# Patient Record
Sex: FEMALE | Race: WHITE | NOT HISPANIC OR LATINO | Employment: OTHER | URBAN - METROPOLITAN AREA
[De-identification: names, ages, dates, MRNs, and addresses within clinical notes are randomized per-mention and may not be internally consistent; named-entity substitution may affect disease eponyms.]

---

## 2023-04-21 ENCOUNTER — TELEPHONE (OUTPATIENT)
Dept: PRIMARY CARE | Facility: CLINIC | Age: 78
End: 2023-04-21

## 2023-04-21 DIAGNOSIS — J44.9 CHRONIC OBSTRUCTIVE PULMONARY DISEASE, UNSPECIFIED COPD TYPE (MULTI): Primary | ICD-10-CM

## 2023-04-21 DIAGNOSIS — R09.02 HYPOXIA: ICD-10-CM

## 2023-04-21 NOTE — TELEPHONE ENCOUNTER
Patient called stating she is requesting her oxygen liter flow up to go up. Yareli from [employer] states she needs an order faxed to [phone]. Please order and I can fax. Thanks!

## 2023-09-12 ENCOUNTER — TELEPHONE (OUTPATIENT)
Dept: PRIMARY CARE | Facility: CLINIC | Age: 78
End: 2023-09-12

## 2023-09-12 DIAGNOSIS — J44.1 COPD EXACERBATION (MULTI): Primary | ICD-10-CM

## 2023-09-12 RX ORDER — AMOXICILLIN AND CLAVULANATE POTASSIUM 875; 125 MG/1; MG/1
875 TABLET, FILM COATED ORAL 2 TIMES DAILY
Qty: 14 TABLET | Refills: 0 | Status: SHIPPED | OUTPATIENT
Start: 2023-09-12 | End: 2023-09-19

## 2023-09-12 RX ORDER — AMOXICILLIN AND CLAVULANATE POTASSIUM 875; 125 MG/1; MG/1
TABLET, FILM COATED ORAL
COMMUNITY
Start: 2023-02-02 | End: 2023-09-12 | Stop reason: SDUPTHER

## 2023-09-12 NOTE — TELEPHONE ENCOUNTER
Is not feeling well can you prescribe and antibiotic, lungs are full of phlegm and she is having a lot of trouble just moving. No sulfur. 126/82 was her BP, [facility].

## 2023-11-20 ENCOUNTER — APPOINTMENT (OUTPATIENT)
Dept: RADIOLOGY | Facility: HOSPITAL | Age: 78
DRG: 193 | End: 2023-11-20
Payer: COMMERCIAL

## 2023-11-20 ENCOUNTER — HOSPITAL ENCOUNTER (INPATIENT)
Facility: HOSPITAL | Age: 78
LOS: 6 days | Discharge: HOME HEALTH | DRG: 193 | End: 2023-11-27
Attending: EMERGENCY MEDICINE | Admitting: INTERNAL MEDICINE
Payer: COMMERCIAL

## 2023-11-20 DIAGNOSIS — J44.1 COPD WITH ACUTE EXACERBATION (MULTI): Primary | ICD-10-CM

## 2023-11-20 DIAGNOSIS — R06.09 DYSPNEA ON EXERTION: ICD-10-CM

## 2023-11-20 DIAGNOSIS — J18.9 PNEUMONIA OF RIGHT LOWER LOBE DUE TO INFECTIOUS ORGANISM: ICD-10-CM

## 2023-11-20 PROBLEM — R53.1 GENERALIZED WEAKNESS: Status: ACTIVE | Noted: 2023-11-20

## 2023-11-20 PROBLEM — N13.30 HYDRONEPHROSIS OF LEFT KIDNEY: Status: ACTIVE | Noted: 2023-11-20

## 2023-11-20 PROBLEM — N20.1 LEFT URETERAL CALCULUS: Status: ACTIVE | Noted: 2023-11-20

## 2023-11-20 PROBLEM — I35.0 MODERATE AORTIC STENOSIS: Chronic | Status: ACTIVE | Noted: 2023-11-20

## 2023-11-20 PROBLEM — J96.21 ACUTE ON CHRONIC HYPOXIC RESPIRATORY FAILURE (MULTI): Status: ACTIVE | Noted: 2023-11-20

## 2023-11-20 LAB
ALBUMIN SERPL BCP-MCNC: 4.2 G/DL (ref 3.4–5)
ALP SERPL-CCNC: 64 U/L (ref 33–136)
ALT SERPL W P-5'-P-CCNC: 15 U/L (ref 7–45)
ANION GAP SERPL CALC-SCNC: 11 MMOL/L (ref 10–20)
APPEARANCE UR: ABNORMAL
AST SERPL W P-5'-P-CCNC: 19 U/L (ref 9–39)
BASOPHILS # BLD AUTO: 0.05 X10*3/UL (ref 0–0.1)
BASOPHILS NFR BLD AUTO: 0.4 %
BILIRUB SERPL-MCNC: 0.6 MG/DL (ref 0–1.2)
BILIRUB UR STRIP.AUTO-MCNC: NEGATIVE MG/DL
BNP SERPL-MCNC: 180 PG/ML (ref 0–99)
BUN SERPL-MCNC: 16 MG/DL (ref 6–23)
CALCIUM SERPL-MCNC: 9.7 MG/DL (ref 8.6–10.3)
CAOX CRY #/AREA UR COMP ASSIST: ABNORMAL /HPF
CARDIAC TROPONIN I PNL SERPL HS: 21 NG/L (ref 0–13)
CHLORIDE SERPL-SCNC: 95 MMOL/L (ref 98–107)
CO2 SERPL-SCNC: 38 MMOL/L (ref 21–32)
COLOR UR: YELLOW
CREAT SERPL-MCNC: 0.68 MG/DL (ref 0.5–1.05)
EOSINOPHIL # BLD AUTO: 0.04 X10*3/UL (ref 0–0.4)
EOSINOPHIL NFR BLD AUTO: 0.3 %
ERYTHROCYTE [DISTWIDTH] IN BLOOD BY AUTOMATED COUNT: 13.1 % (ref 11.5–14.5)
FLUAV RNA RESP QL NAA+PROBE: NOT DETECTED
FLUBV RNA RESP QL NAA+PROBE: NOT DETECTED
GFR SERPL CREATININE-BSD FRML MDRD: 88 ML/MIN/1.73M*2
GLUCOSE SERPL-MCNC: 90 MG/DL (ref 74–99)
GLUCOSE UR STRIP.AUTO-MCNC: NEGATIVE MG/DL
HCT VFR BLD AUTO: 39.7 % (ref 36–46)
HGB BLD-MCNC: 11.9 G/DL (ref 12–16)
HOLD SPECIMEN: NORMAL
HYALINE CASTS #/AREA URNS AUTO: ABNORMAL /LPF
IMM GRANULOCYTES # BLD AUTO: 0.07 X10*3/UL (ref 0–0.5)
IMM GRANULOCYTES NFR BLD AUTO: 0.6 % (ref 0–0.9)
KETONES UR STRIP.AUTO-MCNC: ABNORMAL MG/DL
LACTATE SERPL-SCNC: 1.6 MMOL/L (ref 0.4–2)
LEUKOCYTE ESTERASE UR QL STRIP.AUTO: ABNORMAL
LYMPHOCYTES # BLD AUTO: 0.62 X10*3/UL (ref 0.8–3)
LYMPHOCYTES NFR BLD AUTO: 5.1 %
MCH RBC QN AUTO: 29.1 PG (ref 26–34)
MCHC RBC AUTO-ENTMCNC: 30 G/DL (ref 32–36)
MCV RBC AUTO: 97 FL (ref 80–100)
MONOCYTES # BLD AUTO: 0.96 X10*3/UL (ref 0.05–0.8)
MONOCYTES NFR BLD AUTO: 7.9 %
NEUTROPHILS # BLD AUTO: 10.39 X10*3/UL (ref 1.6–5.5)
NEUTROPHILS NFR BLD AUTO: 85.7 %
NITRITE UR QL STRIP.AUTO: NEGATIVE
NRBC BLD-RTO: 0 /100 WBCS (ref 0–0)
PH UR STRIP.AUTO: 5 [PH]
PLATELET # BLD AUTO: 249 X10*3/UL (ref 150–450)
POTASSIUM SERPL-SCNC: 3.5 MMOL/L (ref 3.5–5.3)
PROT SERPL-MCNC: 7 G/DL (ref 6.4–8.2)
PROT UR STRIP.AUTO-MCNC: ABNORMAL MG/DL
RBC # BLD AUTO: 4.09 X10*6/UL (ref 4–5.2)
RBC # UR STRIP.AUTO: NEGATIVE /UL
RBC #/AREA URNS AUTO: >20 /HPF
SARS-COV-2 RNA RESP QL NAA+PROBE: NOT DETECTED
SODIUM SERPL-SCNC: 140 MMOL/L (ref 136–145)
SP GR UR STRIP.AUTO: 1.02
UROBILINOGEN UR STRIP.AUTO-MCNC: <2 MG/DL
WBC # BLD AUTO: 12.1 X10*3/UL (ref 4.4–11.3)
WBC #/AREA URNS AUTO: ABNORMAL /HPF

## 2023-11-20 PROCEDURE — 99285 EMERGENCY DEPT VISIT HI MDM: CPT | Mod: 25 | Performed by: EMERGENCY MEDICINE

## 2023-11-20 PROCEDURE — 99223 1ST HOSP IP/OBS HIGH 75: CPT | Performed by: STUDENT IN AN ORGANIZED HEALTH CARE EDUCATION/TRAINING PROGRAM

## 2023-11-20 PROCEDURE — 2500000001 HC RX 250 WO HCPCS SELF ADMINISTERED DRUGS (ALT 637 FOR MEDICARE OP): Performed by: STUDENT IN AN ORGANIZED HEALTH CARE EDUCATION/TRAINING PROGRAM

## 2023-11-20 PROCEDURE — 96372 THER/PROPH/DIAG INJ SC/IM: CPT | Mod: 25

## 2023-11-20 PROCEDURE — 73590 X-RAY EXAM OF LOWER LEG: CPT | Mod: RIGHT SIDE | Performed by: RADIOLOGY

## 2023-11-20 PROCEDURE — 71046 X-RAY EXAM CHEST 2 VIEWS: CPT | Performed by: RADIOLOGY

## 2023-11-20 PROCEDURE — 36415 COLL VENOUS BLD VENIPUNCTURE: CPT | Performed by: EMERGENCY MEDICINE

## 2023-11-20 PROCEDURE — 94640 AIRWAY INHALATION TREATMENT: CPT

## 2023-11-20 PROCEDURE — 71046 X-RAY EXAM CHEST 2 VIEWS: CPT | Mod: FY

## 2023-11-20 PROCEDURE — 83880 ASSAY OF NATRIURETIC PEPTIDE: CPT | Performed by: EMERGENCY MEDICINE

## 2023-11-20 PROCEDURE — 84484 ASSAY OF TROPONIN QUANT: CPT | Performed by: EMERGENCY MEDICINE

## 2023-11-20 PROCEDURE — 2500000002 HC RX 250 W HCPCS SELF ADMINISTERED DRUGS (ALT 637 FOR MEDICARE OP, ALT 636 FOR OP/ED): Performed by: EMERGENCY MEDICINE

## 2023-11-20 PROCEDURE — 80053 COMPREHEN METABOLIC PANEL: CPT | Performed by: EMERGENCY MEDICINE

## 2023-11-20 PROCEDURE — 85025 COMPLETE CBC W/AUTO DIFF WBC: CPT | Performed by: EMERGENCY MEDICINE

## 2023-11-20 PROCEDURE — 96374 THER/PROPH/DIAG INJ IV PUSH: CPT | Mod: 59

## 2023-11-20 PROCEDURE — 87086 URINE CULTURE/COLONY COUNT: CPT | Mod: PORLAB | Performed by: EMERGENCY MEDICINE

## 2023-11-20 PROCEDURE — 87449 NOS EACH ORGANISM AG IA: CPT | Mod: PORLAB | Performed by: EMERGENCY MEDICINE

## 2023-11-20 PROCEDURE — 81001 URINALYSIS AUTO W/SCOPE: CPT | Performed by: EMERGENCY MEDICINE

## 2023-11-20 PROCEDURE — 73564 X-RAY EXAM KNEE 4 OR MORE: CPT | Mod: RIGHT SIDE | Performed by: RADIOLOGY

## 2023-11-20 PROCEDURE — 96365 THER/PROPH/DIAG IV INF INIT: CPT

## 2023-11-20 PROCEDURE — 2500000004 HC RX 250 GENERAL PHARMACY W/ HCPCS (ALT 636 FOR OP/ED): Performed by: STUDENT IN AN ORGANIZED HEALTH CARE EDUCATION/TRAINING PROGRAM

## 2023-11-20 PROCEDURE — 73070 X-RAY EXAM OF ELBOW: CPT | Mod: RIGHT SIDE | Performed by: RADIOLOGY

## 2023-11-20 PROCEDURE — 87040 BLOOD CULTURE FOR BACTERIA: CPT | Mod: PORLAB | Performed by: EMERGENCY MEDICINE

## 2023-11-20 PROCEDURE — 2500000002 HC RX 250 W HCPCS SELF ADMINISTERED DRUGS (ALT 637 FOR MEDICARE OP, ALT 636 FOR OP/ED): Performed by: STUDENT IN AN ORGANIZED HEALTH CARE EDUCATION/TRAINING PROGRAM

## 2023-11-20 PROCEDURE — 71260 CT THORAX DX C+: CPT

## 2023-11-20 PROCEDURE — 71260 CT THORAX DX C+: CPT | Performed by: RADIOLOGY

## 2023-11-20 PROCEDURE — 74177 CT ABD & PELVIS W/CONTRAST: CPT | Performed by: RADIOLOGY

## 2023-11-20 PROCEDURE — 73070 X-RAY EXAM OF ELBOW: CPT | Mod: RT,FY

## 2023-11-20 PROCEDURE — 2500000004 HC RX 250 GENERAL PHARMACY W/ HCPCS (ALT 636 FOR OP/ED): Performed by: EMERGENCY MEDICINE

## 2023-11-20 PROCEDURE — 96376 TX/PRO/DX INJ SAME DRUG ADON: CPT

## 2023-11-20 PROCEDURE — 87899 AGENT NOS ASSAY W/OPTIC: CPT | Mod: PORLAB | Performed by: EMERGENCY MEDICINE

## 2023-11-20 PROCEDURE — 96375 TX/PRO/DX INJ NEW DRUG ADDON: CPT

## 2023-11-20 PROCEDURE — 73590 X-RAY EXAM OF LOWER LEG: CPT | Mod: RT,FY

## 2023-11-20 PROCEDURE — 2550000001 HC RX 255 CONTRASTS: Performed by: EMERGENCY MEDICINE

## 2023-11-20 PROCEDURE — 87636 SARSCOV2 & INF A&B AMP PRB: CPT | Performed by: EMERGENCY MEDICINE

## 2023-11-20 PROCEDURE — 83605 ASSAY OF LACTIC ACID: CPT | Performed by: EMERGENCY MEDICINE

## 2023-11-20 PROCEDURE — 96367 TX/PROPH/DG ADDL SEQ IV INF: CPT

## 2023-11-20 PROCEDURE — 73564 X-RAY EXAM KNEE 4 OR MORE: CPT | Mod: RT,FY

## 2023-11-20 RX ORDER — ACETAMINOPHEN 650 MG/1
650 SUPPOSITORY RECTAL EVERY 4 HOURS PRN
Status: DISCONTINUED | OUTPATIENT
Start: 2023-11-20 | End: 2023-11-20

## 2023-11-20 RX ORDER — TALC
3 POWDER (GRAM) TOPICAL DAILY
Status: DISCONTINUED | OUTPATIENT
Start: 2023-11-20 | End: 2023-11-27 | Stop reason: HOSPADM

## 2023-11-20 RX ORDER — ACETAMINOPHEN 325 MG/1
650 TABLET ORAL EVERY 4 HOURS PRN
Status: DISCONTINUED | OUTPATIENT
Start: 2023-11-20 | End: 2023-11-20

## 2023-11-20 RX ORDER — GUAIFENESIN 600 MG/1
600 TABLET, EXTENDED RELEASE ORAL EVERY 12 HOURS PRN
Status: DISCONTINUED | OUTPATIENT
Start: 2023-11-20 | End: 2023-11-27 | Stop reason: HOSPADM

## 2023-11-20 RX ORDER — DOXYCYCLINE 100 MG/1
100 CAPSULE ORAL EVERY 12 HOURS SCHEDULED
Status: DISCONTINUED | OUTPATIENT
Start: 2023-11-20 | End: 2023-11-26

## 2023-11-20 RX ORDER — PANTOPRAZOLE SODIUM 40 MG/1
40 TABLET, DELAYED RELEASE ORAL
Status: DISCONTINUED | OUTPATIENT
Start: 2023-11-21 | End: 2023-11-27 | Stop reason: HOSPADM

## 2023-11-20 RX ORDER — IPRATROPIUM BROMIDE AND ALBUTEROL SULFATE 2.5; .5 MG/3ML; MG/3ML
3 SOLUTION RESPIRATORY (INHALATION)
Status: DISCONTINUED | OUTPATIENT
Start: 2023-11-20 | End: 2023-11-21

## 2023-11-20 RX ORDER — ONDANSETRON HYDROCHLORIDE 2 MG/ML
4 INJECTION, SOLUTION INTRAVENOUS EVERY 8 HOURS PRN
Status: DISCONTINUED | OUTPATIENT
Start: 2023-11-20 | End: 2023-11-27 | Stop reason: HOSPADM

## 2023-11-20 RX ORDER — PREDNISONE 5 MG/1
5 TABLET ORAL EVERY MORNING
Status: ON HOLD | COMMUNITY
End: 2023-11-27 | Stop reason: SDUPTHER

## 2023-11-20 RX ORDER — CEFTRIAXONE 2 G/50ML
2 INJECTION, SOLUTION INTRAVENOUS ONCE
Status: COMPLETED | OUTPATIENT
Start: 2023-11-20 | End: 2023-11-20

## 2023-11-20 RX ORDER — ACETAMINOPHEN 160 MG/5ML
650 SOLUTION ORAL EVERY 4 HOURS PRN
Status: DISCONTINUED | OUTPATIENT
Start: 2023-11-20 | End: 2023-11-20

## 2023-11-20 RX ORDER — SODIUM CHLORIDE, SODIUM LACTATE, POTASSIUM CHLORIDE, CALCIUM CHLORIDE 600; 310; 30; 20 MG/100ML; MG/100ML; MG/100ML; MG/100ML
75 INJECTION, SOLUTION INTRAVENOUS CONTINUOUS
Status: ACTIVE | OUTPATIENT
Start: 2023-11-20 | End: 2023-11-21

## 2023-11-20 RX ORDER — CEFTRIAXONE 2 G/50ML
2 INJECTION, SOLUTION INTRAVENOUS EVERY 24 HOURS
Status: DISCONTINUED | OUTPATIENT
Start: 2023-11-21 | End: 2023-11-26

## 2023-11-20 RX ORDER — METOPROLOL TARTRATE 25 MG/1
37.5 TABLET, FILM COATED ORAL 2 TIMES DAILY
Status: DISCONTINUED | OUTPATIENT
Start: 2023-11-20 | End: 2023-11-27 | Stop reason: HOSPADM

## 2023-11-20 RX ORDER — ONDANSETRON 4 MG/1
4 TABLET, FILM COATED ORAL EVERY 8 HOURS PRN
Status: DISCONTINUED | OUTPATIENT
Start: 2023-11-20 | End: 2023-11-27 | Stop reason: HOSPADM

## 2023-11-20 RX ORDER — PANTOPRAZOLE SODIUM 40 MG/10ML
40 INJECTION, POWDER, LYOPHILIZED, FOR SOLUTION INTRAVENOUS
Status: DISCONTINUED | OUTPATIENT
Start: 2023-11-21 | End: 2023-11-27 | Stop reason: HOSPADM

## 2023-11-20 RX ORDER — BUDESONIDE AND FORMOTEROL FUMARATE DIHYDRATE 160; 4.5 UG/1; UG/1
2 AEROSOL RESPIRATORY (INHALATION)
COMMUNITY
End: 2024-02-27

## 2023-11-20 RX ORDER — BISACODYL 5 MG
10 TABLET, DELAYED RELEASE (ENTERIC COATED) ORAL DAILY PRN
Status: DISCONTINUED | OUTPATIENT
Start: 2023-11-20 | End: 2023-11-27 | Stop reason: HOSPADM

## 2023-11-20 RX ORDER — VANCOMYCIN HYDROCHLORIDE 1 G/200ML
1000 INJECTION, SOLUTION INTRAVENOUS ONCE
Status: COMPLETED | OUTPATIENT
Start: 2023-11-20 | End: 2023-11-20

## 2023-11-20 RX ORDER — ACETAMINOPHEN 325 MG/1
650 TABLET ORAL EVERY 4 HOURS PRN
Status: DISCONTINUED | OUTPATIENT
Start: 2023-11-20 | End: 2023-11-27 | Stop reason: HOSPADM

## 2023-11-20 RX ORDER — VANCOMYCIN HYDROCHLORIDE 750 MG/150ML
750 INJECTION, SOLUTION INTRAVENOUS EVERY 12 HOURS
Status: DISCONTINUED | OUTPATIENT
Start: 2023-11-21 | End: 2023-11-21 | Stop reason: DRUGHIGH

## 2023-11-20 RX ORDER — POLYETHYLENE GLYCOL 3350 17 G/17G
17 POWDER, FOR SOLUTION ORAL DAILY
Status: DISCONTINUED | OUTPATIENT
Start: 2023-11-20 | End: 2023-11-27 | Stop reason: HOSPADM

## 2023-11-20 RX ORDER — HEPARIN SODIUM 5000 [USP'U]/ML
5000 INJECTION, SOLUTION INTRAVENOUS; SUBCUTANEOUS EVERY 8 HOURS
Status: DISCONTINUED | OUTPATIENT
Start: 2023-11-20 | End: 2023-11-27 | Stop reason: HOSPADM

## 2023-11-20 RX ORDER — IPRATROPIUM BROMIDE AND ALBUTEROL SULFATE 2.5; .5 MG/3ML; MG/3ML
3 SOLUTION RESPIRATORY (INHALATION) EVERY 20 MIN
Status: COMPLETED | OUTPATIENT
Start: 2023-11-20 | End: 2023-11-20

## 2023-11-20 RX ORDER — FENTANYL CITRATE 50 UG/ML
25 INJECTION, SOLUTION INTRAMUSCULAR; INTRAVENOUS ONCE
Status: COMPLETED | OUTPATIENT
Start: 2023-11-20 | End: 2023-11-20

## 2023-11-20 RX ORDER — BISACODYL 10 MG/1
10 SUPPOSITORY RECTAL DAILY PRN
Status: DISCONTINUED | OUTPATIENT
Start: 2023-11-20 | End: 2023-11-27 | Stop reason: HOSPADM

## 2023-11-20 RX ADMIN — ACETAMINOPHEN 650 MG: 325 TABLET ORAL at 17:30

## 2023-11-20 RX ADMIN — IPRATROPIUM BROMIDE AND ALBUTEROL SULFATE 3 ML: 2.5; .5 SOLUTION RESPIRATORY (INHALATION) at 09:53

## 2023-11-20 RX ADMIN — IPRATROPIUM BROMIDE AND ALBUTEROL SULFATE 3 ML: 2.5; .5 SOLUTION RESPIRATORY (INHALATION) at 10:00

## 2023-11-20 RX ADMIN — HEPARIN SODIUM 5000 UNITS: 5000 INJECTION INTRAVENOUS; SUBCUTANEOUS at 17:24

## 2023-11-20 RX ADMIN — VANCOMYCIN HYDROCHLORIDE 1000 MG: 1 INJECTION, SOLUTION INTRAVENOUS at 19:30

## 2023-11-20 RX ADMIN — IPRATROPIUM BROMIDE AND ALBUTEROL SULFATE 3 ML: 2.5; .5 SOLUTION RESPIRATORY (INHALATION) at 09:30

## 2023-11-20 RX ADMIN — FENTANYL CITRATE 25 MCG: 50 INJECTION INTRAMUSCULAR; INTRAVENOUS at 09:31

## 2023-11-20 RX ADMIN — CEFTRIAXONE SODIUM 2 G: 2 INJECTION, SOLUTION INTRAVENOUS at 12:55

## 2023-11-20 RX ADMIN — GUAIFENESIN 600 MG: 600 TABLET ORAL at 21:21

## 2023-11-20 RX ADMIN — Medication 3 MG: at 19:31

## 2023-11-20 RX ADMIN — METHYLPREDNISOLONE SODIUM SUCCINATE 125 MG: 125 INJECTION, POWDER, FOR SOLUTION INTRAMUSCULAR; INTRAVENOUS at 09:31

## 2023-11-20 RX ADMIN — DOXYCYCLINE 100 MG: 100 INJECTION, POWDER, LYOPHILIZED, FOR SOLUTION INTRAVENOUS at 13:54

## 2023-11-20 RX ADMIN — DOXYCYCLINE 100 MG: 100 CAPSULE ORAL at 21:21

## 2023-11-20 RX ADMIN — METOPROLOL TARTRATE 37.5 MG: 25 TABLET, FILM COATED ORAL at 21:21

## 2023-11-20 RX ADMIN — SODIUM CHLORIDE, POTASSIUM CHLORIDE, SODIUM LACTATE AND CALCIUM CHLORIDE 75 ML/HR: 600; 310; 30; 20 INJECTION, SOLUTION INTRAVENOUS at 17:24

## 2023-11-20 RX ADMIN — IOHEXOL 75 ML: 350 INJECTION, SOLUTION INTRAVENOUS at 12:18

## 2023-11-20 RX ADMIN — IPRATROPIUM BROMIDE AND ALBUTEROL SULFATE 3 ML: 2.5; .5 SOLUTION RESPIRATORY (INHALATION) at 19:31

## 2023-11-20 RX ADMIN — METHYLPREDNISOLONE SODIUM SUCCINATE 40 MG: 40 INJECTION, POWDER, LYOPHILIZED, FOR SOLUTION INTRAMUSCULAR; INTRAVENOUS at 17:23

## 2023-11-20 RX ADMIN — METHYLPREDNISOLONE SODIUM SUCCINATE 40 MG: 40 INJECTION, POWDER, LYOPHILIZED, FOR SOLUTION INTRAMUSCULAR; INTRAVENOUS at 21:21

## 2023-11-20 SDOH — SOCIAL STABILITY: SOCIAL INSECURITY: ABUSE: ADULT

## 2023-11-20 SDOH — SOCIAL STABILITY: SOCIAL INSECURITY: WERE YOU ABLE TO COMPLETE ALL THE BEHAVIORAL HEALTH SCREENINGS?: YES

## 2023-11-20 SDOH — SOCIAL STABILITY: SOCIAL INSECURITY: HAVE YOU HAD THOUGHTS OF HARMING ANYONE ELSE?: NO

## 2023-11-20 ASSESSMENT — PAIN DESCRIPTION - LOCATION: LOCATION: RIB CAGE

## 2023-11-20 ASSESSMENT — ENCOUNTER SYMPTOMS
DYSURIA: 0
CHILLS: 0
WEAKNESS: 1
WHEEZING: 1
BLOOD IN STOOL: 0
COLOR CHANGE: 0
NAUSEA: 0
DIZZINESS: 0
COUGH: 1
HEMATURIA: 0
SLEEP DISTURBANCE: 0
ABDOMINAL PAIN: 0
PHOTOPHOBIA: 0
VOMITING: 0
SHORTNESS OF BREATH: 1
LIGHT-HEADEDNESS: 0
PALPITATIONS: 0
FEVER: 0
FATIGUE: 1
POLYDIPSIA: 0
TREMORS: 0
CONFUSION: 0

## 2023-11-20 ASSESSMENT — PAIN SCALES - GENERAL
PAINLEVEL_OUTOF10: 2
PAINLEVEL_OUTOF10: 2

## 2023-11-20 ASSESSMENT — ACTIVITIES OF DAILY LIVING (ADL)
TOILETING: INDEPENDENT
BATHING: NEEDS ASSISTANCE
ADEQUATE_TO_COMPLETE_ADL: YES
FEEDING YOURSELF: INDEPENDENT
HEARING - RIGHT EAR: FUNCTIONAL
WALKS IN HOME: INDEPENDENT
PATIENT'S MEMORY ADEQUATE TO SAFELY COMPLETE DAILY ACTIVITIES?: YES
HEARING - LEFT EAR: FUNCTIONAL
JUDGMENT_ADEQUATE_SAFELY_COMPLETE_DAILY_ACTIVITIES: YES
GROOMING: INDEPENDENT
DRESSING YOURSELF: INDEPENDENT

## 2023-11-20 ASSESSMENT — LIFESTYLE VARIABLES
AUDIT-C TOTAL SCORE: 0
HAVE PEOPLE ANNOYED YOU BY CRITICIZING YOUR DRINKING: NO
HOW MANY STANDARD DRINKS CONTAINING ALCOHOL DO YOU HAVE ON A TYPICAL DAY: PATIENT DOES NOT DRINK
HAVE YOU EVER FELT YOU SHOULD CUT DOWN ON YOUR DRINKING: NO
AUDIT-C TOTAL SCORE: 0
HOW OFTEN DO YOU HAVE A DRINK CONTAINING ALCOHOL: NEVER
HOW OFTEN DO YOU HAVE 6 OR MORE DRINKS ON ONE OCCASION: NEVER
EVER HAD A DRINK FIRST THING IN THE MORNING TO STEADY YOUR NERVES TO GET RID OF A HANGOVER: NO
REASON UNABLE TO ASSESS: NO
SKIP TO QUESTIONS 9-10: 1
EVER FELT BAD OR GUILTY ABOUT YOUR DRINKING: NO

## 2023-11-20 ASSESSMENT — COLUMBIA-SUICIDE SEVERITY RATING SCALE - C-SSRS
6. HAVE YOU EVER DONE ANYTHING, STARTED TO DO ANYTHING, OR PREPARED TO DO ANYTHING TO END YOUR LIFE?: NO
1. IN THE PAST MONTH, HAVE YOU WISHED YOU WERE DEAD OR WISHED YOU COULD GO TO SLEEP AND NOT WAKE UP?: NO
2. HAVE YOU ACTUALLY HAD ANY THOUGHTS OF KILLING YOURSELF?: NO

## 2023-11-20 ASSESSMENT — COGNITIVE AND FUNCTIONAL STATUS - GENERAL
DAILY ACTIVITIY SCORE: 23
MOBILITY SCORE: 24
PATIENT BASELINE BEDBOUND: NO
HELP NEEDED FOR BATHING: A LITTLE

## 2023-11-20 ASSESSMENT — PATIENT HEALTH QUESTIONNAIRE - PHQ9
2. FEELING DOWN, DEPRESSED OR HOPELESS: NOT AT ALL
SUM OF ALL RESPONSES TO PHQ9 QUESTIONS 1 & 2: 0
1. LITTLE INTEREST OR PLEASURE IN DOING THINGS: NOT AT ALL

## 2023-11-20 ASSESSMENT — PAIN DESCRIPTION - ORIENTATION: ORIENTATION: RIGHT

## 2023-11-20 ASSESSMENT — PAIN - FUNCTIONAL ASSESSMENT: PAIN_FUNCTIONAL_ASSESSMENT: 0-10

## 2023-11-20 NOTE — PROGRESS NOTES
"Vancomycin Dosing by Pharmacy- INITIAL    Irish Davis is a 78 y.o. year old female who Pharmacy has been consulted for vancomycin dosing for other sepsis . Based on the patient's indication and renal status this patient will be dosed based on a goal AUC of 400-600.     Renal function is currently stable.    Visit Vitals  /87 (BP Location: Left arm, Patient Position: Lying)   Pulse (!) 124   Temp 36.6 °C (97.9 °F) (Temporal)   Resp (!) 28        Lab Results   Component Value Date    CREATININE 0.68 11/20/2023    CREATININE 0.68 09/12/2023    CREATININE 0.76 11/02/2021    CREATININE 0.58 07/20/2020        Patient weight is No results found for: \"PTWEIGHT\"    No results found for: \"CULTURE\"     No intake/output data recorded.  [unfilled]    Lab Results   Component Value Date    PATIENTTEMP 37.0 09/13/2023          Assessment/Plan     Patient will be given a loading dose of 1000 mg.  Will initiate vancomycin maintenance,  750 mg every 12 hours.    This dosing regimen is predicted by [facility] to result in the following pharmacokinetic parameters:  Loading dose: 1000 mg at 18:00 11/20/2023.  Regimen: 750 mg IV every 12 hours.  Start time: 17:16 on 11/20/2023  Exposure target: AUC24 (range)400-600 mg/L.hr   AUC24,ss: 591 mg/L.hr  Probability of AUC24 > 400: 87 %  Ctrough,ss: 19.4 mg/L  Probability of Ctrough,ss > 20: 47 %  Probability of nephrotoxicity (Lodise MINOO 2009): 16 %      Follow-up level will be ordered on 11/21 at 5:00 unless clinically indicated sooner.  Will continue to monitor renal function daily while on vancomycin and order serum creatinine at least every 48 hours if not already ordered.  Follow for continued vancomycin needs, clinical response, and signs/symptoms of toxicity.       Marco Antonio Mora, PharmD       "

## 2023-11-20 NOTE — H&P
History Of Present Illness:  Irish Davis is a 78 y.o. female with PMHx s/f HTN, HLD, moderate aortic stenosis, chronic bilateral lower extremity edema (on as needed Lasix), COPD with chronic hypoxic respiratory failure (baseline 3-4 L nasal cannula), presenting with worsening shortness of breath, cough, wheezing, congestion, weakness following a fall approximately 1 week ago.  Patient reports that in general, she has been worsening with her weakness over the last few months, but otherwise in her typical state of health.  Unfortunately, this changed after she got up to ambulate about a week ago and on return from the bathroom, caught her toe against the floor tile and fell onto her right side (no head strike, no loss of consciousness, no thinners).  She was able to get up and ambulate with the help of her  following this, but has some fairly persistent right knee, right elbow, right lateral chest discomfort since the fall.  She also describes that over the last 5 days in particular, she has noticed that her breathing has gotten much worse from her baseline.  She has had a productive cough, worsening shortness of breath at rest, and has felt generally unwell.  Her home inhalers have not helped with her symptoms.  She does admit that multiple family members she has seen recently have had upper respiratory illnesses with productive coughs as well.  She denies any overt chest pain or pressure, describes chronic lower extremity edema for which she did take Lasix yesterday with improvement, no dizziness or lightheadedness, nausea, vomiting, abdominal pain, focal or lateralizing sensorimotor deficits, flank pain, changes in urine output, dysuria, diarrhea, hematuria.    ED Course (Summary):   Vitals on presentation: T99.5, /88, HR 91, RR 20, SPO2 96% 4 L nasal cannula  CBC with WBC 12.1, Hb 11.9, platelets 249  Chemistries with glucose 90, sodium 140, testing 3.5, BUN 16, serum creatinine 0.68  Lactate  1.6  High-sensitivity troponin 21; ECG without acute ischemic changes  Viral panel negative  UA with trace leukocyte esterase, 11-20 WBCs  Imaging: No acute traumatic injuries.  CXR with a left lower lobe pneumonia.  CT chest abdomen pelvis with the same, but also notable for a large obstructing left ureteral calculus with associated severe hydronephrosis  Urine culture and blood culture sent from the ED.  Urology consulted from the ED.  Patient admitted to medicine.    ED Course:  ED Course as of 11/20/23 2128 Mon Nov 20, 2023   1602 Patient CT scan showing no acute traumatic injury, does show consolidation of the right lower lobe consistent with pneumonia, less likely contusion.  Given patient's cough, wheezing, subjective fevers, leukocytosis seems more consistent with an infectious etiology.  Patient started on ceftriaxone, doxycycline (given erythromycin allergy.)  Did discuss transfer with the patient, she would prefer to remain at [facility] for the duration of her care. [BR]      ED Course User Index  [BR] Panfilo Orr MD         Diagnoses as of 11/20/23 2128   COPD with acute exacerbation (CMS/[facility])   Pneumonia of right lower lobe due to infectious organism     Relevant Results  Results for orders placed or performed during the hospital encounter of 11/20/23 (from the past 24 hour(s))   CBC and Auto Differential   Result Value Ref Range    WBC 12.1 (H) 4.4 - 11.3 x10*3/uL    nRBC 0.0 0.0 - 0.0 /100 WBCs    RBC 4.09 4.00 - 5.20 x10*6/uL    Hemoglobin 11.9 (L) 12.0 - 16.0 g/dL    Hematocrit 39.7 36.0 - 46.0 %    MCV 97 80 - 100 fL    MCH 29.1 26.0 - 34.0 pg    MCHC 30.0 (L) 32.0 - 36.0 g/dL    RDW 13.1 11.5 - 14.5 %    Platelets 249 150 - 450 x10*3/uL    Neutrophils % 85.7 40.0 - 80.0 %    Immature Granulocytes %, Automated 0.6 0.0 - 0.9 %    Lymphocytes % 5.1 13.0 - 44.0 %    Monocytes % 7.9 2.0 - 10.0 %    Eosinophils % 0.3 0.0 - 6.0 %    Basophils % 0.4 0.0 - 2.0 %    Neutrophils Absolute  10.39 (H) 1.60 - 5.50 x10*3/uL    Immature Granulocytes Absolute, Automated 0.07 0.00 - 0.50 x10*3/uL    Lymphocytes Absolute 0.62 (L) 0.80 - 3.00 x10*3/uL    Monocytes Absolute 0.96 (H) 0.05 - 0.80 x10*3/uL    Eosinophils Absolute 0.04 0.00 - 0.40 x10*3/uL    Basophils Absolute 0.05 0.00 - 0.10 x10*3/uL   Comprehensive metabolic panel   Result Value Ref Range    Glucose 90 74 - 99 mg/dL    Sodium 140 136 - 145 mmol/L    Potassium 3.5 3.5 - 5.3 mmol/L    Chloride 95 (L) 98 - 107 mmol/L    Bicarbonate 38 (H) 21 - 32 mmol/L    Anion Gap 11 10 - 20 mmol/L    Urea Nitrogen 16 6 - 23 mg/dL    Creatinine 0.68 0.50 - 1.05 mg/dL    eGFR 88 >60 mL/min/1.73m*2    Calcium 9.7 8.6 - 10.3 mg/dL    Albumin 4.2 3.4 - 5.0 g/dL    Alkaline Phosphatase 64 33 - 136 U/L    Total Protein 7.0 6.4 - 8.2 g/dL    AST 19 9 - 39 U/L    Bilirubin, Total 0.6 0.0 - 1.2 mg/dL    ALT 15 7 - 45 U/L   B-Type Natriuretic Peptide   Result Value Ref Range     (H) 0 - 99 pg/mL   Troponin I, High Sensitivity   Result Value Ref Range    Troponin I, High Sensitivity 21 (H) 0 - 13 ng/L   Influenza A, and B PCR   Result Value Ref Range    Flu A Result Not Detected Not Detected    Flu B Result Not Detected Not Detected   Sars-CoV-2 PCR, Symptomatic   Result Value Ref Range    Coronavirus 2019, PCR Not Detected Not Detected   Lactate   Result Value Ref Range    Lactate 1.6 0.4 - 2.0 mmol/L   Urinalysis with Reflex Microscopic and Culture   Result Value Ref Range    Color, Urine Yellow Straw, Yellow    Appearance, Urine Hazy (N) Clear    Specific Gravity, Urine 1.023 1.005 - 1.035    pH, Urine 5.0 5.0, 5.5, 6.0, 6.5, 7.0, 7.5, 8.0    Protein, Urine 100 (2+) (N) NEGATIVE mg/dL    Glucose, Urine NEGATIVE NEGATIVE mg/dL    Blood, Urine NEGATIVE NEGATIVE    Ketones, Urine 80 (2+) (A) NEGATIVE mg/dL    Bilirubin, Urine NEGATIVE NEGATIVE    Urobilinogen, Urine <2.0 <2.0 mg/dL    Nitrite, Urine NEGATIVE NEGATIVE    Leukocyte Esterase, Urine TRACE (A) NEGATIVE    Extra Urine Gray Tube   Result Value Ref Range    Extra Tube Hold for add-ons.    Microscopic Only, Urine   Result Value Ref Range    WBC, Urine 11-20 (A) 1-5, NONE /HPF    RBC, Urine >20 (A) NONE, 1-2, 3-5 /HPF    Hyaline Casts, Urine 1+ (A) NONE /LPF    Calcium Oxalate Crystals, Urine 2+ (A) NONE, 1+ /HPF      XR elbow right 1-2 views    Result Date: 11/20/2023  Interpreted By:  Titi Trevino, STUDY: XR ELBOW RIGHT 1-2 VIEWS; ;  11/20/2023 1:18 pm   INDICATION: Signs/Symptoms:Fall, right elbow pain.   COMPARISON: None.   ACCESSION NUMBER(S): [id number]   ORDERING CLINICIAN: TRAVIS SKY   FINDINGS: Two views of the right elbow. Soft tissue swelling. No obvious displaced fracture or obvious effusion. Tiny calcifications adjacent to the radial head, favored to be chronic in nature. Chronic appearing irregularity adjacent to the medial humeral epicondyle.       Soft tissue swelling without acute osseous abnormality detected of the elbow.     MACRO: None   Signed by: Titi Trevino 11/20/2023 1:35 PM Dictation workstation:   [id number]    CT chest abdomen pelvis w IV contrast    Result Date: 11/20/2023  Interpreted By:  Carlton Ferrell, STUDY: CT CHEST ABDOMEN PELVIS W IV CONTRAST;  11/20/2023 12:42 pm   INDICATION: Signs/Symptoms:Fall, right sided chest/ right upper quadrant pain   COMPARISON: November 12, 2021 CT abdomen and pelvis. November 20, 2023 chest radiograph   ACCESSION NUMBER(S): [id number]   ORDERING CLINICIAN: TRAVIS SKY   TECHNIQUE: CT of the chest, abdomen, and pelvis was performed. Contiguous axial images were obtained at  5 mm slice thickness through the chest, and at  3 mm through the abdomen and pelvis. Coronal and sagittal reconstructions at  3 mm slice thickness were performed. 75 ml of contrast material Omnipaque 350 were administered intravenously without immediate complication.   FINDINGS: CHEST:   LUNG/PLEURA/LARGE AIRWAYS: Emphysema with superimposed prominent airspace abnormality at  the left base most compatible with pneumonia versus less likely contusion given history of trauma. 4 mm nodule superior segment right lower lobe series 2, image 47 most likely incidental. Unchanged coarse calcification right base series 2, image 84.   VESSELS: No traumatic aortic injury is appreciated within the limitations of this non-EKG gated study.  The thoracic aorta is of normal course and caliber.  Scattered atherosclerosis thoracic aorta and branch vessels. Moderate coronary artery calcifications are seen. The study is not optimized for evaluation of coronary arteries.   HEART: The heart is normal in size.   There is no pericardial effusion.   MEDIASTINUM AND WILBER: No pneumomediastinum, abnormal mediastinal fluid collection or mediastinal hematoma are appreciated.  Postinflammatory mediastinal calcifications. No adenopathy. The esophagus is normal in course and caliber.   CHEST WALL AND LOWER NECK: No acute fracture or dislocation of the included osseous structures are appreciated.  No suspicious osseous lesions are identified.  The thoracic wall soft tissues are within normal limits.   ABDOMEN:   LIVER: No focal perfusion abnormality of the liver is appreciated to suggest contusion or laceration. There is no subcapsular hematoma, no perihepatic fluid collection.   GALLBLADDER: No calcified stones or wall thickening.   BILE DUCTS: The intahepatic and extrahepatic bile ducts are not dilated.   PANCREAS: The pancreas appears unremarkable.   SPLEEN: No parenchymal perfusion deficit of the spleen is appreciated to suggest contusion or laceration. There is no subcapsular hematoma, no perisplenic fluid collection. Scattered benign postinflammatory calcifications.   ADRENAL GLANDS: The bilateral adrenal glands are unremarkable in appearance.   KIDNEYS AND URETERS: No parenchymal perfusion deficit is appreciated in bilateral kidneys to suggest contusion or laceration. There is no subcapsular hematoma, no perinephric  fluid collection.  There is severe left-sided hydronephrosis related to large obstructing distal left ureteral calculus which measures 19 mm length series 6, image 64. There is a smaller calculus along the superior margin of the obstructing calculus. There is postobstructive hypoenhancement left kidney. There are scattered bilateral probably benign renal cortical hypodensities most compatible with cysts although some are too small to characterize including 1 cm fluid density nodule anterior midportion left kidney series 2, image 111.   PELVIS:   BLADDER: The urinary bladder is distended without mass, calculus or surrounding inflammatory change.   REPRODUCTIVE ORGANS: No pelvic mass.   BOWEL: The stomach and bowel are normal caliber without evident hematoma.   VESSELS: Scattered atherosclerosis without aneurysm.   PERITONEUM/RETROPERITONEUM/LYMPH NODES: There is no evidence of intra- or retroperitoneal hematoma.  There is no free or loculated fluid collection, no free intraperitoneal air. No abdominopelvic lymphadenopathy is present.   BONES AND ABDOMINAL WALL: No evidence of acute fracture or dislocation of the included osseous structures.  No suspicious osseous lesions are identified.  No detected abdominal wall hematoma. Unchanged rectus diastasis and ventral abdominal eventration. Unchanged tiny periumbilical fat containing hernia.       Probable pneumonia versus less likely contusion left base.   Large obstructing calculus left ureter with severe left-sided hydronephrosis. Smaller calculus upstream to the large obstructing calculus left kidney.   Signed by: Carlton Ferrell 11/20/2023 1:07 PM Dictation workstation:   [id number]    XR chest 2 views    Result Date: 11/20/2023  Interpreted By:  Jeremy Macdonald, STUDY: XR CHEST 2 VIEWS;  11/20/2023 12:17 pm   INDICATION: Signs/Symptoms:Fall, right sided chest pain, cough.   COMPARISON: Chest radiograph dated 09/12/2023.   ACCESSION NUMBER(S): [id number]   ORDERING  CLINICIAN: TRAVIS SKY   FINDINGS: PA and lateral radiographs of the chest were provided.   CARDIOMEDIASTINAL SILHOUETTE: Cardiomediastinal silhouette is normal in size and configuration. Aortic atherosclerotic calcification.   LUNGS: There is irregular consolidative parenchymal opacity within the posterior base of the left lower lobe. Trace right basilar atelectasis/scarring, similar to comparison exam. No pneumothorax or pleural effusion.   ABDOMEN: No remarkable upper abdominal findings.   BONES: No acute osseous changes.       New, irregular left lower lobe airspace opacity that could reflect an element of pneumonia, aspiration, or atelectasis.   MACRO: None   Signed by: Jeremy Macdonald 11/20/2023 12:31 PM Dictation workstation:   [id number]    XR knee right 4+ views    Result Date: 11/20/2023  Interpreted By:  Jeremy Macdonald, STUDY: XR KNEE RIGHT 4+ VIEWS; XR TIBIA FIBULA RIGHT 2 VIEWS; ;  11/20/2023 12:17 pm   INDICATION: Signs/Symptoms:Fall, knee pain; Signs/Symptoms:Fall, right lower extremity pain.   COMPARISON: None.   ACCESSION NUMBER(S): [id number]; [id number]   ORDERING CLINICIAN: TRAVIS SKY   FINDINGS: Four view right knee: No acute fracture. Normal osseous alignment. Chondrocalcinosis is evident. Trace patellofemoral osteophytic spurring. Joint spaces are otherwise relatively well maintained. No large joint effusion. Enthesopathy along the superior pole of the patella. Atherosclerotic vascular calcification.   Two-view of the right tib-fib: No acute fracture. Trace smooth periosteal reaction along the distal diaphysis of the fibula versus vascular calcification. Normal osseous alignment. Knee joint as described above. Ankle mortise appears congruent. There is edema of the right lower extremity.       No acute osseous abnormality of the right knee. 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   No acute osseous abnormality of the right tib-fib.  Edema of the lower extremity.   MACRO: None   Signed by: Jeremy Macdonald 11/20/2023 12:28 PM Dictation workstation:   [id number]    XR tibia fibula right 2 views    Result Date: 11/20/2023  Interpreted By:  Jeremy Macdonald, STUDY: XR KNEE RIGHT 4+ VIEWS; XR TIBIA FIBULA RIGHT 2 VIEWS; ;  11/20/2023 12:17 pm   INDICATION: Signs/Symptoms:Fall, knee pain; Signs/Symptoms:Fall, right lower extremity pain.   COMPARISON: None.   ACCESSION NUMBER(S): [id number]; [id number]   ORDERING CLINICIAN: TRAVIS SKY   FINDINGS: Four view right knee: No acute fracture. Normal osseous alignment. Chondrocalcinosis is evident. Trace patellofemoral osteophytic spurring. Joint spaces are otherwise relatively well maintained. No large joint effusion. Enthesopathy along the superior pole of the patella. Atherosclerotic vascular calcification.   Two-view of the right tib-fib: No acute fracture. Trace smooth periosteal reaction along the distal diaphysis of the fibula versus vascular calcification. Normal osseous alignment. Knee joint as described above. Ankle mortise appears congruent. There is edema of the right lower extremity.       No acute osseous abnormality of the right knee. 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   No acute osseous abnormality of the right tib-fib. Edema of the lower extremity.   MACRO: None   Signed by: Jeremy Macdonald 11/20/2023 12:28 PM Dictation workstation:   [id number]     Scheduled medications:  [START ON 11/21/2023] cefTRIAXone, 2 g, intravenous, q24h  doxycycline, 100 mg, oral, q12h DORIS  heparin (porcine), 5,000 Units, subcutaneous, q8h  ipratropium-albuteroL, 3 mL, nebulization, q6h  melatonin, 3 mg, oral, Daily  methylPREDNISolone sodium succinate (PF), 40 mg, intravenous, q8h DORIS  metoprolol tartrate, 37.5 mg, oral, BID  [START ON 11/21/2023] pantoprazole, 40 mg, oral, Daily before breakfast   Or  [START ON 11/21/2023] pantoprazole, 40 mg,  intravenous, Daily before breakfast  polyethylene glycol, 17 g, oral, Daily  tiotropium, 2 Inhalation, inhalation, Daily  [START ON 11/21/2023] vancomycin, 750 mg, intravenous, q12h      Continuous medications:  lactated Ringer's, 75 mL/hr, Last Rate: 75 mL/hr (11/20/23 1724)      PRN medications:  PRN medications: acetaminophen, bisacodyl, bisacodyl, guaiFENesin, ondansetron **OR** ondansetron, oxygen      Past Medical History  She has a past medical history of COPD (chronic obstructive pulmonary disease) (CMS/[facility]), Disorder of the skin and subcutaneous tissue, unspecified (07/22/2016), Personal history of other diseases of the respiratory system (02/06/2018), and Personal history of other specified conditions (07/22/2016).    Surgical History  She has a past surgical history that includes Other surgical history (09/25/2019).     Social History  She reports that she has quit smoking. Her smoking use included cigarettes. She does not have any smokeless tobacco history on file. She reports that she does not drink alcohol and does not use drugs.    Family History  No family history on file.     Allergies  Erythromycin    Code Status  Full Code     Review of Systems   Constitutional:  Positive for fatigue. Negative for chills and fever.   Eyes:  Negative for photophobia and visual disturbance.   Respiratory:  Positive for cough, shortness of breath and wheezing.    Cardiovascular:  Negative for chest pain, palpitations and leg swelling.   Gastrointestinal:  Negative for abdominal pain, blood in stool, nausea and vomiting.   Endocrine: Negative for polydipsia and polyuria.   Genitourinary:  Negative for decreased urine volume, dysuria, hematuria and urgency.   Skin:  Negative for color change and rash.   Neurological:  Positive for weakness (Generalized). Negative for dizziness, tremors, syncope and light-headedness.   Psychiatric/Behavioral:  Negative for confusion and sleep disturbance.    All other systems reviewed  and are negative.    Last Recorded Vitals  /71   Pulse 108   Temp 36.6 °C (97.9 °F) (Temporal)   Resp 20   Wt 54.4 kg (120 lb)   SpO2 99%      Physical Exam  Vitals and nursing note reviewed.   Constitutional:       General: She is awake. She is not in acute distress.     Appearance: Normal appearance. She is well-developed. She is not ill-appearing or toxic-appearing.   HENT:      Head: Normocephalic and atraumatic.      Right Ear: External ear normal.      Left Ear: External ear normal.      Nose: Nose normal.      Mouth/Throat:      Pharynx: No oropharyngeal exudate or posterior oropharyngeal erythema.   Eyes:      General: No scleral icterus.     Extraocular Movements: Extraocular movements intact.      Pupils: Pupils are equal, round, and reactive to light.   Cardiovascular:      Rate and Rhythm: Regular rhythm. Tachycardia present.      Pulses: Normal pulses.      Heart sounds: Murmur heard.      No friction rub. No gallop.   Pulmonary:      Effort: Pulmonary effort is normal. No respiratory distress.      Breath sounds: Wheezing present. No rhonchi or rales.      Comments: Crackles at bases  Diminished bilaterally     Abdominal:      General: Bowel sounds are normal. There is no distension.      Palpations: Abdomen is soft. There is no hepatomegaly, splenomegaly or mass.      Tenderness: There is no abdominal tenderness.   Musculoskeletal:         General: No deformity or signs of injury. Normal range of motion.      Cervical back: Normal range of motion and neck supple. No rigidity or tenderness.      Right lower leg: Edema present.      Left lower leg: Edema present.   Skin:     General: Skin is warm and dry.      Capillary Refill: Capillary refill takes less than 2 seconds.      Coloration: Skin is not pale.      Findings: No erythema, lesion or rash.      Comments: Chronic venous stasis dermatitis to BLE   RLE/shin with some fluid blisters   Neurological:      General: No focal deficit present.       Mental Status: She is alert and oriented to person, place, and time.      Cranial Nerves: No cranial nerve deficit.   Psychiatric:         Mood and Affect: Mood normal.         Behavior: Behavior normal. Behavior is cooperative.       Assessment/Plan   Principal Problem:    COPD with acute exacerbation (CMS/HCC)  Active Problems:    Pneumonia of left lower lobe due to infectious organism    Acute on chronic hypoxic respiratory failure (CMS/HCC)    Hydronephrosis of left kidney    Left ureteral calculus    Moderate aortic stenosis    Generalized weakness    78 y.o. female with PMHx s/f HTN, HLD, moderate aortic stenosis, chronic bilateral lower extremity edema (on as needed Lasix), COPD with chronic hypoxic respiratory failure (baseline 3-4 L nasal cannula), presenting with worsening shortness of breath, cough, wheezing, congestion, weakness following a fall approximately 1 week ago.    1.) Acute on chronic hypoxic respiratory failure 2/2 LLL PNA, AECOPD  -Patient's baseline oxygen requirement is typically 3-4 L nasal cannula, while in the ED her oxygen requirement did uptrend to 6 L.  This is acute on chronic by evidence of increase >2 L from baseline.  -Send urine antigens  -Send sputum cx if able   -Continue antibiotic coverage with ceftriaxone/doxycycline   -DuoNebs  -IV steroids  -Pulmonary hygiene   -RT c/s appreciated  -Follow fever curve, WBCs     2.) Large left ureteral calculus with associated severe hydronephrosis  -Patient denies any current symptoms with this and has not had any urinary tract infection symptoms that she can name.  She is not having flank pain.  She has not noticed any changes in her urine output.  -We will continue very gentle IVF overnight  -UA is without obvious signs of infection at this time.  Her renal function is appropriate without any significant elevation.  -Patient is already on antibiotics as above  -Urology consultation appreciated    3.) Worsening dyspnea on exertion,  bilateral lower extremity edema; hx moderate aortic stenosis   -We will update echocardiogram    4.) Generalized weakness  -Likely in setting of above  -PT/OT    CODE STATUS: Full code; had discussion at the bedside with patient and until she is able to talk to her  and family she wishes for everything to be done.  DVT prophylaxis: SCDs, [facility]         Fabiana Shin PA-C    Dragon dictation software was used to dictate this note and thus there may be minor errors in translation/transcription including garbled speech or misspellings. Please contact for clarification if needed.

## 2023-11-20 NOTE — ED PROVIDER NOTES
HPI   Chief Complaint   Patient presents with    Fall     Fell on rt side, rib pain, elbow pain       The patient is an 78-year-old female past medical history of COPD on 4 L supplemental oxygen at baseline presenting with right-sided chest pain, right-sided abdominal pain, cough, dyspnea worsening after a fall 1 week ago.  Patient states that approximately 7 days ago she was ambulating across her room when she caught her foot on the floor precipitating her to fall onto her right side.  She denies striking her head or losing consciousness, states she was able to be helped up immediately by her  and ambulate at baseline after the fall.  Notes some mild discomfort of her right elbow, right lateral chest and right knee at that time.  Notes she feels slightly sore over the first 1-2 days but then noted pain worsening over the next 5 days.  Localizes pain mostly to her right lateral chest.  States that she also began to develop a cough at this time, productive of yellowish-greenish sputum that significantly worsened pain.  States that she has been in contact with family members with similar respiratory illnesses.  Has been feeling progressively more dyspneic over the last 3 days, has been using home bronchodilators without much improvement.  States that she has been using 4 L supplemental oxygen at home, but thinks that she needs to increase this (has not gone up on her supplemental oxygen).  At time of evaluation patient is also localizing pain over her right elbow, right knee, right shin.  Denies any neck pain, back pain.  Denies any nausea or vomiting.  Denies any other recent illness or injury.                          [place] Coma Scale Score: 15                  Patient History   Past Medical History:   Diagnosis Date    COPD (chronic obstructive pulmonary disease) (CMS/[facility])     Disorder of the skin and subcutaneous tissue, unspecified 07/22/2016    Leg skin lesion, left    Personal history of other diseases  of the respiratory system 02/06/2018    History of allergic rhinitis    Personal history of other specified conditions 07/22/2016    History of chest pain     Past Surgical History:   Procedure Laterality Date    OTHER SURGICAL HISTORY  09/25/2019    Cataract surgery     No family history on file.  Social History     Tobacco Use    Smoking status: Former     Types: Cigarettes    Smokeless tobacco: Not on file   Substance Use Topics    Alcohol use: Never    Drug use: Never       Physical Exam   ED Triage Vitals [11/20/23 0831]   Temp Heart Rate Resp BP   37.5 °C (99.5 °F) 91 13 146/88      SpO2 Temp src Heart Rate Source Patient Position   95 % -- Monitor Sitting      BP Location FiO2 (%)     Left arm --       Physical Exam  Vitals and nursing note reviewed.   Constitutional:       General: She is not in acute distress.     Appearance: Normal appearance. She is not ill-appearing or toxic-appearing.   HENT:      Head: Normocephalic and atraumatic. No raccoon eyes, Pardo's sign, abrasion, contusion, right periorbital erythema, left periorbital erythema or laceration.      Nose: No congestion or rhinorrhea.      Mouth/Throat:      Mouth: Mucous membranes are moist.      Pharynx: Oropharynx is clear. No oropharyngeal exudate or posterior oropharyngeal erythema.   Eyes:      Extraocular Movements: Extraocular movements intact.      Right eye: Normal extraocular motion.      Left eye: Normal extraocular motion.      Conjunctiva/sclera: Conjunctivae normal.      Pupils: Pupils are equal, round, and reactive to light.   Cardiovascular:      Rate and Rhythm: Normal rate and regular rhythm.      Pulses: Normal pulses.           Radial pulses are 2+ on the right side and 2+ on the left side.        Dorsalis pedis pulses are 2+ on the right side and 2+ on the left side.      Heart sounds: Normal heart sounds, S1 normal and S2 normal. No murmur heard.     No friction rub. No gallop.   Pulmonary:      Effort: Pulmonary effort is  normal. Prolonged expiration present. No tachypnea, bradypnea, accessory muscle usage or respiratory distress.      Breath sounds: No stridor. Examination of the right-upper field reveals wheezing. Examination of the left-upper field reveals wheezing. Examination of the right-middle field reveals wheezing. Examination of the left-middle field reveals wheezing. Examination of the right-lower field reveals wheezing and rhonchi. Examination of the left-lower field reveals wheezing and rhonchi. Wheezing and rhonchi present. No decreased breath sounds or rales.   Chest:      Chest wall: Tenderness (Over right lateral chest wall) present. No deformity or crepitus.   Abdominal:      General: Abdomen is flat. Bowel sounds are normal. There is no distension.      Palpations: Abdomen is soft.      Tenderness: There is abdominal tenderness in the right upper quadrant. There is no right CVA tenderness, left CVA tenderness, guarding or rebound.      Comments: No ecchymosis, no flank tenderness   Musculoskeletal:      Right shoulder: Normal.      Left shoulder: Normal.      Left upper arm: Normal.      Right elbow: Tenderness present in lateral epicondyle.      Left elbow: Normal.      Right forearm: Normal.      Left forearm: Normal.      Right wrist: Normal.      Left wrist: Normal.      Cervical back: Normal, full passive range of motion without pain, normal range of motion and neck supple. No rigidity. No spinous process tenderness or muscular tenderness. Normal range of motion.      Thoracic back: Normal.      Lumbar back: Normal.      Right hip: Normal.      Left hip: Normal.      Right upper leg: Normal.      Left upper leg: Normal.      Right knee: No swelling. Tenderness present over the medial joint line and lateral joint line.      Left knee: No swelling. Normal range of motion.      Right lower leg: Tenderness present. No deformity. 1+ Edema present.      Left lower leg: No deformity. 1+ Edema present.      Right  ankle: Normal.      Left ankle: Normal.      Right foot: Normal.      Left foot: Normal.   Skin:     General: Skin is warm and dry.      Capillary Refill: Capillary refill takes less than 2 seconds.      Comments: Ecchymosis around right knee, right lateral shin.   Neurological:      General: No focal deficit present.      Mental Status: She is alert and oriented to person, place, and time.      GCS: GCS eye subscore is 4. GCS verbal subscore is 5. GCS motor subscore is 6.      Cranial Nerves: No cranial nerve deficit.      Sensory: No sensory deficit.      Motor: No weakness, tremor or abnormal muscle tone.   Psychiatric:         Mood and Affect: Mood normal.         ED Course & MDM   ED Course as of 11/20/23 2140   Mon Nov 20, 2023   1602 Patient CT scan showing no acute traumatic injury, does show consolidation of the right lower lobe consistent with pneumonia, less likely contusion.  Given patient's cough, wheezing, subjective fevers, leukocytosis seems more consistent with an infectious etiology.  Patient started on ceftriaxone, doxycycline (given erythromycin allergy.)  Did discuss transfer with the patient, she would prefer to remain at [facility] for the duration of her care. [BR]      ED Course User Index  [BR] Panfilo Orr MD         Diagnoses as of 11/20/23 2140   COPD with acute exacerbation (CMS/HCC)   Pneumonia of right lower lobe due to infectious organism       Medical Decision Making  Patient presenting with lateral chest wall pain, cough, myalgias, dyspnea after a fall last week, has had an increasing O2 requirement, does have wheezing/ rhonchi on exam concerning for COPD exacerbation/ pneumonia. Will assess with CXR, will also obtain plain film imaging of lower leg/ elbow to assess for trauma given tenderness over these areas. Will obtain CT of the chest abdomen and pelvis as well given tenderness to right lateral chest wall/ upper abdomen. Will obtain troponin/ ECG as well.  Disposition pending labs/ imaging results/ reassessment.        Procedure  Procedures     Panfilo Orr MD  11/20/23 1667

## 2023-11-20 NOTE — PROGRESS NOTES
Irish Davis is a 78 y.o. female admitted for COPD with acute exacerbation (CMS/[facility]). Pharmacy reviewed the patient's [id number] medications and allergies for accuracy.    The list below reflectives the updated PTA list. Please review each medication in order reconciliation for additional clarification and justification.    Medications added:  Symbicort 160/4.5mcg 2p bid  Prednisone 5mg every day am     Medications modified:    Medications to be removed:    Medications of concern:      Prior to Admission Medications   Prescriptions Last Dose Informant Patient Reported? Taking?   Atrovent HFA 17 mcg/actuation inhaler   No No   Sig: USE 2 INHALATIONS FOUR TIMES A DAY   metoprolol tartrate (Lopressor) 25 mg tablet   No No   Sig: TAKE ONE AND ONE-HALF TABLETS TWICE A DAY      Facility-Administered Medications: None       Madalyn Aguero CPhT

## 2023-11-21 ENCOUNTER — APPOINTMENT (OUTPATIENT)
Dept: CARDIOLOGY | Facility: HOSPITAL | Age: 78
DRG: 193 | End: 2023-11-21
Payer: COMMERCIAL

## 2023-11-21 LAB
AORTIC VALVE MEAN GRADIENT: 11
AORTIC VALVE PEAK VELOCITY: 2.25
AV PEAK GRADIENT: 20.3
AVA (PEAK VEL): 1.21
AVA (VTI): 1
BACTERIA UR CULT: NORMAL
EJECTION FRACTION APICAL 4 CHAMBER: 61.2
EJECTION FRACTION: 61
LEFT ATRIUM VOLUME AREA LENGTH INDEX BSA: 28.4
LEFT VENTRICLE INTERNAL DIMENSION DIASTOLE: 4.1 (ref 3.5–6)
LEFT VENTRICULAR OUTFLOW TRACT DIAMETER: 1.8
LEGIONELLA AG UR QL: NEGATIVE
MITRAL VALVE E/A RATIO: 0.77
MITRAL VALVE E/E' RATIO: 10.01
RIGHT VENTRICLE FREE WALL PEAK S': 15.1
RIGHT VENTRICLE PEAK SYSTOLIC PRESSURE: 42.7
S PNEUM AG UR QL: NEGATIVE
TRICUSPID ANNULAR PLANE SYSTOLIC EXCURSION: 2.6
VANCOMYCIN SERPL-MCNC: 10.8 UG/ML (ref 5–20)

## 2023-11-21 PROCEDURE — 2500000002 HC RX 250 W HCPCS SELF ADMINISTERED DRUGS (ALT 637 FOR MEDICARE OP, ALT 636 FOR OP/ED): Performed by: PHYSICIAN ASSISTANT

## 2023-11-21 PROCEDURE — 2500000002 HC RX 250 W HCPCS SELF ADMINISTERED DRUGS (ALT 637 FOR MEDICARE OP, ALT 636 FOR OP/ED): Performed by: STUDENT IN AN ORGANIZED HEALTH CARE EDUCATION/TRAINING PROGRAM

## 2023-11-21 PROCEDURE — 2500000001 HC RX 250 WO HCPCS SELF ADMINISTERED DRUGS (ALT 637 FOR MEDICARE OP): Performed by: STUDENT IN AN ORGANIZED HEALTH CARE EDUCATION/TRAINING PROGRAM

## 2023-11-21 PROCEDURE — 93306 TTE W/DOPPLER COMPLETE: CPT | Performed by: INTERNAL MEDICINE

## 2023-11-21 PROCEDURE — 97161 PT EVAL LOW COMPLEX 20 MIN: CPT | Mod: GP | Performed by: PHYSICAL THERAPIST

## 2023-11-21 PROCEDURE — 97166 OT EVAL MOD COMPLEX 45 MIN: CPT | Mod: GO | Performed by: OCCUPATIONAL THERAPIST

## 2023-11-21 PROCEDURE — 87070 CULTURE OTHR SPECIMN AEROBIC: CPT | Mod: PORLAB | Performed by: INTERNAL MEDICINE

## 2023-11-21 PROCEDURE — 96372 THER/PROPH/DIAG INJ SC/IM: CPT | Performed by: STUDENT IN AN ORGANIZED HEALTH CARE EDUCATION/TRAINING PROGRAM

## 2023-11-21 PROCEDURE — 1200000002 HC GENERAL ROOM WITH TELEMETRY DAILY

## 2023-11-21 PROCEDURE — 99233 SBSQ HOSP IP/OBS HIGH 50: CPT | Performed by: PHYSICIAN ASSISTANT

## 2023-11-21 PROCEDURE — 80202 ASSAY OF VANCOMYCIN: CPT | Performed by: STUDENT IN AN ORGANIZED HEALTH CARE EDUCATION/TRAINING PROGRAM

## 2023-11-21 PROCEDURE — 99221 1ST HOSP IP/OBS SF/LOW 40: CPT | Performed by: UROLOGY

## 2023-11-21 PROCEDURE — 93306 TTE W/DOPPLER COMPLETE: CPT

## 2023-11-21 PROCEDURE — 2500000004 HC RX 250 GENERAL PHARMACY W/ HCPCS (ALT 636 FOR OP/ED): Performed by: STUDENT IN AN ORGANIZED HEALTH CARE EDUCATION/TRAINING PROGRAM

## 2023-11-21 PROCEDURE — 36415 COLL VENOUS BLD VENIPUNCTURE: CPT | Performed by: STUDENT IN AN ORGANIZED HEALTH CARE EDUCATION/TRAINING PROGRAM

## 2023-11-21 RX ORDER — IPRATROPIUM BROMIDE AND ALBUTEROL SULFATE 2.5; .5 MG/3ML; MG/3ML
3 SOLUTION RESPIRATORY (INHALATION) 4 TIMES DAILY
Status: DISCONTINUED | OUTPATIENT
Start: 2023-11-21 | End: 2023-11-21

## 2023-11-21 RX ORDER — PETROLATUM 420 MG/G
OINTMENT TOPICAL DAILY
Status: DISCONTINUED | OUTPATIENT
Start: 2023-11-21 | End: 2023-11-27 | Stop reason: HOSPADM

## 2023-11-21 RX ORDER — IPRATROPIUM BROMIDE AND ALBUTEROL SULFATE 2.5; .5 MG/3ML; MG/3ML
3 SOLUTION RESPIRATORY (INHALATION) EVERY 2 HOUR PRN
Status: DISCONTINUED | OUTPATIENT
Start: 2023-11-21 | End: 2023-11-27 | Stop reason: HOSPADM

## 2023-11-21 RX ORDER — BUDESONIDE AND FORMOTEROL FUMARATE DIHYDRATE 160; 4.5 UG/1; UG/1
2 AEROSOL RESPIRATORY (INHALATION)
Status: DISCONTINUED | OUTPATIENT
Start: 2023-11-21 | End: 2023-11-27 | Stop reason: HOSPADM

## 2023-11-21 RX ADMIN — IPRATROPIUM BROMIDE 2 PUFF: 17 AEROSOL, METERED RESPIRATORY (INHALATION) at 13:45

## 2023-11-21 RX ADMIN — VANCOMYCIN HYDROCHLORIDE 750 MG: 750 INJECTION, SOLUTION INTRAVENOUS at 06:03

## 2023-11-21 RX ADMIN — METOPROLOL TARTRATE 37.5 MG: 25 TABLET, FILM COATED ORAL at 10:32

## 2023-11-21 RX ADMIN — METHYLPREDNISOLONE SODIUM SUCCINATE 40 MG: 40 INJECTION, POWDER, LYOPHILIZED, FOR SOLUTION INTRAMUSCULAR; INTRAVENOUS at 21:21

## 2023-11-21 RX ADMIN — IPRATROPIUM BROMIDE 2 PUFF: 17 AEROSOL, METERED RESPIRATORY (INHALATION) at 20:00

## 2023-11-21 RX ADMIN — METHYLPREDNISOLONE SODIUM SUCCINATE 40 MG: 40 INJECTION, POWDER, LYOPHILIZED, FOR SOLUTION INTRAMUSCULAR; INTRAVENOUS at 06:03

## 2023-11-21 RX ADMIN — DOXYCYCLINE 100 MG: 100 CAPSULE ORAL at 10:33

## 2023-11-21 RX ADMIN — HEPARIN SODIUM 5000 UNITS: 5000 INJECTION INTRAVENOUS; SUBCUTANEOUS at 01:16

## 2023-11-21 RX ADMIN — BUDESONIDE AND FORMOTEROL FUMARATE DIHYDRATE 2 PUFF: 160; 4.5 AEROSOL RESPIRATORY (INHALATION) at 21:00

## 2023-11-21 RX ADMIN — HEPARIN SODIUM 5000 UNITS: 5000 INJECTION INTRAVENOUS; SUBCUTANEOUS at 10:34

## 2023-11-21 RX ADMIN — CEFTRIAXONE SODIUM 2 G: 2 INJECTION, SOLUTION INTRAVENOUS at 14:38

## 2023-11-21 RX ADMIN — METOPROLOL TARTRATE 37.5 MG: 25 TABLET, FILM COATED ORAL at 20:00

## 2023-11-21 RX ADMIN — ACETAMINOPHEN 650 MG: 325 TABLET ORAL at 21:24

## 2023-11-21 RX ADMIN — Medication 3 MG: at 20:00

## 2023-11-21 RX ADMIN — METHYLPREDNISOLONE SODIUM SUCCINATE 40 MG: 40 INJECTION, POWDER, LYOPHILIZED, FOR SOLUTION INTRAMUSCULAR; INTRAVENOUS at 14:38

## 2023-11-21 RX ADMIN — DOXYCYCLINE 100 MG: 100 CAPSULE ORAL at 20:00

## 2023-11-21 RX ADMIN — IPRATROPIUM BROMIDE 2 PUFF: 17 AEROSOL, METERED RESPIRATORY (INHALATION) at 16:00

## 2023-11-21 RX ADMIN — ACETAMINOPHEN 650 MG: 325 TABLET ORAL at 15:19

## 2023-11-21 ASSESSMENT — COGNITIVE AND FUNCTIONAL STATUS - GENERAL
HELP NEEDED FOR BATHING: A LOT
HELP NEEDED FOR BATHING: A LOT
TURNING FROM BACK TO SIDE WHILE IN FLAT BAD: A LOT
MOBILITY SCORE: 11
PERSONAL GROOMING: A LITTLE
TOILETING: A LOT
STANDING UP FROM CHAIR USING ARMS: A LOT
MOVING FROM LYING ON BACK TO SITTING ON SIDE OF FLAT BED WITH BEDRAILS: A LOT
PERSONAL GROOMING: A LITTLE
MOVING TO AND FROM BED TO CHAIR: A LOT
WALKING IN HOSPITAL ROOM: A LOT
TURNING FROM BACK TO SIDE WHILE IN FLAT BAD: A LOT
DRESSING REGULAR UPPER BODY CLOTHING: A LOT
MOBILITY SCORE: 11
DRESSING REGULAR UPPER BODY CLOTHING: A LOT
CLIMB 3 TO 5 STEPS WITH RAILING: TOTAL
TOILETING: A LOT
STANDING UP FROM CHAIR USING ARMS: A LOT
DRESSING REGULAR LOWER BODY CLOTHING: A LOT
DAILY ACTIVITIY SCORE: 14
DAILY ACTIVITIY SCORE: 14
EATING MEALS: A LITTLE
DRESSING REGULAR LOWER BODY CLOTHING: A LOT
MOVING TO AND FROM BED TO CHAIR: A LOT
WALKING IN HOSPITAL ROOM: A LOT
EATING MEALS: A LITTLE
CLIMB 3 TO 5 STEPS WITH RAILING: TOTAL
MOVING FROM LYING ON BACK TO SITTING ON SIDE OF FLAT BED WITH BEDRAILS: A LOT

## 2023-11-21 ASSESSMENT — ENCOUNTER SYMPTOMS
HALLUCINATIONS: 0
VOMITING: 0
DIARRHEA: 0
FACIAL SWELLING: 0
NAUSEA: 0
CHEST TIGHTNESS: 0
TROUBLE SWALLOWING: 0
WEAKNESS: 1
APPETITE CHANGE: 0
NUMBNESS: 0
BLOOD IN STOOL: 0
FREQUENCY: 0
COUGH: 0
DYSURIA: 0
SORE THROAT: 0
DIAPHORESIS: 0
JOINT SWELLING: 0
SHORTNESS OF BREATH: 1
BACK PAIN: 0
HEADACHES: 0
CONSTIPATION: 0
EYE PAIN: 0
PALPITATIONS: 0
HEMATURIA: 0
FEVER: 0
WOUND: 0
WHEEZING: 1
BRUISES/BLEEDS EASILY: 0
LIGHT-HEADEDNESS: 0
ABDOMINAL PAIN: 0
CHILLS: 0
DIZZINESS: 0
FLANK PAIN: 0
FATIGUE: 1

## 2023-11-21 ASSESSMENT — PAIN SCALES - GENERAL
PAINLEVEL_OUTOF10: 3
PAINLEVEL_OUTOF10: 0 - NO PAIN
PAINLEVEL_OUTOF10: 0 - NO PAIN
PAINLEVEL_OUTOF10: 3
PAINLEVEL_OUTOF10: 0 - NO PAIN

## 2023-11-21 ASSESSMENT — PAIN - FUNCTIONAL ASSESSMENT
PAIN_FUNCTIONAL_ASSESSMENT: 0-10

## 2023-11-21 ASSESSMENT — ACTIVITIES OF DAILY LIVING (ADL): LACK_OF_TRANSPORTATION: YES

## 2023-11-21 NOTE — PROGRESS NOTES
Occupational Therapy    Evaluation    Patient Name: Irihs Davis  MRN: [id number]  Today's Date: 11/21/2023  Time Calculation  Start Time: 0914  Stop Time: 0935  Time Calculation (min): 21 min        Assessment:  OT Assessment: Pt. would benefit from continued therapy toimprove activity ileana. and gen. strength to prior level  Prognosis: Good  End of Session Communication: Bedside nurse  End of Session Patient Position: Bed, 3 rail up, Alarm on  Prognosis: Good  Plan:  Treatment Interventions: Endurance training, UE strengthening/ROM, Functional transfer training, ADL retraining  OT Frequency: 4 times per week  OT Discharge Recommendations: Moderate intensity level of continued care  OT - OK to Discharge: Yes  Treatment Interventions: Endurance training, UE strengthening/ROM, Functional transfer training, ADL retraining    Subjective   Current Problem:  1. COPD with acute exacerbation (CMS/HCC)        2. Pneumonia of right lower lobe due to infectious organism        3. Dyspnea on exertion  Transthoracic Echo (TTE) Complete    Transthoracic Echo (TTE) Complete        General:  General  Reason for Referral: S.O.B., COPD exac., weakness  Referred By: Aleida  Co-Treatment: PT  Co-Treatment Reason: assist of 2 for safe mobility  Patient Position Received: Bed, 3 rail up, Alarm on  General Comment:  in prior to me stating she should be NPO for possible kidney stone procedure, pt. reluctant to getting up OOB 2/2 feeling weak and tired but agreeable, 5 liters O2 , petite build  Precautions:  Medical Precautions: Fall precautions, Oxygen therapy device and L/min  Vital Signs: pulsox 90 when checked during eval.      Pain:  Pain Assessment  Pain Assessment: 0-10  Pain Score: 0 - No pain Pt. states has a sore under R thigh     Objective   Cognition:  Overall Cognitive Status: Within Functional Limits           Home Living:  Type of Home: House  Lives With: Spouse  Home Adaptive Equipment: None  Home Layout: Two level  "(stays on 1st floor , sleeps in recliner)  Home Living Comments: Pt. states that she cannot ileana. showering in shower and hygiene \"has gone to hell\"  Prior Function:  Level of [place]: Independent with ADLs and functional transfers, Independent with homemaking with ambulation  Receives Help From:  ()  Homemaking Assistance: Needs assistance  Meal Prep:  (by )  Laundry: Other (Comment)  Vacuuming:  (by )  Driving/Transportation: Other (Comment)  Shopping:  (by , pt. has macular degen.)  IADL History:  does most now      ADL:  LE Dressing Assistance: Moderate  Toileting Assistance with Device: Moderate (to[place] [facility]  ADL Comments: pt. cyndi and niles, needs frequent rest breaks with min. activity  Activity Tolerance:  Endurance: Tolerates less than 10 min exercise, no significant change in vital signs (extended time for rest breaks for pt. to restore breathing during min. activity)  Bed Mobility/Transfers: Bed Mobility  Bed Mobility:  (Min.A X 2 to/from bed)    Transfers  Transfer:  (Min.A X 2 sit-to-stand 2/2 cyndi cage, S.O.B.)      Ambulation/Gait Training:  Ambulation/Gait Training  Ambulation/Gait Training Performed:  (Min.A X 2 for 2-3 steps at bedside)  Standing Balance:  Static Standing Balance  Static Standing-Level of Assistance:  (Min.A X 2 bedside)      Vision:Vision - Basic Assessment  Current Vision:  (decreased with macular degen.)     Strength:  Strength Comments: fair UEs, full AROM     Coordination:  Movements are Fluid and Coordinated: No (Noy cage)       Outcome Measures:[facility] Daily Activity  Putting on and taking off regular lower body clothing: A lot  Bathing (including washing, rinsing, drying): A lot  Putting on and taking off regular upper body clothing: A lot  Toileting, which includes using toilet, bedpan or urinal: A lot  Taking care of personal grooming such as brushing teeth: A little  Eating Meals: A little  Daily Activity - Total Score: " 14        Education Documentation  ADL Training, taught by Lady Vinson OT at 11/21/2023 10:46 AM.  Learner: Patient  Readiness: Acceptance  Method: Demonstration  Response: Needs Reinforcement  Comment: pursed lip breathing    Education Comments  No comments found.             Goals:  Encounter Problems       Encounter Problems (Active)       ADLs       Marta. UB and LB bathing with set-up while seated EOB with min. S.O.B. and O2  (Progressing)       Start:  11/21/23    Expected End:  11/28/23               EXERCISE/STRENGTHENING       Marta. bilat. UE AROM 12 reps./plane with min. S.O.B. and O2 and breathing tech.  (Progressing)       Start:  11/21/23    Expected End:  11/28/23               TRANSFERS       Demo. CGA func. trfrs. with device PRN.   (Progressing)       Start:  11/21/23    Expected End:  11/28/23

## 2023-11-21 NOTE — CARE PLAN
Problem: Fall/Injury  Goal: Not fall by end of shift  Outcome: Not Progressing  Goal: Be free from injury by end of the shift  Outcome: Not Progressing  Goal: Verbalize understanding of personal risk factors for fall in the hospital  Outcome: Not Progressing  Goal: Verbalize understanding of risk factor reduction measures to prevent injury from fall in the home  Outcome: Not Progressing  Goal: Use assistive devices by end of the shift  Outcome: Not Progressing  Goal: Pace activities to prevent fatigue by end of the shift  Outcome: Not Progressing     Problem: Skin  Goal: Decreased wound size/increased tissue granulation at next dressing change  Outcome: Not Progressing  Goal: Participates in plan/prevention/treatment measures  Outcome: Not Progressing  Goal: Prevent/manage excess moisture  Outcome: Not Progressing  Goal: Prevent/minimize sheer/friction injuries  Outcome: Not Progressing  Goal: Promote/optimize nutrition  Outcome: Not Progressing  Goal: Promote skin healing  Outcome: Not Progressing   The patient's goals for the shift include      The clinical goals for the shift include no falls    Over the shift, the patient did not make progress toward the following goals. Barriers to progression include none.

## 2023-11-21 NOTE — ASSESSMENT & PLAN NOTE
-Patient's baseline oxygen requirement is typically 3-4 L nasal cannula, while in the ED her oxygen requirement did uptrend to 6 L.  This is acute on chronic by evidence of increase >2 L from baseline.  -Urine antigens negative  -Send sputum cx if able   -Continue antibiotic coverage with ceftriaxone/doxycycline   -DuoNebs- she is refusing, will continue PRN  -IV steroids  -Pulmonary hygiene   -RT c/s appreciated  -Follow fever curve, WBCs

## 2023-11-21 NOTE — CARE PLAN
The patient's goals for the shift include      The clinical goals for the shift include no falls    Over the shift, the patient did not make progress toward the following goals. Barriers to progression include ***. Recommendations to address these barriers include ***.

## 2023-11-21 NOTE — ASSESSMENT & PLAN NOTE
-Patient denies any current symptoms with this and has not had any urinary tract infection symptoms that she can name.  She is not having flank pain.  She has not noticed any changes in her urine output.  -UA is without obvious signs of infection at this time.  Her renal function is appropriate without any significant elevation.  -Patient is already on antibiotics as above  -Urology consultation appreciated- planning outpatient cystoscopy/treatment

## 2023-11-21 NOTE — PROGRESS NOTES
Vancomycin Dosing by Pharmacy- Cessation of Therapy    Consult to pharmacy for vancomycin dosing has been discontinued by the prescriber, pharmacy will sign off at this time.    Please call pharmacy if there are further questions or re-enter a consult if vancomycin is resumed.     Ned Worthington [facility]

## 2023-11-21 NOTE — PROGRESS NOTES
Irish Davis is a 78 y.o. female on day 0 of admission presenting with COPD with acute exacerbation (CMS/[facility]).      Subjective   Irish Davis is a 78 y.o. female with PMHx s/f HTN, HLD, moderate aortic stenosis, chronic bilateral lower extremity edema (on as needed Lasix), COPD with chronic hypoxic respiratory failure (baseline 3-4 L nasal cannula), presented 11/20/23 with worsening shortness of breath, cough, wheezing, congestion, weakness following a fall approximately 1 week ago. She reports on return from the bathroom, caught her toe against the floor tile and fell onto her right side (no head strike, no loss of consciousness, no thinners), has some fairly persistent right knee, right elbow, right lateral chest discomfort since the fall.She also describes that over the last 5 days in particular, she has noticed that her breathing has gotten much worse from her baseline.  She has had a productive cough, worsening shortness of breath at rest, and has felt generally unwell.  Her home inhalers have not helped with her symptoms.  She does admit that multiple family members she has seen recently have had upper respiratory illnesses with productive coughs as well. She describes chronic lower extremity edema for which she did take Lasix yesterday with improvement. Lactate 1.6. High-sensitivity troponin 21; ECG without acute ischemic changes. Viral panel negative. UA with trace leukocyte esterase, 11-20 WBCs. Imaging: No acute traumatic injuries.  CXR with a left lower lobe pneumonia.  CT chest abdomen pelvis with the same, but also notable for a large obstructing left ureteral calculus with associated severe hydronephrosis. Urinary antigens negative    11/21/23: No acute events overnight. Vitals stable, 94% on 5L. No AM labs. She feels improved from a respiratory standpoint, but still weak.          Review of Systems   Constitutional:  Positive for fatigue. Negative for appetite change, chills, diaphoresis  and fever.   HENT:  Negative for congestion, ear pain, facial swelling, hearing loss, nosebleeds, sore throat, tinnitus and trouble swallowing.    Eyes:  Negative for pain.   Respiratory:  Positive for shortness of breath and wheezing. Negative for cough and chest tightness.    Cardiovascular:  Negative for chest pain, palpitations and leg swelling.   Gastrointestinal:  Negative for abdominal pain, blood in stool, constipation, diarrhea, nausea and vomiting.   Genitourinary:  Negative for dysuria, flank pain, frequency, hematuria and urgency.   Musculoskeletal:  Negative for back pain and joint swelling.   Skin:  Negative for rash and wound.   Neurological:  Positive for weakness. Negative for dizziness, syncope, light-headedness, numbness and headaches.   Hematological:  Does not bruise/bleed easily.   Psychiatric/Behavioral:  Negative for behavioral problems, hallucinations and suicidal ideas.           Objective     Last Recorded Vitals  /72 (Patient Position: Lying)   Pulse 81   Temp 37.1 °C (98.8 °F) (Temporal)   Resp 19   Wt 54.4 kg (120 lb)   SpO2 96%     Image Results  XR elbow right 1-2 views    Result Date: 11/20/2023  Interpreted By:  Titi Trevino, STUDY: XR ELBOW RIGHT 1-2 VIEWS; ;  11/20/2023 1:18 pm   INDICATION: Signs/Symptoms:Fall, right elbow pain.   COMPARISON: None.   ACCESSION NUMBER(S): [id number]   ORDERING CLINICIAN: TRAVIS SKY   FINDINGS: Two views of the right elbow. Soft tissue swelling. No obvious displaced fracture or obvious effusion. Tiny calcifications adjacent to the radial head, favored to be chronic in nature. Chronic appearing irregularity adjacent to the medial humeral epicondyle.       Soft tissue swelling without acute osseous abnormality detected of the elbow.     MACRO: None   Signed by: Titi Trevino 11/20/2023 1:35 PM Dictation workstation:   [id number]    CT chest abdomen pelvis w IV contrast    Result Date: 11/20/2023  Interpreted By:  Carlton Ferrell,  STUDY: CT CHEST ABDOMEN PELVIS W IV CONTRAST;  11/20/2023 12:42 pm   INDICATION: Signs/Symptoms:Fall, right sided chest/ right upper quadrant pain   COMPARISON: November 12, 2021 CT abdomen and pelvis. November 20, 2023 chest radiograph   ACCESSION NUMBER(S): [id number]   ORDERING CLINICIAN: TRAVIS SKY   TECHNIQUE: CT of the chest, abdomen, and pelvis was performed. Contiguous axial images were obtained at  5 mm slice thickness through the chest, and at  3 mm through the abdomen and pelvis. Coronal and sagittal reconstructions at  3 mm slice thickness were performed. 75 ml of contrast material Omnipaque 350 were administered intravenously without immediate complication.   FINDINGS: CHEST:   LUNG/PLEURA/LARGE AIRWAYS: Emphysema with superimposed prominent airspace abnormality at the left base most compatible with pneumonia versus less likely contusion given history of trauma. 4 mm nodule superior segment right lower lobe series 2, image 47 most likely incidental. Unchanged coarse calcification right base series 2, image 84.   VESSELS: No traumatic aortic injury is appreciated within the limitations of this non-EKG gated study.  The thoracic aorta is of normal course and caliber.  Scattered atherosclerosis thoracic aorta and branch vessels. Moderate coronary artery calcifications are seen. The study is not optimized for evaluation of coronary arteries.   HEART: The heart is normal in size.   There is no pericardial effusion.   MEDIASTINUM AND WILBER: No pneumomediastinum, abnormal mediastinal fluid collection or mediastinal hematoma are appreciated.  Postinflammatory mediastinal calcifications. No adenopathy. The esophagus is normal in course and caliber.   CHEST WALL AND LOWER NECK: No acute fracture or dislocation of the included osseous structures are appreciated.  No suspicious osseous lesions are identified.  The thoracic wall soft tissues are within normal limits.   ABDOMEN:   LIVER: No focal perfusion  abnormality of the liver is appreciated to suggest contusion or laceration. There is no subcapsular hematoma, no perihepatic fluid collection.   GALLBLADDER: No calcified stones or wall thickening.   BILE DUCTS: The intahepatic and extrahepatic bile ducts are not dilated.   PANCREAS: The pancreas appears unremarkable.   SPLEEN: No parenchymal perfusion deficit of the spleen is appreciated to suggest contusion or laceration. There is no subcapsular hematoma, no perisplenic fluid collection. Scattered benign postinflammatory calcifications.   ADRENAL GLANDS: The bilateral adrenal glands are unremarkable in appearance.   KIDNEYS AND URETERS: No parenchymal perfusion deficit is appreciated in bilateral kidneys to suggest contusion or laceration. There is no subcapsular hematoma, no perinephric fluid collection.  There is severe left-sided hydronephrosis related to large obstructing distal left ureteral calculus which measures 19 mm length series 6, image 64. There is a smaller calculus along the superior margin of the obstructing calculus. There is postobstructive hypoenhancement left kidney. There are scattered bilateral probably benign renal cortical hypodensities most compatible with cysts although some are too small to characterize including 1 cm fluid density nodule anterior midportion left kidney series 2, image 111.   PELVIS:   BLADDER: The urinary bladder is distended without mass, calculus or surrounding inflammatory change.   REPRODUCTIVE ORGANS: No pelvic mass.   BOWEL: The stomach and bowel are normal caliber without evident hematoma.   VESSELS: Scattered atherosclerosis without aneurysm.   PERITONEUM/RETROPERITONEUM/LYMPH NODES: There is no evidence of intra- or retroperitoneal hematoma.  There is no free or loculated fluid collection, no free intraperitoneal air. No abdominopelvic lymphadenopathy is present.   BONES AND ABDOMINAL WALL: No evidence of acute fracture or dislocation of the included osseous  structures.  No suspicious osseous lesions are identified.  No detected abdominal wall hematoma. Unchanged rectus diastasis and ventral abdominal eventration. Unchanged tiny periumbilical fat containing hernia.       Probable pneumonia versus less likely contusion left base.   Large obstructing calculus left ureter with severe left-sided hydronephrosis. Smaller calculus upstream to the large obstructing calculus left kidney.   Signed by: Carlton Ferrell 11/20/2023 1:07 PM Dictation workstation:   [id number]    XR chest 2 views    Result Date: 11/20/2023  Interpreted By:  Jeremy Macdonald, STUDY: XR CHEST 2 VIEWS;  11/20/2023 12:17 pm   INDICATION: Signs/Symptoms:Fall, right sided chest pain, cough.   COMPARISON: Chest radiograph dated 09/12/2023.   ACCESSION NUMBER(S): [id number]   ORDERING CLINICIAN: TRAVIS SKY   FINDINGS: PA and lateral radiographs of the chest were provided.   CARDIOMEDIASTINAL SILHOUETTE: Cardiomediastinal silhouette is normal in size and configuration. Aortic atherosclerotic calcification.   LUNGS: There is irregular consolidative parenchymal opacity within the posterior base of the left lower lobe. Trace right basilar atelectasis/scarring, similar to comparison exam. No pneumothorax or pleural effusion.   ABDOMEN: No remarkable upper abdominal findings.   BONES: No acute osseous changes.       New, irregular left lower lobe airspace opacity that could reflect an element of pneumonia, aspiration, or atelectasis.   MACRO: None   Signed by: Jeremy Macdonald 11/20/2023 12:31 PM Dictation workstation:   [id number]    XR knee right 4+ views    Result Date: 11/20/2023  Interpreted By:  Jeremy Macdonald, STUDY: XR KNEE RIGHT 4+ VIEWS; XR TIBIA FIBULA RIGHT 2 VIEWS; ;  11/20/2023 12:17 pm   INDICATION: Signs/Symptoms:Fall, knee pain; Signs/Symptoms:Fall, right lower extremity pain.   COMPARISON: None.   ACCESSION NUMBER(S): [id number]; [id number]   ORDERING CLINICIAN: TRAVIS SKY   FINDINGS: Four  view right knee: No acute fracture. Normal osseous alignment. Chondrocalcinosis is evident. Trace patellofemoral osteophytic spurring. Joint spaces are otherwise relatively well maintained. No large joint effusion. Enthesopathy along the superior pole of the patella. Atherosclerotic vascular calcification.   Two-view of the right tib-fib: No acute fracture. Trace smooth periosteal reaction along the distal diaphysis of the fibula versus vascular calcification. Normal osseous alignment. Knee joint as described above. Ankle mortise appears congruent. There is edema of the right lower extremity.       No acute osseous abnormality of the right knee. 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   No acute osseous abnormality of the right tib-fib. Edema of the lower extremity.   MACRO: None   Signed by: Jeremy Macdonald 11/20/2023 12:28 PM Dictation workstation:   [id number]    XR tibia fibula right 2 views    Result Date: 11/20/2023  Interpreted By:  Jeremy Macdonald, STUDY: XR KNEE RIGHT 4+ VIEWS; XR TIBIA FIBULA RIGHT 2 VIEWS; ;  11/20/2023 12:17 pm   INDICATION: Signs/Symptoms:Fall, knee pain; Signs/Symptoms:Fall, right lower extremity pain.   COMPARISON: None.   ACCESSION NUMBER(S): [id number]; [id number]   ORDERING CLINICIAN: TRAVIS SKY   FINDINGS: Four view right knee: No acute fracture. Normal osseous alignment. Chondrocalcinosis is evident. Trace patellofemoral osteophytic spurring. Joint spaces are otherwise relatively well maintained. No large joint effusion. Enthesopathy along the superior pole of the patella. Atherosclerotic vascular calcification.   Two-view of the right tib-fib: No acute fracture. Trace smooth periosteal reaction along the distal diaphysis of the fibula versus vascular calcification. Normal osseous alignment. Knee joint as described above. Ankle mortise appears congruent. There is edema of the right lower extremity.       No acute  osseous abnormality of the right knee. 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   No acute osseous abnormality of the right tib-fib. Edema of the lower extremity.   MACRO: None   Signed by: Jeremy Macdonald 11/20/2023 12:28 PM Dictation workstation:   [id number]       Lab Results  Results for orders placed or performed during the hospital encounter of 11/20/23 (from the past 24 hour(s))   Vancomycin   Result Value Ref Range    Vancomycin 10.8 5.0 - 20.0 ug/mL        Medications  Scheduled medications:  budesonide-formoteroL, 2 puff, inhalation, BID  cefTRIAXone, 2 g, intravenous, q24h  doxycycline, 100 mg, oral, q12h DORIS  heparin (porcine), 5,000 Units, subcutaneous, q8h  ipratropium, 2 puff, inhalation, 4x daily  melatonin, 3 mg, oral, Daily  methylPREDNISolone sodium succinate (PF), 40 mg, intravenous, q8h DORIS  metoprolol tartrate, 37.5 mg, oral, BID  pantoprazole, 40 mg, oral, Daily before breakfast   Or  pantoprazole, 40 mg, intravenous, Daily before breakfast  polyethylene glycol, 17 g, oral, Daily  tiotropium, 2 Inhalation, inhalation, Daily      Continuous medications:     PRN medications:  PRN medications: acetaminophen, bisacodyl, bisacodyl, guaiFENesin, ipratropium-albuteroL, ondansetron **OR** ondansetron, oxygen     Physical Exam  Constitutional:       General: She is not in acute distress.     Appearance: Normal appearance.      Comments: Thin, frail   HENT:      Head: Normocephalic and atraumatic.      Right Ear: External ear normal.      Left Ear: External ear normal.      Nose: Nose normal.      Mouth/Throat:      Mouth: Mucous membranes are moist.      Pharynx: Oropharynx is clear.   Eyes:      Extraocular Movements: Extraocular movements intact.      Conjunctiva/sclera: Conjunctivae normal.      Pupils: Pupils are equal, round, and reactive to light.   Cardiovascular:      Rate and Rhythm: Normal rate and regular rhythm.      Pulses: Normal  pulses.      Heart sounds: Normal heart sounds.   Pulmonary:      Effort: Pulmonary effort is normal. No respiratory distress.      Breath sounds: Wheezing and rales present. No rhonchi.      Comments: NC in place  Abdominal:      General: Abdomen is flat. Bowel sounds are normal.      Palpations: Abdomen is soft.      Tenderness: There is no abdominal tenderness. There is no right CVA tenderness, left CVA tenderness, guarding or rebound.   Musculoskeletal:         General: No swelling. Normal range of motion.      Cervical back: Normal range of motion and neck supple.   Skin:     General: Skin is warm and dry.      Capillary Refill: Capillary refill takes less than 2 seconds.      Findings: No lesion or rash.   Neurological:      General: No focal deficit present.      Mental Status: She is alert and oriented to person, place, and time. Mental status is at baseline.   Psychiatric:         Mood and Affect: Mood normal.         Behavior: Behavior normal.                  Code Status  Full Code     Assessment/Plan      Acute on chronic hypoxic respiratory failure (CMS/HCC)  Assessment & Plan  -Patient's baseline oxygen requirement is typically 3-4 L nasal cannula, while in the ED her oxygen requirement did uptrend to 6 L.  This is acute on chronic by evidence of increase >2 L from baseline.  -Urine antigens negative  -Send sputum cx if able   -Continue antibiotic coverage with ceftriaxone/doxycycline   -DuoNebs- she is refusing, will continue PRN  -IV steroids  -Pulmonary hygiene   -RT c/s appreciated  -Follow fever curve, WBCs     * COPD with acute exacerbation (CMS/HCC)  Assessment & Plan  -Steroids/doxy as above    Generalized weakness  Assessment & Plan  -Likely in setting of above  -PT/OT    Moderate aortic stenosis  Assessment & Plan  -We will update echocardiogram      Left ureteral calculus  Assessment & Plan  -Patient denies any current symptoms with this and has not had any urinary tract infection symptoms  that she can name.  She is not having flank pain.  She has not noticed any changes in her urine output.  -UA is without obvious signs of infection at this time.  Her renal function is appropriate without any significant elevation.  -Patient is already on antibiotics as above  -Urology consultation appreciated- planning outpatient cystoscopy/treatment     Hydronephrosis of left kidney  Assessment & Plan  -2/2 stone  -outpatient treatment per urology    Pneumonia of left lower lobe due to infectious organism  Assessment & Plan  -IV antibiotics- rocephin/doxy  -Urine antigens negative          DVT ppx: subcutaneous heparin      Please see orders for more complete plan    Sadie Maher PA-C

## 2023-11-21 NOTE — PROGRESS NOTES
Physical Therapy    Physical Therapy Evaluation    Patient Name: Irish Davis  MRN: [id number]  Today's Date: 11/21/2023   Time Calculation  Start Time: 0913  Stop Time: 0931  Time Calculation (min): 18 min    Assessment/Plan   PT Assessment  PT Assessment Results: Decreased strength, Decreased endurance, Impaired balance, Decreased mobility  Rehab Prognosis: Good  Evaluation/Treatment Tolerance: Patient limited by fatigue (SOB)  End of Session Communication: Bedside nurse  Assessment Comment:  (Patient requires 2 assist and extended rest breaks for minimal activity, would benefit from continued therapy at discharge.)  End of Session Patient Position: Bed, 3 rail up, Alarm on  IP OR SWING BED PT PLAN  Inpatient or Swing Bed: Inpatient  PT Plan  Treatment/Interventions: Bed mobility, Transfer training, Gait training, Balance training, Strengthening, Endurance training, Therapeutic exercise, Therapeutic activity  PT Plan: Skilled PT  PT Frequency: 3 times per week  PT Discharge Recommendations: Moderate intensity level of continued care  PT - OK to Discharge: Yes    Subjective     General Visit Information:  General  Reason for Referral: Dx: R sided CP, abd pain, PNA.  Referred By: Aleida  Past Medical History Relevant to Rehab: COPD on O2 4-4.5 L/min at home, aortic stenosis, recent fall, macular degeneration, HTN, LE edema,  Co-Treatment:  (with OT for safety and energy conservation)  Prior to Session Communication: Bedside nurse  Patient Position Received: Bed, 3 rail up, Alarm on  General Comment:  (Patient seen awake and alert in room 3328, O2 on 5L/min, tele)    Home Living:  Home Living  Type of Home:  (Patient lives with  at home, stays on first level, few steps to enter. Patient amb without AD, was fairly independent until recently, does admit to needing rest breaks after miimal activity due to SOB.)    Prior Level of Function:       Precautions:  Precautions  Precautions Comment:  (falls,  O2)    Vital Signs:  Vital Signs  SpO2: 90 %  Objective     Pain:  Pain Assessment  Pain Assessment:  (mild pain posterior R thigh due to wound, did not rate)    Cognition:  Cognition  Overall Cognitive Status: Within Functional Limits    General Assessments:  General Observation  General Observation:  (Patient needed 4-5 min rest breaks between activities due to significant SOB)   Activity Tolerance  Endurance: Tolerates less than 10 min exercise, no significant change in vital signs (needs extended rest breaks during activity due to SOB)     Strength  Strength Comments:  (overall generalized weakness; SUNITA LE quads grossly 4-/5)        Postural Control  Postural Control:  (sitting balance fair/fair+ - uses UE's for support/to assist with breathing; standing balance fair-/poor+ with 2 assist)          Functional Assessments:     Bed Mobility  Bed Mobility:  (supine to/from sit with Min assist x 2)  Transfers  Transfer:  (sit to stand with Min assist x 2; cues for sequencing, safety, balance, posture)  Ambulation/Gait Training  Ambulation/Gait Training Performed:  (Amb 2-3 small side steps with Min assist x 2 and hand held assist; cues for sequencing, safety, balance, posture, energy conservation)          Extremity/Trunk Assessments:                Outcome Measures:  [facility] Basic Mobility  Turning from your back to your side while in a flat bed without using bedrails: A lot  Moving from lying on your back to sitting on the side of a flat bed without using bedrails: A lot  Moving to and from bed to chair (including a wheelchair): A lot  Standing up from a chair using your arms (e.g. wheelchair or bedside chair): A lot  To walk in hospital room: A lot  Climbing 3-5 steps with railing: Total  Basic Mobility - Total Score: 11                            Goals:  Encounter Problems       Encounter Problems (Active)       PT Problem       transfers       Start:  11/21/23    Expected End:  12/05/23       Patient will perform  all transfers with CGA x1          gait       Start:  11/21/23    Expected End:  12/05/23       Patient will amb 25+ feet with Min assist x 1; A.D prn          strength       Start:  11/21/23    Expected End:  12/05/23       Patient will perform 20+ reps of AROM/RROM for SUNITA LE's to improve safety and functional independence               Education Documentation  Precautions, taught by Nora Sr PT at 11/21/2023 10:30 AM.  Learner: Patient  Readiness: Eager  Method: Explanation  Response: Needs Reinforcement    Mobility Training, taught by Nora Sr, PT at 11/21/2023 10:30 AM.  Learner: Patient  Readiness: Eager  Method: Explanation  Response: Needs Reinforcement    Education Comments  No comments found.

## 2023-11-21 NOTE — NURSING NOTE
Patient complaining of SOB. O2 sat 81% on 5 L. Nurse paged respiratory to come and look at pt. Pt o2 turned up to 6L and pt instructed to do pursed lip breathing and try to relax. Pt sitting at 90% angle and did refuse a breathing treatment. Pt o2 sat returned to 90%. With patient copd history, respiratory suggested we keep pt at 6L as long as she stays above 88%.

## 2023-11-21 NOTE — CONSULTS
Wound Care Consult     Visit Date: 11/21/2023      Patient Name: Irish Davis         MRN: [id number]           YOB: 1945     Pertinent Labs:   Albumin   Date Value Ref Range Status   11/20/2023 4.2 3.4 - 5.0 g/dL Final     Wound Assessment:  Wound 11/21/23 Other (comment) Pretibial Right (Active)   Wound Image   11/21/23 1611   Site Assessment [place];Other (Comment) 11/21/23 1611   Leticia-Wound Assessment Edematous;Dry;Ecchymotic 11/21/23 1611   Shape irregular 11/21/23 1611   Wound Length (cm) 5.5 cm 11/21/23 1611   Wound Width (cm) 2.5 cm 11/21/23 1611   Wound Surface Area (cm^2) 13.75 cm^2 11/21/23 1611   Drainage Description None 11/21/23 1611   Drainage Amount None 11/21/23 1611   Dressing Petroleum gauze;Foam 11/21/23 1611   Dressing Changed New 11/21/23 1611   Dressing Status Clean;Dry 11/21/23 1611       Wound 11/21/23 Other (comment) Leg Dorsal;Right;Upper (Active)   Wound Image   11/21/23 1612   Site Assessment Red;Yellow;Painful 11/21/23 1612   Leticia-Wound Assessment Pale;Pink 11/21/23 1612   Non-staged Wound Description Full thickness 11/21/23 1612   Shape round 11/21/23 1612   Wound Length (cm) 1 cm 11/21/23 1612   Wound Width (cm) 1 cm 11/21/23 1612   Wound Surface Area (cm^2) 1 cm^2 11/21/23 1612   Wound Depth (cm) 0.2 cm 11/21/23 1612   Wound Volume (cm^3) 0.2 cm^3 11/21/23 1612   State of Healing Non-healing 11/21/23 1612   Wound Bed Granulation (%) 30 % 11/21/23 1612   Wound Bed Epithelium (%) 20 % 11/21/23 1612   Wound Bed Slough (%) 50 % 11/21/23 1612   Drainage Description Yellow 11/21/23 1612   Drainage Amount Small 11/21/23 1612   Dressing Other (Comment) 11/21/23 1612   Dressing Changed Changed 11/21/23 1612   Dressing Status Clean;Dry 11/21/23 1612     Patient seen for report of “chronic right posterior leg wound, unable to see wound care when she was at home and blistering on right leg” (present on admission) complicated by PMH: HTN, HLD, moderate aortic stenosis,  chronic bilateral lower extremity edema (on as needed Lasix), COPD with chronic hypoxic respiratory failure. Exam conducted with patient  at bedside. Patient states she has had wound to right posterior thigh for years, unsure of what caused it. She states “my  is my doctor at home and fixed it up when it was infected”. Patient reports she has not seen an actual doctor for her wound. Foul smell and yellow drainage coming from right posterior full thickness ulceration, wound culture obtained per order. Right lower leg swelling noted with intact blister. BLE dry/flaky, aquaphor ordered. Patient states due to her COPD she is unable to wash her legs/feet as bending down causes her worsening shortness of breath. Patient had recent fall with generalized bruising noted. Skin hygiene and dressing care provided. See detailed assessment above from flowsheet. Recommendations below, reviewed with Dr. Levi, verbal orders received.      Wound location: Right posterior thigh   Treatment protocol recommended:  Cleanse with normal saline and pat dry.  Apply aquacel ag cut to size, cover with mepilex foam border every other day/prn.   Continue to off load, turning at least every 2 hours. Offload heels.    Wound location: Right lower leg   Treatment protocol recommended:  Cleanse with normal saline and pat dry.  Apply adaptic and mepilex ag every 3 days/prn.   ACE wrap RLE from toes to below knee. May remove for hygiene.   Continue to off load, turning at least every 2 hours. Offload heels.      Therapeutic surface: Patient on [employer] standard pressure relieving mattress during exam. Staff to continue to assist patient with turning and reposition atleast every 2 hours. Elevate legs, offload heels.     Nursing updated, continue pressure injury preventions, nursing to follow provider orders and re-consult wound RN if needed.    See above recommendations for treatment. I would recommend follow up in [facility] upon discharge.     Please contact me with questions or changes in patient condition.  Diamond Basurto RN  [facility]   [phone]         Diamond Basurto RN  11/21/2023  5:08 PM

## 2023-11-21 NOTE — PROGRESS NOTES
"Vancomycin Dosing by Pharmacy- FOLLOW UP    Irish Davis is a 78 y.o. year old female who Pharmacy has been consulted for vancomycin dosing for other sepsis . Based on the patient's indication and renal status this patient is being dosed based on a goal AUC of 500-600.     Renal function is currently stable.    Current vancomycin dose: 750 mg given every 12 hours    Estimated vancomycin AUC on current dose: 625 mg/L.hr     Visit Vitals  /67 (BP Location: Right arm, Patient Position: Lying)   Pulse 79   Temp 36.6 °C (97.9 °F) (Temporal)   Resp 16        Lab Results   Component Value Date    CREATININE 0.68 11/20/2023    CREATININE 0.68 09/12/2023    CREATININE 0.76 11/02/2021    CREATININE 0.58 07/20/2020        Patient weight is No results found for: \"PTWEIGHT\"    No results found for: \"CULTURE\"     No intake/output data recorded.  [unfilled]    Lab Results   Component Value Date    PATIENTTEMP 37.0 09/13/2023        Assessment/Plan    Above goal AUC. Orders placed for new vancomcyin regimen of 1250 every 24 hours to begin at 0700.    This dosing regimen is predicted by [employer] to result in the following pharmacokinetic parameters:  Loading dose: N/A  Regimen: 1250 mg IV every 24 hours.  Start time: 18:03 on 11/21/2023  Exposure target: AUC24 (range)400-600 mg/L.hr   AUC24,ss: 529 mg/L.hr  Probability of AUC24 > 400: 89 %  Ctrough,ss: 14.3 mg/L  Probability of Ctrough,ss > 20: 15 %  Probability of nephrotoxicity (Lodise MINOO 2009): 9 %      The next level will be obtained on 11/25 at 0500. May be obtained sooner if clinically indicated.   Will continue to monitor renal function daily while on vancomycin and order serum creatinine at least every 48 hours if not already ordered.  Follow for continued vancomycin needs, clinical response, and signs/symptoms of toxicity.       Darion Melendez [facility]           "

## 2023-11-21 NOTE — CONSULTS
Reason For Consult  Incidental finding left ureteral stone, hydronephrosis    History Of Present Illness  Irish Davis is a 78 y.o. female presenting with short of breath, pneumonia.  He has a incidental finding of left hydronephrosis and dilated ureter and 2 cm left distal ureteral stone.  Patient asymptomatic.  Patient denied by  history.    Tmax 97.9, vital signs stable    White count 12.2, hemoglobin 11.9, creatinine 0.68    Urine culture pending    CT scan abdomen pelvis: Significant dilated left proximal ureter and hydronephrosis, 3 cm left distal ureteral stone     Past Medical History  She has a past medical history of COPD (chronic obstructive pulmonary disease) (CMS/[facility]), Disorder of the skin and subcutaneous tissue, unspecified (07/22/2016), Personal history of other diseases of the respiratory system (02/06/2018), and Personal history of other specified conditions (07/22/2016).    Surgical History  She has a past surgical history that includes Other surgical history (09/25/2019).     Social History  She reports that she has quit smoking. Her smoking use included cigarettes. She does not have any smokeless tobacco history on file. She reports that she does not drink alcohol and does not use drugs.    Family History  No family history on file.     Allergies  Erythromycin    Review of Systems  Unremarkable     Physical Exam  Abdomen soft     Last Recorded Vitals  Blood pressure 118/67, pulse 79, temperature 36.6 °C (97.9 °F), temperature source Temporal, resp. rate 16, height 1.524 m (5'), weight 54.4 kg (120 lb), SpO2 94 %.    Relevant Results      CT abdomen and pelvis: Left ureteral stone     Assessment/Plan   Impression  Left ureteral stone-asymptomatic  Left hydronephrosis    Plan  Medical management for pneumonia  Elective cystoscopy ureteroscopy holmium laser lithotripsy and left stent as outpatient (will schedule)  Discussed with medical team and patient herself    I spent  minutes in the  professional and overall care of this patient.      Sebastián Ríos MD

## 2023-11-22 ENCOUNTER — APPOINTMENT (OUTPATIENT)
Dept: RADIOLOGY | Facility: HOSPITAL | Age: 78
DRG: 193 | End: 2023-11-22
Payer: COMMERCIAL

## 2023-11-22 LAB
ANION GAP SERPL CALC-SCNC: 13 MMOL/L (ref 10–20)
BUN SERPL-MCNC: 28 MG/DL (ref 6–23)
CALCIUM SERPL-MCNC: 9.5 MG/DL (ref 8.6–10.3)
CHLORIDE SERPL-SCNC: 98 MMOL/L (ref 98–107)
CO2 SERPL-SCNC: 33 MMOL/L (ref 21–32)
CREAT SERPL-MCNC: 0.69 MG/DL (ref 0.5–1.05)
ERYTHROCYTE [DISTWIDTH] IN BLOOD BY AUTOMATED COUNT: 13.2 % (ref 11.5–14.5)
GFR SERPL CREATININE-BSD FRML MDRD: 88 ML/MIN/1.73M*2
GLUCOSE SERPL-MCNC: 102 MG/DL (ref 74–99)
HCT VFR BLD AUTO: 37 % (ref 36–46)
HGB BLD-MCNC: 11.1 G/DL (ref 12–16)
MAGNESIUM SERPL-MCNC: 1.95 MG/DL (ref 1.6–2.4)
MCH RBC QN AUTO: 29 PG (ref 26–34)
MCHC RBC AUTO-ENTMCNC: 30 G/DL (ref 32–36)
MCV RBC AUTO: 97 FL (ref 80–100)
NRBC BLD-RTO: 0 /100 WBCS (ref 0–0)
PLATELET # BLD AUTO: 273 X10*3/UL (ref 150–450)
POTASSIUM SERPL-SCNC: 3.7 MMOL/L (ref 3.5–5.3)
RBC # BLD AUTO: 3.83 X10*6/UL (ref 4–5.2)
SODIUM SERPL-SCNC: 140 MMOL/L (ref 136–145)
WBC # BLD AUTO: 14 X10*3/UL (ref 4.4–11.3)

## 2023-11-22 PROCEDURE — 71275 CT ANGIOGRAPHY CHEST: CPT

## 2023-11-22 PROCEDURE — 36415 COLL VENOUS BLD VENIPUNCTURE: CPT | Performed by: PHYSICIAN ASSISTANT

## 2023-11-22 PROCEDURE — 71275 CT ANGIOGRAPHY CHEST: CPT | Performed by: RADIOLOGY

## 2023-11-22 PROCEDURE — 2550000001 HC RX 255 CONTRASTS: Performed by: INTERNAL MEDICINE

## 2023-11-22 PROCEDURE — 1200000002 HC GENERAL ROOM WITH TELEMETRY DAILY

## 2023-11-22 PROCEDURE — 2500000005 HC RX 250 GENERAL PHARMACY W/O HCPCS: Performed by: STUDENT IN AN ORGANIZED HEALTH CARE EDUCATION/TRAINING PROGRAM

## 2023-11-22 PROCEDURE — 97530 THERAPEUTIC ACTIVITIES: CPT | Mod: GP,CQ

## 2023-11-22 PROCEDURE — 2500000004 HC RX 250 GENERAL PHARMACY W/ HCPCS (ALT 636 FOR OP/ED): Performed by: PHYSICIAN ASSISTANT

## 2023-11-22 PROCEDURE — 96372 THER/PROPH/DIAG INJ SC/IM: CPT | Performed by: STUDENT IN AN ORGANIZED HEALTH CARE EDUCATION/TRAINING PROGRAM

## 2023-11-22 PROCEDURE — 2500000001 HC RX 250 WO HCPCS SELF ADMINISTERED DRUGS (ALT 637 FOR MEDICARE OP): Performed by: STUDENT IN AN ORGANIZED HEALTH CARE EDUCATION/TRAINING PROGRAM

## 2023-11-22 PROCEDURE — 2500000001 HC RX 250 WO HCPCS SELF ADMINISTERED DRUGS (ALT 637 FOR MEDICARE OP): Performed by: PHYSICIAN ASSISTANT

## 2023-11-22 PROCEDURE — 2500000004 HC RX 250 GENERAL PHARMACY W/ HCPCS (ALT 636 FOR OP/ED): Performed by: STUDENT IN AN ORGANIZED HEALTH CARE EDUCATION/TRAINING PROGRAM

## 2023-11-22 PROCEDURE — 2500000002 HC RX 250 W HCPCS SELF ADMINISTERED DRUGS (ALT 637 FOR MEDICARE OP, ALT 636 FOR OP/ED): Performed by: PHYSICIAN ASSISTANT

## 2023-11-22 PROCEDURE — 83735 ASSAY OF MAGNESIUM: CPT | Performed by: PHYSICIAN ASSISTANT

## 2023-11-22 PROCEDURE — 99233 SBSQ HOSP IP/OBS HIGH 50: CPT | Performed by: PHYSICIAN ASSISTANT

## 2023-11-22 PROCEDURE — 82374 ASSAY BLOOD CARBON DIOXIDE: CPT | Performed by: PHYSICIAN ASSISTANT

## 2023-11-22 PROCEDURE — 85027 COMPLETE CBC AUTOMATED: CPT | Performed by: PHYSICIAN ASSISTANT

## 2023-11-22 RX ORDER — LORAZEPAM 0.5 MG/1
0.5 TABLET ORAL ONCE
Status: COMPLETED | OUTPATIENT
Start: 2023-11-22 | End: 2023-11-22

## 2023-11-22 RX ORDER — FUROSEMIDE 10 MG/ML
20 INJECTION INTRAMUSCULAR; INTRAVENOUS ONCE
Status: COMPLETED | OUTPATIENT
Start: 2023-11-22 | End: 2023-11-22

## 2023-11-22 RX ADMIN — BUDESONIDE AND FORMOTEROL FUMARATE DIHYDRATE 2 PUFF: 160; 4.5 AEROSOL RESPIRATORY (INHALATION) at 20:00

## 2023-11-22 RX ADMIN — HEPARIN SODIUM 5000 UNITS: 5000 INJECTION INTRAVENOUS; SUBCUTANEOUS at 23:24

## 2023-11-22 RX ADMIN — IPRATROPIUM BROMIDE 2 PUFF: 17 AEROSOL, METERED RESPIRATORY (INHALATION) at 20:00

## 2023-11-22 RX ADMIN — METOPROLOL TARTRATE 37.5 MG: 25 TABLET, FILM COATED ORAL at 08:10

## 2023-11-22 RX ADMIN — DOXYCYCLINE 100 MG: 100 CAPSULE ORAL at 20:27

## 2023-11-22 RX ADMIN — CEFTRIAXONE SODIUM 2 G: 2 INJECTION, SOLUTION INTRAVENOUS at 13:14

## 2023-11-22 RX ADMIN — BUDESONIDE AND FORMOTEROL FUMARATE DIHYDRATE 2 PUFF: 160; 4.5 AEROSOL RESPIRATORY (INHALATION) at 08:10

## 2023-11-22 RX ADMIN — METOPROLOL TARTRATE 37.5 MG: 25 TABLET, FILM COATED ORAL at 20:27

## 2023-11-22 RX ADMIN — IPRATROPIUM BROMIDE 2 PUFF: 17 AEROSOL, METERED RESPIRATORY (INHALATION) at 16:37

## 2023-11-22 RX ADMIN — ACETAMINOPHEN 650 MG: 325 TABLET ORAL at 16:42

## 2023-11-22 RX ADMIN — METHYLPREDNISOLONE SODIUM SUCCINATE 40 MG: 40 INJECTION, POWDER, LYOPHILIZED, FOR SOLUTION INTRAMUSCULAR; INTRAVENOUS at 05:04

## 2023-11-22 RX ADMIN — FUROSEMIDE 20 MG: 10 INJECTION, SOLUTION INTRAMUSCULAR; INTRAVENOUS at 11:08

## 2023-11-22 RX ADMIN — METHYLPREDNISOLONE SODIUM SUCCINATE 40 MG: 40 INJECTION, POWDER, LYOPHILIZED, FOR SOLUTION INTRAMUSCULAR; INTRAVENOUS at 14:17

## 2023-11-22 RX ADMIN — Medication 5 L/MIN: at 14:17

## 2023-11-22 RX ADMIN — DOXYCYCLINE 100 MG: 100 CAPSULE ORAL at 08:10

## 2023-11-22 RX ADMIN — Medication 3 MG: at 20:27

## 2023-11-22 RX ADMIN — HEPARIN SODIUM 5000 UNITS: 5000 INJECTION INTRAVENOUS; SUBCUTANEOUS at 08:10

## 2023-11-22 RX ADMIN — IOHEXOL 75 ML: 350 INJECTION, SOLUTION INTRAVENOUS at 11:34

## 2023-11-22 RX ADMIN — IPRATROPIUM BROMIDE 2 PUFF: 17 AEROSOL, METERED RESPIRATORY (INHALATION) at 08:10

## 2023-11-22 RX ADMIN — METHYLPREDNISOLONE SODIUM SUCCINATE 40 MG: 40 INJECTION, POWDER, LYOPHILIZED, FOR SOLUTION INTRAMUSCULAR; INTRAVENOUS at 22:39

## 2023-11-22 RX ADMIN — HEPARIN SODIUM 5000 UNITS: 5000 INJECTION INTRAVENOUS; SUBCUTANEOUS at 16:38

## 2023-11-22 RX ADMIN — LORAZEPAM 0.5 MG: 0.5 TABLET ORAL at 11:09

## 2023-11-22 RX ADMIN — IPRATROPIUM BROMIDE 2 PUFF: 17 AEROSOL, METERED RESPIRATORY (INHALATION) at 13:15

## 2023-11-22 ASSESSMENT — ENCOUNTER SYMPTOMS
DIZZINESS: 0
WOUND: 0
DYSURIA: 0
LIGHT-HEADEDNESS: 0
APPETITE CHANGE: 0
CHILLS: 0
PALPITATIONS: 0
WEAKNESS: 1
BLOOD IN STOOL: 0
EYE PAIN: 0
BRUISES/BLEEDS EASILY: 0
COUGH: 0
FREQUENCY: 0
NAUSEA: 0
ABDOMINAL PAIN: 0
BACK PAIN: 0
DIAPHORESIS: 0
WHEEZING: 1
TROUBLE SWALLOWING: 0
DIARRHEA: 0
SORE THROAT: 0
FATIGUE: 1
CHEST TIGHTNESS: 0
HALLUCINATIONS: 0
FACIAL SWELLING: 0
HEADACHES: 0
JOINT SWELLING: 0
CONSTIPATION: 0
SHORTNESS OF BREATH: 1
FEVER: 0
FLANK PAIN: 0
NUMBNESS: 0
VOMITING: 0
HEMATURIA: 0

## 2023-11-22 ASSESSMENT — COGNITIVE AND FUNCTIONAL STATUS - GENERAL
WALKING IN HOSPITAL ROOM: A LOT
CLIMB 3 TO 5 STEPS WITH RAILING: A LOT
MOBILITY SCORE: 13
MOVING FROM LYING ON BACK TO SITTING ON SIDE OF FLAT BED WITH BEDRAILS: A LITTLE
TURNING FROM BACK TO SIDE WHILE IN FLAT BAD: A LOT
STANDING UP FROM CHAIR USING ARMS: A LOT
MOVING TO AND FROM BED TO CHAIR: A LOT

## 2023-11-22 ASSESSMENT — PAIN - FUNCTIONAL ASSESSMENT
PAIN_FUNCTIONAL_ASSESSMENT: 0-10
PAIN_FUNCTIONAL_ASSESSMENT: 0-10

## 2023-11-22 ASSESSMENT — PAIN SCALES - GENERAL
PAINLEVEL_OUTOF10: 3
PAINLEVEL_OUTOF10: 0 - NO PAIN
PAINLEVEL_OUTOF10: 0 - NO PAIN

## 2023-11-22 ASSESSMENT — PAIN DESCRIPTION - LOCATION: LOCATION: NECK

## 2023-11-22 NOTE — PROGRESS NOTES
11/22/23 0940   Discharge Planning   Living Arrangements Spouse/significant other   Support Systems Spouse/significant other   Assistance Needed none   Type of Residence Private residence   Home or Post Acute Services Post acute facilities (Rehab/SNF/etc)   Type of Post Acute Facility Services Rehab     I met with pt/ who said that pt is independent at home.  She denies use of mobility aides but does use O2 (4l NC) through [facility].  She has port concentrator and tanks.  I discussed PT rec for mod therapy. Pt did admit feeling weak but willing to look over SNF lists and discuss with .  Will follow up on pts decision at later time.

## 2023-11-22 NOTE — PROGRESS NOTES
Occupational Therapy                 Therapy Communication Note    Patient Name: Irish Davis  MRN: [id number]  Today's Date: 11/22/2023     Discipline: Occupational Therapy    Missed Visit Reason: Missed Visit Reason: Patient refused    Missed Time: Attempt    Comment: Pt claims she is too short of breath to participate in therapy

## 2023-11-22 NOTE — PROGRESS NOTES
Physical Therapy    Physical Therapy Treatment    Patient Name: Irish Davis  MRN: [id number]  Today's Date: 11/22/2023  Time Calculation  Start Time: 1343  Stop Time: 1402  Time Calculation (min): 19 min       Assessment/Plan   PT Assessment  End of Session Communication: Bedside nurse  Assessment Comment: patient's Spo2 86 perent, nurisng adjusted  02 and PT got paatient ot chiar for ease of breathiing, Spo2 increased to90 percent but patient toofatigued todo more  End of Session Patient Position: Up in chair, Alarm off, not on at start of session  PT Plan  Inpatient/Swing Bed or Outpatient: Inpatient  PT Plan  Treatment/Interventions: Bed mobility, Transfer training, Strengthening  PT Plan: Skilled PT  PT Frequency: 3 times per week  PT Discharge Recommendations: Moderate intensity level of continued care  PT - OK to Discharge: Yes      General Visit Information:   PT  Visit  PT Received On: 11/22/23  General  Prior to Session Communication: Bedside nurse  Patient Position Received: Bed, 3 rail up, Alarm off, not on at start of session  General Comment: patient half-way don in thebed breathinghard    Subjective   Precautions:     Vital Signs:       Objective   Pain:  Pain Assessment  Pain Assessment: 0-10  Pain Score: 0 - No pain (no complaints of pain)  Cognition:  Cognition  Overall Cognitive Status: Within Functional Limits (anxious)  Postural Control:     Extremity/Trunk Assessments:    Activity Tolerance:     Treatments:  Therapeutic Exercise  Therapeutic Exercise Performed: Yes  Therapeutic Exercise Activity 1: ankle pumps 10 reps each         Bed Mobility  Bed Mobility: Yes  Bed Mobility 1  Bed Mobility 1: Supine to sitting  Level of Assistance 1: Minimum assistance    Ambulation/Gait Training  Ambulation/Gait Training Performed: Yes  Ambulation/Gait Training 1  Surface 1: Level tile  Comments/Distance (ft) 1: gait rianing 3 feet to chair wiht  hand held assit  Transfers  Transfer: Yes  Transfer  1  Transfer From 1: Sit to  Transfer to 1: Stand  Transfer Level of Assistance 1: Minimum assistance    Outcome Measures:  [facility] Basic Mobility  Turning from your back to your side while in a flat bed without using bedrails: A little  Moving from lying on your back to sitting on the side of a flat bed without using bedrails: A lot  Moving to and from bed to chair (including a wheelchair): A lot  Standing up from a chair using your arms (e.g. wheelchair or bedside chair): A lot  To walk in hospital room: A lot  Climbing 3-5 steps with railing: A lot  Basic Mobility - Total Score: 13    Education Documentation  Precautions, taught by Rama Rockwell PTA at 11/22/2023  2:30 PM.  Learner: Patient  Readiness: Acceptance  Method: Explanation  Response: Verbalizes Understanding    Mobility Training, taught by Rama Rockwell PTA at 11/22/2023  2:30 PM.  Learner: Patient  Readiness: Acceptance  Method: Explanation  Response: Verbalizes Understanding    Education Comments  No comments found.        OP EDUCATION:       Encounter Problems       Encounter Problems (Active)       PT Problem       transfers (Progressing)       Start:  11/21/23    Expected End:  12/05/23       Patient will perform all transfers with CGA x1          gait (Progressing)       Start:  11/21/23    Expected End:  12/05/23       Patient will amb 25+ feet with Min assist x 1; A.D prn          strength (Progressing)       Start:  11/21/23    Expected End:  12/05/23       Patient will perform 20+ reps of AROM/RROM for SUNITA LE's to improve safety and functional independence

## 2023-11-22 NOTE — PROGRESS NOTES
"Irish Davis is a 78 y.o. female on day 1 of admission presenting with COPD with acute exacerbation (CMS/[facility]).      Subjective   Irish Davis is a 78 y.o. female with PMHx s/f HTN, HLD, moderate aortic stenosis, chronic bilateral lower extremity edema (on as needed Lasix), COPD with chronic hypoxic respiratory failure (baseline 3-4 L nasal cannula), presented 11/20/23 with worsening shortness of breath, cough, wheezing, congestion, weakness following a fall approximately 1 week ago. She reports on return from the bathroom, caught her toe against the floor tile and fell onto her right side (no head strike, no loss of consciousness, no thinners), has some fairly persistent right knee, right elbow, right lateral chest discomfort since the fall.She also describes that over the last 5 days in particular, she has noticed that her breathing has gotten much worse from her baseline.  She has had a productive cough, worsening shortness of breath at rest, and has felt generally unwell.  Her home inhalers have not helped with her symptoms.  She does admit that multiple family members she has seen recently have had upper respiratory illnesses with productive coughs as well. She describes chronic lower extremity edema for which she did take Lasix yesterday with improvement. Lactate 1.6. High-sensitivity troponin 21; ECG without acute ischemic changes. Viral panel negative. UA with trace leukocyte esterase, 11-20 WBCs. Imaging: No acute traumatic injuries.  CXR with a left lower lobe pneumonia.  CT chest abdomen pelvis with the same, but also notable for a large obstructing left ureteral calculus with associated severe hydronephrosis. Urinary antigens negative. Urine culture no growth      Blood cultures negative x1 day  Echo: EF \"normal\" but no percentage recorded, +DD, Mild AS      11/22/23: No acute events overnight. 81% on 5 L- increased to 6L with improvement to 90%, BP elevated 166/85. BMP unremarkable. Slight " leukocytosis, likely from steroids. She feels her breathing has worsened today, will give 1x dose IV lasix as she sounds like she has more crackles. CTA chest negative for PE but does show continued LLL pneumonia.          Review of Systems   Constitutional:  Positive for fatigue. Negative for appetite change, chills, diaphoresis and fever.   HENT:  Negative for congestion, ear pain, facial swelling, hearing loss, nosebleeds, sore throat, tinnitus and trouble swallowing.    Eyes:  Negative for pain.   Respiratory:  Positive for shortness of breath and wheezing. Negative for cough and chest tightness.    Cardiovascular:  Negative for chest pain, palpitations and leg swelling.   Gastrointestinal:  Negative for abdominal pain, blood in stool, constipation, diarrhea, nausea and vomiting.   Genitourinary:  Negative for dysuria, flank pain, frequency, hematuria and urgency.   Musculoskeletal:  Negative for back pain and joint swelling.   Skin:  Negative for rash and wound.   Neurological:  Positive for weakness. Negative for dizziness, syncope, light-headedness, numbness and headaches.   Hematological:  Does not bruise/bleed easily.   Psychiatric/Behavioral:  Negative for behavioral problems, hallucinations and suicidal ideas.           Objective     Last Recorded Vitals  /77 (BP Location: Right arm, Patient Position: Lying)   Pulse 81   Temp 36.6 °C (97.9 °F) (Temporal)   Resp 20   Wt 54.4 kg (120 lb)   SpO2 94%     Image Results  XR elbow right 1-2 views    Result Date: 11/20/2023  Interpreted By:  Titi Trevino, STUDY: XR ELBOW RIGHT 1-2 VIEWS; ;  11/20/2023 1:18 pm   INDICATION: Signs/Symptoms:Fall, right elbow pain.   COMPARISON: None.   ACCESSION NUMBER(S): [id number]   ORDERING CLINICIAN: TRAVIS SKY   FINDINGS: Two views of the right elbow. Soft tissue swelling. No obvious displaced fracture or obvious effusion. Tiny calcifications adjacent to the radial head, favored to be chronic in nature. Chronic  appearing irregularity adjacent to the medial humeral epicondyle.       Soft tissue swelling without acute osseous abnormality detected of the elbow.     MACRO: None   Signed by: Titi Trevino 11/20/2023 1:35 PM Dictation workstation:   [id number]    CT chest abdomen pelvis w IV contrast    Result Date: 11/20/2023  Interpreted By:  Carlton Ferrell, STUDY: CT CHEST ABDOMEN PELVIS W IV CONTRAST;  11/20/2023 12:42 pm   INDICATION: Signs/Symptoms:Fall, right sided chest/ right upper quadrant pain   COMPARISON: November 12, 2021 CT abdomen and pelvis. November 20, 2023 chest radiograph   ACCESSION NUMBER(S): [id number]   ORDERING CLINICIAN: TRAVIS SKY   TECHNIQUE: CT of the chest, abdomen, and pelvis was performed. Contiguous axial images were obtained at  5 mm slice thickness through the chest, and at  3 mm through the abdomen and pelvis. Coronal and sagittal reconstructions at  3 mm slice thickness were performed. 75 ml of contrast material Omnipaque 350 were administered intravenously without immediate complication.   FINDINGS: CHEST:   LUNG/PLEURA/LARGE AIRWAYS: Emphysema with superimposed prominent airspace abnormality at the left base most compatible with pneumonia versus less likely contusion given history of trauma. 4 mm nodule superior segment right lower lobe series 2, image 47 most likely incidental. Unchanged coarse calcification right base series 2, image 84.   VESSELS: No traumatic aortic injury is appreciated within the limitations of this non-EKG gated study.  The thoracic aorta is of normal course and caliber.  Scattered atherosclerosis thoracic aorta and branch vessels. Moderate coronary artery calcifications are seen. The study is not optimized for evaluation of coronary arteries.   HEART: The heart is normal in size.   There is no pericardial effusion.   MEDIASTINUM AND WILBER: No pneumomediastinum, abnormal mediastinal fluid collection or mediastinal hematoma are appreciated.  Postinflammatory  mediastinal calcifications. No adenopathy. The esophagus is normal in course and caliber.   CHEST WALL AND LOWER NECK: No acute fracture or dislocation of the included osseous structures are appreciated.  No suspicious osseous lesions are identified.  The thoracic wall soft tissues are within normal limits.   ABDOMEN:   LIVER: No focal perfusion abnormality of the liver is appreciated to suggest contusion or laceration. There is no subcapsular hematoma, no perihepatic fluid collection.   GALLBLADDER: No calcified stones or wall thickening.   BILE DUCTS: The intahepatic and extrahepatic bile ducts are not dilated.   PANCREAS: The pancreas appears unremarkable.   SPLEEN: No parenchymal perfusion deficit of the spleen is appreciated to suggest contusion or laceration. There is no subcapsular hematoma, no perisplenic fluid collection. Scattered benign postinflammatory calcifications.   ADRENAL GLANDS: The bilateral adrenal glands are unremarkable in appearance.   KIDNEYS AND URETERS: No parenchymal perfusion deficit is appreciated in bilateral kidneys to suggest contusion or laceration. There is no subcapsular hematoma, no perinephric fluid collection.  There is severe left-sided hydronephrosis related to large obstructing distal left ureteral calculus which measures 19 mm length series 6, image 64. There is a smaller calculus along the superior margin of the obstructing calculus. There is postobstructive hypoenhancement left kidney. There are scattered bilateral probably benign renal cortical hypodensities most compatible with cysts although some are too small to characterize including 1 cm fluid density nodule anterior midportion left kidney series 2, image 111.   PELVIS:   BLADDER: The urinary bladder is distended without mass, calculus or surrounding inflammatory change.   REPRODUCTIVE ORGANS: No pelvic mass.   BOWEL: The stomach and bowel are normal caliber without evident hematoma.   VESSELS: Scattered  atherosclerosis without aneurysm.   PERITONEUM/RETROPERITONEUM/LYMPH NODES: There is no evidence of intra- or retroperitoneal hematoma.  There is no free or loculated fluid collection, no free intraperitoneal air. No abdominopelvic lymphadenopathy is present.   BONES AND ABDOMINAL WALL: No evidence of acute fracture or dislocation of the included osseous structures.  No suspicious osseous lesions are identified.  No detected abdominal wall hematoma. Unchanged rectus diastasis and ventral abdominal eventration. Unchanged tiny periumbilical fat containing hernia.       Probable pneumonia versus less likely contusion left base.   Large obstructing calculus left ureter with severe left-sided hydronephrosis. Smaller calculus upstream to the large obstructing calculus left kidney.   Signed by: Carlton Ferrell 11/20/2023 1:07 PM Dictation workstation:   [id number]    XR chest 2 views    Result Date: 11/20/2023  Interpreted By:  Jeremy Macdonald, STUDY: XR CHEST 2 VIEWS;  11/20/2023 12:17 pm   INDICATION: Signs/Symptoms:Fall, right sided chest pain, cough.   COMPARISON: Chest radiograph dated 09/12/2023.   ACCESSION NUMBER(S): [id number]   ORDERING CLINICIAN: TRAVIS SKY   FINDINGS: PA and lateral radiographs of the chest were provided.   CARDIOMEDIASTINAL SILHOUETTE: Cardiomediastinal silhouette is normal in size and configuration. Aortic atherosclerotic calcification.   LUNGS: There is irregular consolidative parenchymal opacity within the posterior base of the left lower lobe. Trace right basilar atelectasis/scarring, similar to comparison exam. No pneumothorax or pleural effusion.   ABDOMEN: No remarkable upper abdominal findings.   BONES: No acute osseous changes.       New, irregular left lower lobe airspace opacity that could reflect an element of pneumonia, aspiration, or atelectasis.   MACRO: None   Signed by: Jeremy Macdonald 11/20/2023 12:31 PM Dictation workstation:   [id number]    XR knee right 4+  views    Result Date: 11/20/2023  Interpreted By:  Jeremy Macdonald, STUDY: XR KNEE RIGHT 4+ VIEWS; XR TIBIA FIBULA RIGHT 2 VIEWS; ;  11/20/2023 12:17 pm   INDICATION: Signs/Symptoms:Fall, knee pain; Signs/Symptoms:Fall, right lower extremity pain.   COMPARISON: None.   ACCESSION NUMBER(S): [id number]; [id number]   ORDERING CLINICIAN: TRAVIS SKY   FINDINGS: Four view right knee: No acute fracture. Normal osseous alignment. Chondrocalcinosis is evident. Trace patellofemoral osteophytic spurring. Joint spaces are otherwise relatively well maintained. No large joint effusion. Enthesopathy along the superior pole of the patella. Atherosclerotic vascular calcification.   Two-view of the right tib-fib: No acute fracture. Trace smooth periosteal reaction along the distal diaphysis of the fibula versus vascular calcification. Normal osseous alignment. Knee joint as described above. Ankle mortise appears congruent. There is edema of the right lower extremity.       No acute osseous abnormality of the right knee. 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   No acute osseous abnormality of the right tib-fib. Edema of the lower extremity.   MACRO: None   Signed by: Jeremy Macdonald 11/20/2023 12:28 PM Dictation workstation:   [id number]    XR tibia fibula right 2 views    Result Date: 11/20/2023  Interpreted By:  Jeremy Macdonald, STUDY: XR KNEE RIGHT 4+ VIEWS; XR TIBIA FIBULA RIGHT 2 VIEWS; ;  11/20/2023 12:17 pm   INDICATION: Signs/Symptoms:Fall, knee pain; Signs/Symptoms:Fall, right lower extremity pain.   COMPARISON: None.   ACCESSION NUMBER(S): [id number]; [id number]   ORDERING CLINICIAN: TRAVIS SKY   FINDINGS: Four view right knee: No acute fracture. Normal osseous alignment. Chondrocalcinosis is evident. Trace patellofemoral osteophytic spurring. Joint spaces are otherwise relatively well maintained. No large joint effusion. Enthesopathy along the superior pole  of the patella. Atherosclerotic vascular calcification.   Two-view of the right tib-fib: No acute fracture. Trace smooth periosteal reaction along the distal diaphysis of the fibula versus vascular calcification. Normal osseous alignment. Knee joint as described above. Ankle mortise appears congruent. There is edema of the right lower extremity.       No acute osseous abnormality of the right knee. 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   No acute osseous abnormality of the right tib-fib. Edema of the lower extremity.   MACRO: None   Signed by: Jeremy Macdonald 11/20/2023 12:28 PM Dictation workstation:   [id number]       Lab Results  Results for orders placed or performed during the hospital encounter of 11/20/23 (from the past 24 hour(s))   Transthoracic Echo (TTE) Complete   Result Value Ref Range    LVOT diam 1.80     LV biplane EF 61     MV E/A ratio 0.77     MV avg E/e' ratio 10.01     Tricuspid annular plane systolic excursion 2.6     AV mn grad 11.0     LA vol index A/L 28.4     AV pk lucrecia 2.25     RV free wall pk S' 15.10     RVSP 42.7     LVIDd 4.10     Aortic Valve Area by Continuity of VTI 1.00     Aortic Valve Area by Continuity of Peak Velocity 1.21     AV pk grad 20.3     LV A4C EF 61.2    CBC   Result Value Ref Range    WBC 14.0 (H) 4.4 - 11.3 x10*3/uL    nRBC 0.0 0.0 - 0.0 /100 WBCs    RBC 3.83 (L) 4.00 - 5.20 x10*6/uL    Hemoglobin 11.1 (L) 12.0 - 16.0 g/dL    Hematocrit 37.0 36.0 - 46.0 %    MCV 97 80 - 100 fL    MCH 29.0 26.0 - 34.0 pg    MCHC 30.0 (L) 32.0 - 36.0 g/dL    RDW 13.2 11.5 - 14.5 %    Platelets 273 150 - 450 x10*3/uL   Basic Metabolic Panel   Result Value Ref Range    Glucose 102 (H) 74 - 99 mg/dL    Sodium 140 136 - 145 mmol/L    Potassium 3.7 3.5 - 5.3 mmol/L    Chloride 98 98 - 107 mmol/L    Bicarbonate 33 (H) 21 - 32 mmol/L    Anion Gap 13 10 - 20 mmol/L    Urea Nitrogen 28 (H) 6 - 23 mg/dL    Creatinine 0.69 0.50 - 1.05  mg/dL    eGFR 88 >60 mL/min/1.73m*2    Calcium 9.5 8.6 - 10.3 mg/dL   Magnesium   Result Value Ref Range    Magnesium 1.95 1.60 - 2.40 mg/dL        Medications  Scheduled medications:  budesonide-formoteroL, 2 puff, inhalation, BID  cefTRIAXone, 2 g, intravenous, q24h  doxycycline, 100 mg, oral, q12h DORIS  heparin (porcine), 5,000 Units, subcutaneous, q8h  ipratropium, 2 puff, inhalation, 4x daily  melatonin, 3 mg, oral, Daily  methylPREDNISolone sodium succinate (PF), 40 mg, intravenous, q8h DORIS  metoprolol tartrate, 37.5 mg, oral, BID  pantoprazole, 40 mg, oral, Daily before breakfast   Or  pantoprazole, 40 mg, intravenous, Daily before breakfast  polyethylene glycol, 17 g, oral, Daily  white petrolatum, , Topical, Daily      Continuous medications:     PRN medications:  PRN medications: acetaminophen, bisacodyl, bisacodyl, guaiFENesin, ipratropium-albuteroL, ondansetron **OR** ondansetron, oxygen     Physical Exam  Constitutional:       General: She is not in acute distress.     Appearance: Normal appearance.      Comments: Thin, frail   HENT:      Head: Normocephalic and atraumatic.      Right Ear: External ear normal.      Left Ear: External ear normal.      Nose: Nose normal.      Mouth/Throat:      Mouth: Mucous membranes are moist.      Pharynx: Oropharynx is clear.   Eyes:      Extraocular Movements: Extraocular movements intact.      Conjunctiva/sclera: Conjunctivae normal.      Pupils: Pupils are equal, round, and reactive to light.   Cardiovascular:      Rate and Rhythm: Normal rate and regular rhythm.      Pulses: Normal pulses.      Heart sounds: Normal heart sounds.   Pulmonary:      Effort: Pulmonary effort is normal. No respiratory distress.      Breath sounds: Wheezing and rales present. No rhonchi.      Comments: NC in place  Slightly worse crackles bilateral bases  Abdominal:      General: Abdomen is flat. Bowel sounds are normal.      Palpations: Abdomen is soft.      Tenderness: There is no  abdominal tenderness. There is no right CVA tenderness, left CVA tenderness, guarding or rebound.   Musculoskeletal:         General: No swelling. Normal range of motion.      Cervical back: Normal range of motion and neck supple.   Skin:     General: Skin is warm and dry.      Capillary Refill: Capillary refill takes less than 2 seconds.      Findings: No lesion or rash.   Neurological:      General: No focal deficit present.      Mental Status: She is alert and oriented to person, place, and time. Mental status is at baseline.   Psychiatric:         Mood and Affect: Mood normal.         Behavior: Behavior normal.                  Code Status  Full Code     Assessment/Plan      Acute on chronic hypoxic respiratory failure (CMS/HCC)  Assessment & Plan  -Patient's baseline oxygen requirement is typically 3-4 L nasal cannula, while in the ED her oxygen requirement did uptrend to 6 L.  This is acute on chronic by evidence of increase >2 L from baseline.  -Urine antigens negative  -Send sputum cx if able   -Continue antibiotic coverage with ceftriaxone/doxycycline   -DuoNebs- she is refusing, will continue PRN  -IV steroids  -Pulmonary hygiene   -RT c/s appreciated  -Follow fever curve, WBCs     * COPD with acute exacerbation (CMS/HCC)  Assessment & Plan  -Steroids/doxy as above    Generalized weakness  Assessment & Plan  -Likely in setting of above  -PT/OT    Moderate aortic stenosis  Assessment & Plan  -We will update echocardiogram      Left ureteral calculus  Assessment & Plan  -Patient denies any current symptoms with this and has not had any urinary tract infection symptoms that she can name.  She is not having flank pain.  She has not noticed any changes in her urine output.  -UA is without obvious signs of infection at this time.  Her renal function is appropriate without any significant elevation.  -Patient is already on antibiotics as above  -Urology consultation appreciated- planning outpatient  cystoscopy/treatment     Hydronephrosis of left kidney  Assessment & Plan  -2/2 stone  -outpatient treatment per urology    Pneumonia of left lower lobe due to infectious organism  Assessment & Plan  -IV antibiotics- rocephin/doxy  -Urine antigens negative          DVT ppx: subcutaneous heparin      Please see orders for more complete plan    Sadie Maher PA-C

## 2023-11-22 NOTE — PROGRESS NOTES
"Nutrition Assessment Note  Nutrition Assessment    Reason for Assessment  Reason for Assessment: Dietitian discretion (MST 3)    History:   78 y.o. female with PMHx s/f HTN, HLD, moderate aortic stenosis, chronic bilateral lower extremity edema (on as needed Lasix), COPD with chronic hypoxic respiratory failure (baseline 3-4 L nasal cannula), presenting with worsening shortness of breath, cough, wheezing, congestion, weakness following a fall approximately 1 week ago.         11/22:  Pt reports having a poor appetite for the past 3 days.  Pt only consuming 3 bites of oatmeal today, but agreeable to Ensure supplements.           Current Diet: Adult diet Cardiac; 70 gm fat; 2 - 3 grams Sodium  Average meal Intake during admission: <25%   Average supplement intake during admission: none ordered at this time     Nutrition Related Findings: Pt reports anorexia.  .  Peripheral Vascular (WDL): Within Defined Limits.  .   Wound Type:  ulceration  (nursing/wound notes provide further details)           Labs:  Results from last 7 days   Lab Units 11/22/23  0503 11/20/23  0911   GLUCOSE mg/dL 102* 90   SODIUM mmol/L 140 140   POTASSIUM mmol/L 3.7 3.5   CHLORIDE mmol/L 98 95*   CO2 mmol/L 33* 38*   BUN mg/dL 28* 16   CREATININE mg/dL 0.69 0.68   EGFR mL/min/1.73m*2 88 88   CALCIUM mg/dL 9.5 9.7   MAGNESIUM mg/dL 1.95  --      No results found for: \"HGBA1C\"        Wt Readings from Last 10 Encounters:   11/20/23 54.4 kg (120 lb)   09/07/22 59 kg (130 lb)   08/10/22 59 kg (130 lb)   05/13/22 58.5 kg (129 lb)   06/14/21 54.4 kg (120 lb)   07/20/20 54.4 kg (120 lb)   03/10/20 56.2 kg (124 lb)                 Anthropometrics:            Weight Change  Weight History / % Weight Change: Pt reports stable weight, and UBW of 126#         IBW/kg (Dietitian Calculated): 45.45 kg        Energy Needs:  Calculated Energy Needs Using Equations  Temp: 36.8 °C (98.3 °F)    Estimated Energy Needs  Total Energy Estimated Needs (kCal): 1632 " kCal  Total Estimated Energy Need per Day (kCal/kg): 30 kCal/kg    Estimated Protein Needs  Total Protein Estimated Needs (g): 65 g  Total Protein Estimated Needs (g/kg): 1.2 g/kg    Estimated Fluid Needs  Method for Estimating Needs: 1ml/kcal         Nutrition Focused Physical Findings:  Subcutaneous Fat Loss  Orbital Fat Pads: Well nourshed (slightly bulging fat pads)  Buccal Fat Pads: Well nourished (full, rounded cheeks)    Muscle Wasting  Temporalis: Well nourished (well-defined muscle)  Pectoralis (Clavicular Region): Well nourished (clavicle not visible)            Nutrition Diagnosis        Patient has Nutrition Diagnosis: Yes  Nutrition Diagnosis 1: Increased energy expenditure  Diagnosis Status (1): New  Related to (1): hx of COPD  As Evidenced by (1): increased energy needs to promote weight gain             Nutrition Interventions/Recommendations   Nutrition Prescription  Individualized Nutrition Prescription Provided for : Cardiac diet    Food and/or Nutrient Delivery Interventions  Interventions: Meals and snacks, Medical food supplement    Meals and Snacks: General healthful diet          Medical Food Supplement: Commercial beverage  Goal: Trial Margy Ensure plus daily            Nutrition Counseling  Counseling Theoretical Approach: Nutrition counseling based on health belief model  Counseling Strategies: Nutrition counseling based on goal setting strategy    Coordination of Nutrition Care by a Nutrition Professional  Collaboration and Referral of Nutrition Care: Collaboration by nutrition professional with other providers    Education Documentation  No documentation found.    Reviewed supplements        Nutrition Monitoring and Evaluation   Food and Nutrient Related History  Energy Intake: Estimated energy intake         Amount of Food: Estimated amout of food  Criteria: PO >75%          Anthropometrics: Body Composition/Growth/Weight History  Weight: Measured weight  Criteria: stable weight             Biochemical Data, Medical Tests and Procedures  Electrolyte and Renal Panel: Sodium       Nutrition Focused Physical Findings            Skin: Other (Comment)  Criteria: Intact

## 2023-11-23 LAB
ANION GAP SERPL CALC-SCNC: 11 MMOL/L (ref 10–20)
BUN SERPL-MCNC: 35 MG/DL (ref 6–23)
CALCIUM SERPL-MCNC: 9.4 MG/DL (ref 8.6–10.3)
CHLORIDE SERPL-SCNC: 93 MMOL/L (ref 98–107)
CO2 SERPL-SCNC: 43 MMOL/L (ref 21–32)
CREAT SERPL-MCNC: 0.75 MG/DL (ref 0.5–1.05)
ERYTHROCYTE [DISTWIDTH] IN BLOOD BY AUTOMATED COUNT: 12.9 % (ref 11.5–14.5)
GFR SERPL CREATININE-BSD FRML MDRD: 81 ML/MIN/1.73M*2
GLUCOSE SERPL-MCNC: 135 MG/DL (ref 74–99)
HCT VFR BLD AUTO: 36.6 % (ref 36–46)
HGB BLD-MCNC: 11.1 G/DL (ref 12–16)
MAGNESIUM SERPL-MCNC: 2.03 MG/DL (ref 1.6–2.4)
MCH RBC QN AUTO: 29.2 PG (ref 26–34)
MCHC RBC AUTO-ENTMCNC: 30.3 G/DL (ref 32–36)
MCV RBC AUTO: 96 FL (ref 80–100)
NRBC BLD-RTO: 0 /100 WBCS (ref 0–0)
PLATELET # BLD AUTO: 285 X10*3/UL (ref 150–450)
POTASSIUM SERPL-SCNC: 3.6 MMOL/L (ref 3.5–5.3)
RBC # BLD AUTO: 3.8 X10*6/UL (ref 4–5.2)
SODIUM SERPL-SCNC: 143 MMOL/L (ref 136–145)
WBC # BLD AUTO: 10.3 X10*3/UL (ref 4.4–11.3)

## 2023-11-23 PROCEDURE — 2500000001 HC RX 250 WO HCPCS SELF ADMINISTERED DRUGS (ALT 637 FOR MEDICARE OP): Performed by: STUDENT IN AN ORGANIZED HEALTH CARE EDUCATION/TRAINING PROGRAM

## 2023-11-23 PROCEDURE — 36415 COLL VENOUS BLD VENIPUNCTURE: CPT | Performed by: PHYSICIAN ASSISTANT

## 2023-11-23 PROCEDURE — 2500000002 HC RX 250 W HCPCS SELF ADMINISTERED DRUGS (ALT 637 FOR MEDICARE OP, ALT 636 FOR OP/ED): Performed by: PHYSICIAN ASSISTANT

## 2023-11-23 PROCEDURE — 82374 ASSAY BLOOD CARBON DIOXIDE: CPT | Performed by: PHYSICIAN ASSISTANT

## 2023-11-23 PROCEDURE — 1210000001 HC SEMI-PRIVATE ROOM DAILY

## 2023-11-23 PROCEDURE — 99233 SBSQ HOSP IP/OBS HIGH 50: CPT | Performed by: INTERNAL MEDICINE

## 2023-11-23 PROCEDURE — 2500000004 HC RX 250 GENERAL PHARMACY W/ HCPCS (ALT 636 FOR OP/ED): Performed by: STUDENT IN AN ORGANIZED HEALTH CARE EDUCATION/TRAINING PROGRAM

## 2023-11-23 PROCEDURE — 96372 THER/PROPH/DIAG INJ SC/IM: CPT | Performed by: STUDENT IN AN ORGANIZED HEALTH CARE EDUCATION/TRAINING PROGRAM

## 2023-11-23 PROCEDURE — 83735 ASSAY OF MAGNESIUM: CPT | Performed by: PHYSICIAN ASSISTANT

## 2023-11-23 PROCEDURE — 85027 COMPLETE CBC AUTOMATED: CPT | Performed by: PHYSICIAN ASSISTANT

## 2023-11-23 PROCEDURE — 2500000001 HC RX 250 WO HCPCS SELF ADMINISTERED DRUGS (ALT 637 FOR MEDICARE OP): Performed by: INTERNAL MEDICINE

## 2023-11-23 PROCEDURE — 2500000005 HC RX 250 GENERAL PHARMACY W/O HCPCS: Performed by: STUDENT IN AN ORGANIZED HEALTH CARE EDUCATION/TRAINING PROGRAM

## 2023-11-23 RX ORDER — LORAZEPAM 0.5 MG/1
0.5 TABLET ORAL EVERY 8 HOURS PRN
Status: DISCONTINUED | OUTPATIENT
Start: 2023-11-23 | End: 2023-11-27 | Stop reason: HOSPADM

## 2023-11-23 RX ADMIN — METHYLPREDNISOLONE SODIUM SUCCINATE 40 MG: 40 INJECTION, POWDER, LYOPHILIZED, FOR SOLUTION INTRAMUSCULAR; INTRAVENOUS at 05:40

## 2023-11-23 RX ADMIN — METHYLPREDNISOLONE SODIUM SUCCINATE 40 MG: 40 INJECTION, POWDER, LYOPHILIZED, FOR SOLUTION INTRAMUSCULAR; INTRAVENOUS at 13:29

## 2023-11-23 RX ADMIN — DOXYCYCLINE 100 MG: 100 CAPSULE ORAL at 21:15

## 2023-11-23 RX ADMIN — ACETAMINOPHEN 650 MG: 325 TABLET ORAL at 16:33

## 2023-11-23 RX ADMIN — HEPARIN SODIUM 5000 UNITS: 5000 INJECTION INTRAVENOUS; SUBCUTANEOUS at 16:31

## 2023-11-23 RX ADMIN — METOPROLOL TARTRATE 37.5 MG: 25 TABLET, FILM COATED ORAL at 21:14

## 2023-11-23 RX ADMIN — METOPROLOL TARTRATE 37.5 MG: 25 TABLET, FILM COATED ORAL at 09:32

## 2023-11-23 RX ADMIN — LORAZEPAM 0.5 MG: 0.5 TABLET ORAL at 21:15

## 2023-11-23 RX ADMIN — LORAZEPAM 0.5 MG: 0.5 TABLET ORAL at 10:27

## 2023-11-23 RX ADMIN — IPRATROPIUM BROMIDE 2 PUFF: 17 AEROSOL, METERED RESPIRATORY (INHALATION) at 09:31

## 2023-11-23 RX ADMIN — HEPARIN SODIUM 5000 UNITS: 5000 INJECTION INTRAVENOUS; SUBCUTANEOUS at 23:13

## 2023-11-23 RX ADMIN — BUDESONIDE AND FORMOTEROL FUMARATE DIHYDRATE 2 PUFF: 160; 4.5 AEROSOL RESPIRATORY (INHALATION) at 21:15

## 2023-11-23 RX ADMIN — IPRATROPIUM BROMIDE 2 PUFF: 17 AEROSOL, METERED RESPIRATORY (INHALATION) at 13:29

## 2023-11-23 RX ADMIN — BUDESONIDE AND FORMOTEROL FUMARATE DIHYDRATE 2 PUFF: 160; 4.5 AEROSOL RESPIRATORY (INHALATION) at 09:31

## 2023-11-23 RX ADMIN — Medication 3 MG: at 21:14

## 2023-11-23 RX ADMIN — IPRATROPIUM BROMIDE 2 PUFF: 17 AEROSOL, METERED RESPIRATORY (INHALATION) at 21:15

## 2023-11-23 RX ADMIN — IPRATROPIUM BROMIDE 2 PUFF: 17 AEROSOL, METERED RESPIRATORY (INHALATION) at 16:31

## 2023-11-23 RX ADMIN — Medication 5 L/MIN: at 21:21

## 2023-11-23 RX ADMIN — METHYLPREDNISOLONE SODIUM SUCCINATE 40 MG: 40 INJECTION, POWDER, LYOPHILIZED, FOR SOLUTION INTRAMUSCULAR; INTRAVENOUS at 21:15

## 2023-11-23 RX ADMIN — DOXYCYCLINE 100 MG: 100 CAPSULE ORAL at 09:32

## 2023-11-23 RX ADMIN — CEFTRIAXONE SODIUM 2 G: 2 INJECTION, SOLUTION INTRAVENOUS at 13:29

## 2023-11-23 RX ADMIN — ACETAMINOPHEN 650 MG: 325 TABLET ORAL at 21:14

## 2023-11-23 RX ADMIN — HEPARIN SODIUM 5000 UNITS: 5000 INJECTION INTRAVENOUS; SUBCUTANEOUS at 09:32

## 2023-11-23 ASSESSMENT — ENCOUNTER SYMPTOMS
SHORTNESS OF BREATH: 1
BACK PAIN: 0
DIAPHORESIS: 0
CHILLS: 0
JOINT SWELLING: 0
ABDOMINAL PAIN: 0
BRUISES/BLEEDS EASILY: 0
DYSURIA: 0
NAUSEA: 0
WEAKNESS: 1
WHEEZING: 1
NUMBNESS: 0
SORE THROAT: 0
FREQUENCY: 0
HEMATURIA: 0
DIARRHEA: 0
FATIGUE: 1
HALLUCINATIONS: 0
BLOOD IN STOOL: 0
WOUND: 0
COUGH: 0
VOMITING: 0
EYE PAIN: 0
FEVER: 0
TROUBLE SWALLOWING: 0
PALPITATIONS: 0
HEADACHES: 0
FLANK PAIN: 0
FACIAL SWELLING: 0
CONSTIPATION: 0
CHEST TIGHTNESS: 0
APPETITE CHANGE: 0
LIGHT-HEADEDNESS: 0
DIZZINESS: 0

## 2023-11-23 ASSESSMENT — COGNITIVE AND FUNCTIONAL STATUS - GENERAL
WALKING IN HOSPITAL ROOM: A LOT
HELP NEEDED FOR BATHING: A LOT
TOILETING: A LOT
DRESSING REGULAR UPPER BODY CLOTHING: A LOT
MOVING TO AND FROM BED TO CHAIR: A LOT
TURNING FROM BACK TO SIDE WHILE IN FLAT BAD: A LOT
DRESSING REGULAR LOWER BODY CLOTHING: A LOT
STANDING UP FROM CHAIR USING ARMS: A LOT
WALKING IN HOSPITAL ROOM: A LOT
MOBILITY SCORE: 13
DRESSING REGULAR LOWER BODY CLOTHING: A LOT
MOVING FROM LYING ON BACK TO SITTING ON SIDE OF FLAT BED WITH BEDRAILS: A LITTLE
HELP NEEDED FOR BATHING: A LOT
TURNING FROM BACK TO SIDE WHILE IN FLAT BAD: A LOT
MOBILITY SCORE: 13
PERSONAL GROOMING: A LITTLE
MOVING FROM LYING ON BACK TO SITTING ON SIDE OF FLAT BED WITH BEDRAILS: A LITTLE
EATING MEALS: A LITTLE
DAILY ACTIVITIY SCORE: 14
CLIMB 3 TO 5 STEPS WITH RAILING: A LOT
STANDING UP FROM CHAIR USING ARMS: A LOT
MOVING TO AND FROM BED TO CHAIR: A LOT
DRESSING REGULAR UPPER BODY CLOTHING: A LOT
DAILY ACTIVITIY SCORE: 14
TOILETING: A LOT
PERSONAL GROOMING: A LITTLE
EATING MEALS: A LITTLE
CLIMB 3 TO 5 STEPS WITH RAILING: A LOT

## 2023-11-23 ASSESSMENT — PAIN - FUNCTIONAL ASSESSMENT
PAIN_FUNCTIONAL_ASSESSMENT: 0-10
PAIN_FUNCTIONAL_ASSESSMENT: 0-10

## 2023-11-23 ASSESSMENT — PAIN SCALES - GENERAL
PAINLEVEL_OUTOF10: 0 - NO PAIN
PAINLEVEL_OUTOF10: 0 - NO PAIN
PAINLEVEL_OUTOF10: 3

## 2023-11-23 ASSESSMENT — PAIN DESCRIPTION - LOCATION: LOCATION: OTHER (COMMENT)

## 2023-11-23 NOTE — PROGRESS NOTES
"Irish Davis is a 78 y.o. female on day 2 of admission presenting with COPD with acute exacerbation (CMS/[facility]).      Subjective   Irish Davis is a 78 y.o. female with PMHx s/f HTN, HLD, moderate aortic stenosis, chronic bilateral lower extremity edema (on as needed Lasix), COPD with chronic hypoxic respiratory failure (baseline 3-4 L nasal cannula), presented 11/20/23 with worsening shortness of breath, cough, wheezing, congestion, weakness following a fall approximately 1 week ago. She reports on return from the bathroom, caught her toe against the floor tile and fell onto her right side (no head strike, no loss of consciousness, no thinners), has some fairly persistent right knee, right elbow, right lateral chest discomfort since the fall.She also describes that over the last 5 days in particular, she has noticed that her breathing has gotten much worse from her baseline.  She has had a productive cough, worsening shortness of breath at rest, and has felt generally unwell.  Her home inhalers have not helped with her symptoms.  She does admit that multiple family members she has seen recently have had upper respiratory illnesses with productive coughs as well. She describes chronic lower extremity edema for which she did take Lasix yesterday with improvement. Lactate 1.6. High-sensitivity troponin 21; ECG without acute ischemic changes. Viral panel negative. UA with trace leukocyte esterase, 11-20 WBCs. Imaging: No acute traumatic injuries.  CXR with a left lower lobe pneumonia.  CT chest abdomen pelvis with the same, but also notable for a large obstructing left ureteral calculus with associated severe hydronephrosis. Urinary antigens negative. Urine culture no growth      Blood cultures negative x1 day  Echo: EF \"normal\" but no percentage recorded, +DD, Mild AS      11/22/23: No acute events overnight. 81% on 5 L- increased to 6L with improvement to 90%, BP elevated 166/85. BMP unremarkable. Slight " leukocytosis, likely from steroids. She feels her breathing has worsened today, will give 1x dose IV lasix as she sounds like she has more crackles. CTA chest negative for PE but does show continued LLL pneumonia.     11/23: SOB improving. She had an episode of SVT this am that broke spontaneously before intervention could be implemented.          Review of Systems   Constitutional:  Positive for fatigue. Negative for appetite change, chills, diaphoresis and fever.   HENT:  Negative for congestion, ear pain, facial swelling, hearing loss, nosebleeds, sore throat, tinnitus and trouble swallowing.    Eyes:  Negative for pain.   Respiratory:  Positive for shortness of breath and wheezing. Negative for cough and chest tightness.    Cardiovascular:  Negative for chest pain, palpitations and leg swelling.   Gastrointestinal:  Negative for abdominal pain, blood in stool, constipation, diarrhea, nausea and vomiting.   Genitourinary:  Negative for dysuria, flank pain, frequency, hematuria and urgency.   Musculoskeletal:  Negative for back pain and joint swelling.   Skin:  Negative for rash and wound.   Neurological:  Positive for weakness. Negative for dizziness, syncope, light-headedness, numbness and headaches.   Hematological:  Does not bruise/bleed easily.   Psychiatric/Behavioral:  Negative for behavioral problems, hallucinations and suicidal ideas.           Objective     Last Recorded Vitals  /73 (BP Location: Left arm, Patient Position: Lying)   Pulse 89   Temp 36.7 °C (98 °F) (Temporal)   Resp 20   Wt 54.4 kg (120 lb)   SpO2 (!) 88%     Image Results  XR elbow right 1-2 views    Result Date: 11/20/2023  Interpreted By:  Titi Trevino, STUDY: XR ELBOW RIGHT 1-2 VIEWS; ;  11/20/2023 1:18 pm   INDICATION: Signs/Symptoms:Fall, right elbow pain.   COMPARISON: None.   ACCESSION NUMBER(S): [id number]   ORDERING CLINICIAN: TRAVIS SKY   FINDINGS: Two views of the right elbow. Soft tissue swelling. No obvious  displaced fracture or obvious effusion. Tiny calcifications adjacent to the radial head, favored to be chronic in nature. Chronic appearing irregularity adjacent to the medial humeral epicondyle.       Soft tissue swelling without acute osseous abnormality detected of the elbow.     MACRO: None   Signed by: Titi Trevino 11/20/2023 1:35 PM Dictation workstation:   [id number]    CT chest abdomen pelvis w IV contrast    Result Date: 11/20/2023  Interpreted By:  Carlton Ferrell, STUDY: CT CHEST ABDOMEN PELVIS W IV CONTRAST;  11/20/2023 12:42 pm   INDICATION: Signs/Symptoms:Fall, right sided chest/ right upper quadrant pain   COMPARISON: November 12, 2021 CT abdomen and pelvis. November 20, 2023 chest radiograph   ACCESSION NUMBER(S): [id number]   ORDERING CLINICIAN: TRAVIS SKY   TECHNIQUE: CT of the chest, abdomen, and pelvis was performed. Contiguous axial images were obtained at  5 mm slice thickness through the chest, and at  3 mm through the abdomen and pelvis. Coronal and sagittal reconstructions at  3 mm slice thickness were performed. 75 ml of contrast material Omnipaque 350 were administered intravenously without immediate complication.   FINDINGS: CHEST:   LUNG/PLEURA/LARGE AIRWAYS: Emphysema with superimposed prominent airspace abnormality at the left base most compatible with pneumonia versus less likely contusion given history of trauma. 4 mm nodule superior segment right lower lobe series 2, image 47 most likely incidental. Unchanged coarse calcification right base series 2, image 84.   VESSELS: No traumatic aortic injury is appreciated within the limitations of this non-EKG gated study.  The thoracic aorta is of normal course and caliber.  Scattered atherosclerosis thoracic aorta and branch vessels. Moderate coronary artery calcifications are seen. The study is not optimized for evaluation of coronary arteries.   HEART: The heart is normal in size.   There is no pericardial effusion.   MEDIASTINUM  AND WILBER: No pneumomediastinum, abnormal mediastinal fluid collection or mediastinal hematoma are appreciated.  Postinflammatory mediastinal calcifications. No adenopathy. The esophagus is normal in course and caliber.   CHEST WALL AND LOWER NECK: No acute fracture or dislocation of the included osseous structures are appreciated.  No suspicious osseous lesions are identified.  The thoracic wall soft tissues are within normal limits.   ABDOMEN:   LIVER: No focal perfusion abnormality of the liver is appreciated to suggest contusion or laceration. There is no subcapsular hematoma, no perihepatic fluid collection.   GALLBLADDER: No calcified stones or wall thickening.   BILE DUCTS: The intahepatic and extrahepatic bile ducts are not dilated.   PANCREAS: The pancreas appears unremarkable.   SPLEEN: No parenchymal perfusion deficit of the spleen is appreciated to suggest contusion or laceration. There is no subcapsular hematoma, no perisplenic fluid collection. Scattered benign postinflammatory calcifications.   ADRENAL GLANDS: The bilateral adrenal glands are unremarkable in appearance.   KIDNEYS AND URETERS: No parenchymal perfusion deficit is appreciated in bilateral kidneys to suggest contusion or laceration. There is no subcapsular hematoma, no perinephric fluid collection.  There is severe left-sided hydronephrosis related to large obstructing distal left ureteral calculus which measures 19 mm length series 6, image 64. There is a smaller calculus along the superior margin of the obstructing calculus. There is postobstructive hypoenhancement left kidney. There are scattered bilateral probably benign renal cortical hypodensities most compatible with cysts although some are too small to characterize including 1 cm fluid density nodule anterior midportion left kidney series 2, image 111.   PELVIS:   BLADDER: The urinary bladder is distended without mass, calculus or surrounding inflammatory change.   REPRODUCTIVE  ORGANS: No pelvic mass.   BOWEL: The stomach and bowel are normal caliber without evident hematoma.   VESSELS: Scattered atherosclerosis without aneurysm.   PERITONEUM/RETROPERITONEUM/LYMPH NODES: There is no evidence of intra- or retroperitoneal hematoma.  There is no free or loculated fluid collection, no free intraperitoneal air. No abdominopelvic lymphadenopathy is present.   BONES AND ABDOMINAL WALL: No evidence of acute fracture or dislocation of the included osseous structures.  No suspicious osseous lesions are identified.  No detected abdominal wall hematoma. Unchanged rectus diastasis and ventral abdominal eventration. Unchanged tiny periumbilical fat containing hernia.       Probable pneumonia versus less likely contusion left base.   Large obstructing calculus left ureter with severe left-sided hydronephrosis. Smaller calculus upstream to the large obstructing calculus left kidney.   Signed by: Carlton Ferrell 11/20/2023 1:07 PM Dictation workstation:   [id number]    XR chest 2 views    Result Date: 11/20/2023  Interpreted By:  Jeremy Macdonald, STUDY: XR CHEST 2 VIEWS;  11/20/2023 12:17 pm   INDICATION: Signs/Symptoms:Fall, right sided chest pain, cough.   COMPARISON: Chest radiograph dated 09/12/2023.   ACCESSION NUMBER(S): [id number]   ORDERING CLINICIAN: TRAVIS SKY   FINDINGS: PA and lateral radiographs of the chest were provided.   CARDIOMEDIASTINAL SILHOUETTE: Cardiomediastinal silhouette is normal in size and configuration. Aortic atherosclerotic calcification.   LUNGS: There is irregular consolidative parenchymal opacity within the posterior base of the left lower lobe. Trace right basilar atelectasis/scarring, similar to comparison exam. No pneumothorax or pleural effusion.   ABDOMEN: No remarkable upper abdominal findings.   BONES: No acute osseous changes.       New, irregular left lower lobe airspace opacity that could reflect an element of pneumonia, aspiration, or atelectasis.   MACRO:  None   Signed by: Jeremy Macdonald 11/20/2023 12:31 PM Dictation workstation:   [id number]    XR knee right 4+ views    Result Date: 11/20/2023  Interpreted By:  Jeremy Macdonald, STUDY: XR KNEE RIGHT 4+ VIEWS; XR TIBIA FIBULA RIGHT 2 VIEWS; ;  11/20/2023 12:17 pm   INDICATION: Signs/Symptoms:Fall, knee pain; Signs/Symptoms:Fall, right lower extremity pain.   COMPARISON: None.   ACCESSION NUMBER(S): [id number]; [id number]   ORDERING CLINICIAN: TRAVIS SKY   FINDINGS: Four view right knee: No acute fracture. Normal osseous alignment. Chondrocalcinosis is evident. Trace patellofemoral osteophytic spurring. Joint spaces are otherwise relatively well maintained. No large joint effusion. Enthesopathy along the superior pole of the patella. Atherosclerotic vascular calcification.   Two-view of the right tib-fib: No acute fracture. Trace smooth periosteal reaction along the distal diaphysis of the fibula versus vascular calcification. Normal osseous alignment. Knee joint as described above. Ankle mortise appears congruent. There is edema of the right lower extremity.       No acute osseous abnormality of the right knee. 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   No acute osseous abnormality of the right tib-fib. Edema of the lower extremity.   MACRO: None   Signed by: Jeremy Macdonald 11/20/2023 12:28 PM Dictation workstation:   [id number]    XR tibia fibula right 2 views    Result Date: 11/20/2023  Interpreted By:  Jeremy Macdonald, STUDY: XR KNEE RIGHT 4+ VIEWS; XR TIBIA FIBULA RIGHT 2 VIEWS; ;  11/20/2023 12:17 pm   INDICATION: Signs/Symptoms:Fall, knee pain; Signs/Symptoms:Fall, right lower extremity pain.   COMPARISON: None.   ACCESSION NUMBER(S): [id number]; [id number]   ORDERING CLINICIAN: TRAVIS SKY   FINDINGS: Four view right knee: No acute fracture. Normal osseous alignment. Chondrocalcinosis is evident. Trace patellofemoral osteophytic spurring. Joint  spaces are otherwise relatively well maintained. No large joint effusion. Enthesopathy along the superior pole of the patella. Atherosclerotic vascular calcification.   Two-view of the right tib-fib: No acute fracture. Trace smooth periosteal reaction along the distal diaphysis of the fibula versus vascular calcification. Normal osseous alignment. Knee joint as described above. Ankle mortise appears congruent. There is edema of the right lower extremity.       No acute osseous abnormality of the right knee. 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   No acute osseous abnormality of the right tib-fib. Edema of the lower extremity.   MACRO: None   Signed by: Jeremy Macdonald 11/20/2023 12:28 PM Dictation workstation:   [id number]       Lab Results  Results for orders placed or performed during the hospital encounter of 11/20/23 (from the past 24 hour(s))   CBC   Result Value Ref Range    WBC 10.3 4.4 - 11.3 x10*3/uL    nRBC 0.0 0.0 - 0.0 /100 WBCs    RBC 3.80 (L) 4.00 - 5.20 x10*6/uL    Hemoglobin 11.1 (L) 12.0 - 16.0 g/dL    Hematocrit 36.6 36.0 - 46.0 %    MCV 96 80 - 100 fL    MCH 29.2 26.0 - 34.0 pg    MCHC 30.3 (L) 32.0 - 36.0 g/dL    RDW 12.9 11.5 - 14.5 %    Platelets 285 150 - 450 x10*3/uL   Basic Metabolic Panel   Result Value Ref Range    Glucose 135 (H) 74 - 99 mg/dL    Sodium 143 136 - 145 mmol/L    Potassium 3.6 3.5 - 5.3 mmol/L    Chloride 93 (L) 98 - 107 mmol/L    Bicarbonate 43 (HH) 21 - 32 mmol/L    Anion Gap 11 10 - 20 mmol/L    Urea Nitrogen 35 (H) 6 - 23 mg/dL    Creatinine 0.75 0.50 - 1.05 mg/dL    eGFR 81 >60 mL/min/1.73m*2    Calcium 9.4 8.6 - 10.3 mg/dL   Magnesium   Result Value Ref Range    Magnesium 2.03 1.60 - 2.40 mg/dL        Medications  Scheduled medications:  budesonide-formoteroL, 2 puff, inhalation, BID  cefTRIAXone, 2 g, intravenous, q24h  doxycycline, 100 mg, oral, q12h DORIS  heparin (porcine), 5,000 Units, subcutaneous,  q8h  ipratropium, 2 puff, inhalation, 4x daily  melatonin, 3 mg, oral, Daily  methylPREDNISolone sodium succinate (PF), 40 mg, intravenous, q8h DORIS  metoprolol tartrate, 37.5 mg, oral, BID  pantoprazole, 40 mg, oral, Daily before breakfast   Or  pantoprazole, 40 mg, intravenous, Daily before breakfast  polyethylene glycol, 17 g, oral, Daily  white petrolatum, , Topical, Daily      Continuous medications:     PRN medications:  PRN medications: acetaminophen, bisacodyl, bisacodyl, guaiFENesin, ipratropium-albuteroL, LORazepam, ondansetron **OR** ondansetron, oxygen     Physical Exam  Constitutional:       General: She is not in acute distress.     Appearance: Normal appearance.      Comments: Thin, frail   HENT:      Head: Normocephalic and atraumatic.      Right Ear: External ear normal.      Left Ear: External ear normal.      Nose: Nose normal.      Mouth/Throat:      Mouth: Mucous membranes are moist.      Pharynx: Oropharynx is clear.   Eyes:      Extraocular Movements: Extraocular movements intact.      Conjunctiva/sclera: Conjunctivae normal.      Pupils: Pupils are equal, round, and reactive to light.   Cardiovascular:      Rate and Rhythm: Normal rate and regular rhythm.      Pulses: Normal pulses.      Heart sounds: Normal heart sounds.   Pulmonary:      Effort: Pulmonary effort is normal. No respiratory distress.      Breath sounds: Wheezing and rales present. No rhonchi.      Comments: NC in place  Slightly worse crackles bilateral bases  Abdominal:      General: Abdomen is flat. Bowel sounds are normal.      Palpations: Abdomen is soft.      Tenderness: There is no abdominal tenderness. There is no right CVA tenderness, left CVA tenderness, guarding or rebound.   Musculoskeletal:         General: No swelling. Normal range of motion.      Cervical back: Normal range of motion and neck supple.   Skin:     General: Skin is warm and dry.      Capillary Refill: Capillary refill takes less than 2 seconds.       Findings: No lesion or rash.   Neurological:      General: No focal deficit present.      Mental Status: She is alert and oriented to person, place, and time. Mental status is at baseline.   Psychiatric:         Mood and Affect: Mood normal.         Behavior: Behavior normal.                  Code Status  Full Code     Assessment/Plan      Generalized weakness  Assessment & Plan  -Likely in setting of above  -PT/OT    Left ureteral calculus  Assessment & Plan  -Patient denies any current symptoms with this and has not had any urinary tract infection symptoms that she can name.  She is not having flank pain.  She has not noticed any changes in her urine output.  -UA is without obvious signs of infection at this time.  Her renal function is appropriate without any significant elevation.  -Patient is already on antibiotics  -Urology consultation appreciated- planning outpatient cystoscopy/treatment     Hydronephrosis of left kidney  Assessment & Plan  -2/2 stone  -outpatient treatment per urology    Acute on chronic hypoxic respiratory failure (CMS/HCC)  Assessment & Plan  -Patient's baseline oxygen requirement is typically 3-4 L nasal cannula, while in the ED her oxygen requirement did uptrend to 6 L.  This is acute on chronic by evidence of increase >2 L from baseline.  -Urine antigens negative  -Send sputum cx if able   -Continue antibiotic coverage with ceftriaxone/doxycycline   -DuoNebs- she is refusing, will continue PRN  -IV steroids  -Pulmonary hygiene   -RT c/s appreciated  -Follow fever curve, WBCs     Pneumonia of left lower lobe due to infectious organism  Assessment & Plan  -IV antibiotics- rocephin/doxy D3  -Urine antigens negative    * COPD with acute exacerbation (CMS/HCC)  Assessment & Plan  -Steroids/doxy as above          DVT ppx: subcutaneous heparin      Please see orders for more complete plan    Donovan Mendosa MD PhD

## 2023-11-23 NOTE — PROGRESS NOTES
[facility]     Urology Progress Note   PCP: Khloe Mccray MD    LOS: 1     Subjective: Pt sleeping    Past Medical History:   Diagnosis Date    COPD (chronic obstructive pulmonary disease) (CMS/[facility])     Disorder of the skin and subcutaneous tissue, unspecified 07/22/2016    Leg skin lesion, left    Personal history of other diseases of the respiratory system 02/06/2018    History of allergic rhinitis    Personal history of other specified conditions 07/22/2016    History of chest pain     Past Surgical History:   Procedure Laterality Date    OTHER SURGICAL HISTORY  09/25/2019    Cataract surgery     Social History     Socioeconomic History    Marital status:      Spouse name: Not on file    Number of children: Not on file    Years of education: Not on file    Highest education level: Not on file   Occupational History    Not on file   Tobacco Use    Smoking status: Former     Types: Cigarettes    Smokeless tobacco: Not on file   Substance and Sexual Activity    Alcohol use: Never    Drug use: Never    Sexual activity: Not on file   Other Topics Concern    Not on file   Social History Narrative    Not on file     Social Determinants of Health     Financial Resource Strain: Low Risk  (11/21/2023)    Overall Financial Resource Strain (CARDIA)     Difficulty of Paying Living Expenses: Not hard at all   Food Insecurity: Not on file   Transportation Needs: Unmet Transportation Needs (11/21/2023)    PRAPARE - Transportation     Lack of Transportation (Medical): Yes     Lack of Transportation (Non-Medical): Yes   Physical Activity: Not on file   Stress: Not on file   Social Connections: Not on file   Intimate Partner Violence: Not on file   Housing Stability: Low Risk  (11/21/2023)    Housing Stability Vital Sign     Unable to Pay for Housing in the Last Year: No     Number of Places Lived in the Last Year: 1     Unstable Housing in the Last Year: No     Scheduled medications  budesonide-formoteroL, 2  puff, inhalation, BID  cefTRIAXone, 2 g, intravenous, q24h  doxycycline, 100 mg, oral, q12h DORIS  heparin (porcine), 5,000 Units, subcutaneous, q8h  ipratropium, 2 puff, inhalation, 4x daily  melatonin, 3 mg, oral, Daily  methylPREDNISolone sodium succinate (PF), 40 mg, intravenous, q8h DORIS  metoprolol tartrate, 37.5 mg, oral, BID  pantoprazole, 40 mg, oral, Daily before breakfast   Or  pantoprazole, 40 mg, intravenous, Daily before breakfast  polyethylene glycol, 17 g, oral, Daily  white petrolatum, , Topical, Daily      Continuous medications     PRN medications  PRN medications: acetaminophen, bisacodyl, bisacodyl, guaiFENesin, ipratropium-albuteroL, ondansetron **OR** ondansetron, oxygen  Allergies   Allergen Reactions    Erythromycin Itching          Physical Exam:  Resting comfortably  NAD      Results from last 7 days   Lab Units 11/22/23  0503 11/20/23  0911   SODIUM mmol/L 140 140   POTASSIUM mmol/L 3.7 3.5   CHLORIDE mmol/L 98 95*   CO2 mmol/L 33* 38*   BUN mg/dL 28* 16   CREATININE mg/dL 0.69 0.68   GLUCOSE mg/dL 102* 90   CALCIUM mg/dL 9.5 9.7     Results from last 7 days   Lab Units 11/22/23  0503 11/20/23  0911   WBC AUTO x10*3/uL 14.0* 12.1*   HEMOGLOBIN g/dL 11.1* 11.9*   HEMATOCRIT % 37.0 39.7   PLATELETS AUTO x10*3/uL 273 249     CT A/P 11/20/23  KIDNEYS AND URETERS:  No parenchymal perfusion deficit is appreciated in bilateral kidneys  to suggest contusion or laceration. There is no subcapsular hematoma,  no perinephric fluid collection.  There is severe left-sided  hydronephrosis related to large obstructing distal left ureteral  calculus which measures 19 mm length series 6, image 64. There is a  smaller calculus along the superior margin of the obstructing  calculus. There is postobstructive hypoenhancement left kidney. There  are scattered bilateral probably benign renal cortical hypodensities  most compatible with cysts although some are too small to  characterize including 1 cm fluid density  nodule anterior midportion  left kidney series 2, image 111.    Images and report above reviewed     No results found for the last 90 days.      Assessment:  Left distal ureteral calculus   Left hydronephrosis secondary to above    Plan:  Renal function WNL, pain controlled, urine cx 11/20 no growth  Plan for outpatient left ureteroscopy and laser lithotripsy with Dr. Ríos - to be scheduled in near future after resolution of PNA  Urology will sign off, please reconsult as needed

## 2023-11-23 NOTE — CARE PLAN
The patient's goals for the shift include      The clinical goals for the shift include no falls    Pt remained free of falls this shift. Pt medicated per MAR.   Problem: Fall/Injury  Goal: Not fall by end of shift  Outcome: Progressing  Goal: Be free from injury by end of the shift  Outcome: Progressing  Goal: Verbalize understanding of personal risk factors for fall in the hospital  Outcome: Progressing  Goal: Verbalize understanding of risk factor reduction measures to prevent injury from fall in the home  Outcome: Progressing     Problem: Skin  Goal: Decreased wound size/increased tissue granulation at next dressing change  Outcome: Progressing  Goal: Participates in plan/prevention/treatment measures  Outcome: Progressing  Goal: Prevent/manage excess moisture  Outcome: Progressing  Goal: Prevent/minimize sheer/friction injuries  Outcome: Progressing  Goal: Promote/optimize nutrition  Outcome: Progressing  Goal: Promote skin healing  Outcome: Progressing     Pt medicated per MAR this shift. Pt remained free of falls. Will continue to monitor pt this shift.

## 2023-11-23 NOTE — CARE PLAN
Problem: Fall/Injury  Goal: Not fall by end of shift  Outcome: Progressing  Goal: Be free from injury by end of the shift  Outcome: Progressing  Goal: Verbalize understanding of personal risk factors for fall in the hospital  Outcome: Progressing  Goal: Verbalize understanding of risk factor reduction measures to prevent injury from fall in the home  Outcome: Progressing     Problem: Skin  Goal: Decreased wound size/increased tissue granulation at next dressing change  11/23/2023 1522 by Nyla Guerrier RN  Outcome: Progressing  Flowsheets (Taken 11/23/2023 1522)  Decreased wound size/increased tissue granulation at next dressing change:   Promote sleep for wound healing   Protective dressings over bony prominences  11/23/2023 1516 by Nyla Guerrier RN  Outcome: Progressing  Goal: Participates in plan/prevention/treatment measures  11/23/2023 1522 by Nyla Guerrier RN  Outcome: Progressing  Flowsheets (Taken 11/23/2023 1522)  Participates in plan/prevention/treatment measures: Increase activity/out of bed for meals  11/23/2023 1516 by Nyla Guerrier RN  Outcome: Progressing  Goal: Prevent/manage excess moisture  11/23/2023 1522 by Nyla Guerrier RN  Outcome: Progressing  Flowsheets (Taken 11/23/2023 1522)  Prevent/manage excess moisture:   Moisturize dry skin   Monitor for/manage infection if present   Follow provider orders for dressing changes  11/23/2023 1516 by Nyla Guerrier RN  Outcome: Progressing  Goal: Prevent/minimize sheer/friction injuries  11/23/2023 1522 by Nyla Guerrier RN  Outcome: Progressing  Flowsheets (Taken 11/23/2023 1522)  Prevent/minimize sheer/friction injuries:   HOB 30 degrees or less   Increase activity/out of bed for meals   Turn/reposition every 2 hours/use positioning/transfer devices   Use pull sheet  11/23/2023 1516 by Nyla Guerrier RN  Outcome: Progressing  Goal: Promote/optimize nutrition  11/23/2023 1522 by Nyla Guerrier RN  Outcome: Progressing  Flowsheets  (Taken 11/23/2023 1522)  Promote/optimize nutrition:   Consume > 50% meals/supplements   Monitor/record intake including meals   Offer water/supplements/favorite foods  11/23/2023 1516 by Nyla Guerrier RN  Outcome: Progressing  Goal: Promote skin healing  11/23/2023 1522 by Nyla Guerrier RN  Outcome: Progressing  Flowsheets (Taken 11/23/2023 1522)  Promote skin healing:   Assess skin/pad under line(s)/device(s)   Protective dressings over bony prominences   Rotate device position/do not position patient on device   Turn/reposition every 2 hours/use positioning/transfer devices  11/23/2023 1516 by Nyla Guerrier RN  Outcome: Progressing   The patient's goals for the shift include      The clinical goals for the shift include pt will maintain baseline oxygen sats this shift on baseline home oxygen requirement.    Pt remained free of falls this shift. Pt medicated per MAR. Will continue to monitor pt.

## 2023-11-23 NOTE — NURSING NOTE
Patient educated on use of external catheter, due to increased urination it was decided it would be best for her breathing to use rather than having to lay flat for bed pan since she would get very short of breath and oxygen level would destat. Patient understood and has shown appropriate use of this, but may need re-educated on this as she forgets she can pee with this on and not make a mess of her bed

## 2023-11-23 NOTE — NURSING NOTE
"Secure chat sent to Dr. Pyle regarding patient's critical bicarb of 43. Received response from Dr. Pyle stating \"notify rounding doctors.\" This RN leaves at 0730 and will not be present to speak to the rounding doctor. Will notify dayshift RN who will take over care for patient.   "

## 2023-11-24 LAB
BACTERIA BLD CULT: NORMAL
BACTERIA BLD CULT: NORMAL

## 2023-11-24 PROCEDURE — 96372 THER/PROPH/DIAG INJ SC/IM: CPT | Performed by: STUDENT IN AN ORGANIZED HEALTH CARE EDUCATION/TRAINING PROGRAM

## 2023-11-24 PROCEDURE — 2500000001 HC RX 250 WO HCPCS SELF ADMINISTERED DRUGS (ALT 637 FOR MEDICARE OP): Performed by: INTERNAL MEDICINE

## 2023-11-24 PROCEDURE — 1210000001 HC SEMI-PRIVATE ROOM DAILY

## 2023-11-24 PROCEDURE — 2500000001 HC RX 250 WO HCPCS SELF ADMINISTERED DRUGS (ALT 637 FOR MEDICARE OP): Performed by: STUDENT IN AN ORGANIZED HEALTH CARE EDUCATION/TRAINING PROGRAM

## 2023-11-24 PROCEDURE — 2500000004 HC RX 250 GENERAL PHARMACY W/ HCPCS (ALT 636 FOR OP/ED): Performed by: STUDENT IN AN ORGANIZED HEALTH CARE EDUCATION/TRAINING PROGRAM

## 2023-11-24 PROCEDURE — 97530 THERAPEUTIC ACTIVITIES: CPT | Mod: GO,CO

## 2023-11-24 PROCEDURE — 99233 SBSQ HOSP IP/OBS HIGH 50: CPT | Performed by: INTERNAL MEDICINE

## 2023-11-24 PROCEDURE — 2500000002 HC RX 250 W HCPCS SELF ADMINISTERED DRUGS (ALT 637 FOR MEDICARE OP, ALT 636 FOR OP/ED): Performed by: PHYSICIAN ASSISTANT

## 2023-11-24 RX ADMIN — IPRATROPIUM BROMIDE 2 PUFF: 17 AEROSOL, METERED RESPIRATORY (INHALATION) at 12:10

## 2023-11-24 RX ADMIN — Medication 3 MG: at 20:18

## 2023-11-24 RX ADMIN — METHYLPREDNISOLONE SODIUM SUCCINATE 40 MG: 40 INJECTION, POWDER, LYOPHILIZED, FOR SOLUTION INTRAMUSCULAR; INTRAVENOUS at 22:00

## 2023-11-24 RX ADMIN — METOPROLOL TARTRATE 37.5 MG: 25 TABLET, FILM COATED ORAL at 08:02

## 2023-11-24 RX ADMIN — IPRATROPIUM BROMIDE 2 PUFF: 17 AEROSOL, METERED RESPIRATORY (INHALATION) at 16:02

## 2023-11-24 RX ADMIN — METHYLPREDNISOLONE SODIUM SUCCINATE 40 MG: 40 INJECTION, POWDER, LYOPHILIZED, FOR SOLUTION INTRAMUSCULAR; INTRAVENOUS at 05:00

## 2023-11-24 RX ADMIN — CEFTRIAXONE SODIUM 2 G: 2 INJECTION, SOLUTION INTRAVENOUS at 11:11

## 2023-11-24 RX ADMIN — BUDESONIDE AND FORMOTEROL FUMARATE DIHYDRATE 2 PUFF: 160; 4.5 AEROSOL RESPIRATORY (INHALATION) at 20:16

## 2023-11-24 RX ADMIN — IPRATROPIUM BROMIDE 2 PUFF: 17 AEROSOL, METERED RESPIRATORY (INHALATION) at 20:16

## 2023-11-24 RX ADMIN — DOXYCYCLINE 100 MG: 100 CAPSULE ORAL at 20:16

## 2023-11-24 RX ADMIN — METOPROLOL TARTRATE 37.5 MG: 25 TABLET, FILM COATED ORAL at 20:16

## 2023-11-24 RX ADMIN — LORAZEPAM 0.5 MG: 0.5 TABLET ORAL at 10:58

## 2023-11-24 RX ADMIN — BUDESONIDE AND FORMOTEROL FUMARATE DIHYDRATE 2 PUFF: 160; 4.5 AEROSOL RESPIRATORY (INHALATION) at 08:03

## 2023-11-24 RX ADMIN — HYDROPHOR: 42 OINTMENT TOPICAL at 09:00

## 2023-11-24 RX ADMIN — IPRATROPIUM BROMIDE 2 PUFF: 17 AEROSOL, METERED RESPIRATORY (INHALATION) at 08:03

## 2023-11-24 RX ADMIN — LORAZEPAM 0.5 MG: 0.5 TABLET ORAL at 20:18

## 2023-11-24 RX ADMIN — HEPARIN SODIUM 5000 UNITS: 5000 INJECTION INTRAVENOUS; SUBCUTANEOUS at 08:02

## 2023-11-24 RX ADMIN — HEPARIN SODIUM 5000 UNITS: 5000 INJECTION INTRAVENOUS; SUBCUTANEOUS at 16:02

## 2023-11-24 RX ADMIN — METHYLPREDNISOLONE SODIUM SUCCINATE 40 MG: 40 INJECTION, POWDER, LYOPHILIZED, FOR SOLUTION INTRAMUSCULAR; INTRAVENOUS at 13:07

## 2023-11-24 RX ADMIN — DOXYCYCLINE 100 MG: 100 CAPSULE ORAL at 08:02

## 2023-11-24 RX ADMIN — PANTOPRAZOLE SODIUM 40 MG: 40 TABLET, DELAYED RELEASE ORAL at 05:00

## 2023-11-24 ASSESSMENT — COGNITIVE AND FUNCTIONAL STATUS - GENERAL
MOVING TO AND FROM BED TO CHAIR: A LOT
MOVING TO AND FROM BED TO CHAIR: A LOT
PERSONAL GROOMING: A LITTLE
WALKING IN HOSPITAL ROOM: A LOT
HELP NEEDED FOR BATHING: A LOT
DRESSING REGULAR UPPER BODY CLOTHING: A LOT
WALKING IN HOSPITAL ROOM: A LOT
MOVING FROM LYING ON BACK TO SITTING ON SIDE OF FLAT BED WITH BEDRAILS: A LITTLE
TOILETING: A LOT
STANDING UP FROM CHAIR USING ARMS: A LOT
CLIMB 3 TO 5 STEPS WITH RAILING: A LOT
MOVING FROM LYING ON BACK TO SITTING ON SIDE OF FLAT BED WITH BEDRAILS: A LITTLE
MOBILITY SCORE: 12
DAILY ACTIVITIY SCORE: 15
EATING MEALS: A LITTLE
DRESSING REGULAR LOWER BODY CLOTHING: A LOT
STANDING UP FROM CHAIR USING ARMS: A LOT
TURNING FROM BACK TO SIDE WHILE IN FLAT BAD: A LOT
EATING MEALS: A LITTLE
HELP NEEDED FOR BATHING: A LOT
MOBILITY SCORE: 13
DRESSING REGULAR UPPER BODY CLOTHING: A LITTLE
TURNING FROM BACK TO SIDE WHILE IN FLAT BAD: A LOT
DRESSING REGULAR LOWER BODY CLOTHING: A LOT
PERSONAL GROOMING: A LITTLE
CLIMB 3 TO 5 STEPS WITH RAILING: TOTAL
TOILETING: A LOT
DAILY ACTIVITIY SCORE: 14

## 2023-11-24 ASSESSMENT — ENCOUNTER SYMPTOMS
CHILLS: 0
FEVER: 0
WHEEZING: 1
EYE PAIN: 0
DIAPHORESIS: 0
NAUSEA: 0
CONSTIPATION: 0
BRUISES/BLEEDS EASILY: 0
TROUBLE SWALLOWING: 0
CHEST TIGHTNESS: 0
FACIAL SWELLING: 0
HALLUCINATIONS: 0
BACK PAIN: 0
PALPITATIONS: 0
DIZZINESS: 0
NUMBNESS: 0
VOMITING: 0
COUGH: 0
DYSURIA: 0
FREQUENCY: 0
DIARRHEA: 0
LIGHT-HEADEDNESS: 0
JOINT SWELLING: 0
HEMATURIA: 0
APPETITE CHANGE: 0
HEADACHES: 0
SORE THROAT: 0
WOUND: 0
SHORTNESS OF BREATH: 1
WEAKNESS: 1
BLOOD IN STOOL: 0
FATIGUE: 1
ABDOMINAL PAIN: 0
FLANK PAIN: 0

## 2023-11-24 ASSESSMENT — PAIN SCALES - GENERAL
PAINLEVEL_OUTOF10: 0 - NO PAIN

## 2023-11-24 ASSESSMENT — PAIN - FUNCTIONAL ASSESSMENT
PAIN_FUNCTIONAL_ASSESSMENT: 0-10
PAIN_FUNCTIONAL_ASSESSMENT: 0-10

## 2023-11-24 NOTE — PROGRESS NOTES
Notified by Khalida Bernstein RN [facility]  that pt preference for Snf is [place], referral to be sent.    Dr Mendosa sent message that pt preference now [place], referral to be sent.       11.24.23 1139  [facility] accepted, they are [facility]< I asked them to start [employer] precert

## 2023-11-24 NOTE — CARE PLAN
Problem: Skin  Goal: Decreased wound size/increased tissue granulation at next dressing change  11/24/2023 1206 by Nyla Guerrier RN  Outcome: Progressing  Flowsheets (Taken 11/24/2023 1206)  Decreased wound size/increased tissue granulation at next dressing change:   Promote sleep for wound healing   Utilize specialty bed per algorithm   Protective dressings over bony prominences  11/24/2023 1201 by Nyla Guerrier RN  Outcome: Progressing  Goal: Participates in plan/prevention/treatment measures  11/24/2023 1206 by Nyla Guerrier RN  Outcome: Progressing  Flowsheets (Taken 11/24/2023 1206)  Participates in plan/prevention/treatment measures:   Discuss with provider PT/OT consult   Increase activity/out of bed for meals   Elevate heels  11/24/2023 1201 by Nyla Guerrier RN  Outcome: Progressing  Goal: Prevent/manage excess moisture  11/24/2023 1206 by Nyla Guerrier RN  Outcome: Progressing  Flowsheets (Taken 11/24/2023 1206)  Prevent/manage excess moisture:   Cleanse incontinence/protect with barrier cream   Moisturize dry skin   Use wicking fabric (obtain order)   Follow provider orders for dressing changes   Monitor for/manage infection if present  11/24/2023 1201 by Nyla Guerrier RN  Outcome: Progressing  Goal: Prevent/minimize sheer/friction injuries  11/24/2023 1206 by Nyla Guerrier RN  Outcome: Progressing  Flowsheets (Taken 11/24/2023 1206)  Prevent/minimize sheer/friction injuries:   HOB 30 degrees or less   Increase activity/out of bed for meals   Turn/reposition every 2 hours/use positioning/transfer devices   Complete micro-shifts as needed if patient unable. Adjust patient position to relieve pressure points, not a full turn   Use pull sheet   Utilize specialty bed per algorithm  11/24/2023 1201 by Nyla Guerrier RN  Outcome: Progressing  Goal: Promote/optimize nutrition  11/24/2023 1206 by Nyla Guerrier RN  Outcome: Progressing  Flowsheets (Taken 11/24/2023  1206)  Promote/optimize nutrition:   Discuss with provider if NPO > 2 days   Assist with feeding   Monitor/record intake including meals   Consume > 50% meals/supplements  11/24/2023 1201 by Nyla Guerrier RN  Outcome: Progressing  Goal: Promote skin healing  11/24/2023 1206 by Nyla Guerrier RN  Outcome: Progressing  Flowsheets (Taken 11/24/2023 1206)  Promote skin healing:   Assess skin/pad under line(s)/device(s)   Rotate device position/do not position patient on device   Turn/reposition every 2 hours/use positioning/transfer devices   Protective dressings over bony prominences   Ensure correct size (line/device) and apply per  instructions  11/24/2023 1201 by Nyla Guerrier RN  Outcome: Progressing   The patient's goals for the shift include      The clinical goals for the shift include pt will maintain base line o2 saturation on baseline o2 requirements this shift.

## 2023-11-24 NOTE — PROGRESS NOTES
Occupational Therapy    OT Treatment    Patient Name: Irish Davis  MRN: [id number]  Today's Date: 11/24/2023  Time Calculation  Start Time: 1019  Stop Time: 1035  Time Calculation (min): 16 min         Assessment:  Evaluation/Treatment Tolerance: Patient limited by fatigue  Medical Staff Made Aware: Yes  End of Session Communication: Bedside nurse  End of Session Patient Position: Up in chair, Alarm off, not on at start of session  OT Assessment Results: Decreased upper extremity strength, Decreased safe judgment during ADL, Decreased endurance, Decreased ADL status  Evaluation/Treatment Tolerance: Patient limited by fatigue  Medical Staff Made Aware: Yes  Strengths: Support of Caregivers    Plan:  Treatment Interventions: Functional transfer training, Endurance training, Patient/family training  Treatment Interventions: Functional transfer training, Endurance training, Patient/family training  Subjective     Current Problem:  Patient Active Problem List   Diagnosis    COPD with acute exacerbation (CMS/HCC)    Pneumonia of left lower lobe due to infectious organism    Acute on chronic hypoxic respiratory failure (CMS/HCC)    Hydronephrosis of left kidney    Left ureteral calculus    Moderate aortic stenosis    Generalized weakness       General:  OT Received On: 11/24/23  Missed Visit: Yes  Missed Visit Reason: Patient refused  Family/Caregiver Present: Yes  Caregiver Feedback: very supportive  Patient Position Received: Bed, 3 rail up  General Comment: Pt c/o SOB with activity       Pain:  Pain Assessment  Pain Assessment: 0-10  Pain Score: 0 - No pain  Objective        Functional Standing Tolerance:  Time: Static standing x 1 min  Activity: Static standing with hand held assist  Functional Standing Tolerance Comments: poor    Bed Mobility/Transfers: Bed Mobility  Bed Mobility: Yes  Bed Mobility 1  Level of Assistance 1: Minimum assistance  Transfers  Transfer: Yes  Transfer 1  Transfer From 1: Bed  to  Transfer to 1: Chair with arms  Technique 1: Stand pivot, Sit to stand, Stand to sit  Transfer Level of Assistance 1: Arm in arm assistance        Strength:  Strength Comments: Generalized weakness    Other Activity: static sitting and standing       Outcome Measures:[facility] Daily Activity  Putting on and taking off regular lower body clothing: A lot  Bathing (including washing, rinsing, drying): A lot  Putting on and taking off regular upper body clothing: A little  Toileting, which includes using toilet, bedpan or urinal: A lot  Taking care of personal grooming such as brushing teeth: A little  Eating Meals: A little  Daily Activity - Total Score: 15         Goals:  Encounter Problems       Encounter Problems (Active)       ADLs       Marta. UB and LB bathing with set-up while seated EOB with min. S.O.B. and O2  (Progressing)       Start:  11/21/23    Expected End:  11/28/23               EXERCISE/STRENGTHENING       Marta. bilat. UE AROM 12 reps./plane with min. S.O.B. and O2 and breathing tech.  (Not Progressing)       Start:  11/21/23    Expected End:  11/28/23               TRANSFERS       Demo. CGA func. trfrs. with device PRN.   (Progressing)       Start:  11/21/23    Expected End:  11/28/23

## 2023-11-24 NOTE — PROGRESS NOTES
I met with pt/ at bedside to obtain SNF choice.  Their choice was [facility].  HASEEB Hess, MEAGANN, RT given referral to send.

## 2023-11-24 NOTE — PROGRESS NOTES
"Irish Davis is a 78 y.o. female on day 3 of admission presenting with COPD with acute exacerbation (CMS/[facility]).      Subjective   Irish Davis is a 78 y.o. female with PMHx s/f HTN, HLD, moderate aortic stenosis, chronic bilateral lower extremity edema (on as needed Lasix), COPD with chronic hypoxic respiratory failure (baseline 3-4 L nasal cannula), presented 11/20/23 with worsening shortness of breath, cough, wheezing, congestion, weakness following a fall approximately 1 week ago. She reports on return from the bathroom, caught her toe against the floor tile and fell onto her right side (no head strike, no loss of consciousness, no thinners), has some fairly persistent right knee, right elbow, right lateral chest discomfort since the fall.She also describes that over the last 5 days in particular, she has noticed that her breathing has gotten much worse from her baseline.  She has had a productive cough, worsening shortness of breath at rest, and has felt generally unwell.  Her home inhalers have not helped with her symptoms.  She does admit that multiple family members she has seen recently have had upper respiratory illnesses with productive coughs as well. She describes chronic lower extremity edema for which she did take Lasix yesterday with improvement. Lactate 1.6. High-sensitivity troponin 21; ECG without acute ischemic changes. Viral panel negative. UA with trace leukocyte esterase, 11-20 WBCs. Imaging: No acute traumatic injuries.  CXR with a left lower lobe pneumonia.  CT chest abdomen pelvis with the same, but also notable for a large obstructing left ureteral calculus with associated severe hydronephrosis. Urinary antigens negative. Urine culture no growth      Blood cultures negative x1 day  Echo: EF \"normal\" but no percentage recorded, +DD, Mild AS      11/22/23: No acute events overnight. 81% on 5 L- increased to 6L with improvement to 90%, BP elevated 166/85. BMP unremarkable. Slight " leukocytosis, likely from steroids. She feels her breathing has worsened today, will give 1x dose IV lasix as she sounds like she has more crackles. CTA chest negative for PE but does show continued LLL pneumonia.     11/23: SOB improving. She had an episode of SVT this am that broke spontaneously before intervention could be implemented.     11/24: She is without complaint. [facility] has accepted patient. Auth pending.          Review of Systems   Constitutional:  Positive for fatigue. Negative for appetite change, chills, diaphoresis and fever.   HENT:  Negative for congestion, ear pain, facial swelling, hearing loss, nosebleeds, sore throat, tinnitus and trouble swallowing.    Eyes:  Negative for pain.   Respiratory:  Positive for shortness of breath and wheezing. Negative for cough and chest tightness.    Cardiovascular:  Negative for chest pain, palpitations and leg swelling.   Gastrointestinal:  Negative for abdominal pain, blood in stool, constipation, diarrhea, nausea and vomiting.   Genitourinary:  Negative for dysuria, flank pain, frequency, hematuria and urgency.   Musculoskeletal:  Negative for back pain and joint swelling.   Skin:  Negative for rash and wound.   Neurological:  Positive for weakness. Negative for dizziness, syncope, light-headedness, numbness and headaches.   Hematological:  Does not bruise/bleed easily.   Psychiatric/Behavioral:  Negative for behavioral problems, hallucinations and suicidal ideas.           Objective     Last Recorded Vitals  /79 (BP Location: Right arm, Patient Position: Sitting)   Pulse 89   Temp 36.4 °C (97.6 °F) (Temporal)   Resp 18   Wt 54.4 kg (120 lb)   SpO2 93%     Image Results  XR elbow right 1-2 views    Result Date: 11/20/2023  Interpreted By:  Titi Trevino, STUDY: XR ELBOW RIGHT 1-2 VIEWS; ;  11/20/2023 1:18 pm   INDICATION: Signs/Symptoms:Fall, right elbow pain.   COMPARISON: None.   ACCESSION NUMBER(S): [id number]   ORDERING CLINICIAN:  TRAVIS SKY   FINDINGS: Two views of the right elbow. Soft tissue swelling. No obvious displaced fracture or obvious effusion. Tiny calcifications adjacent to the radial head, favored to be chronic in nature. Chronic appearing irregularity adjacent to the medial humeral epicondyle.       Soft tissue swelling without acute osseous abnormality detected of the elbow.     MACRO: None   Signed by: Titi Trevino 11/20/2023 1:35 PM Dictation workstation:   [id number]    CT chest abdomen pelvis w IV contrast    Result Date: 11/20/2023  Interpreted By:  Carlton Ferrell, STUDY: CT CHEST ABDOMEN PELVIS W IV CONTRAST;  11/20/2023 12:42 pm   INDICATION: Signs/Symptoms:Fall, right sided chest/ right upper quadrant pain   COMPARISON: November 12, 2021 CT abdomen and pelvis. November 20, 2023 chest radiograph   ACCESSION NUMBER(S): [id number]   ORDERING CLINICIAN: TRAVIS SKY   TECHNIQUE: CT of the chest, abdomen, and pelvis was performed. Contiguous axial images were obtained at  5 mm slice thickness through the chest, and at  3 mm through the abdomen and pelvis. Coronal and sagittal reconstructions at  3 mm slice thickness were performed. 75 ml of contrast material Omnipaque 350 were administered intravenously without immediate complication.   FINDINGS: CHEST:   LUNG/PLEURA/LARGE AIRWAYS: Emphysema with superimposed prominent airspace abnormality at the left base most compatible with pneumonia versus less likely contusion given history of trauma. 4 mm nodule superior segment right lower lobe series 2, image 47 most likely incidental. Unchanged coarse calcification right base series 2, image 84.   VESSELS: No traumatic aortic injury is appreciated within the limitations of this non-EKG gated study.  The thoracic aorta is of normal course and caliber.  Scattered atherosclerosis thoracic aorta and branch vessels. Moderate coronary artery calcifications are seen. The study is not optimized for evaluation of coronary arteries.    HEART: The heart is normal in size.   There is no pericardial effusion.   MEDIASTINUM AND WILBER: No pneumomediastinum, abnormal mediastinal fluid collection or mediastinal hematoma are appreciated.  Postinflammatory mediastinal calcifications. No adenopathy. The esophagus is normal in course and caliber.   CHEST WALL AND LOWER NECK: No acute fracture or dislocation of the included osseous structures are appreciated.  No suspicious osseous lesions are identified.  The thoracic wall soft tissues are within normal limits.   ABDOMEN:   LIVER: No focal perfusion abnormality of the liver is appreciated to suggest contusion or laceration. There is no subcapsular hematoma, no perihepatic fluid collection.   GALLBLADDER: No calcified stones or wall thickening.   BILE DUCTS: The intahepatic and extrahepatic bile ducts are not dilated.   PANCREAS: The pancreas appears unremarkable.   SPLEEN: No parenchymal perfusion deficit of the spleen is appreciated to suggest contusion or laceration. There is no subcapsular hematoma, no perisplenic fluid collection. Scattered benign postinflammatory calcifications.   ADRENAL GLANDS: The bilateral adrenal glands are unremarkable in appearance.   KIDNEYS AND URETERS: No parenchymal perfusion deficit is appreciated in bilateral kidneys to suggest contusion or laceration. There is no subcapsular hematoma, no perinephric fluid collection.  There is severe left-sided hydronephrosis related to large obstructing distal left ureteral calculus which measures 19 mm length series 6, image 64. There is a smaller calculus along the superior margin of the obstructing calculus. There is postobstructive hypoenhancement left kidney. There are scattered bilateral probably benign renal cortical hypodensities most compatible with cysts although some are too small to characterize including 1 cm fluid density nodule anterior midportion left kidney series 2, image 111.   PELVIS:   BLADDER: The urinary bladder is  distended without mass, calculus or surrounding inflammatory change.   REPRODUCTIVE ORGANS: No pelvic mass.   BOWEL: The stomach and bowel are normal caliber without evident hematoma.   VESSELS: Scattered atherosclerosis without aneurysm.   PERITONEUM/RETROPERITONEUM/LYMPH NODES: There is no evidence of intra- or retroperitoneal hematoma.  There is no free or loculated fluid collection, no free intraperitoneal air. No abdominopelvic lymphadenopathy is present.   BONES AND ABDOMINAL WALL: No evidence of acute fracture or dislocation of the included osseous structures.  No suspicious osseous lesions are identified.  No detected abdominal wall hematoma. Unchanged rectus diastasis and ventral abdominal eventration. Unchanged tiny periumbilical fat containing hernia.       Probable pneumonia versus less likely contusion left base.   Large obstructing calculus left ureter with severe left-sided hydronephrosis. Smaller calculus upstream to the large obstructing calculus left kidney.   Signed by: Carlton Ferrell 11/20/2023 1:07 PM Dictation workstation:   [id number]    XR chest 2 views    Result Date: 11/20/2023  Interpreted By:  Jeremy Macdonald, STUDY: XR CHEST 2 VIEWS;  11/20/2023 12:17 pm   INDICATION: Signs/Symptoms:Fall, right sided chest pain, cough.   COMPARISON: Chest radiograph dated 09/12/2023.   ACCESSION NUMBER(S): [id number]   ORDERING CLINICIAN: TRAVIS SKY   FINDINGS: PA and lateral radiographs of the chest were provided.   CARDIOMEDIASTINAL SILHOUETTE: Cardiomediastinal silhouette is normal in size and configuration. Aortic atherosclerotic calcification.   LUNGS: There is irregular consolidative parenchymal opacity within the posterior base of the left lower lobe. Trace right basilar atelectasis/scarring, similar to comparison exam. No pneumothorax or pleural effusion.   ABDOMEN: No remarkable upper abdominal findings.   BONES: No acute osseous changes.       New, irregular left lower lobe airspace opacity  that could reflect an element of pneumonia, aspiration, or atelectasis.   MACRO: None   Signed by: Jeremy Macdonald 11/20/2023 12:31 PM Dictation workstation:   [id number]    XR knee right 4+ views    Result Date: 11/20/2023  Interpreted By:  Jeremy Macdonald, STUDY: XR KNEE RIGHT 4+ VIEWS; XR TIBIA FIBULA RIGHT 2 VIEWS; ;  11/20/2023 12:17 pm   INDICATION: Signs/Symptoms:Fall, knee pain; Signs/Symptoms:Fall, right lower extremity pain.   COMPARISON: None.   ACCESSION NUMBER(S): [id number]; [id number]   ORDERING CLINICIAN: TRAVIS SKY   FINDINGS: Four view right knee: No acute fracture. Normal osseous alignment. Chondrocalcinosis is evident. Trace patellofemoral osteophytic spurring. Joint spaces are otherwise relatively well maintained. No large joint effusion. Enthesopathy along the superior pole of the patella. Atherosclerotic vascular calcification.   Two-view of the right tib-fib: No acute fracture. Trace smooth periosteal reaction along the distal diaphysis of the fibula versus vascular calcification. Normal osseous alignment. Knee joint as described above. Ankle mortise appears congruent. There is edema of the right lower extremity.       No acute osseous abnormality of the right knee. 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   No acute osseous abnormality of the right tib-fib. Edema of the lower extremity.   MACRO: None   Signed by: Jeremy Macdonald 11/20/2023 12:28 PM Dictation workstation:   [id number]    XR tibia fibula right 2 views    Result Date: 11/20/2023  Interpreted By:  Jeremy Macdonald, STUDY: XR KNEE RIGHT 4+ VIEWS; XR TIBIA FIBULA RIGHT 2 VIEWS; ;  11/20/2023 12:17 pm   INDICATION: Signs/Symptoms:Fall, knee pain; Signs/Symptoms:Fall, right lower extremity pain.   COMPARISON: None.   ACCESSION NUMBER(S): [id number]; [id number]   ORDERING CLINICIAN: TRAVIS SKY   FINDINGS: Four view right knee: No acute fracture. Normal osseous alignment.  Chondrocalcinosis is evident. Trace patellofemoral osteophytic spurring. Joint spaces are otherwise relatively well maintained. No large joint effusion. Enthesopathy along the superior pole of the patella. Atherosclerotic vascular calcification.   Two-view of the right tib-fib: No acute fracture. Trace smooth periosteal reaction along the distal diaphysis of the fibula versus vascular calcification. Normal osseous alignment. Knee joint as described above. Ankle mortise appears congruent. There is edema of the right lower extremity.       No acute osseous abnormality of the right knee. 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   No acute osseous abnormality of the right tib-fib. Edema of the lower extremity.   MACRO: None   Signed by: Jeremy Macdonald 11/20/2023 12:28 PM Dictation workstation:   [id number]       Lab Results  No results found for this or any previous visit (from the past 24 hour(s)).       Medications  Scheduled medications:  budesonide-formoteroL, 2 puff, inhalation, BID  cefTRIAXone, 2 g, intravenous, q24h  doxycycline, 100 mg, oral, q12h DORIS  heparin (porcine), 5,000 Units, subcutaneous, q8h  ipratropium, 2 puff, inhalation, 4x daily  melatonin, 3 mg, oral, Daily  methylPREDNISolone sodium succinate (PF), 40 mg, intravenous, q8h DORIS  metoprolol tartrate, 37.5 mg, oral, BID  pantoprazole, 40 mg, oral, Daily before breakfast   Or  pantoprazole, 40 mg, intravenous, Daily before breakfast  polyethylene glycol, 17 g, oral, Daily  white petrolatum, , Topical, Daily      Continuous medications:     PRN medications:  PRN medications: acetaminophen, bisacodyl, bisacodyl, guaiFENesin, ipratropium-albuteroL, LORazepam, ondansetron **OR** ondansetron, oxygen     Physical Exam  Constitutional:       General: She is not in acute distress.     Appearance: Normal appearance.      Comments: Thin, frail   HENT:      Head: Normocephalic and atraumatic.       Right Ear: External ear normal.      Left Ear: External ear normal.      Nose: Nose normal.      Mouth/Throat:      Mouth: Mucous membranes are moist.      Pharynx: Oropharynx is clear.   Eyes:      Extraocular Movements: Extraocular movements intact.      Conjunctiva/sclera: Conjunctivae normal.      Pupils: Pupils are equal, round, and reactive to light.   Cardiovascular:      Rate and Rhythm: Normal rate and regular rhythm.      Pulses: Normal pulses.      Heart sounds: Normal heart sounds.   Pulmonary:      Effort: Pulmonary effort is normal. No respiratory distress.      Breath sounds: Wheezing and rales present. No rhonchi.      Comments: NC in place  Slightly worse crackles bilateral bases  Abdominal:      General: Abdomen is flat. Bowel sounds are normal.      Palpations: Abdomen is soft.      Tenderness: There is no abdominal tenderness. There is no right CVA tenderness, left CVA tenderness, guarding or rebound.   Musculoskeletal:         General: No swelling. Normal range of motion.      Cervical back: Normal range of motion and neck supple.   Skin:     General: Skin is warm and dry.      Capillary Refill: Capillary refill takes less than 2 seconds.      Findings: No lesion or rash.   Neurological:      General: No focal deficit present.      Mental Status: She is alert and oriented to person, place, and time. Mental status is at baseline.   Psychiatric:         Mood and Affect: Mood normal.         Behavior: Behavior normal.                  Code Status  Full Code     Assessment/Plan      Generalized weakness  Assessment & Plan  -Likely in setting of above  -PT/OT    Left ureteral calculus  Assessment & Plan  -Patient denies any current symptoms with this and has not had any urinary tract infection symptoms that she can name.  She is not having flank pain.  She has not noticed any changes in her urine output.  -UA is without obvious signs of infection at this time.  Her renal function is appropriate  without any significant elevation.  -Patient is already on antibiotics  -Urology consultation appreciated- planning outpatient cystoscopy/treatment     Hydronephrosis of left kidney  Assessment & Plan  -2/2 stone  -outpatient treatment per urology    Acute on chronic hypoxic respiratory failure (CMS/HCC)  Assessment & Plan  -Patient's baseline oxygen requirement is typically 3-4 L nasal cannula, while in the ED her oxygen requirement did uptrend to 6 L.  This is acute on chronic by evidence of increase >2 L from baseline.  -Urine antigens negative  -Continue antibiotic coverage with ceftriaxone/doxycycline D4  -DuoNebs- she is refusing, will continue PRN  -IV steroids  -Pulmonary hygiene   -RT c/s appreciated  -Follow fever curve, WBCs     Pneumonia of left lower lobe due to infectious organism  Assessment & Plan  -IV antibiotics- rocephin/doxy D4  -Urine antigens negative    * COPD with acute exacerbation (CMS/HCC)  Assessment & Plan  -Steroids/doxy as above          DVT ppx: subcutaneous heparin      Please see orders for more complete plan    Donovan Mendosa MD PhD

## 2023-11-24 NOTE — CARE PLAN
Problem: Skin  Goal: Decreased wound size/increased tissue granulation at next dressing change  Outcome: Progressing  Goal: Participates in plan/prevention/treatment measures  Outcome: Progressing  Goal: Prevent/manage excess moisture  Outcome: Progressing  Goal: Prevent/minimize sheer/friction injuries  Outcome: Progressing  Goal: Promote/optimize nutrition  Outcome: Progressing  Goal: Promote skin healing  Outcome: Progressing   The patient's goals for the shift include      The clinical goals for the shift include pt will maintain base line o2 saturation on baseline o2 requirements this shift.    Pt remained free of falls this shift. Pt medicated per MAR.

## 2023-11-25 PROCEDURE — 2500000004 HC RX 250 GENERAL PHARMACY W/ HCPCS (ALT 636 FOR OP/ED): Performed by: STUDENT IN AN ORGANIZED HEALTH CARE EDUCATION/TRAINING PROGRAM

## 2023-11-25 PROCEDURE — 97116 GAIT TRAINING THERAPY: CPT | Mod: CQ,GP

## 2023-11-25 PROCEDURE — 2500000001 HC RX 250 WO HCPCS SELF ADMINISTERED DRUGS (ALT 637 FOR MEDICARE OP): Performed by: STUDENT IN AN ORGANIZED HEALTH CARE EDUCATION/TRAINING PROGRAM

## 2023-11-25 PROCEDURE — 96372 THER/PROPH/DIAG INJ SC/IM: CPT | Performed by: STUDENT IN AN ORGANIZED HEALTH CARE EDUCATION/TRAINING PROGRAM

## 2023-11-25 PROCEDURE — 1210000001 HC SEMI-PRIVATE ROOM DAILY

## 2023-11-25 PROCEDURE — 2500000002 HC RX 250 W HCPCS SELF ADMINISTERED DRUGS (ALT 637 FOR MEDICARE OP, ALT 636 FOR OP/ED): Performed by: PHYSICIAN ASSISTANT

## 2023-11-25 PROCEDURE — 2500000001 HC RX 250 WO HCPCS SELF ADMINISTERED DRUGS (ALT 637 FOR MEDICARE OP): Performed by: INTERNAL MEDICINE

## 2023-11-25 PROCEDURE — 99233 SBSQ HOSP IP/OBS HIGH 50: CPT | Performed by: INTERNAL MEDICINE

## 2023-11-25 RX ADMIN — LORAZEPAM 0.5 MG: 0.5 TABLET ORAL at 11:19

## 2023-11-25 RX ADMIN — DOXYCYCLINE 100 MG: 100 CAPSULE ORAL at 21:21

## 2023-11-25 RX ADMIN — DOXYCYCLINE 100 MG: 100 CAPSULE ORAL at 11:17

## 2023-11-25 RX ADMIN — METHYLPREDNISOLONE SODIUM SUCCINATE 40 MG: 40 INJECTION, POWDER, LYOPHILIZED, FOR SOLUTION INTRAMUSCULAR; INTRAVENOUS at 21:21

## 2023-11-25 RX ADMIN — ACETAMINOPHEN 650 MG: 325 TABLET ORAL at 05:55

## 2023-11-25 RX ADMIN — METHYLPREDNISOLONE SODIUM SUCCINATE 40 MG: 40 INJECTION, POWDER, LYOPHILIZED, FOR SOLUTION INTRAMUSCULAR; INTRAVENOUS at 13:35

## 2023-11-25 RX ADMIN — GUAIFENESIN 600 MG: 600 TABLET ORAL at 11:17

## 2023-11-25 RX ADMIN — HEPARIN SODIUM 5000 UNITS: 5000 INJECTION INTRAVENOUS; SUBCUTANEOUS at 00:19

## 2023-11-25 RX ADMIN — PANTOPRAZOLE SODIUM 40 MG: 40 TABLET, DELAYED RELEASE ORAL at 06:01

## 2023-11-25 RX ADMIN — IPRATROPIUM BROMIDE 2 PUFF: 17 AEROSOL, METERED RESPIRATORY (INHALATION) at 11:30

## 2023-11-25 RX ADMIN — CEFTRIAXONE SODIUM 2 G: 2 INJECTION, SOLUTION INTRAVENOUS at 11:17

## 2023-11-25 RX ADMIN — HYDROPHOR: 42 OINTMENT TOPICAL at 09:00

## 2023-11-25 RX ADMIN — ACETAMINOPHEN 650 MG: 325 TABLET ORAL at 11:17

## 2023-11-25 RX ADMIN — METHYLPREDNISOLONE SODIUM SUCCINATE 40 MG: 40 INJECTION, POWDER, LYOPHILIZED, FOR SOLUTION INTRAMUSCULAR; INTRAVENOUS at 06:00

## 2023-11-25 RX ADMIN — IPRATROPIUM BROMIDE 2 PUFF: 17 AEROSOL, METERED RESPIRATORY (INHALATION) at 16:04

## 2023-11-25 RX ADMIN — LORAZEPAM 0.5 MG: 0.5 TABLET ORAL at 21:24

## 2023-11-25 RX ADMIN — IPRATROPIUM BROMIDE 2 PUFF: 17 AEROSOL, METERED RESPIRATORY (INHALATION) at 21:20

## 2023-11-25 RX ADMIN — METOPROLOL TARTRATE 37.5 MG: 25 TABLET, FILM COATED ORAL at 11:17

## 2023-11-25 RX ADMIN — Medication 3 MG: at 21:21

## 2023-11-25 RX ADMIN — BUDESONIDE AND FORMOTEROL FUMARATE DIHYDRATE 2 PUFF: 160; 4.5 AEROSOL RESPIRATORY (INHALATION) at 11:30

## 2023-11-25 RX ADMIN — BUDESONIDE AND FORMOTEROL FUMARATE DIHYDRATE 2 PUFF: 160; 4.5 AEROSOL RESPIRATORY (INHALATION) at 21:20

## 2023-11-25 RX ADMIN — HEPARIN SODIUM 5000 UNITS: 5000 INJECTION INTRAVENOUS; SUBCUTANEOUS at 11:18

## 2023-11-25 RX ADMIN — HEPARIN SODIUM 5000 UNITS: 5000 INJECTION INTRAVENOUS; SUBCUTANEOUS at 16:06

## 2023-11-25 ASSESSMENT — PAIN - FUNCTIONAL ASSESSMENT
PAIN_FUNCTIONAL_ASSESSMENT: 0-10

## 2023-11-25 ASSESSMENT — ENCOUNTER SYMPTOMS
WEAKNESS: 1
CHEST TIGHTNESS: 0
FREQUENCY: 0
HEMATURIA: 0
PALPITATIONS: 0
FATIGUE: 1
WHEEZING: 1
ABDOMINAL PAIN: 0
HEADACHES: 0
FACIAL SWELLING: 0
DIARRHEA: 0
COUGH: 0
TROUBLE SWALLOWING: 0
DIAPHORESIS: 0
EYE PAIN: 0
BRUISES/BLEEDS EASILY: 0
APPETITE CHANGE: 0
NUMBNESS: 0
CONSTIPATION: 0
CHILLS: 0
FLANK PAIN: 0
BACK PAIN: 0
LIGHT-HEADEDNESS: 0
DIZZINESS: 0
HALLUCINATIONS: 0
FEVER: 0
VOMITING: 0
SORE THROAT: 0
NAUSEA: 0
WOUND: 0
SHORTNESS OF BREATH: 1
DYSURIA: 0
JOINT SWELLING: 0
BLOOD IN STOOL: 0

## 2023-11-25 ASSESSMENT — PAIN DESCRIPTION - LOCATION: LOCATION: GENERALIZED

## 2023-11-25 ASSESSMENT — COGNITIVE AND FUNCTIONAL STATUS - GENERAL
MOVING FROM LYING ON BACK TO SITTING ON SIDE OF FLAT BED WITH BEDRAILS: A LITTLE
DRESSING REGULAR LOWER BODY CLOTHING: A LITTLE
HELP NEEDED FOR BATHING: A LITTLE
WALKING IN HOSPITAL ROOM: A LOT
CLIMB 3 TO 5 STEPS WITH RAILING: TOTAL
TOILETING: A LITTLE
DAILY ACTIVITIY SCORE: 19
MOVING TO AND FROM BED TO CHAIR: A LOT
MOVING TO AND FROM BED TO CHAIR: A LOT
MOBILITY SCORE: 14
HELP NEEDED FOR BATHING: A LITTLE
TURNING FROM BACK TO SIDE WHILE IN FLAT BAD: A LITTLE
TURNING FROM BACK TO SIDE WHILE IN FLAT BAD: A LITTLE
MOVING TO AND FROM BED TO CHAIR: A LOT
CLIMB 3 TO 5 STEPS WITH RAILING: TOTAL
MOBILITY SCORE: 14
MOVING FROM LYING ON BACK TO SITTING ON SIDE OF FLAT BED WITH BEDRAILS: A LITTLE
WALKING IN HOSPITAL ROOM: A LOT
PERSONAL GROOMING: A LITTLE
MOBILITY SCORE: 14
PERSONAL GROOMING: A LITTLE
TOILETING: A LITTLE
DAILY ACTIVITIY SCORE: 19
WALKING IN HOSPITAL ROOM: A LOT
DRESSING REGULAR LOWER BODY CLOTHING: A LITTLE
DRESSING REGULAR UPPER BODY CLOTHING: A LITTLE
MOVING FROM LYING ON BACK TO SITTING ON SIDE OF FLAT BED WITH BEDRAILS: A LITTLE
STANDING UP FROM CHAIR USING ARMS: A LITTLE
DRESSING REGULAR UPPER BODY CLOTHING: A LITTLE
STANDING UP FROM CHAIR USING ARMS: A LITTLE
STANDING UP FROM CHAIR USING ARMS: A LITTLE
CLIMB 3 TO 5 STEPS WITH RAILING: TOTAL
TURNING FROM BACK TO SIDE WHILE IN FLAT BAD: A LITTLE

## 2023-11-25 ASSESSMENT — PAIN SCALES - GENERAL
PAINLEVEL_OUTOF10: 0 - NO PAIN
PAINLEVEL_OUTOF10: 2
PAINLEVEL_OUTOF10: 0 - NO PAIN
PAINLEVEL_OUTOF10: 3

## 2023-11-25 ASSESSMENT — PAIN DESCRIPTION - DESCRIPTORS: DESCRIPTORS: ACHING

## 2023-11-25 NOTE — PROGRESS NOTES
"Irish Davis is a 78 y.o. female on day 4 of admission presenting with COPD with acute exacerbation (CMS/[facility]).      Subjective   Irish Davis is a 78 y.o. female with PMHx s/f HTN, HLD, moderate aortic stenosis, chronic bilateral lower extremity edema (on as needed Lasix), COPD with chronic hypoxic respiratory failure (baseline 3-4 L nasal cannula), presented 11/20/23 with worsening shortness of breath, cough, wheezing, congestion, weakness following a fall approximately 1 week ago. She reports on return from the bathroom, caught her toe against the floor tile and fell onto her right side (no head strike, no loss of consciousness, no thinners), has some fairly persistent right knee, right elbow, right lateral chest discomfort since the fall.She also describes that over the last 5 days in particular, she has noticed that her breathing has gotten much worse from her baseline.  She has had a productive cough, worsening shortness of breath at rest, and has felt generally unwell.  Her home inhalers have not helped with her symptoms.  She does admit that multiple family members she has seen recently have had upper respiratory illnesses with productive coughs as well. She describes chronic lower extremity edema for which she did take Lasix yesterday with improvement. Lactate 1.6. High-sensitivity troponin 21; ECG without acute ischemic changes. Viral panel negative. UA with trace leukocyte esterase, 11-20 WBCs. Imaging: No acute traumatic injuries.  CXR with a left lower lobe pneumonia.  CT chest abdomen pelvis with the same, but also notable for a large obstructing left ureteral calculus with associated severe hydronephrosis. Urinary antigens negative. Urine culture no growth      Blood cultures negative x1 day  Echo: EF \"normal\" but no percentage recorded, +DD, Mild AS      11/22/23: No acute events overnight. 81% on 5 L- increased to 6L with improvement to 90%, BP elevated 166/85. BMP unremarkable. Slight " leukocytosis, likely from steroids. She feels her breathing has worsened today, will give 1x dose IV lasix as she sounds like she has more crackles. CTA chest negative for PE but does show continued LLL pneumonia.     11/23: SOB improving. She had an episode of SVT this am that broke spontaneously before intervention could be implemented.     11/24: She is without complaint. [facility] has accepted patient. Auth pending.     11/25: Weakness is improving to the point that she's capable of a bit more ambulation. She's now requesting to go home with [facility] as opposed to SNF.            Review of Systems   Constitutional:  Positive for fatigue. Negative for appetite change, chills, diaphoresis and fever.   HENT:  Negative for congestion, ear pain, facial swelling, hearing loss, nosebleeds, sore throat, tinnitus and trouble swallowing.    Eyes:  Negative for pain.   Respiratory:  Positive for shortness of breath and wheezing. Negative for cough and chest tightness.    Cardiovascular:  Negative for chest pain, palpitations and leg swelling.   Gastrointestinal:  Negative for abdominal pain, blood in stool, constipation, diarrhea, nausea and vomiting.   Genitourinary:  Negative for dysuria, flank pain, frequency, hematuria and urgency.   Musculoskeletal:  Negative for back pain and joint swelling.   Skin:  Negative for rash and wound.   Neurological:  Positive for weakness. Negative for dizziness, syncope, light-headedness, numbness and headaches.   Hematological:  Does not bruise/bleed easily.   Psychiatric/Behavioral:  Negative for behavioral problems, hallucinations and suicidal ideas.           Objective     Last Recorded Vitals  /87 (BP Location: Left arm, Patient Position: Lying)   Pulse 66   Temp 35.9 °C (96.6 °F) (Temporal)   Resp 16   Wt 54.4 kg (120 lb)   SpO2 97%     Image Results  XR elbow right 1-2 views    Result Date: 11/20/2023  Interpreted By:  Titi Trevino, STUDY: XR ELBOW RIGHT 1-2 VIEWS; ;   11/20/2023 1:18 pm   INDICATION: Signs/Symptoms:Fall, right elbow pain.   COMPARISON: None.   ACCESSION NUMBER(S): [id number]   ORDERING CLINICIAN: TRAVIS SKY   FINDINGS: Two views of the right elbow. Soft tissue swelling. No obvious displaced fracture or obvious effusion. Tiny calcifications adjacent to the radial head, favored to be chronic in nature. Chronic appearing irregularity adjacent to the medial humeral epicondyle.       Soft tissue swelling without acute osseous abnormality detected of the elbow.     MACRO: None   Signed by: Titi Trevino 11/20/2023 1:35 PM Dictation workstation:   [id number]    CT chest abdomen pelvis w IV contrast    Result Date: 11/20/2023  Interpreted By:  Carlton Ferrell, STUDY: CT CHEST ABDOMEN PELVIS W IV CONTRAST;  11/20/2023 12:42 pm   INDICATION: Signs/Symptoms:Fall, right sided chest/ right upper quadrant pain   COMPARISON: November 12, 2021 CT abdomen and pelvis. November 20, 2023 chest radiograph   ACCESSION NUMBER(S): [id number]   ORDERING CLINICIAN: TRAVIS SKY   TECHNIQUE: CT of the chest, abdomen, and pelvis was performed. Contiguous axial images were obtained at  5 mm slice thickness through the chest, and at  3 mm through the abdomen and pelvis. Coronal and sagittal reconstructions at  3 mm slice thickness were performed. 75 ml of contrast material Omnipaque 350 were administered intravenously without immediate complication.   FINDINGS: CHEST:   LUNG/PLEURA/LARGE AIRWAYS: Emphysema with superimposed prominent airspace abnormality at the left base most compatible with pneumonia versus less likely contusion given history of trauma. 4 mm nodule superior segment right lower lobe series 2, image 47 most likely incidental. Unchanged coarse calcification right base series 2, image 84.   VESSELS: No traumatic aortic injury is appreciated within the limitations of this non-EKG gated study.  The thoracic aorta is of normal course and caliber.  Scattered atherosclerosis  thoracic aorta and branch vessels. Moderate coronary artery calcifications are seen. The study is not optimized for evaluation of coronary arteries.   HEART: The heart is normal in size.   There is no pericardial effusion.   MEDIASTINUM AND WILBER: No pneumomediastinum, abnormal mediastinal fluid collection or mediastinal hematoma are appreciated.  Postinflammatory mediastinal calcifications. No adenopathy. The esophagus is normal in course and caliber.   CHEST WALL AND LOWER NECK: No acute fracture or dislocation of the included osseous structures are appreciated.  No suspicious osseous lesions are identified.  The thoracic wall soft tissues are within normal limits.   ABDOMEN:   LIVER: No focal perfusion abnormality of the liver is appreciated to suggest contusion or laceration. There is no subcapsular hematoma, no perihepatic fluid collection.   GALLBLADDER: No calcified stones or wall thickening.   BILE DUCTS: The intahepatic and extrahepatic bile ducts are not dilated.   PANCREAS: The pancreas appears unremarkable.   SPLEEN: No parenchymal perfusion deficit of the spleen is appreciated to suggest contusion or laceration. There is no subcapsular hematoma, no perisplenic fluid collection. Scattered benign postinflammatory calcifications.   ADRENAL GLANDS: The bilateral adrenal glands are unremarkable in appearance.   KIDNEYS AND URETERS: No parenchymal perfusion deficit is appreciated in bilateral kidneys to suggest contusion or laceration. There is no subcapsular hematoma, no perinephric fluid collection.  There is severe left-sided hydronephrosis related to large obstructing distal left ureteral calculus which measures 19 mm length series 6, image 64. There is a smaller calculus along the superior margin of the obstructing calculus. There is postobstructive hypoenhancement left kidney. There are scattered bilateral probably benign renal cortical hypodensities most compatible with cysts although some are too small  to characterize including 1 cm fluid density nodule anterior midportion left kidney series 2, image 111.   PELVIS:   BLADDER: The urinary bladder is distended without mass, calculus or surrounding inflammatory change.   REPRODUCTIVE ORGANS: No pelvic mass.   BOWEL: The stomach and bowel are normal caliber without evident hematoma.   VESSELS: Scattered atherosclerosis without aneurysm.   PERITONEUM/RETROPERITONEUM/LYMPH NODES: There is no evidence of intra- or retroperitoneal hematoma.  There is no free or loculated fluid collection, no free intraperitoneal air. No abdominopelvic lymphadenopathy is present.   BONES AND ABDOMINAL WALL: No evidence of acute fracture or dislocation of the included osseous structures.  No suspicious osseous lesions are identified.  No detected abdominal wall hematoma. Unchanged rectus diastasis and ventral abdominal eventration. Unchanged tiny periumbilical fat containing hernia.       Probable pneumonia versus less likely contusion left base.   Large obstructing calculus left ureter with severe left-sided hydronephrosis. Smaller calculus upstream to the large obstructing calculus left kidney.   Signed by: Carlton Ferrell 11/20/2023 1:07 PM Dictation workstation:   [id number]    XR chest 2 views    Result Date: 11/20/2023  Interpreted By:  Jeremy Macdonald, STUDY: XR CHEST 2 VIEWS;  11/20/2023 12:17 pm   INDICATION: Signs/Symptoms:Fall, right sided chest pain, cough.   COMPARISON: Chest radiograph dated 09/12/2023.   ACCESSION NUMBER(S): [id number]   ORDERING CLINICIAN: TRAVIS SKY   FINDINGS: PA and lateral radiographs of the chest were provided.   CARDIOMEDIASTINAL SILHOUETTE: Cardiomediastinal silhouette is normal in size and configuration. Aortic atherosclerotic calcification.   LUNGS: There is irregular consolidative parenchymal opacity within the posterior base of the left lower lobe. Trace right basilar atelectasis/scarring, similar to comparison exam. No pneumothorax or pleural  effusion.   ABDOMEN: No remarkable upper abdominal findings.   BONES: No acute osseous changes.       New, irregular left lower lobe airspace opacity that could reflect an element of pneumonia, aspiration, or atelectasis.   MACRO: None   Signed by: Jeremy Macdonald 11/20/2023 12:31 PM Dictation workstation:   [id number]    XR knee right 4+ views    Result Date: 11/20/2023  Interpreted By:  Jeremy Macdonald, STUDY: XR KNEE RIGHT 4+ VIEWS; XR TIBIA FIBULA RIGHT 2 VIEWS; ;  11/20/2023 12:17 pm   INDICATION: Signs/Symptoms:Fall, knee pain; Signs/Symptoms:Fall, right lower extremity pain.   COMPARISON: None.   ACCESSION NUMBER(S): [id number]; [id number]   ORDERING CLINICIAN: TRAVIS SKY   FINDINGS: Four view right knee: No acute fracture. Normal osseous alignment. Chondrocalcinosis is evident. Trace patellofemoral osteophytic spurring. Joint spaces are otherwise relatively well maintained. No large joint effusion. Enthesopathy along the superior pole of the patella. Atherosclerotic vascular calcification.   Two-view of the right tib-fib: No acute fracture. Trace smooth periosteal reaction along the distal diaphysis of the fibula versus vascular calcification. Normal osseous alignment. Knee joint as described above. Ankle mortise appears congruent. There is edema of the right lower extremity.       No acute osseous abnormality of the right knee. 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   No acute osseous abnormality of the right tib-fib. Edema of the lower extremity.   MACRO: None   Signed by: Jeremy Macdonald 11/20/2023 12:28 PM Dictation workstation:   [id number]    XR tibia fibula right 2 views    Result Date: 11/20/2023  Interpreted By:  Jeremy Macdonald, STUDY: XR KNEE RIGHT 4+ VIEWS; XR TIBIA FIBULA RIGHT 2 VIEWS; ;  11/20/2023 12:17 pm   INDICATION: Signs/Symptoms:Fall, knee pain; Signs/Symptoms:Fall, right lower extremity pain.   COMPARISON: None.   ACCESSION  NUMBER(S): [id number]; [id number]   ORDERING CLINICIAN: TRAVIS SKY   FINDINGS: Four view right knee: No acute fracture. Normal osseous alignment. Chondrocalcinosis is evident. Trace patellofemoral osteophytic spurring. Joint spaces are otherwise relatively well maintained. No large joint effusion. Enthesopathy along the superior pole of the patella. Atherosclerotic vascular calcification.   Two-view of the right tib-fib: No acute fracture. Trace smooth periosteal reaction along the distal diaphysis of the fibula versus vascular calcification. Normal osseous alignment. Knee joint as described above. Ankle mortise appears congruent. There is edema of the right lower extremity.       No acute osseous abnormality of the right knee. 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   No acute osseous abnormality of the right tib-fib. Edema of the lower extremity.   MACRO: None   Signed by: Jeremy Macdonald 11/20/2023 12:28 PM Dictation workstation:   [id number]       Lab Results  No results found for this or any previous visit (from the past 24 hour(s)).       Medications  Scheduled medications:  budesonide-formoteroL, 2 puff, inhalation, BID  cefTRIAXone, 2 g, intravenous, q24h  doxycycline, 100 mg, oral, q12h DORIS  heparin (porcine), 5,000 Units, subcutaneous, q8h  ipratropium, 2 puff, inhalation, 4x daily  melatonin, 3 mg, oral, Daily  methylPREDNISolone sodium succinate (PF), 40 mg, intravenous, q8h [facility]  metoprolol tartrate, 37.5 mg, oral, BID  pantoprazole, 40 mg, oral, Daily before breakfast   Or  pantoprazole, 40 mg, intravenous, Daily before breakfast  polyethylene glycol, 17 g, oral, Daily  white petrolatum, , Topical, Daily      Continuous medications:     PRN medications:  PRN medications: acetaminophen, bisacodyl, bisacodyl, guaiFENesin, ipratropium-albuteroL, LORazepam, ondansetron **OR** ondansetron, oxygen     Physical Exam  Constitutional:       General:  She is not in acute distress.     Appearance: Normal appearance.      Comments: Thin, frail   HENT:      Head: Normocephalic and atraumatic.      Right Ear: External ear normal.      Left Ear: External ear normal.      Nose: Nose normal.      Mouth/Throat:      Mouth: Mucous membranes are moist.      Pharynx: Oropharynx is clear.   Eyes:      Extraocular Movements: Extraocular movements intact.      Conjunctiva/sclera: Conjunctivae normal.      Pupils: Pupils are equal, round, and reactive to light.   Cardiovascular:      Rate and Rhythm: Normal rate and regular rhythm.      Pulses: Normal pulses.      Heart sounds: Normal heart sounds.   Pulmonary:      Effort: Pulmonary effort is normal. No respiratory distress.      Breath sounds: Wheezing and rales present. No rhonchi.      Comments: NC in place  Slightly worse crackles bilateral bases  Abdominal:      General: Abdomen is flat. Bowel sounds are normal.      Palpations: Abdomen is soft.      Tenderness: There is no abdominal tenderness. There is no right CVA tenderness, left CVA tenderness, guarding or rebound.   Musculoskeletal:         General: No swelling. Normal range of motion.      Cervical back: Normal range of motion and neck supple.   Skin:     General: Skin is warm and dry.      Capillary Refill: Capillary refill takes less than 2 seconds.      Findings: No lesion or rash.   Neurological:      General: No focal deficit present.      Mental Status: She is alert and oriented to person, place, and time. Mental status is at baseline.   Psychiatric:         Mood and Affect: Mood normal.         Behavior: Behavior normal.                  Code Status  Full Code     Assessment/Plan      Generalized weakness  Assessment & Plan  -Likely in setting of above  -PT/OT    Left ureteral calculus  Assessment & Plan  -Patient denies any current symptoms with this and has not had any urinary tract infection symptoms that she can name.  She is not having flank pain.  She  has not noticed any changes in her urine output.  -UA is without obvious signs of infection at this time.  Her renal function is appropriate without any significant elevation.  -Patient is already on antibiotics  -Urology consultation appreciated- planning outpatient cystoscopy/treatment     Hydronephrosis of left kidney  Assessment & Plan  -2/2 stone  -outpatient treatment per urology    Acute on chronic hypoxic respiratory failure (CMS/HCC)  Assessment & Plan  -Patient's baseline oxygen requirement is typically 3-4 L nasal cannula, while in the ED her oxygen requirement did uptrend to 6 L.  This is acute on chronic by evidence of increase >2 L from baseline.  -Urine antigens negative  -Continue antibiotic coverage with ceftriaxone/doxycycline D5  -DuoNebs- she is refusing, will continue PRN  -IV steroids  -Pulmonary hygiene   -RT c/s appreciated  -Follow fever curve, WBCs     Pneumonia of left lower lobe due to infectious organism  Assessment & Plan  -IV antibiotics- rocephin/doxy D5  -Urine antigens negative    * COPD with acute exacerbation (CMS/HCC)  Assessment & Plan  -Steroids/doxy as above          DVT ppx: subcutaneous heparin      Please see orders for more complete plan    Donovan Mendosa MD PhD

## 2023-11-25 NOTE — PROGRESS NOTES
11/25/23 1318  Spoke with pt and pt . Both now wanting pt to go home with [facility] as pt PCP is Khloe cMcray. Requested MD to send referral to [facility].  Malena Banegas RN, BSN, Fan/ Eric CT Supervisor

## 2023-11-25 NOTE — CARE PLAN
The patient's goals for the shift include      Problem: Fall/Injury  Goal: Not fall by end of shift  Outcome: Progressing  Goal: Be free from injury by end of the shift  Outcome: Progressing  Goal: Verbalize understanding of personal risk factors for fall in the hospital  Outcome: Progressing  Goal: Verbalize understanding of risk factor reduction measures to prevent injury from fall in the home  Outcome: Progressing     Problem: Skin  Goal: Decreased wound size/increased tissue granulation at next dressing change  Outcome: Progressing  Flowsheets (Taken 11/25/2023 1613)  Decreased wound size/increased tissue granulation at next dressing change: Promote sleep for wound healing  Goal: Participates in plan/prevention/treatment measures  Outcome: Progressing  Goal: Prevent/manage excess moisture  Outcome: Progressing  Flowsheets (Taken 11/25/2023 1613)  Prevent/manage excess moisture: Cleanse incontinence/protect with barrier cream  Goal: Prevent/minimize sheer/friction injuries  Outcome: Progressing  Flowsheets (Taken 11/25/2023 1613)  Prevent/minimize sheer/friction injuries: Turn/reposition every 2 hours/use positioning/transfer devices  Goal: Promote/optimize nutrition  Outcome: Progressing  Flowsheets (Taken 11/25/2023 1613)  Promote/optimize nutrition: Consume > 50% meals/supplements  Goal: Promote skin healing  Outcome: Progressing       The clinical goals for the shift include pt will maintain O2>92% this shift    See assessment and mar. Remains on 5 liters.

## 2023-11-25 NOTE — CARE PLAN
Problem: Fall/Injury  Goal: Not fall by end of shift  Outcome: Progressing  Goal: Be free from injury by end of the shift  Outcome: Progressing  Goal: Verbalize understanding of personal risk factors for fall in the hospital  Outcome: Progressing  Goal: Verbalize understanding of risk factor reduction measures to prevent injury from fall in the home  Outcome: Progressing     Problem: Skin  Goal: Decreased wound size/increased tissue granulation at next dressing change  11/24/2023 2304 by Delgado Hudson RN  Flowsheets (Taken 11/24/2023 2304)  Decreased wound size/increased tissue granulation at next dressing change:   Promote sleep for wound healing   Protective dressings over bony prominences  11/24/2023 2252 by Delgado Hudson RN  Outcome: Progressing  Goal: Participates in plan/prevention/treatment measures  11/24/2023 2304 by Delgado Hudson RN  Flowsheets (Taken 11/24/2023 2304)  Participates in plan/prevention/treatment measures:   Increase activity/out of bed for meals   Elevate heels   Discuss with provider PT/OT consult  11/24/2023 2252 by Delgado Hudson RN  Outcome: Progressing  Goal: Prevent/manage excess moisture  11/24/2023 2304 by Delgado Hudson RN  Flowsheets (Taken 11/24/2023 2304)  Prevent/manage excess moisture:   Cleanse incontinence/protect with barrier cream   Moisturize dry skin   Follow provider orders for dressing changes   Monitor for/manage infection if present  11/24/2023 2252 by Delgado Hudson RN  Outcome: Progressing  Goal: Prevent/minimize sheer/friction injuries  11/24/2023 2304 by Delgado Hudson RN  Flowsheets (Taken 11/24/2023 2304)  Prevent/minimize sheer/friction injuries:   Turn/reposition every 2 hours/use positioning/transfer devices   Use pull sheet   Increase activity/out of bed for meals   HOB 30 degrees or less  11/24/2023 2252 by Delgado Hudson RN  Outcome: Progressing  Goal: Promote/optimize nutrition  11/24/2023 2304 by Delgado Hudson RN  Flowsheets (Taken 11/24/2023  2304)  Promote/optimize nutrition:   Discuss with provider if NPO > 2 days   Offer water/supplements/favorite foods   Consume > 50% meals/supplements   Monitor/record intake including meals  11/24/2023 2252 by Delgado Hudson RN  Outcome: Progressing  Goal: Promote skin healing  11/24/2023 2304 by Delgado Hudson RN  Flowsheets (Taken 11/24/2023 2304)  Promote skin healing:   Assess skin/pad under line(s)/device(s)   Protective dressings over bony prominences   Turn/reposition every 2 hours/use positioning/transfer devices  11/24/2023 2252 by Delgado Hudson RN  Outcome: Progressing   The patient's goals for the shift include      The clinical goals for the shift include pt will maintain O2>92% this shift    Over the shift, the patient did not make progress toward the following goals. Barriers to progression include n/a. Recommendations to address these barriers include q2 turn and reposition/Qshift skin assessments.

## 2023-11-25 NOTE — PROGRESS NOTES
Physical Therapy  Physical Therapy Treatment    Patient Name: Irish Davis  MRN: [id number]  Today's Date: 11/25/2023  Time Calculation  Start Time: 1026  Stop Time: 1041  Time Calculation (min): 15 min     Assessment/Plan   PT Plan  Inpatient/Swing Bed or Outpatient: Inpatient  PT Plan  Treatment/Interventions: Bed mobility, Transfer training, Strengthening  PT Plan: Skilled PT  PT Frequency: 3 times per week  PT Discharge Recommendations: Moderate intensity level of continued care  PT - OK to Discharge: Yes    General Visit Information:   PT  Visit  PT Received On: 11/25/23  Response to Previous Treatment: Patient with no complaints from previous session.  General  General Comment: Room: [facility]    Subjective   Precautions:  Precautions  Precautions Comment: falls       Objective   Pain:  Pain Assessment  Pain Assessment:  (pt reports 3/10 general aches and pains)    Cognition:  Cognition  Overall Cognitive Status: Within Functional Limits    Activity Tolerance:  Activity Tolerance  Endurance: Tolerates 10 - 20 min exercise with multiple rests    Treatments:  Bed Mobility  Supine to sitting: Amy  Scooting: SBA  Rolling: SBA    Ambulation/Gait Training  15 feet x1 rep with FWW, Amy  Multiple standing rest breaks d/t fatigue and SOB    Transfers  Sit to stand: Amy  Stand to sit: Amy  Transfer Device: Rolling Walker      Other Activity  Other Activity Performed:  (chair following tx)    Outcome Measures:  [facility] Basic Mobility  Turning from your back to your side while in a flat bed without using bedrails: A little  Moving from lying on your back to sitting on the side of a flat bed without using bedrails: A little  Moving to and from bed to chair (including a wheelchair): A lot  Standing up from a chair using your arms (e.g. wheelchair or bedside chair): A little  To walk in hospital room: A lot  Climbing 3-5 steps with railing: Total  Basic Mobility - Total Score: 14    Education Documentation  Mobility  Training, taught by Rober Montelongo PTA at 11/25/2023 11:18 AM.  Learner: Patient  Readiness: Acceptance  Method: Explanation, Demonstration  Response: Needs Reinforcement    Education Comments  No comments found.        OP EDUCATION:       Encounter Problems       Encounter Problems (Active)       PT Problem       transfers (Progressing)       Start:  11/21/23    Expected End:  12/05/23       Patient will perform all transfers with CGA x1          gait (Progressing)       Start:  11/21/23    Expected End:  12/05/23       Patient will amb 25+ feet with Min assist x 1; A.D prn          strength (Progressing)       Start:  11/21/23    Expected End:  12/05/23       Patient will perform 20+ reps of AROM/RROM for SUNITA LE's to improve safety and functional independence

## 2023-11-25 NOTE — CARE PLAN
The patient's goals for the shift include      The clinical goals for the shift include pt will maintain base line o2 saturation on baseline o2 requirements this shift.    Over the shift, the patient did not make progress toward the following goals. Barriers to progression include n/a. Recommendations to address these barriers include continue rissa. Inhalers, and assess respiratory status.

## 2023-11-26 PROCEDURE — 2500000004 HC RX 250 GENERAL PHARMACY W/ HCPCS (ALT 636 FOR OP/ED): Performed by: STUDENT IN AN ORGANIZED HEALTH CARE EDUCATION/TRAINING PROGRAM

## 2023-11-26 PROCEDURE — 2500000001 HC RX 250 WO HCPCS SELF ADMINISTERED DRUGS (ALT 637 FOR MEDICARE OP): Performed by: INTERNAL MEDICINE

## 2023-11-26 PROCEDURE — 2500000001 HC RX 250 WO HCPCS SELF ADMINISTERED DRUGS (ALT 637 FOR MEDICARE OP): Performed by: STUDENT IN AN ORGANIZED HEALTH CARE EDUCATION/TRAINING PROGRAM

## 2023-11-26 PROCEDURE — 1210000001 HC SEMI-PRIVATE ROOM DAILY

## 2023-11-26 PROCEDURE — 2500000002 HC RX 250 W HCPCS SELF ADMINISTERED DRUGS (ALT 637 FOR MEDICARE OP, ALT 636 FOR OP/ED): Performed by: PHYSICIAN ASSISTANT

## 2023-11-26 PROCEDURE — 96372 THER/PROPH/DIAG INJ SC/IM: CPT | Performed by: STUDENT IN AN ORGANIZED HEALTH CARE EDUCATION/TRAINING PROGRAM

## 2023-11-26 PROCEDURE — 93010 ELECTROCARDIOGRAM REPORT: CPT | Performed by: INTERNAL MEDICINE

## 2023-11-26 PROCEDURE — 99233 SBSQ HOSP IP/OBS HIGH 50: CPT | Performed by: INTERNAL MEDICINE

## 2023-11-26 RX ORDER — SODIUM CHLORIDE 0.9 % (FLUSH) 0.9 %
10 SYRINGE (ML) INJECTION AS NEEDED
Status: DISCONTINUED | OUTPATIENT
Start: 2023-11-26 | End: 2023-11-27 | Stop reason: HOSPADM

## 2023-11-26 RX ADMIN — METHYLPREDNISOLONE SODIUM SUCCINATE 40 MG: 40 INJECTION, POWDER, LYOPHILIZED, FOR SOLUTION INTRAMUSCULAR; INTRAVENOUS at 14:52

## 2023-11-26 RX ADMIN — LORAZEPAM 0.5 MG: 0.5 TABLET ORAL at 08:29

## 2023-11-26 RX ADMIN — IPRATROPIUM BROMIDE 2 PUFF: 17 AEROSOL, METERED RESPIRATORY (INHALATION) at 12:24

## 2023-11-26 RX ADMIN — Medication 3 MG: at 20:02

## 2023-11-26 RX ADMIN — ACETAMINOPHEN 650 MG: 325 TABLET ORAL at 03:36

## 2023-11-26 RX ADMIN — IPRATROPIUM BROMIDE 2 PUFF: 17 AEROSOL, METERED RESPIRATORY (INHALATION) at 08:29

## 2023-11-26 RX ADMIN — HEPARIN SODIUM 5000 UNITS: 5000 INJECTION INTRAVENOUS; SUBCUTANEOUS at 08:29

## 2023-11-26 RX ADMIN — IPRATROPIUM BROMIDE 2 PUFF: 17 AEROSOL, METERED RESPIRATORY (INHALATION) at 17:01

## 2023-11-26 RX ADMIN — DOXYCYCLINE 100 MG: 100 CAPSULE ORAL at 08:29

## 2023-11-26 RX ADMIN — METOPROLOL TARTRATE 37.5 MG: 25 TABLET, FILM COATED ORAL at 08:29

## 2023-11-26 RX ADMIN — Medication 10 ML: at 14:53

## 2023-11-26 RX ADMIN — IPRATROPIUM BROMIDE 2 PUFF: 17 AEROSOL, METERED RESPIRATORY (INHALATION) at 00:00

## 2023-11-26 RX ADMIN — BUDESONIDE AND FORMOTEROL FUMARATE DIHYDRATE 2 PUFF: 160; 4.5 AEROSOL RESPIRATORY (INHALATION) at 08:29

## 2023-11-26 RX ADMIN — BUDESONIDE AND FORMOTEROL FUMARATE DIHYDRATE 2 PUFF: 160; 4.5 AEROSOL RESPIRATORY (INHALATION) at 20:04

## 2023-11-26 RX ADMIN — METOPROLOL TARTRATE 37.5 MG: 25 TABLET, FILM COATED ORAL at 20:02

## 2023-11-26 RX ADMIN — IPRATROPIUM BROMIDE 2 PUFF: 17 AEROSOL, METERED RESPIRATORY (INHALATION) at 20:04

## 2023-11-26 RX ADMIN — PANTOPRAZOLE SODIUM 40 MG: 40 TABLET, DELAYED RELEASE ORAL at 06:01

## 2023-11-26 RX ADMIN — HEPARIN SODIUM 5000 UNITS: 5000 INJECTION INTRAVENOUS; SUBCUTANEOUS at 17:01

## 2023-11-26 RX ADMIN — METHYLPREDNISOLONE SODIUM SUCCINATE 40 MG: 40 INJECTION, POWDER, LYOPHILIZED, FOR SOLUTION INTRAMUSCULAR; INTRAVENOUS at 21:03

## 2023-11-26 RX ADMIN — METHYLPREDNISOLONE SODIUM SUCCINATE 40 MG: 40 INJECTION, POWDER, LYOPHILIZED, FOR SOLUTION INTRAMUSCULAR; INTRAVENOUS at 06:01

## 2023-11-26 RX ADMIN — LORAZEPAM 0.5 MG: 0.5 TABLET ORAL at 20:04

## 2023-11-26 ASSESSMENT — COGNITIVE AND FUNCTIONAL STATUS - GENERAL
CLIMB 3 TO 5 STEPS WITH RAILING: TOTAL
MOBILITY SCORE: 14
DAILY ACTIVITIY SCORE: 19
HELP NEEDED FOR BATHING: A LITTLE
TOILETING: A LITTLE
CLIMB 3 TO 5 STEPS WITH RAILING: TOTAL
MOVING TO AND FROM BED TO CHAIR: A LOT
TURNING FROM BACK TO SIDE WHILE IN FLAT BAD: A LITTLE
MOVING FROM LYING ON BACK TO SITTING ON SIDE OF FLAT BED WITH BEDRAILS: A LITTLE
MOVING TO AND FROM BED TO CHAIR: A LOT
TURNING FROM BACK TO SIDE WHILE IN FLAT BAD: A LITTLE
DRESSING REGULAR LOWER BODY CLOTHING: A LITTLE
DRESSING REGULAR UPPER BODY CLOTHING: A LITTLE
MOBILITY SCORE: 14
TOILETING: A LITTLE
HELP NEEDED FOR BATHING: A LITTLE
DAILY ACTIVITIY SCORE: 19
DRESSING REGULAR LOWER BODY CLOTHING: A LITTLE
STANDING UP FROM CHAIR USING ARMS: A LITTLE
DRESSING REGULAR UPPER BODY CLOTHING: A LITTLE
STANDING UP FROM CHAIR USING ARMS: A LITTLE
WALKING IN HOSPITAL ROOM: A LOT
WALKING IN HOSPITAL ROOM: A LOT
PERSONAL GROOMING: A LITTLE
MOVING FROM LYING ON BACK TO SITTING ON SIDE OF FLAT BED WITH BEDRAILS: A LITTLE
PERSONAL GROOMING: A LITTLE

## 2023-11-26 ASSESSMENT — ENCOUNTER SYMPTOMS
WOUND: 0
PALPITATIONS: 0
CHEST TIGHTNESS: 0
VOMITING: 0
CONSTIPATION: 0
FEVER: 0
WEAKNESS: 1
SHORTNESS OF BREATH: 1
COUGH: 0
FLANK PAIN: 0
FREQUENCY: 0
TROUBLE SWALLOWING: 0
HALLUCINATIONS: 0
WHEEZING: 1
HEMATURIA: 0
FATIGUE: 1
NUMBNESS: 0
DIARRHEA: 0
EYE PAIN: 0
APPETITE CHANGE: 0
DYSURIA: 0
BLOOD IN STOOL: 0
CHILLS: 0
ABDOMINAL PAIN: 0
JOINT SWELLING: 0
BRUISES/BLEEDS EASILY: 0
FACIAL SWELLING: 0
DIZZINESS: 0
SORE THROAT: 0
HEADACHES: 0
NAUSEA: 0
DIAPHORESIS: 0
BACK PAIN: 0
LIGHT-HEADEDNESS: 0

## 2023-11-26 ASSESSMENT — PAIN - FUNCTIONAL ASSESSMENT
PAIN_FUNCTIONAL_ASSESSMENT: 0-10
PAIN_FUNCTIONAL_ASSESSMENT: 0-10

## 2023-11-26 ASSESSMENT — PAIN SCALES - GENERAL
PAINLEVEL_OUTOF10: 0 - NO PAIN
PAINLEVEL_OUTOF10: 3

## 2023-11-26 ASSESSMENT — PAIN DESCRIPTION - ORIENTATION: ORIENTATION: RIGHT;LEFT

## 2023-11-26 ASSESSMENT — PAIN DESCRIPTION - LOCATION: LOCATION: LEG

## 2023-11-26 NOTE — PROGRESS NOTES
"Irish Davis is a 78 y.o. female on day 5 of admission presenting with COPD with acute exacerbation (CMS/[facility]).      Subjective   Irish Davis is a 78 y.o. female with PMHx s/f HTN, HLD, moderate aortic stenosis, chronic bilateral lower extremity edema (on as needed Lasix), COPD with chronic hypoxic respiratory failure (baseline 3-4 L nasal cannula), presented 11/20/23 with worsening shortness of breath, cough, wheezing, congestion, weakness following a fall approximately 1 week ago. She reports on return from the bathroom, caught her toe against the floor tile and fell onto her right side (no head strike, no loss of consciousness, no thinners), has some fairly persistent right knee, right elbow, right lateral chest discomfort since the fall.She also describes that over the last 5 days in particular, she has noticed that her breathing has gotten much worse from her baseline.  She has had a productive cough, worsening shortness of breath at rest, and has felt generally unwell.  Her home inhalers have not helped with her symptoms.  She does admit that multiple family members she has seen recently have had upper respiratory illnesses with productive coughs as well. She describes chronic lower extremity edema for which she did take Lasix yesterday with improvement. Lactate 1.6. High-sensitivity troponin 21; ECG without acute ischemic changes. Viral panel negative. UA with trace leukocyte esterase, 11-20 WBCs. Imaging: No acute traumatic injuries.  CXR with a left lower lobe pneumonia.  CT chest abdomen pelvis with the same, but also notable for a large obstructing left ureteral calculus with associated severe hydronephrosis. Urinary antigens negative. Urine culture no growth      Blood cultures negative x1 day  Echo: EF \"normal\" but no percentage recorded, +DD, Mild AS      11/22/23: No acute events overnight. 81% on 5 L- increased to 6L with improvement to 90%, BP elevated 166/85. BMP unremarkable. Slight " leukocytosis, likely from steroids. She feels her breathing has worsened today, will give 1x dose IV lasix as she sounds like she has more crackles. CTA chest negative for PE but does show continued LLL pneumonia.     11/23: SOB improving. She had an episode of SVT this am that broke spontaneously before intervention could be implemented.     11/24: She is without complaint. [facility]. Auth pending.     11/25: Weakness is improving to the point that she's capable of a bit more ambulation. She's now requesting to go home with [facility] as opposed to SNF.     11/26: She continues to improve. She wishes to go home with [facility] tomorrow.          Review of Systems   Constitutional:  Positive for fatigue. Negative for appetite change, chills, diaphoresis and fever.   HENT:  Negative for congestion, ear pain, facial swelling, hearing loss, nosebleeds, sore throat, tinnitus and trouble swallowing.    Eyes:  Negative for pain.   Respiratory:  Positive for shortness of breath and wheezing. Negative for cough and chest tightness.    Cardiovascular:  Negative for chest pain, palpitations and leg swelling.   Gastrointestinal:  Negative for abdominal pain, blood in stool, constipation, diarrhea, nausea and vomiting.   Genitourinary:  Negative for dysuria, flank pain, frequency, hematuria and urgency.   Musculoskeletal:  Negative for back pain and joint swelling.   Skin:  Negative for rash and wound.   Neurological:  Positive for weakness. Negative for dizziness, syncope, light-headedness, numbness and headaches.   Hematological:  Does not bruise/bleed easily.   Psychiatric/Behavioral:  Negative for behavioral problems, hallucinations and suicidal ideas.           Objective     Last Recorded Vitals  /81 (BP Location: Left arm, Patient Position: Lying)   Pulse 68   Temp 36.7 °C (98 °F) (Temporal)   Resp 16   Wt 54.4 kg (120 lb)   SpO2 96%     Image Results  XR elbow right 1-2 views    Result Date:  11/20/2023  Interpreted By:  Titi Trevino, STUDY: XR ELBOW RIGHT 1-2 VIEWS; ;  11/20/2023 1:18 pm   INDICATION: Signs/Symptoms:Fall, right elbow pain.   COMPARISON: None.   ACCESSION NUMBER(S): [id number]   ORDERING CLINICIAN: TRAVIS SKY   FINDINGS: Two views of the right elbow. Soft tissue swelling. No obvious displaced fracture or obvious effusion. Tiny calcifications adjacent to the radial head, favored to be chronic in nature. Chronic appearing irregularity adjacent to the medial humeral epicondyle.       Soft tissue swelling without acute osseous abnormality detected of the elbow.     MACRO: None   Signed by: Titi Trevino 11/20/2023 1:35 PM Dictation workstation:   [id number]    CT chest abdomen pelvis w IV contrast    Result Date: 11/20/2023  Interpreted By:  Carlton Ferrell, STUDY: CT CHEST ABDOMEN PELVIS W IV CONTRAST;  11/20/2023 12:42 pm   INDICATION: Signs/Symptoms:Fall, right sided chest/ right upper quadrant pain   COMPARISON: November 12, 2021 CT abdomen and pelvis. November 20, 2023 chest radiograph   ACCESSION NUMBER(S): [id number]   ORDERING CLINICIAN: TRAVIS SKY   TECHNIQUE: CT of the chest, abdomen, and pelvis was performed. Contiguous axial images were obtained at  5 mm slice thickness through the chest, and at  3 mm through the abdomen and pelvis. Coronal and sagittal reconstructions at  3 mm slice thickness were performed. 75 ml of contrast material Omnipaque 350 were administered intravenously without immediate complication.   FINDINGS: CHEST:   LUNG/PLEURA/LARGE AIRWAYS: Emphysema with superimposed prominent airspace abnormality at the left base most compatible with pneumonia versus less likely contusion given history of trauma. 4 mm nodule superior segment right lower lobe series 2, image 47 most likely incidental. Unchanged coarse calcification right base series 2, image 84.   VESSELS: No traumatic aortic injury is appreciated within the limitations of this non-EKG gated study.   The thoracic aorta is of normal course and caliber.  Scattered atherosclerosis thoracic aorta and branch vessels. Moderate coronary artery calcifications are seen. The study is not optimized for evaluation of coronary arteries.   HEART: The heart is normal in size.   There is no pericardial effusion.   MEDIASTINUM AND WILBER: No pneumomediastinum, abnormal mediastinal fluid collection or mediastinal hematoma are appreciated.  Postinflammatory mediastinal calcifications. No adenopathy. The esophagus is normal in course and caliber.   CHEST WALL AND LOWER NECK: No acute fracture or dislocation of the included osseous structures are appreciated.  No suspicious osseous lesions are identified.  The thoracic wall soft tissues are within normal limits.   ABDOMEN:   LIVER: No focal perfusion abnormality of the liver is appreciated to suggest contusion or laceration. There is no subcapsular hematoma, no perihepatic fluid collection.   GALLBLADDER: No calcified stones or wall thickening.   BILE DUCTS: The intahepatic and extrahepatic bile ducts are not dilated.   PANCREAS: The pancreas appears unremarkable.   SPLEEN: No parenchymal perfusion deficit of the spleen is appreciated to suggest contusion or laceration. There is no subcapsular hematoma, no perisplenic fluid collection. Scattered benign postinflammatory calcifications.   ADRENAL GLANDS: The bilateral adrenal glands are unremarkable in appearance.   KIDNEYS AND URETERS: No parenchymal perfusion deficit is appreciated in bilateral kidneys to suggest contusion or laceration. There is no subcapsular hematoma, no perinephric fluid collection.  There is severe left-sided hydronephrosis related to large obstructing distal left ureteral calculus which measures 19 mm length series 6, image 64. There is a smaller calculus along the superior margin of the obstructing calculus. There is postobstructive hypoenhancement left kidney. There are scattered bilateral probably benign  renal cortical hypodensities most compatible with cysts although some are too small to characterize including 1 cm fluid density nodule anterior midportion left kidney series 2, image 111.   PELVIS:   BLADDER: The urinary bladder is distended without mass, calculus or surrounding inflammatory change.   REPRODUCTIVE ORGANS: No pelvic mass.   BOWEL: The stomach and bowel are normal caliber without evident hematoma.   VESSELS: Scattered atherosclerosis without aneurysm.   PERITONEUM/RETROPERITONEUM/LYMPH NODES: There is no evidence of intra- or retroperitoneal hematoma.  There is no free or loculated fluid collection, no free intraperitoneal air. No abdominopelvic lymphadenopathy is present.   BONES AND ABDOMINAL WALL: No evidence of acute fracture or dislocation of the included osseous structures.  No suspicious osseous lesions are identified.  No detected abdominal wall hematoma. Unchanged rectus diastasis and ventral abdominal eventration. Unchanged tiny periumbilical fat containing hernia.       Probable pneumonia versus less likely contusion left base.   Large obstructing calculus left ureter with severe left-sided hydronephrosis. Smaller calculus upstream to the large obstructing calculus left kidney.   Signed by: Carlton Ferrell 11/20/2023 1:07 PM Dictation workstation:   [id number]    XR chest 2 views    Result Date: 11/20/2023  Interpreted By:  Jeremy Macdonald, STUDY: XR CHEST 2 VIEWS;  11/20/2023 12:17 pm   INDICATION: Signs/Symptoms:Fall, right sided chest pain, cough.   COMPARISON: Chest radiograph dated 09/12/2023.   ACCESSION NUMBER(S): [id number]   ORDERING CLINICIAN: TRAVIS SKY   FINDINGS: PA and lateral radiographs of the chest were provided.   CARDIOMEDIASTINAL SILHOUETTE: Cardiomediastinal silhouette is normal in size and configuration. Aortic atherosclerotic calcification.   LUNGS: There is irregular consolidative parenchymal opacity within the posterior base of the left lower lobe. Trace right  basilar atelectasis/scarring, similar to comparison exam. No pneumothorax or pleural effusion.   ABDOMEN: No remarkable upper abdominal findings.   BONES: No acute osseous changes.       New, irregular left lower lobe airspace opacity that could reflect an element of pneumonia, aspiration, or atelectasis.   MACRO: None   Signed by: Jeremy Macdonald 11/20/2023 12:31 PM Dictation workstation:   [id number]    XR knee right 4+ views    Result Date: 11/20/2023  Interpreted By:  Jeremy Macdonald, STUDY: XR KNEE RIGHT 4+ VIEWS; XR TIBIA FIBULA RIGHT 2 VIEWS; ;  11/20/2023 12:17 pm   INDICATION: Signs/Symptoms:Fall, knee pain; Signs/Symptoms:Fall, right lower extremity pain.   COMPARISON: None.   ACCESSION NUMBER(S): [id number]; [id number]   ORDERING CLINICIAN: TRAVIS SKY   FINDINGS: Four view right knee: No acute fracture. Normal osseous alignment. Chondrocalcinosis is evident. Trace patellofemoral osteophytic spurring. Joint spaces are otherwise relatively well maintained. No large joint effusion. Enthesopathy along the superior pole of the patella. Atherosclerotic vascular calcification.   Two-view of the right tib-fib: No acute fracture. Trace smooth periosteal reaction along the distal diaphysis of the fibula versus vascular calcification. Normal osseous alignment. Knee joint as described above. Ankle mortise appears congruent. There is edema of the right lower extremity.       No acute osseous abnormality of the right knee. 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   No acute osseous abnormality of the right tib-fib. Edema of the lower extremity.   MACRO: None   Signed by: Jeremy Macdonald 11/20/2023 12:28 PM Dictation workstation:   [id number]    XR tibia fibula right 2 views    Result Date: 11/20/2023  Interpreted By:  Jeremy Macdonald, STUDY: XR KNEE RIGHT 4+ VIEWS; XR TIBIA FIBULA RIGHT 2 VIEWS; ;  11/20/2023 12:17 pm   INDICATION: Signs/Symptoms:Fall, knee  pain; Signs/Symptoms:Fall, right lower extremity pain.   COMPARISON: None.   ACCESSION NUMBER(S): [id number]; [id number]   ORDERING CLINICIAN: TRAVIS SKY   FINDINGS: Four view right knee: No acute fracture. Normal osseous alignment. Chondrocalcinosis is evident. Trace patellofemoral osteophytic spurring. Joint spaces are otherwise relatively well maintained. No large joint effusion. Enthesopathy along the superior pole of the patella. Atherosclerotic vascular calcification.   Two-view of the right tib-fib: No acute fracture. Trace smooth periosteal reaction along the distal diaphysis of the fibula versus vascular calcification. Normal osseous alignment. Knee joint as described above. Ankle mortise appears congruent. There is edema of the right lower extremity.       No acute osseous abnormality of the right knee. 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   No acute osseous abnormality of the right tib-fib. Edema of the lower extremity.   MACRO: None   Signed by: Jeremy Macdonald 11/20/2023 12:28 PM Dictation workstation:   [id number]       Lab Results  No results found for this or any previous visit (from the past 24 hour(s)).       Medications  Scheduled medications:  budesonide-formoteroL, 2 puff, inhalation, BID  heparin (porcine), 5,000 Units, subcutaneous, q8h  ipratropium, 2 puff, inhalation, 4x daily  melatonin, 3 mg, oral, Daily  methylPREDNISolone sodium succinate (PF), 40 mg, intravenous, q8h DORIS  metoprolol tartrate, 37.5 mg, oral, BID  pantoprazole, 40 mg, oral, Daily before breakfast   Or  pantoprazole, 40 mg, intravenous, Daily before breakfast  polyethylene glycol, 17 g, oral, Daily  white petrolatum, , Topical, Daily      Continuous medications:     PRN medications:  PRN medications: acetaminophen, bisacodyl, bisacodyl, guaiFENesin, ipratropium-albuteroL, LORazepam, ondansetron **OR** ondansetron, oxygen     Physical Exam  Constitutional:        General: She is not in acute distress.     Appearance: Normal appearance.      Comments: Thin, frail   HENT:      Head: Normocephalic and atraumatic.      Right Ear: External ear normal.      Left Ear: External ear normal.      Nose: Nose normal.      Mouth/Throat:      Mouth: Mucous membranes are moist.      Pharynx: Oropharynx is clear.   Eyes:      Extraocular Movements: Extraocular movements intact.      Conjunctiva/sclera: Conjunctivae normal.      Pupils: Pupils are equal, round, and reactive to light.   Cardiovascular:      Rate and Rhythm: Normal rate and regular rhythm.      Pulses: Normal pulses.      Heart sounds: Normal heart sounds.   Pulmonary:      Effort: Pulmonary effort is normal. No respiratory distress.      Breath sounds: Wheezing and rales present. No rhonchi.      Comments: NC in place  Slightly worse crackles bilateral bases  Abdominal:      General: Abdomen is flat. Bowel sounds are normal.      Palpations: Abdomen is soft.      Tenderness: There is no abdominal tenderness. There is no right CVA tenderness, left CVA tenderness, guarding or rebound.   Musculoskeletal:         General: No swelling. Normal range of motion.      Cervical back: Normal range of motion and neck supple.   Skin:     General: Skin is warm and dry.      Capillary Refill: Capillary refill takes less than 2 seconds.      Findings: No lesion or rash.   Neurological:      General: No focal deficit present.      Mental Status: She is alert and oriented to person, place, and time. Mental status is at baseline.   Psychiatric:         Mood and Affect: Mood normal.         Behavior: Behavior normal.                  Code Status  Full Code     Assessment/Plan      Generalized weakness  Assessment & Plan  -Likely in setting of above  -PT/OT rec SNF, she wishes for [facility]     Left ureteral calculus  Assessment & Plan  -Patient denies any current symptoms with this and has not had any urinary tract infection symptoms that she can  name.  She is not having flank pain.  She has not noticed any changes in her urine output.  -UA is without obvious signs of infection at this time.  Her renal function is appropriate without any significant elevation.  -Patient is already on antibiotics  -Urology consultation appreciated- planning outpatient cystoscopy/treatment     Hydronephrosis of left kidney  Assessment & Plan  -2/2 stone  -outpatient treatment per urology    Acute on chronic hypoxic respiratory failure (CMS/HCC)  Assessment & Plan  -Patient's baseline oxygen requirement is typically 3-4 L nasal cannula, while in the ED her oxygen requirement did uptrend to 6 L.  This is acute on chronic by evidence of increase >2 L from baseline.  -Urine antigens negative  -ceftriaxone/doxycycline completed  -DuoNebs- she is refusing, will continue PRN  -IV steroids, PO taper on DC  -Pulmonary hygiene   -RT c/s appreciated  -Follow fever curve, WBCs     Pneumonia of left lower lobe due to infectious organism  Assessment & Plan  -IV antibiotics- rocephin/doxy complete  -Urine antigens negative    * COPD with acute exacerbation (CMS/HCC)  Assessment & Plan  -Steroids as above          DVT ppx: subcutaneous heparin      Please see orders for more complete plan    Donovan Mendosa MD PhD

## 2023-11-26 NOTE — CARE PLAN
The patient's goals for the shift include      Problem: Fall/Injury  Goal: Not fall by end of shift  Outcome: Progressing  Goal: Be free from injury by end of the shift  Outcome: Progressing  Goal: Verbalize understanding of personal risk factors for fall in the hospital  Outcome: Progressing  Goal: Verbalize understanding of risk factor reduction measures to prevent injury from fall in the home  Outcome: Progressing     Problem: Skin  Goal: Decreased wound size/increased tissue granulation at next dressing change  Outcome: Progressing  Goal: Participates in plan/prevention/treatment measures  Outcome: Progressing  Flowsheets (Taken 11/26/2023 1201)  Participates in plan/prevention/treatment measures: Increase activity/out of bed for meals  Goal: Prevent/manage excess moisture  Outcome: Progressing  Flowsheets (Taken 11/26/2023 1201)  Prevent/manage excess moisture: Follow provider orders for dressing changes  Goal: Prevent/minimize sheer/friction injuries  Outcome: Progressing  Flowsheets (Taken 11/26/2023 1201)  Prevent/minimize sheer/friction injuries:   Increase activity/out of bed for meals   Turn/reposition every 2 hours/use positioning/transfer devices  Goal: Promote/optimize nutrition  Outcome: Progressing  Flowsheets (Taken 11/26/2023 1201)  Promote/optimize nutrition: Consume > 50% meals/supplements  Goal: Promote skin healing  Outcome: Progressing  Flowsheets (Taken 11/26/2023 1201)  Promote skin healing: Turn/reposition every 2 hours/use positioning/transfer devices       The clinical goals for the shift include Pt will remain safe and not fall during shift    See assessment and mar. Remains on 5 liters NC. Wound care as ordered.

## 2023-11-26 NOTE — CARE PLAN
Problem: Fall/Injury  Goal: Not fall by end of shift  Outcome: Progressing  Goal: Be free from injury by end of the shift  Outcome: Progressing  Goal: Verbalize understanding of personal risk factors for fall in the hospital  Outcome: Progressing  Goal: Verbalize understanding of risk factor reduction measures to prevent injury from fall in the home  Outcome: Progressing     Problem: Skin  Goal: Decreased wound size/increased tissue granulation at next dressing change  Outcome: Progressing  Goal: Participates in plan/prevention/treatment measures  Outcome: Progressing  Goal: Prevent/manage excess moisture  Outcome: Progressing  Goal: Prevent/minimize sheer/friction injuries  Outcome: Progressing  Goal: Promote/optimize nutrition  Outcome: Progressing  Goal: Promote skin healing  Outcome: Progressing   The patient's goals for the shift include      The clinical goals for the shift include Pt will remain safe and not fall during shift    Over the shift, the patient did not make progress toward the following goals. Barriers to progression include . Recommendations to address these barriers include .

## 2023-11-27 ENCOUNTER — HOSPITAL ENCOUNTER (OUTPATIENT)
Dept: CARDIOLOGY | Facility: HOSPITAL | Age: 78
Discharge: HOME | End: 2023-11-27
Payer: COMMERCIAL

## 2023-11-27 ENCOUNTER — HOME HEALTH ADMISSION (OUTPATIENT)
Dept: HOME HEALTH SERVICES | Facility: HOME HEALTH | Age: 78
End: 2023-11-27
Payer: COMMERCIAL

## 2023-11-27 ENCOUNTER — PHARMACY VISIT (OUTPATIENT)
Dept: PHARMACY | Facility: CLINIC | Age: 78
End: 2023-11-27
Payer: COMMERCIAL

## 2023-11-27 VITALS
HEIGHT: 60 IN | SYSTOLIC BLOOD PRESSURE: 174 MMHG | TEMPERATURE: 97 F | WEIGHT: 120 LBS | BODY MASS INDEX: 23.56 KG/M2 | HEART RATE: 83 BPM | RESPIRATION RATE: 19 BRPM | OXYGEN SATURATION: 90 % | DIASTOLIC BLOOD PRESSURE: 73 MMHG

## 2023-11-27 PROBLEM — R53.1 GENERALIZED WEAKNESS: Status: RESOLVED | Noted: 2023-11-20 | Resolved: 2023-11-27

## 2023-11-27 PROBLEM — J96.21 ACUTE ON CHRONIC HYPOXIC RESPIRATORY FAILURE (MULTI): Status: RESOLVED | Noted: 2023-11-20 | Resolved: 2023-11-27

## 2023-11-27 LAB
ATRIAL RATE: 96 BPM
BACTERIA SPEC CULT: ABNORMAL
BACTERIA SPEC CULT: ABNORMAL
GRAM STN SPEC: ABNORMAL
GRAM STN SPEC: ABNORMAL
P AXIS: 78 DEGREES
PR INTERVAL: 111 MS
Q ONSET: 249 MS
QRS COUNT: 15 BEATS
QRS DURATION: 99 MS
QT INTERVAL: 459 MS
QTC CALCULATION(BAZETT): 596 MS
QTC FREDERICIA: 546 MS
R AXIS: 79 DEGREES
T AXIS: -57 DEGREES
T OFFSET: 479 MS
VENTRICULAR RATE: 101 BPM

## 2023-11-27 PROCEDURE — 2500000004 HC RX 250 GENERAL PHARMACY W/ HCPCS (ALT 636 FOR OP/ED): Performed by: STUDENT IN AN ORGANIZED HEALTH CARE EDUCATION/TRAINING PROGRAM

## 2023-11-27 PROCEDURE — RXMED WILLOW AMBULATORY MEDICATION CHARGE

## 2023-11-27 PROCEDURE — 93005 ELECTROCARDIOGRAM TRACING: CPT

## 2023-11-27 PROCEDURE — 2500000001 HC RX 250 WO HCPCS SELF ADMINISTERED DRUGS (ALT 637 FOR MEDICARE OP): Performed by: INTERNAL MEDICINE

## 2023-11-27 PROCEDURE — 2500000001 HC RX 250 WO HCPCS SELF ADMINISTERED DRUGS (ALT 637 FOR MEDICARE OP): Performed by: STUDENT IN AN ORGANIZED HEALTH CARE EDUCATION/TRAINING PROGRAM

## 2023-11-27 PROCEDURE — 96372 THER/PROPH/DIAG INJ SC/IM: CPT | Performed by: STUDENT IN AN ORGANIZED HEALTH CARE EDUCATION/TRAINING PROGRAM

## 2023-11-27 PROCEDURE — 99239 HOSP IP/OBS DSCHRG MGMT >30: CPT | Performed by: INTERNAL MEDICINE

## 2023-11-27 PROCEDURE — 2500000002 HC RX 250 W HCPCS SELF ADMINISTERED DRUGS (ALT 637 FOR MEDICARE OP, ALT 636 FOR OP/ED): Performed by: PHYSICIAN ASSISTANT

## 2023-11-27 RX ORDER — PREDNISONE 10 MG/1
TABLET ORAL
Qty: 30 TABLET | Refills: 0 | Status: SHIPPED | OUTPATIENT
Start: 2023-11-27 | End: 2023-12-06 | Stop reason: SDUPTHER

## 2023-11-27 RX ORDER — HYDROXYZINE HYDROCHLORIDE 25 MG/1
12.5 TABLET, FILM COATED ORAL 3 TIMES DAILY PRN
Qty: 60 TABLET | Refills: 0 | Status: SHIPPED | OUTPATIENT
Start: 2023-11-27 | End: 2023-12-13 | Stop reason: SDUPTHER

## 2023-11-27 RX ORDER — PREDNISONE 5 MG/1
5 TABLET ORAL EVERY MORNING
Start: 2023-11-27 | End: 2023-12-06 | Stop reason: ALTCHOICE

## 2023-11-27 RX ADMIN — IPRATROPIUM BROMIDE 2 PUFF: 17 AEROSOL, METERED RESPIRATORY (INHALATION) at 08:00

## 2023-11-27 RX ADMIN — ACETAMINOPHEN 650 MG: 325 TABLET ORAL at 09:43

## 2023-11-27 RX ADMIN — LORAZEPAM 0.5 MG: 0.5 TABLET ORAL at 09:42

## 2023-11-27 RX ADMIN — METHYLPREDNISOLONE SODIUM SUCCINATE 40 MG: 40 INJECTION, POWDER, LYOPHILIZED, FOR SOLUTION INTRAMUSCULAR; INTRAVENOUS at 05:49

## 2023-11-27 RX ADMIN — HEPARIN SODIUM 5000 UNITS: 5000 INJECTION INTRAVENOUS; SUBCUTANEOUS at 00:20

## 2023-11-27 RX ADMIN — METOPROLOL TARTRATE 37.5 MG: 25 TABLET, FILM COATED ORAL at 09:42

## 2023-11-27 RX ADMIN — BUDESONIDE AND FORMOTEROL FUMARATE DIHYDRATE 2 PUFF: 160; 4.5 AEROSOL RESPIRATORY (INHALATION) at 08:00

## 2023-11-27 RX ADMIN — HYDROPHOR: 42 OINTMENT TOPICAL at 09:43

## 2023-11-27 RX ADMIN — PANTOPRAZOLE SODIUM 40 MG: 40 TABLET, DELAYED RELEASE ORAL at 05:49

## 2023-11-27 RX ADMIN — HEPARIN SODIUM 5000 UNITS: 5000 INJECTION INTRAVENOUS; SUBCUTANEOUS at 09:46

## 2023-11-27 ASSESSMENT — COGNITIVE AND FUNCTIONAL STATUS - GENERAL
DRESSING REGULAR UPPER BODY CLOTHING: A LITTLE
MOVING FROM LYING ON BACK TO SITTING ON SIDE OF FLAT BED WITH BEDRAILS: A LITTLE
TURNING FROM BACK TO SIDE WHILE IN FLAT BAD: A LITTLE
TOILETING: A LITTLE
DAILY ACTIVITIY SCORE: 20
CLIMB 3 TO 5 STEPS WITH RAILING: A LITTLE
MOBILITY SCORE: 18
MOVING TO AND FROM BED TO CHAIR: A LITTLE
WALKING IN HOSPITAL ROOM: A LITTLE
DRESSING REGULAR LOWER BODY CLOTHING: A LITTLE
STANDING UP FROM CHAIR USING ARMS: A LITTLE
HELP NEEDED FOR BATHING: A LITTLE

## 2023-11-27 NOTE — NURSING NOTE
Patient given discharge instructions all questions and concerns answered at this  time. IV removed meds to beds

## 2023-11-27 NOTE — DISCHARGE SUMMARY
"DISCHARGE SUMMARY     Discharge Diagnosis  COPD with acute exacerbation (CMS/[facility])    Issues Requiring Follow-Up  Urology OP F/U     This discharge took greater than 35 minutes.    Test Results Pending At Discharge  Pending Labs       No current pending labs.            Hospital Course     Irish Davis is a 78 y.o. female with PMHx s/f HTN, HLD, moderate aortic stenosis, chronic bilateral lower extremity edema (on as needed Lasix), COPD with chronic hypoxic respiratory failure (baseline 3-4 L nasal cannula), presented 11/20/23 with worsening shortness of breath, cough, wheezing, congestion, weakness following a fall approximately 1 week ago. She reports on return from the bathroom, caught her toe against the floor tile and fell onto her right side (no head strike, no loss of consciousness, no thinners), has some fairly persistent right knee, right elbow, right lateral chest discomfort since the fall.She also describes that over the last 5 days in particular, she has noticed that her breathing has gotten much worse from her baseline.  She has had a productive cough, worsening shortness of breath at rest, and has felt generally unwell.  Her home inhalers have not helped with her symptoms.  She does admit that multiple family members she has seen recently have had upper respiratory illnesses with productive coughs as well. She describes chronic lower extremity edema for which she did take Lasix yesterday with improvement. Lactate 1.6. High-sensitivity troponin 21; ECG without acute ischemic changes. Viral panel negative. UA with trace leukocyte esterase, 11-20 WBCs. Imaging: No acute traumatic injuries.  CXR with a left lower lobe pneumonia.  CT chest abdomen pelvis with the same, but also notable for a large obstructing left ureteral calculus with associated severe hydronephrosis. Urinary antigens negative. Urine culture no growth        Blood cultures negative x1 day  Echo: EF \"normal\" but no percentage " recorded, +DD, Mild AS        11/22/23: No acute events overnight. 81% on 5 L- increased to 6L with improvement to 90%, BP elevated 166/85. BMP unremarkable. Slight leukocytosis, likely from steroids. She feels her breathing has worsened today, will give 1x dose IV lasix as she sounds like she has more crackles. CTA chest negative for PE but does show continued LLL pneumonia.      11/23: SOB improving. She had an episode of SVT this am that broke spontaneously before intervention could be implemented.      11/24: She is without complaint. [facility] has accepted patient. Auth pending.      11/25: Weakness is improving to the point that she's capable of a bit more ambulation. She's now requesting to go home with [facility] as opposed to SNF.      11/26: She continues to improve. She wishes to go home with [facility] tomorrow.     Acute on chronic hypoxic respiratory failure (CMS/HCC)  Assessment & Plan  -Patient's baseline oxygen requirement is typically 3-4 L nasal cannula, while in the ED her oxygen requirement did uptrend to 6 L.  This is acute on chronic by evidence of increase >2 L from baseline.  -Urine antigens negative  -ceftriaxone/doxycycline completed  -PO steroid taper on DC, then resume home prednisone dosing  -Home O2 eval pending      Pneumonia of left lower lobe due to infectious organism  Assessment & Plan  -rocephin/doxy complete  -Urine antigens negative     * COPD with acute exacerbation (CMS/HCC)  Assessment & Plan  -Steroid taper as above    Generalized weakness  Assessment & Plan  -Likely in setting of above  -PT/OT rec SNF, she wishes for [facility]      Left ureteral calculus  Assessment & Plan  -Patient denies any current symptoms with this and has not had any urinary tract infection symptoms that she can name.  She is not having flank pain.  She has not noticed any changes in her urine output.  -UA is without obvious signs of infection at this time.  Her renal function is appropriate without any significant  elevation.  -Urology consultation appreciated- planning outpatient cystoscopy/treatment      Hydronephrosis of left kidney  Assessment & Plan  -2/2 stone  -outpatient treatment per urology     Pertinent Physical Exam At Time of Discharge  Physical Exam  Vitals and nursing note reviewed.   Constitutional:       General: She is not in acute distress.     Appearance: She is not ill-appearing or toxic-appearing.   HENT:      Head: Normocephalic and atraumatic.   Cardiovascular:      Rate and Rhythm: Normal rate and regular rhythm.      Pulses: Normal pulses.      Heart sounds: Normal heart sounds. No murmur heard.     No friction rub. No gallop.   Pulmonary:      Effort: Pulmonary effort is normal. No respiratory distress.      Breath sounds: No wheezing, rhonchi or rales.      Comments: Poor air movement B/L.   Abdominal:      General: Abdomen is flat. Bowel sounds are normal. There is no distension.      Palpations: Abdomen is soft.      Tenderness: There is no abdominal tenderness.   Musculoskeletal:      Cervical back: Neck supple. No rigidity or tenderness.   Skin:     General: Skin is warm and dry.   Neurological:      General: No focal deficit present.   Psychiatric:         Mood and Affect: Mood normal.         Home Medications     Medication List      CHANGE how you take these medications     * predniSONE 10 mg tablet; Commonly known as: Deltasone; Take 4 tablets   (40 mg) by mouth once daily for 3 days, THEN 3 tablets (30 mg) once daily   for 3 days, THEN 2 tablets (20 mg) once daily for 3 days, THEN 1 tablet   (10 mg) once daily for 3 days.; Start taking on: November 27, 2023; What   changed: You were already taking a medication with the same name, and this   prescription was added. Make sure you understand how and when to take   each.   * predniSONE 5 mg tablet; Commonly known as: Deltasone; Take 1 tablet (5   mg) by mouth once daily in the morning. Hold this while taking the   prednisone taper prescribed on  11/27. Once the taper is complete resume   this medication.; What changed: additional instructions  * This list has 2 medication(s) that are the same as other medications   prescribed for you. Read the directions carefully, and ask your doctor or   other care provider to review them with you.     CONTINUE taking these medications     Atrovent HFA 17 mcg/actuation inhaler; Generic drug: ipratropium; USE 2   INHALATIONS FOUR TIMES A DAY   metoprolol tartrate 25 mg tablet; Commonly known as: Lopressor; TAKE ONE   AND ONE-HALF TABLETS TWICE A DAY   Symbicort 160-4.5 mcg/actuation inhaler; Generic drug:   budesonide-formoteroL       Outpatient Follow-Up  No follow-ups on file.     Donovan Mendosa MD PhD  11/27/2023  9:32 AM

## 2023-11-27 NOTE — PROGRESS NOTES
Spiritual Care Visit    Clinical Encounter Type  Visited With: Patient  Routine Visit: Introduction              Sacramental Encounters  Communion: Patient wants communion

## 2023-11-27 NOTE — PROGRESS NOTES
Physical Therapy                 Therapy Communication Note    Patient Name: Irish Davis  MRN: [id number]  Today's Date: 11/27/2023     Discipline: Physical Therapy    Respiratory present to do a home 02 eval

## 2023-11-27 NOTE — DISCHARGE INSTRUCTIONS
We are going to discharge you on a prednisone taper. While on this higher dose of prednisone do not take your normal dose of prednisone. Once you complete the prednisone taper, the next day resume your normal prednisone dosing of 5 mg once daily.

## 2023-11-28 ENCOUNTER — PATIENT OUTREACH (OUTPATIENT)
Dept: PRIMARY CARE | Facility: CLINIC | Age: 78
End: 2023-11-28

## 2023-11-28 ENCOUNTER — DOCUMENTATION (OUTPATIENT)
Dept: PRIMARY CARE | Facility: CLINIC | Age: 78
End: 2023-11-28

## 2023-11-28 NOTE — DOCUMENTATION CLARIFICATION NOTE
PATIENT:               ROMULO DOHERTY  ACCT #:                  [id number]  MRN:                       [id number]  :                       1945  ADMIT DATE:       2023 8:26 AM  DISCH DATE:        2023 12:30 PM  RESPONDING PROVIDER #:        [id number]          PROVIDER RESPONSE TEXT:    Sepsis was ruled out after workup    CDI QUERY TEXT:    [facility]_Sepsis Link Organ Dysfunction        Instruction:    Based on your assessment of the patient and the clinical information, please provide the requested documentation by clicking on the appropriate radio button and enter any additional information if prompted.    Question: Please further clarify if a relationship exists between the Sepsis and acute organ dysfunction    When answering this query, please exercise your independent professional judgment. The fact that a question is being asked, does not imply that any particular answer is desired or expected.    The patient's clinical indicators include:  Clinical Information: 79 yo female with COPD exac and Pneumonia.    Clinical Indicators:  Vital signs  T37.5  R28 Bp 127/76 93 percent on 4L   WBC 12.1 Lactate 1.6  Pao2/FIO2 ratio 188.89   CT chest Impression:  Probable pneumonia versus less likely contusion left base.      Treatment:  doxycycline 100 mg po Q12hrs -  Ceftriaxone 2gm IV Q24hrs -    Risk Factors:COPD , pneumonia, acute hypoxic resp failure, HTN, HLD  Options provided:  -- Sepsis with associated acute respiratory organ dysfunction acute on chronic hypoxic respiratory failure  -- Sepsis was ruled out after workup  -- Sepsis with other associated acute organ dysfunction, Please specify additional information below  -- Other - I will add my own diagnosis  -- Refer to Clinical Documentation Reviewer    Query created by: Helen Swift on 2023 7:42 AM      Electronically signed by:  KURT HUFF MD PhD 2023 9:06 AM

## 2023-11-28 NOTE — PROGRESS NOTES
Discharge Facility: [facility]   Discharge Diagnosis: COPD with acute exacerbation (CMS/[facility])   Admission Date: 11-20-23   Discharge Date: 11-27-23    PCP Appointment Date: Message routed to office   Specialist Appointment Date: 12-22-23 Urology   Hospital Encounter and Summary: Linked   See discharge assessment below for further details  Engagement  Call Start Time: 0845 (11/29/2023  8:48 AM)    Medications  Medications reviewed with patient/caregiver?: Yes (11/29/2023  8:48 AM)  Is the patient having any side effects they believe may be caused by any medication additions or changes?: No (11/29/2023  8:48 AM)  Care Management Interventions: Provided patient education (11/29/2023  8:48 AM)    Appointments  Does the patient have a primary care provider?: Yes (11/29/2023  8:48 AM)  Care Management Interventions: Advised patient to make appointment (11/29/2023  8:48 AM)    Self Management  Has home health visited the patient within 72 hours of discharge?: Call prior to 72 hours (11/29/2023  8:48 AM)  Has all Durable Medical Equipment (DME) been delivered?: Yes (11/29/2023  8:48 AM)  DME Interventions: Called Durable Medical Equipment (DME) agency (11/29/2023  8:48 AM)  Care Management Interventions: Contacted Acute Psychiatry Service (APS) (11/29/2023  8:48 AM)    Patient Teaching  Does the patient have access to their discharge instructions?: Yes (11/29/2023  8:48 AM)  Care Management Interventions: Reviewed instructions with patient (11/29/2023  8:48 AM)  What is the patient's perception of their health status since discharge?: Same (11/29/2023  8:48 AM)  Patient/Caregiver Education Comments: Pt.  is in the hosp at this time, and this is all pt. can focus on. Pt. states she does not want home health at this time, but is also stating she is very weak. Pt. daughter is in the house to help pt. with care. (11/29/2023  8:48 AM)      Guido Garza LPN

## 2023-11-28 NOTE — DOCUMENTATION CLARIFICATION NOTE
PATIENT:               ROMULO DOHERTY  ACCT #:                  [id number]  MRN:                       [id number]  :                       1945  ADMIT DATE:       2023 8:26 AM  DISCH DATE:        2023 12:30 PM  RESPONDING PROVIDER #:        [id number]          PROVIDER RESPONSE TEXT:    CHF ruled out after work up    CDI QUERY TEXT:    [facility]_CHF        Instruction:    Based on your assessment of the patient and the clinical information, please provide the requested documentation by clicking on the appropriate radio button and enter any additional information if prompted.    Question: Please further clarify the type and acuity of congestive heart failure      When answering this query, please exercise your independent professional judgment. The fact that a question is being asked, does not imply that any particular answer is desired or expected.    The patient's clinical indicators include:  Clinical Information:79 yo female with COPD exac and Pneumonia.    Clinical Indicators:  Vital signs  T37.5  R28 Bp 127/76 93 percent on 4L         Pao2/FIO2 ratio 188.89    echo:, Left Ventricle: Left ventricular systolic function is normal. There are no regional wall motion abnormalities. The left ventricular cavity size is normal. Spectral Doppler shows an impaired relaxation pattern of left ventricular diastolic filling. No EF noted,    ,  No acute events overnight. 81% on 5 L- increased to 6L with improvement to 90%, BP elevated 166/85. BMP unremarkable. Slight leukocytosis, likely from steroids. She feels her breathing has worsened today, will give 1x dose IV lasix as she sounds like she has more crackles. CTA chest negative for PE but does show continued LLL pneumonia. She describes chronic lower extremity edema for which she did take Lasix yesterday with improvement. ,     CT chest Impression:  Probable pneumonia versus less likely contusion left base.      Treatment:  Lasix 20  mg IV    Risk Factors: COPD , pneumonia, acute hypoxic resp failure, HTN, HLD  Options provided:  -- CHF ruled out after work up  -- Acute Diastolic Congestive Heart Failure  -- Acute on Chronic Diastolic Congestive Heart Failure  -- Chronic Diastolic Congestive Heart Failure  -- Other - I will add my own diagnosis  -- Refer to Clinical Documentation Reviewer    Query created by: Helen Swift on 11/27/2023 8:07 AM      Electronically signed by:  KURT HUFF MD PhD 11/28/2023 9:06 AM

## 2023-11-28 NOTE — SIGNIFICANT EVENT
COPD Follow Up Call    Patient Reports Feelings (Symptoms) are: improved    The Patient discharged With the following Diagnosis: COPD    If You were Referred to Pulmonary Rehab, Have You Followed up With them?    How is your breathing? Same as usual    How is your activity?  baseliine    How is your cough? Usual amount    Mucus: usual amount    How often Are you using a Quick Relief/ Rescue Inhaler / Nebulizer:     usual amount

## 2023-12-06 ENCOUNTER — TELEPHONE (OUTPATIENT)
Dept: PRIMARY CARE | Facility: CLINIC | Age: 78
End: 2023-12-06

## 2023-12-06 DIAGNOSIS — J44.1 COPD WITH ACUTE EXACERBATION (MULTI): ICD-10-CM

## 2023-12-06 DIAGNOSIS — J44.9 CHRONIC OBSTRUCTIVE PULMONARY DISEASE, UNSPECIFIED COPD TYPE (MULTI): Primary | ICD-10-CM

## 2023-12-06 RX ORDER — PREDNISONE 10 MG/1
10 TABLET ORAL DAILY
Qty: 90 TABLET | Refills: 3 | Status: SHIPPED | OUTPATIENT
Start: 2023-12-06 | End: 2024-02-05 | Stop reason: HOSPADM

## 2023-12-06 NOTE — TELEPHONE ENCOUNTER
----- Message from Khloe Mccray MD sent at 12/1/2023  8:05 PM EST -----  Regarding: RE: Hospital d/  I don;t think she will be able, last time I saw here was 6/2021 (virtual)- Severe COPD, at that time she had not left her home in  a year, 12/2023 follow up is with urology. Unrelated to her admission. Possibly a virtual if she would be willing.   ----- Message -----  From: Ayanna Melissa  Sent: 12/1/2023  11:25 AM EST  To: Khloe Mccray MD  Subject: FW: [facility] d                                 Do you want to try to see this patient prior to 12/22 appointment?  ----- Message -----  From: Leslee Lopez MA  Sent: 11/29/2023   9:13 AM EST  To: Tejal Marin MA  Subject: FW: [facility]                                   ----- Message -----  From: Guido Garza LPN  Sent: 11/29/2023   8:53 AM EST  To: [facility] [place] [id number] Clinical Support Staff  Subject: Hospital d/                                     Discharge Facility: [facility]   Discharge Diagnosis: COPD with acute exacerbation (CMS/[facility])   Admission Date: 11-20-23   Discharge Date: 11-27-23      No PCP appointments available within 14 days of discharge. Unable to contact patient despite outreach attempts x 2 made.  Please reach out to patient and schedule an appointment within 7-13 days from discharge date.    I spoke with pt. Daughter and she seems to not understand the discharge and I have concerns for the pt. Safety at home alone.     I am going to put a referral in to social work if possible.       Guido Garza LPN

## 2023-12-06 NOTE — TELEPHONE ENCOUNTER
Per Dr. Mccray she will increase to 10mg.  Patient notified.  Please send over a refill to [facility] for her.  She thanks you very much.

## 2023-12-06 NOTE — TELEPHONE ENCOUNTER
Patient unable to do follow up at this time. Her  is currently in the hospital and she has been very exacerbated by her COPD.

## 2023-12-06 NOTE — TELEPHONE ENCOUNTER
Was prescribed predniSONE 10 mg at the hospital and is wondering if you can prescribe it at 10 mg because she fells much better at that dosage.

## 2023-12-13 ENCOUNTER — TELEPHONE (OUTPATIENT)
Dept: PRIMARY CARE | Facility: CLINIC | Age: 78
End: 2023-12-13

## 2023-12-13 DIAGNOSIS — J44.1 COPD WITH ACUTE EXACERBATION (MULTI): ICD-10-CM

## 2023-12-13 DIAGNOSIS — F06.4 ANXIETY DISORDER DUE TO MEDICAL CONDITION: Primary | ICD-10-CM

## 2023-12-13 RX ORDER — HYDROXYZINE HYDROCHLORIDE 25 MG/1
12.5 TABLET, FILM COATED ORAL EVERY 8 HOURS PRN
Qty: 270 TABLET | Refills: 1 | Status: SHIPPED | OUTPATIENT
Start: 2023-12-13 | End: 2024-12-12

## 2023-12-19 ENCOUNTER — PATIENT OUTREACH (OUTPATIENT)
Dept: PRIMARY CARE | Facility: CLINIC | Age: 78
End: 2023-12-19

## 2023-12-19 NOTE — PROGRESS NOTES
Call regarding appt. with PCP on (n/a) after hospitalization.  At time of outreach call the patient feels as if their condition has (improved) since last visit.  Reviewed the PCP appointment with the pt and addressed any questions or concerns.  Guido Garza LPN

## 2023-12-22 ENCOUNTER — APPOINTMENT (OUTPATIENT)
Dept: UROLOGY | Facility: CLINIC | Age: 78
End: 2023-12-22

## 2023-12-29 ENCOUNTER — PATIENT OUTREACH (OUTPATIENT)
Dept: CARE COORDINATION | Facility: CLINIC | Age: 78
End: 2023-12-29

## 2023-12-29 NOTE — PROGRESS NOTES
Call placed regarding one month post discharge follow up call.  At time of outreach call the patient feels as if their condition has (improved) since initial visit with PCP or specialist.  Questions or concerns regarding recovery period addressed at this time.   Reviewed any PCP or specialists progress notes/labs/radiology reports if applicable and addressed any questions or concerns.  Guido Garza LPN

## 2024-01-02 ENCOUNTER — TELEPHONE (OUTPATIENT)
Dept: PRIMARY CARE | Facility: CLINIC | Age: 79
End: 2024-01-02

## 2024-01-02 DIAGNOSIS — J44.1 COPD WITH ACUTE EXACERBATION (MULTI): Primary | ICD-10-CM

## 2024-01-02 RX ORDER — AMOXICILLIN 875 MG/1
875 TABLET, FILM COATED ORAL 2 TIMES DAILY
Qty: 20 TABLET | Refills: 0 | Status: SHIPPED | OUTPATIENT
Start: 2024-01-02 | End: 2024-01-12

## 2024-01-02 NOTE — TELEPHONE ENCOUNTER
Chief Complaint:     Symptoms: Cough with phlegm - Green    Duration: 1 month after having pneumonia    Treatments: Robitussin    Patient Requesting:  antibiotics - none with SUN as an ingredient.  Recommendations    Covid Test: Negative    Did patient have Covid Vaccine?    Recent covid booster:      Pharmacy: [facility] in [place]

## 2024-01-26 ENCOUNTER — APPOINTMENT (OUTPATIENT)
Dept: RADIOLOGY | Facility: HOSPITAL | Age: 79
DRG: 510 | End: 2024-01-26
Payer: COMMERCIAL

## 2024-01-26 ENCOUNTER — HOSPITAL ENCOUNTER (INPATIENT)
Facility: HOSPITAL | Age: 79
LOS: 5 days | Discharge: NURSING, REHAB OR LONG-TERM CARE | DRG: 510 | End: 2024-02-01
Attending: EMERGENCY MEDICINE | Admitting: INTERNAL MEDICINE
Payer: COMMERCIAL

## 2024-01-26 DIAGNOSIS — S09.90XA CLOSED HEAD INJURY, INITIAL ENCOUNTER: ICD-10-CM

## 2024-01-26 DIAGNOSIS — I50.9 CONGESTIVE HEART FAILURE, UNSPECIFIED HF CHRONICITY, UNSPECIFIED HEART FAILURE TYPE (MULTI): ICD-10-CM

## 2024-01-26 DIAGNOSIS — S52.92XB TYPE I OR II OPEN FRACTURE OF LEFT RADIUS AND ULNA, INITIAL ENCOUNTER: ICD-10-CM

## 2024-01-26 DIAGNOSIS — S52.202B TYPE I OR II OPEN FRACTURE OF LEFT RADIUS AND ULNA, INITIAL ENCOUNTER: ICD-10-CM

## 2024-01-26 DIAGNOSIS — S52.91XA CLOSED FRACTURE OF RIGHT RADIUS AND ULNA, INITIAL ENCOUNTER: Primary | ICD-10-CM
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
## 2024-01-26 DIAGNOSIS — L97.901 ULCER OF LOWER EXTREMITY, LIMITED TO BREAKDOWN OF SKIN, UNSPECIFIED LATERALITY (MULTI): ICD-10-CM

## 2024-01-26 DIAGNOSIS — S52.201A CLOSED FRACTURE OF RIGHT RADIUS AND ULNA, INITIAL ENCOUNTER: Primary | ICD-10-CM

## 2024-01-26 LAB
ALBUMIN SERPL BCP-MCNC: 3.7 G/DL (ref 3.4–5)
ALP SERPL-CCNC: 69 U/L (ref 33–136)
ALT SERPL W P-5'-P-CCNC: 30 U/L (ref 7–45)
ANION GAP SERPL CALC-SCNC: 9 MMOL/L (ref 10–20)
APTT PPP: 35 SECONDS (ref 27–38)
AST SERPL W P-5'-P-CCNC: 20 U/L (ref 9–39)
BASOPHILS # BLD AUTO: 0.04 X10*3/UL (ref 0–0.1)
BASOPHILS NFR BLD AUTO: 0.4 %
BILIRUB SERPL-MCNC: 0.5 MG/DL (ref 0–1.2)
BUN SERPL-MCNC: 19 MG/DL (ref 6–23)
CALCIUM SERPL-MCNC: 9.1 MG/DL (ref 8.6–10.3)
CHLORIDE SERPL-SCNC: 99 MMOL/L (ref 98–107)
CO2 SERPL-SCNC: 37 MMOL/L (ref 21–32)
CREAT SERPL-MCNC: 0.57 MG/DL (ref 0.5–1.05)
EGFRCR SERPLBLD CKD-EPI 2021: >90 ML/MIN/1.73M*2
EOSINOPHIL # BLD AUTO: 0.03 X10*3/UL (ref 0–0.4)
EOSINOPHIL NFR BLD AUTO: 0.3 %
ERYTHROCYTE [DISTWIDTH] IN BLOOD BY AUTOMATED COUNT: 14.1 % (ref 11.5–14.5)
GLUCOSE SERPL-MCNC: 77 MG/DL (ref 74–99)
HCT VFR BLD AUTO: 34.5 % (ref 36–46)
HGB BLD-MCNC: 10.6 G/DL (ref 12–16)
IMM GRANULOCYTES # BLD AUTO: 0.05 X10*3/UL (ref 0–0.5)
IMM GRANULOCYTES NFR BLD AUTO: 0.5 % (ref 0–0.9)
INR PPP: 1.1 (ref 0.9–1.1)
LYMPHOCYTES # BLD AUTO: 1.14 X10*3/UL (ref 0.8–3)
LYMPHOCYTES NFR BLD AUTO: 10.8 %
MCH RBC QN AUTO: 29.1 PG (ref 26–34)
MCHC RBC AUTO-ENTMCNC: 30.7 G/DL (ref 32–36)
MCV RBC AUTO: 95 FL (ref 80–100)
MONOCYTES # BLD AUTO: 0.91 X10*3/UL (ref 0.05–0.8)
MONOCYTES NFR BLD AUTO: 8.6 %
NEUTROPHILS # BLD AUTO: 8.41 X10*3/UL (ref 1.6–5.5)
NEUTROPHILS NFR BLD AUTO: 79.4 %
NRBC BLD-RTO: 0 /100 WBCS (ref 0–0)
PLATELET # BLD AUTO: 246 X10*3/UL (ref 150–450)
POTASSIUM SERPL-SCNC: 3.4 MMOL/L (ref 3.5–5.3)
PROT SERPL-MCNC: 6.2 G/DL (ref 6.4–8.2)
PROTHROMBIN TIME: 12.5 SECONDS (ref 9.8–12.8)
RBC # BLD AUTO: 3.64 X10*6/UL (ref 4–5.2)
SODIUM SERPL-SCNC: 142 MMOL/L (ref 136–145)
WBC # BLD AUTO: 10.6 X10*3/UL (ref 4.4–11.3)

## 2024-01-26 PROCEDURE — 73110 X-RAY EXAM OF WRIST: CPT | Mod: LEFT SIDE | Performed by: RADIOLOGY

## 2024-01-26 PROCEDURE — 2500000004 HC RX 250 GENERAL PHARMACY W/ HCPCS (ALT 636 FOR OP/ED)

## 2024-01-26 PROCEDURE — 73130 X-RAY EXAM OF HAND: CPT | Mod: LT

## 2024-01-26 PROCEDURE — 70450 CT HEAD/BRAIN W/O DYE: CPT | Performed by: RADIOLOGY

## 2024-01-26 PROCEDURE — 70450 CT HEAD/BRAIN W/O DYE: CPT

## 2024-01-26 PROCEDURE — 72125 CT NECK SPINE W/O DYE: CPT

## 2024-01-26 PROCEDURE — 2500000001 HC RX 250 WO HCPCS SELF ADMINISTERED DRUGS (ALT 637 FOR MEDICARE OP)

## 2024-01-26 PROCEDURE — 73110 X-RAY EXAM OF WRIST: CPT | Mod: LT

## 2024-01-26 PROCEDURE — 80053 COMPREHEN METABOLIC PANEL: CPT

## 2024-01-26 PROCEDURE — 85025 COMPLETE CBC W/AUTO DIFF WBC: CPT

## 2024-01-26 PROCEDURE — 99285 EMERGENCY DEPT VISIT HI MDM: CPT | Mod: 25 | Performed by: EMERGENCY MEDICINE

## 2024-01-26 PROCEDURE — 96365 THER/PROPH/DIAG IV INF INIT: CPT

## 2024-01-26 PROCEDURE — 72125 CT NECK SPINE W/O DYE: CPT | Performed by: RADIOLOGY

## 2024-01-26 PROCEDURE — 73130 X-RAY EXAM OF HAND: CPT | Mod: RT

## 2024-01-26 PROCEDURE — 85730 THROMBOPLASTIN TIME PARTIAL: CPT

## 2024-01-26 PROCEDURE — 87636 SARSCOV2 & INF A&B AMP PRB: CPT

## 2024-01-26 PROCEDURE — 36415 COLL VENOUS BLD VENIPUNCTURE: CPT

## 2024-01-26 PROCEDURE — 85610 PROTHROMBIN TIME: CPT

## 2024-01-26 PROCEDURE — 73130 X-RAY EXAM OF HAND: CPT | Mod: RIGHT SIDE | Performed by: RADIOLOGY

## 2024-01-26 PROCEDURE — 73110 X-RAY EXAM OF WRIST: CPT | Mod: RT

## 2024-01-26 RX ORDER — CEFAZOLIN SODIUM 2 G/100ML
2 INJECTION, SOLUTION INTRAVENOUS ONCE
Status: COMPLETED | OUTPATIENT
Start: 2024-01-26 | End: 2024-01-27

## 2024-01-26 RX ORDER — SODIUM CHLORIDE 9 MG/ML
75 INJECTION, SOLUTION INTRAVENOUS CONTINUOUS
Status: DISCONTINUED | OUTPATIENT
Start: 2024-01-26 | End: 2024-01-28

## 2024-01-26 RX ORDER — TRAMADOL HYDROCHLORIDE 50 MG/1
50 TABLET ORAL EVERY 8 HOURS PRN
Status: DISCONTINUED | OUTPATIENT
Start: 2024-01-26 | End: 2024-01-27

## 2024-01-26 RX ADMIN — CEFAZOLIN SODIUM 2 G: 2 INJECTION, SOLUTION INTRAVENOUS at 23:15

## 2024-01-26 RX ADMIN — SODIUM CHLORIDE 125 ML/HR: 9 INJECTION, SOLUTION INTRAVENOUS at 23:17

## 2024-01-26 RX ADMIN — TRAMADOL HYDROCHLORIDE 50 MG: 50 TABLET, COATED ORAL at 21:03

## 2024-01-26 ASSESSMENT — PAIN DESCRIPTION - ORIENTATION: ORIENTATION: LEFT;RIGHT

## 2024-01-26 ASSESSMENT — PAIN SCALES - GENERAL
PAINLEVEL_OUTOF10: 7

## 2024-01-26 ASSESSMENT — PAIN DESCRIPTION - DIRECTION: RADIATING_TOWARDS: BIL LOWER ARMS

## 2024-01-26 ASSESSMENT — PAIN - FUNCTIONAL ASSESSMENT: PAIN_FUNCTIONAL_ASSESSMENT: 0-10

## 2024-01-27 ENCOUNTER — APPOINTMENT (OUTPATIENT)
Dept: RADIOLOGY | Facility: HOSPITAL | Age: 79
DRG: 510 | End: 2024-01-27
Payer: COMMERCIAL

## 2024-01-27 ENCOUNTER — ANESTHESIA EVENT (OUTPATIENT)
Dept: OPERATING ROOM | Facility: HOSPITAL | Age: 79
DRG: 510 | End: 2024-01-27
Payer: COMMERCIAL

## 2024-01-27 ENCOUNTER — ANESTHESIA (OUTPATIENT)
Dept: OPERATING ROOM | Facility: HOSPITAL | Age: 79
DRG: 510 | End: 2024-01-27
Payer: COMMERCIAL

## 2024-01-27 PROBLEM — S52.92XB TYPE I OR II OPEN FRACTURE OF LEFT RADIUS AND ULNA: Status: ACTIVE | Noted: 2024-01-26

## 2024-01-27 PROBLEM — S52.91XA CLOSED FRACTURE OF RIGHT RADIUS AND ULNA, INITIAL ENCOUNTER: Status: ACTIVE | Noted: 2024-01-27

## 2024-01-27 PROBLEM — S52.202B TYPE I OR II OPEN FRACTURE OF LEFT RADIUS AND ULNA: Status: ACTIVE | Noted: 2024-01-26

## 2024-01-27 PROBLEM — S52.201A CLOSED FRACTURE OF RIGHT RADIUS AND ULNA, INITIAL ENCOUNTER: Status: ACTIVE | Noted: 2024-01-27

## 2024-01-27 LAB
ALBUMIN SERPL BCP-MCNC: 3.4 G/DL (ref 3.4–5)
ANION GAP SERPL CALC-SCNC: 11 MMOL/L (ref 10–20)
BUN SERPL-MCNC: 15 MG/DL (ref 6–23)
CALCIUM SERPL-MCNC: 8.2 MG/DL (ref 8.6–10.3)
CHLORIDE SERPL-SCNC: 99 MMOL/L (ref 98–107)
CO2 SERPL-SCNC: 34 MMOL/L (ref 21–32)
CREAT SERPL-MCNC: 0.53 MG/DL (ref 0.5–1.05)
EGFRCR SERPLBLD CKD-EPI 2021: >90 ML/MIN/1.73M*2
ERYTHROCYTE [DISTWIDTH] IN BLOOD BY AUTOMATED COUNT: 14.2 % (ref 11.5–14.5)
FLUAV RNA RESP QL NAA+PROBE: NOT DETECTED
FLUBV RNA RESP QL NAA+PROBE: NOT DETECTED
GLUCOSE SERPL-MCNC: 82 MG/DL (ref 74–99)
HCT VFR BLD AUTO: 33.1 % (ref 36–46)
HGB BLD-MCNC: 9.9 G/DL (ref 12–16)
HOLD SPECIMEN: NORMAL
MAGNESIUM SERPL-MCNC: 1.64 MG/DL (ref 1.6–2.4)
MCH RBC QN AUTO: 28 PG (ref 26–34)
MCHC RBC AUTO-ENTMCNC: 29.9 G/DL (ref 32–36)
MCV RBC AUTO: 94 FL (ref 80–100)
NRBC BLD-RTO: 0 /100 WBCS (ref 0–0)
PHOSPHATE SERPL-MCNC: 2.6 MG/DL (ref 2.5–4.9)
PLATELET # BLD AUTO: 229 X10*3/UL (ref 150–450)
POTASSIUM SERPL-SCNC: 3.1 MMOL/L (ref 3.5–5.3)
RBC # BLD AUTO: 3.53 X10*6/UL (ref 4–5.2)
SARS-COV-2 RNA RESP QL NAA+PROBE: NOT DETECTED
SODIUM SERPL-SCNC: 141 MMOL/L (ref 136–145)
WBC # BLD AUTO: 11.5 X10*3/UL (ref 4.4–11.3)

## 2024-01-27 PROCEDURE — 2720000007 HC OR 272 NO HCPCS: Performed by: ORTHOPAEDIC SURGERY

## 2024-01-27 PROCEDURE — 3700000002 HC GENERAL ANESTHESIA TIME - EACH INCREMENTAL 1 MINUTE: Performed by: ORTHOPAEDIC SURGERY

## 2024-01-27 PROCEDURE — 2500000005 HC RX 250 GENERAL PHARMACY W/O HCPCS

## 2024-01-27 PROCEDURE — 2500000001 HC RX 250 WO HCPCS SELF ADMINISTERED DRUGS (ALT 637 FOR MEDICARE OP): Performed by: INTERNAL MEDICINE

## 2024-01-27 PROCEDURE — 2500000004 HC RX 250 GENERAL PHARMACY W/ HCPCS (ALT 636 FOR OP/ED): Performed by: SURGERY

## 2024-01-27 PROCEDURE — C1713 ANCHOR/SCREW BN/BN,TIS/BN: HCPCS | Performed by: ORTHOPAEDIC SURGERY

## 2024-01-27 PROCEDURE — 2500000002 HC RX 250 W HCPCS SELF ADMINISTERED DRUGS (ALT 637 FOR MEDICARE OP, ALT 636 FOR OP/ED): Performed by: INTERNAL MEDICINE

## 2024-01-27 PROCEDURE — 3700000001 HC GENERAL ANESTHESIA TIME - INITIAL BASE CHARGE: Performed by: ORTHOPAEDIC SURGERY

## 2024-01-27 PROCEDURE — 11012 DEB SKIN BONE AT FX SITE: CPT | Performed by: ORTHOPAEDIC SURGERY

## 2024-01-27 PROCEDURE — 76000 FLUOROSCOPY <1 HR PHYS/QHP: CPT

## 2024-01-27 PROCEDURE — 36415 COLL VENOUS BLD VENIPUNCTURE: CPT | Performed by: INTERNAL MEDICINE

## 2024-01-27 PROCEDURE — 96375 TX/PRO/DX INJ NEW DRUG ADDON: CPT

## 2024-01-27 PROCEDURE — 2500000004 HC RX 250 GENERAL PHARMACY W/ HCPCS (ALT 636 FOR OP/ED): Performed by: NURSE ANESTHETIST, CERTIFIED REGISTERED

## 2024-01-27 PROCEDURE — 2500000004 HC RX 250 GENERAL PHARMACY W/ HCPCS (ALT 636 FOR OP/ED): Performed by: INTERNAL MEDICINE

## 2024-01-27 PROCEDURE — 2500000004 HC RX 250 GENERAL PHARMACY W/ HCPCS (ALT 636 FOR OP/ED)

## 2024-01-27 PROCEDURE — 99223 1ST HOSP IP/OBS HIGH 75: CPT | Performed by: INTERNAL MEDICINE

## 2024-01-27 PROCEDURE — 2500000004 HC RX 250 GENERAL PHARMACY W/ HCPCS (ALT 636 FOR OP/ED): Performed by: ORTHOPAEDIC SURGERY

## 2024-01-27 PROCEDURE — 0PBL0ZZ EXCISION OF LEFT ULNA, OPEN APPROACH: ICD-10-PCS | Performed by: ORTHOPAEDIC SURGERY

## 2024-01-27 PROCEDURE — 3600000004 HC OR TIME - INITIAL BASE CHARGE - PROCEDURE LEVEL FOUR: Performed by: ORTHOPAEDIC SURGERY

## 2024-01-27 PROCEDURE — 99223 1ST HOSP IP/OBS HIGH 75: CPT | Performed by: SURGERY

## 2024-01-27 PROCEDURE — 2500000002 HC RX 250 W HCPCS SELF ADMINISTERED DRUGS (ALT 637 FOR MEDICARE OP, ALT 636 FOR OP/ED): Performed by: ANESTHESIOLOGY

## 2024-01-27 PROCEDURE — 3600000009 HC OR TIME - EACH INCREMENTAL 1 MINUTE - PROCEDURE LEVEL FOUR: Performed by: ORTHOPAEDIC SURGERY

## 2024-01-27 PROCEDURE — 94760 N-INVAS EAR/PLS OXIMETRY 1: CPT

## 2024-01-27 PROCEDURE — 2780000003 HC OR 278 NO HCPCS: Performed by: ORTHOPAEDIC SURGERY

## 2024-01-27 PROCEDURE — 2500000005 HC RX 250 GENERAL PHARMACY W/O HCPCS: Performed by: NURSE ANESTHETIST, CERTIFIED REGISTERED

## 2024-01-27 PROCEDURE — 2500000005 HC RX 250 GENERAL PHARMACY W/O HCPCS: Performed by: ORTHOPAEDIC SURGERY

## 2024-01-27 PROCEDURE — 85027 COMPLETE CBC AUTOMATED: CPT | Performed by: INTERNAL MEDICINE

## 2024-01-27 PROCEDURE — 99222 1ST HOSP IP/OBS MODERATE 55: CPT | Performed by: ORTHOPAEDIC SURGERY

## 2024-01-27 PROCEDURE — 2500000001 HC RX 250 WO HCPCS SELF ADMINISTERED DRUGS (ALT 637 FOR MEDICARE OP): Performed by: STUDENT IN AN ORGANIZED HEALTH CARE EDUCATION/TRAINING PROGRAM

## 2024-01-27 PROCEDURE — 80069 RENAL FUNCTION PANEL: CPT | Performed by: INTERNAL MEDICINE

## 2024-01-27 PROCEDURE — 25609 OPTX DST RD XART FX/EP SEP3+: CPT | Performed by: ORTHOPAEDIC SURGERY

## 2024-01-27 PROCEDURE — 0PSJ04Z REPOSITION LEFT RADIUS WITH INTERNAL FIXATION DEVICE, OPEN APPROACH: ICD-10-PCS | Performed by: ORTHOPAEDIC SURGERY

## 2024-01-27 PROCEDURE — 0PSH04Z REPOSITION RIGHT RADIUS WITH INTERNAL FIXATION DEVICE, OPEN APPROACH: ICD-10-PCS | Performed by: ORTHOPAEDIC SURGERY

## 2024-01-27 PROCEDURE — 2500000002 HC RX 250 W HCPCS SELF ADMINISTERED DRUGS (ALT 637 FOR MEDICARE OP, ALT 636 FOR OP/ED): Performed by: SURGERY

## 2024-01-27 PROCEDURE — 7100000001 HC RECOVERY ROOM TIME - INITIAL BASE CHARGE: Performed by: ORTHOPAEDIC SURGERY

## 2024-01-27 PROCEDURE — 2500000001 HC RX 250 WO HCPCS SELF ADMINISTERED DRUGS (ALT 637 FOR MEDICARE OP): Performed by: ORTHOPAEDIC SURGERY

## 2024-01-27 PROCEDURE — 7100000002 HC RECOVERY ROOM TIME - EACH INCREMENTAL 1 MINUTE: Performed by: ORTHOPAEDIC SURGERY

## 2024-01-27 PROCEDURE — 83735 ASSAY OF MAGNESIUM: CPT | Performed by: INTERNAL MEDICINE

## 2024-01-27 PROCEDURE — 1200000002 HC GENERAL ROOM WITH TELEMETRY DAILY

## 2024-01-27 DEVICE — SCREW, CORTEX SELF TAP W/T8 RECESS 2.7MM X 14MM: Type: IMPLANTABLE DEVICE | Site: WRIST | Status: FUNCTIONAL

## 2024-01-27 DEVICE — PLATE, 2.4MM, DISTAL RADIUS, VA-LCP, 6H HEAD/3H SHAFT, RT: Type: IMPLANTABLE DEVICE | Site: WRIST | Status: FUNCTIONAL

## 2024-01-27 DEVICE — SCREW, LOCKING, 2.4MM X 16MM: Type: IMPLANTABLE DEVICE | Site: WRIST | Status: FUNCTIONAL

## 2024-01-27 DEVICE — SCREW, CORTEX SELF TAP W/T8 RECESS 2.7MM X 12MM: Type: IMPLANTABLE DEVICE | Site: WRIST | Status: FUNCTIONAL

## 2024-01-27 DEVICE — PLATE, 2.4MM, VOLAR DISTAL, VA-LCP, 6H HEAD/3H SHAFT, LEFT: Type: IMPLANTABLE DEVICE | Site: WRIST | Status: FUNCTIONAL

## 2024-01-27 DEVICE — IMPLANTABLE DEVICE: Type: IMPLANTABLE DEVICE | Site: WRIST | Status: FUNCTIONAL

## 2024-01-27 RX ORDER — ONDANSETRON HYDROCHLORIDE 2 MG/ML
4 INJECTION, SOLUTION INTRAVENOUS EVERY 6 HOURS PRN
Status: DISCONTINUED | OUTPATIENT
Start: 2024-01-27 | End: 2024-01-27

## 2024-01-27 RX ORDER — HEPARIN SODIUM 5000 [USP'U]/ML
5000 INJECTION, SOLUTION INTRAVENOUS; SUBCUTANEOUS EVERY 8 HOURS
Status: DISCONTINUED | OUTPATIENT
Start: 2024-01-27 | End: 2024-02-02 | Stop reason: HOSPADM

## 2024-01-27 RX ORDER — SODIUM CHLORIDE, SODIUM LACTATE, POTASSIUM CHLORIDE, CALCIUM CHLORIDE 600; 310; 30; 20 MG/100ML; MG/100ML; MG/100ML; MG/100ML
100 INJECTION, SOLUTION INTRAVENOUS CONTINUOUS
Status: DISCONTINUED | OUTPATIENT
Start: 2024-01-27 | End: 2024-01-27 | Stop reason: HOSPADM

## 2024-01-27 RX ORDER — SODIUM CHLORIDE, SODIUM LACTATE, POTASSIUM CHLORIDE, CALCIUM CHLORIDE 600; 310; 30; 20 MG/100ML; MG/100ML; MG/100ML; MG/100ML
INJECTION, SOLUTION INTRAVENOUS CONTINUOUS PRN
Status: DISCONTINUED | OUTPATIENT
Start: 2024-01-27 | End: 2024-01-27

## 2024-01-27 RX ORDER — LIDOCAINE HYDROCHLORIDE 10 MG/ML
0.1 INJECTION, SOLUTION EPIDURAL; INFILTRATION; INTRACAUDAL; PERINEURAL ONCE
Status: DISCONTINUED | OUTPATIENT
Start: 2024-01-27 | End: 2024-01-27 | Stop reason: HOSPADM

## 2024-01-27 RX ORDER — CEFAZOLIN SODIUM 2 G/100ML
2 INJECTION, SOLUTION INTRAVENOUS EVERY 8 HOURS
Status: COMPLETED | OUTPATIENT
Start: 2024-01-27 | End: 2024-01-27

## 2024-01-27 RX ORDER — LIDOCAINE HYDROCHLORIDE AND EPINEPHRINE 20; 10 MG/ML; UG/ML
INJECTION, SOLUTION INFILTRATION; PERINEURAL AS NEEDED
Status: DISCONTINUED | OUTPATIENT
Start: 2024-01-27 | End: 2024-01-27 | Stop reason: HOSPADM

## 2024-01-27 RX ORDER — ALBUTEROL SULFATE 0.83 MG/ML
2.5 SOLUTION RESPIRATORY (INHALATION) EVERY 4 HOURS PRN
Status: DISCONTINUED | OUTPATIENT
Start: 2024-01-27 | End: 2024-01-28 | Stop reason: SDUPTHER

## 2024-01-27 RX ORDER — HYDROXYZINE HYDROCHLORIDE 25 MG/1
12.5 TABLET, FILM COATED ORAL EVERY 8 HOURS PRN
Status: DISCONTINUED | OUTPATIENT
Start: 2024-01-27 | End: 2024-02-02 | Stop reason: HOSPADM

## 2024-01-27 RX ORDER — LABETALOL HYDROCHLORIDE 5 MG/ML
5 INJECTION, SOLUTION INTRAVENOUS ONCE AS NEEDED
Status: DISCONTINUED | OUTPATIENT
Start: 2024-01-27 | End: 2024-01-27 | Stop reason: HOSPADM

## 2024-01-27 RX ORDER — OXYCODONE HYDROCHLORIDE 5 MG/1
2.5 TABLET ORAL EVERY 6 HOURS PRN
Status: DISCONTINUED | OUTPATIENT
Start: 2024-01-27 | End: 2024-02-02 | Stop reason: HOSPADM

## 2024-01-27 RX ORDER — CEFAZOLIN SODIUM 2 G/100ML
2 INJECTION, SOLUTION INTRAVENOUS EVERY 8 HOURS
Status: DISCONTINUED | OUTPATIENT
Start: 2024-01-27 | End: 2024-01-27 | Stop reason: SDUPTHER

## 2024-01-27 RX ORDER — FENTANYL CITRATE 50 UG/ML
INJECTION, SOLUTION INTRAMUSCULAR; INTRAVENOUS
Status: COMPLETED
Start: 2024-01-27 | End: 2024-01-27

## 2024-01-27 RX ORDER — FLUTICASONE FUROATE AND VILANTEROL 200; 25 UG/1; UG/1
1 POWDER RESPIRATORY (INHALATION) DAILY
Status: DISCONTINUED | OUTPATIENT
Start: 2024-01-27 | End: 2024-02-02 | Stop reason: HOSPADM

## 2024-01-27 RX ORDER — ONDANSETRON HYDROCHLORIDE 2 MG/ML
4 INJECTION, SOLUTION INTRAVENOUS ONCE AS NEEDED
Status: DISCONTINUED | OUTPATIENT
Start: 2024-01-27 | End: 2024-01-27 | Stop reason: HOSPADM

## 2024-01-27 RX ORDER — TRANEXAMIC ACID 10 MG/ML
INJECTION, SOLUTION INTRAVENOUS AS NEEDED
Status: DISCONTINUED | OUTPATIENT
Start: 2024-01-27 | End: 2024-01-27

## 2024-01-27 RX ORDER — NEOSTIGMINE METHYLSULFATE 1 MG/ML
INJECTION, SOLUTION INTRAVENOUS AS NEEDED
Status: DISCONTINUED | OUTPATIENT
Start: 2024-01-27 | End: 2024-01-27

## 2024-01-27 RX ORDER — PROPOFOL 10 MG/ML
INJECTION, EMULSION INTRAVENOUS AS NEEDED
Status: DISCONTINUED | OUTPATIENT
Start: 2024-01-27 | End: 2024-01-27

## 2024-01-27 RX ORDER — TRANEXAMIC ACID 100 MG/ML
INJECTION, SOLUTION INTRAVENOUS
Status: COMPLETED
Start: 2024-01-27 | End: 2024-01-27

## 2024-01-27 RX ORDER — TRANEXAMIC ACID 100 MG/ML
INJECTION, SOLUTION INTRAVENOUS AS NEEDED
Status: DISCONTINUED | OUTPATIENT
Start: 2024-01-27 | End: 2024-01-27

## 2024-01-27 RX ORDER — ACETAMINOPHEN 325 MG/1
650 TABLET ORAL EVERY 4 HOURS PRN
Status: DISCONTINUED | OUTPATIENT
Start: 2024-01-27 | End: 2024-01-27

## 2024-01-27 RX ORDER — ROCURONIUM BROMIDE 50 MG/5 ML
SYRINGE (ML) INTRAVENOUS AS NEEDED
Status: DISCONTINUED | OUTPATIENT
Start: 2024-01-27 | End: 2024-01-27

## 2024-01-27 RX ORDER — DEXAMETHASONE SODIUM PHOSPHATE 4 MG/ML
INJECTION, SOLUTION INTRA-ARTICULAR; INTRALESIONAL; INTRAMUSCULAR; INTRAVENOUS; SOFT TISSUE AS NEEDED
Status: DISCONTINUED | OUTPATIENT
Start: 2024-01-27 | End: 2024-01-27

## 2024-01-27 RX ORDER — ONDANSETRON HYDROCHLORIDE 2 MG/ML
4 INJECTION, SOLUTION INTRAVENOUS EVERY 8 HOURS PRN
Status: DISCONTINUED | OUTPATIENT
Start: 2024-01-27 | End: 2024-02-02 | Stop reason: HOSPADM

## 2024-01-27 RX ORDER — FLUTICASONE FUROATE AND VILANTEROL 200; 25 UG/1; UG/1
1 POWDER RESPIRATORY (INHALATION)
Status: DISCONTINUED | OUTPATIENT
Start: 2024-01-27 | End: 2024-01-27 | Stop reason: SDUPTHER

## 2024-01-27 RX ORDER — OXYCODONE HYDROCHLORIDE 5 MG/1
5 TABLET ORAL EVERY 6 HOURS PRN
Status: DISCONTINUED | OUTPATIENT
Start: 2024-01-27 | End: 2024-02-02 | Stop reason: HOSPADM

## 2024-01-27 RX ORDER — METOPROLOL TARTRATE 25 MG/1
37.5 TABLET, FILM COATED ORAL 2 TIMES DAILY
Status: DISCONTINUED | OUTPATIENT
Start: 2024-01-27 | End: 2024-01-27 | Stop reason: SDUPTHER

## 2024-01-27 RX ORDER — IPRATROPIUM BROMIDE AND ALBUTEROL SULFATE 2.5; .5 MG/3ML; MG/3ML
3 SOLUTION RESPIRATORY (INHALATION) ONCE
Status: COMPLETED | OUTPATIENT
Start: 2024-01-27 | End: 2024-01-27

## 2024-01-27 RX ORDER — TRANEXAMIC ACID 100 MG/ML
INJECTION, SOLUTION INTRAVENOUS AS NEEDED
Status: DISCONTINUED | OUTPATIENT
Start: 2024-01-27 | End: 2024-01-27 | Stop reason: HOSPADM

## 2024-01-27 RX ORDER — ALBUTEROL SULFATE 0.83 MG/ML
2.5 SOLUTION RESPIRATORY (INHALATION) EVERY 2 HOUR PRN
Status: DISCONTINUED | OUTPATIENT
Start: 2024-01-27 | End: 2024-01-27

## 2024-01-27 RX ORDER — FENTANYL CITRATE 50 UG/ML
INJECTION, SOLUTION INTRAMUSCULAR; INTRAVENOUS AS NEEDED
Status: DISCONTINUED | OUTPATIENT
Start: 2024-01-27 | End: 2024-01-27

## 2024-01-27 RX ORDER — POTASSIUM CHLORIDE 14.9 MG/ML
20 INJECTION INTRAVENOUS
Status: COMPLETED | OUTPATIENT
Start: 2024-01-27 | End: 2024-01-27

## 2024-01-27 RX ORDER — ALBUTEROL SULFATE 0.83 MG/ML
2.5 SOLUTION RESPIRATORY (INHALATION) ONCE AS NEEDED
Status: DISCONTINUED | OUTPATIENT
Start: 2024-01-27 | End: 2024-01-27 | Stop reason: HOSPADM

## 2024-01-27 RX ORDER — FENTANYL CITRATE 50 UG/ML
50 INJECTION, SOLUTION INTRAMUSCULAR; INTRAVENOUS ONCE
Status: COMPLETED | OUTPATIENT
Start: 2024-01-27 | End: 2024-01-27

## 2024-01-27 RX ORDER — HYDRALAZINE HYDROCHLORIDE 20 MG/ML
5 INJECTION INTRAMUSCULAR; INTRAVENOUS EVERY 30 MIN PRN
Status: DISCONTINUED | OUTPATIENT
Start: 2024-01-27 | End: 2024-01-27 | Stop reason: HOSPADM

## 2024-01-27 RX ORDER — METOPROLOL TARTRATE 25 MG/1
37.5 TABLET, FILM COATED ORAL 2 TIMES DAILY
Status: DISCONTINUED | OUTPATIENT
Start: 2024-01-27 | End: 2024-02-02 | Stop reason: HOSPADM

## 2024-01-27 RX ORDER — HYDROMORPHONE HYDROCHLORIDE 0.2 MG/ML
0.2 INJECTION INTRAMUSCULAR; INTRAVENOUS; SUBCUTANEOUS EVERY 5 MIN PRN
Status: DISCONTINUED | OUTPATIENT
Start: 2024-01-27 | End: 2024-01-27 | Stop reason: HOSPADM

## 2024-01-27 RX ORDER — ACETAMINOPHEN 325 MG/1
650 TABLET ORAL EVERY 6 HOURS
Status: DISCONTINUED | OUTPATIENT
Start: 2024-01-27 | End: 2024-02-02 | Stop reason: HOSPADM

## 2024-01-27 RX ORDER — POLYETHYLENE GLYCOL 3350 17 G/17G
17 POWDER, FOR SOLUTION ORAL DAILY
Status: DISCONTINUED | OUTPATIENT
Start: 2024-01-27 | End: 2024-02-02 | Stop reason: HOSPADM

## 2024-01-27 RX ORDER — GLYCOPYRROLATE 0.2 MG/ML
INJECTION INTRAMUSCULAR; INTRAVENOUS AS NEEDED
Status: DISCONTINUED | OUTPATIENT
Start: 2024-01-27 | End: 2024-01-27

## 2024-01-27 RX ORDER — OXYCODONE HYDROCHLORIDE 5 MG/1
5 TABLET ORAL EVERY 4 HOURS PRN
Status: DISCONTINUED | OUTPATIENT
Start: 2024-01-27 | End: 2024-01-27 | Stop reason: HOSPADM

## 2024-01-27 RX ORDER — LIDOCAINE HYDROCHLORIDE 20 MG/ML
INJECTION, SOLUTION INFILTRATION; PERINEURAL AS NEEDED
Status: DISCONTINUED | OUTPATIENT
Start: 2024-01-27 | End: 2024-01-27

## 2024-01-27 RX ORDER — MORPHINE SULFATE 2 MG/ML
2 INJECTION, SOLUTION INTRAMUSCULAR; INTRAVENOUS EVERY 4 HOURS PRN
Status: DISCONTINUED | OUTPATIENT
Start: 2024-01-27 | End: 2024-02-02 | Stop reason: HOSPADM

## 2024-01-27 RX ORDER — PHENYLEPHRINE HCL IN 0.9% NACL 0.4MG/10ML
SYRINGE (ML) INTRAVENOUS AS NEEDED
Status: DISCONTINUED | OUTPATIENT
Start: 2024-01-27 | End: 2024-01-27

## 2024-01-27 RX ORDER — CEFAZOLIN SODIUM 2 G/100ML
2 INJECTION, SOLUTION INTRAVENOUS EVERY 8 HOURS
Status: COMPLETED | OUTPATIENT
Start: 2024-01-28 | End: 2024-01-28

## 2024-01-27 RX ORDER — OXYCODONE HYDROCHLORIDE 10 MG/1
10 TABLET ORAL EVERY 6 HOURS PRN
Status: DISCONTINUED | OUTPATIENT
Start: 2024-01-27 | End: 2024-01-27

## 2024-01-27 RX ORDER — OXYCODONE HYDROCHLORIDE 5 MG/1
5 TABLET ORAL EVERY 6 HOURS PRN
Status: DISCONTINUED | OUTPATIENT
Start: 2024-01-27 | End: 2024-01-27

## 2024-01-27 RX ORDER — ONDANSETRON 4 MG/1
4 TABLET, FILM COATED ORAL EVERY 8 HOURS PRN
Status: DISCONTINUED | OUTPATIENT
Start: 2024-01-27 | End: 2024-02-02 | Stop reason: HOSPADM

## 2024-01-27 RX ORDER — BUPIVACAINE HYDROCHLORIDE 5 MG/ML
INJECTION, SOLUTION PERINEURAL AS NEEDED
Status: DISCONTINUED | OUTPATIENT
Start: 2024-01-27 | End: 2024-01-27 | Stop reason: HOSPADM

## 2024-01-27 RX ADMIN — NEOSTIGMINE METHYLSULFATE 3 MG: 1 INJECTION, SOLUTION INTRAVENOUS at 14:00

## 2024-01-27 RX ADMIN — POTASSIUM CHLORIDE 20 MEQ: 14.9 INJECTION, SOLUTION INTRAVENOUS at 10:26

## 2024-01-27 RX ADMIN — TRANEXAMIC ACID 1000 MG: 100 INJECTION, SOLUTION INTRAVENOUS at 14:00

## 2024-01-27 RX ADMIN — ACETAMINOPHEN 650 MG: 325 TABLET ORAL at 03:32

## 2024-01-27 RX ADMIN — Medication 40 MG: at 12:25

## 2024-01-27 RX ADMIN — ALBUTEROL SULFATE 2.5 MG: 2.5 SOLUTION RESPIRATORY (INHALATION) at 14:55

## 2024-01-27 RX ADMIN — ACETAMINOPHEN 650 MG: 325 TABLET ORAL at 10:02

## 2024-01-27 RX ADMIN — Medication 120 MCG: at 13:24

## 2024-01-27 RX ADMIN — METOPROLOL TARTRATE 37.5 MG: 25 TABLET, FILM COATED ORAL at 20:25

## 2024-01-27 RX ADMIN — Medication 80 MCG: at 12:54

## 2024-01-27 RX ADMIN — Medication 80 MCG: at 12:25

## 2024-01-27 RX ADMIN — ACETAMINOPHEN 650 MG: 325 TABLET ORAL at 20:25

## 2024-01-27 RX ADMIN — FENTANYL CITRATE 50 MCG: 50 INJECTION INTRAMUSCULAR; INTRAVENOUS at 00:52

## 2024-01-27 RX ADMIN — ONDANSETRON 4 MG: 2 INJECTION, SOLUTION INTRAMUSCULAR; INTRAVENOUS at 12:30

## 2024-01-27 RX ADMIN — Medication 20 MG: at 13:34

## 2024-01-27 RX ADMIN — SODIUM CHLORIDE, POTASSIUM CHLORIDE, SODIUM LACTATE AND CALCIUM CHLORIDE: 600; 310; 30; 20 INJECTION, SOLUTION INTRAVENOUS at 14:22

## 2024-01-27 RX ADMIN — TRANEXAMIC ACID 1000 MG: 100 INJECTION, SOLUTION INTRAVENOUS at 12:24

## 2024-01-27 RX ADMIN — LIDOCAINE HYDROCHLORIDE 100 MG: 20 INJECTION, SOLUTION INFILTRATION; PERINEURAL at 12:25

## 2024-01-27 RX ADMIN — FENTANYL CITRATE 50 MCG: 50 INJECTION, SOLUTION INTRAMUSCULAR; INTRAVENOUS at 12:25

## 2024-01-27 RX ADMIN — IPRATROPIUM BROMIDE AND ALBUTEROL SULFATE 3 ML: .5; 3 SOLUTION RESPIRATORY (INHALATION) at 14:58

## 2024-01-27 RX ADMIN — TIOTROPIUM BROMIDE INHALATION SPRAY 2 PUFF: 3.12 SPRAY, METERED RESPIRATORY (INHALATION) at 09:48

## 2024-01-27 RX ADMIN — METOPROLOL TARTRATE 37.5 MG: 25 TABLET, FILM COATED ORAL at 03:32

## 2024-01-27 RX ADMIN — CEFAZOLIN SODIUM 2 G: 2 INJECTION, SOLUTION INTRAVENOUS at 06:17

## 2024-01-27 RX ADMIN — Medication 80 MCG: at 13:19

## 2024-01-27 RX ADMIN — TRANEXAMIC ACID 1000 MG: 100 INJECTION, SOLUTION INTRAVENOUS at 00:35

## 2024-01-27 RX ADMIN — FENTANYL CITRATE 50 MCG: 50 INJECTION, SOLUTION INTRAMUSCULAR; INTRAVENOUS at 00:52

## 2024-01-27 RX ADMIN — CEFAZOLIN SODIUM 2 G: 2 INJECTION, SOLUTION INTRAVENOUS at 22:22

## 2024-01-27 RX ADMIN — HEPARIN SODIUM 5000 UNITS: 5000 INJECTION INTRAVENOUS; SUBCUTANEOUS at 17:12

## 2024-01-27 RX ADMIN — FLUTICASONE FUROATE AND VILANTEROL TRIFENATATE 1 PUFF: 200; 25 POWDER RESPIRATORY (INHALATION) at 09:49

## 2024-01-27 RX ADMIN — SODIUM CHLORIDE 125 ML/HR: 9 INJECTION, SOLUTION INTRAVENOUS at 06:17

## 2024-01-27 RX ADMIN — POTASSIUM CHLORIDE 20 MEQ: 14.9 INJECTION, SOLUTION INTRAVENOUS at 09:59

## 2024-01-27 RX ADMIN — SODIUM CHLORIDE 75 ML/HR: 9 INJECTION, SOLUTION INTRAVENOUS at 22:23

## 2024-01-27 RX ADMIN — FENTANYL CITRATE 50 MCG: 50 INJECTION, SOLUTION INTRAMUSCULAR; INTRAVENOUS at 13:33

## 2024-01-27 RX ADMIN — CEFAZOLIN SODIUM 2 G: 2 INJECTION, SOLUTION INTRAVENOUS at 16:10

## 2024-01-27 RX ADMIN — PROPOFOL 100 MG: 10 INJECTION, EMULSION INTRAVENOUS at 12:25

## 2024-01-27 RX ADMIN — GLYCOPYRROLATE 0.4 MG: 0.2 INJECTION, SOLUTION INTRAMUSCULAR; INTRAVENOUS at 14:00

## 2024-01-27 RX ADMIN — DEXAMETHASONE SODIUM PHOSPHATE 4 MG: 4 INJECTION INTRA-ARTICULAR; INTRALESIONAL; INTRAMUSCULAR; INTRAVENOUS; SOFT TISSUE at 12:34

## 2024-01-27 RX ADMIN — OXYCODONE HYDROCHLORIDE 5 MG: 5 TABLET ORAL at 20:25

## 2024-01-27 RX ADMIN — METOPROLOL TARTRATE 37.5 MG: 25 TABLET, FILM COATED ORAL at 09:49

## 2024-01-27 SDOH — SOCIAL STABILITY: SOCIAL INSECURITY: WERE YOU ABLE TO COMPLETE ALL THE BEHAVIORAL HEALTH SCREENINGS?: YES

## 2024-01-27 SDOH — SOCIAL STABILITY: SOCIAL INSECURITY: ARE THERE ANY APPARENT SIGNS OF INJURIES/BEHAVIORS THAT COULD BE RELATED TO ABUSE/NEGLECT?: NO

## 2024-01-27 SDOH — HEALTH STABILITY: MENTAL HEALTH: CURRENT SMOKER: 0

## 2024-01-27 SDOH — SOCIAL STABILITY: SOCIAL INSECURITY: DO YOU FEEL ANYONE HAS EXPLOITED OR TAKEN ADVANTAGE OF YOU FINANCIALLY OR OF YOUR PERSONAL PROPERTY?: NO

## 2024-01-27 SDOH — SOCIAL STABILITY: SOCIAL INSECURITY: DO YOU FEEL UNSAFE GOING BACK TO THE PLACE WHERE YOU ARE LIVING?: NO

## 2024-01-27 SDOH — SOCIAL STABILITY: SOCIAL INSECURITY: ARE YOU OR HAVE YOU BEEN THREATENED OR ABUSED PHYSICALLY, EMOTIONALLY, OR SEXUALLY BY ANYONE?: NO

## 2024-01-27 SDOH — SOCIAL STABILITY: SOCIAL INSECURITY: DOES ANYONE TRY TO KEEP YOU FROM HAVING/CONTACTING OTHER FRIENDS OR DOING THINGS OUTSIDE YOUR HOME?: NO

## 2024-01-27 SDOH — SOCIAL STABILITY: SOCIAL INSECURITY: ABUSE: ADULT

## 2024-01-27 SDOH — SOCIAL STABILITY: SOCIAL INSECURITY: HAVE YOU HAD THOUGHTS OF HARMING ANYONE ELSE?: NO

## 2024-01-27 SDOH — SOCIAL STABILITY: SOCIAL INSECURITY: HAS ANYONE EVER THREATENED TO HURT YOUR FAMILY OR YOUR PETS?: NO

## 2024-01-27 ASSESSMENT — PAIN SCALES - GENERAL
PAINLEVEL_OUTOF10: 0 - NO PAIN
PAINLEVEL_OUTOF10: 0 - NO PAIN
PAIN_LEVEL: 2
PAINLEVEL_OUTOF10: 7
PAINLEVEL_OUTOF10: 0 - NO PAIN
PAINLEVEL_OUTOF10: 0 - NO PAIN
PAINLEVEL_OUTOF10: 8
PAINLEVEL_OUTOF10: 0 - NO PAIN
PAINLEVEL_OUTOF10: 7
PAINLEVEL_OUTOF10: 0 - NO PAIN
PAINLEVEL_OUTOF10: 0 - NO PAIN
PAINLEVEL_OUTOF10: 7

## 2024-01-27 ASSESSMENT — COGNITIVE AND FUNCTIONAL STATUS - GENERAL
TOILETING: A LITTLE
DAILY ACTIVITIY SCORE: 18
STANDING UP FROM CHAIR USING ARMS: A LITTLE
PERSONAL GROOMING: A LITTLE
MOVING FROM LYING ON BACK TO SITTING ON SIDE OF FLAT BED WITH BEDRAILS: A LITTLE
DRESSING REGULAR UPPER BODY CLOTHING: A LITTLE
MOVING TO AND FROM BED TO CHAIR: A LITTLE
PATIENT BASELINE BEDBOUND: NO
TURNING FROM BACK TO SIDE WHILE IN FLAT BAD: A LITTLE
WALKING IN HOSPITAL ROOM: A LOT
CLIMB 3 TO 5 STEPS WITH RAILING: TOTAL
DRESSING REGULAR LOWER BODY CLOTHING: A LITTLE
HELP NEEDED FOR BATHING: A LITTLE
EATING MEALS: A LITTLE
MOBILITY SCORE: 15

## 2024-01-27 ASSESSMENT — ACTIVITIES OF DAILY LIVING (ADL)
JUDGMENT_ADEQUATE_SAFELY_COMPLETE_DAILY_ACTIVITIES: YES
FEEDING YOURSELF: NEEDS ASSISTANCE
HEARING - LEFT EAR: FUNCTIONAL
WALKS IN HOME: NEEDS ASSISTANCE
ADEQUATE_TO_COMPLETE_ADL: YES
HEARING - RIGHT EAR: FUNCTIONAL
PATIENT'S MEMORY ADEQUATE TO SAFELY COMPLETE DAILY ACTIVITIES?: YES
GROOMING: NEEDS ASSISTANCE
DRESSING YOURSELF: NEEDS ASSISTANCE
TOILETING: NEEDS ASSISTANCE
BATHING: NEEDS ASSISTANCE
LACK_OF_TRANSPORTATION: NO
ASSISTIVE_DEVICE: OXYGEN

## 2024-01-27 ASSESSMENT — COLUMBIA-SUICIDE SEVERITY RATING SCALE - C-SSRS
6. HAVE YOU EVER DONE ANYTHING, STARTED TO DO ANYTHING, OR PREPARED TO DO ANYTHING TO END YOUR LIFE?: NO
1. IN THE PAST MONTH, HAVE YOU WISHED YOU WERE DEAD OR WISHED YOU COULD GO TO SLEEP AND NOT WAKE UP?: NO
6. HAVE YOU EVER DONE ANYTHING, STARTED TO DO ANYTHING, OR PREPARED TO DO ANYTHING TO END YOUR LIFE?: NO
1. IN THE PAST MONTH, HAVE YOU WISHED YOU WERE DEAD OR WISHED YOU COULD GO TO SLEEP AND NOT WAKE UP?: NO
2. HAVE YOU ACTUALLY HAD ANY THOUGHTS OF KILLING YOURSELF?: NO
2. HAVE YOU ACTUALLY HAD ANY THOUGHTS OF KILLING YOURSELF?: NO

## 2024-01-27 ASSESSMENT — PAIN DESCRIPTION - ORIENTATION: ORIENTATION: LEFT;RIGHT

## 2024-01-27 ASSESSMENT — ENCOUNTER SYMPTOMS
CHILLS: 0
COUGH: 0
BLOOD IN STOOL: 0
NUMBNESS: 0
PALPITATIONS: 0
DIARRHEA: 0
CONSTIPATION: 0
SHORTNESS OF BREATH: 0
FEVER: 0
HEADACHES: 0
VOMITING: 0
DIZZINESS: 0
NAUSEA: 0
WEAKNESS: 0
ABDOMINAL PAIN: 0

## 2024-01-27 ASSESSMENT — PAIN - FUNCTIONAL ASSESSMENT
PAIN_FUNCTIONAL_ASSESSMENT: 0-10

## 2024-01-27 ASSESSMENT — LIFESTYLE VARIABLES
HOW OFTEN DO YOU HAVE 6 OR MORE DRINKS ON ONE OCCASION: NEVER
PRESCIPTION_ABUSE_PAST_12_MONTHS: NO
AUDIT-C TOTAL SCORE: 0
AUDIT-C TOTAL SCORE: 0
HOW OFTEN DO YOU HAVE A DRINK CONTAINING ALCOHOL: NEVER
SKIP TO QUESTIONS 9-10: 1
HOW MANY STANDARD DRINKS CONTAINING ALCOHOL DO YOU HAVE ON A TYPICAL DAY: PATIENT DOES NOT DRINK
SUBSTANCE_ABUSE_PAST_12_MONTHS: NO

## 2024-01-27 ASSESSMENT — PATIENT HEALTH QUESTIONNAIRE - PHQ9
SUM OF ALL RESPONSES TO PHQ9 QUESTIONS 1 & 2: 0
1. LITTLE INTEREST OR PLEASURE IN DOING THINGS: NOT AT ALL
2. FEELING DOWN, DEPRESSED OR HOPELESS: NOT AT ALL

## 2024-01-27 ASSESSMENT — PAIN DESCRIPTION - LOCATION: LOCATION: ARM

## 2024-01-27 ASSESSMENT — PAIN DESCRIPTION - PAIN TYPE: TYPE: ACUTE PAIN

## 2024-01-27 ASSESSMENT — PAIN DESCRIPTION - DESCRIPTORS: DESCRIPTORS: ACHING;THROBBING

## 2024-01-27 NOTE — CARE PLAN
The patient's goals for the shift include to remain safe and not fall during shift    The clinical goals for the shift include to remain safe and not fall during shift    Over the shift, the patient remains free from injury.

## 2024-01-27 NOTE — ANESTHESIA PREPROCEDURE EVALUATION
Patient: Irish Davis    Procedure Information       Date/Time: 01/27/24 1200    Procedure: Open Reduction Internal Fixation Radius (Bilateral: Wrist)    Location: [facility] OR 07 / Virtual [facility] OR    Surgeons: Bryant Arnold, DO          79 yo F with B/L wrist fx.  Hx COPD on 4L O2 NC at baseline, HTN, and mild aortic stenosis (per last echo from 11/2023).    ECHO FROM 11/2023  Left Ventricle: Left ventricular systolic function is normal. There are no regional wall motion abnormalities. The left ventricular cavity size is normal. Spectral Doppler shows an impaired relaxation pattern of left ventricular diastolic filling.  Left Atrium: The left atrium is normal in size.  Right Ventricle: The right ventricle is normal in size. There is normal right ventricular global systolic function.  Right Atrium: The right atrium is normal in size.  Aortic Valve: The aortic valve is trileaflet. There is mild aortic valve cusp calcification. There is evidence of mild aortic valve stenosis.  There is no evidence of aortic valve regurgitation. The peak instantaneous gradient of the aortic valve is 20.2 mmHg. The mean gradient of the aortic valve is 11.0 mmHg.  Mitral Valve: The mitral valve is mildly thickened. There is trace mitral valve regurgitation.  Tricuspid Valve: The tricuspid valve is structurally normal. There is mild tricuspid regurgitation.  Pulmonic Valve: The pulmonic valve is structurally normal. There is no indication of pulmonic valve regurgitation.  Pericardium: There is no pericardial effusion noted.  Aorta: The aortic root is normal.        CONCLUSIONS:   1. Left ventricular systolic function is normal.   2. Spectral Doppler shows an impaired relaxation pattern of left ventricular diastolic filling.   3. Mild aortic valve stenosis.    Relevant Problems   Cardiovascular   (+) Moderate aortic stenosis      /Renal   (+) Hydronephrosis of left kidney      Neuro/Psych   (+) Anxiety disorder due to medical condition       Pulmonary   (+) COPD with acute exacerbation (CMS/HCC)   (+) Pneumonia of left lower lobe due to infectious organism      Infectious Disease   (+) Pneumonia of left lower lobe due to infectious organism       Clinical information reviewed:    Allergies  Meds               NPO Detail:  No data recorded     Physical Exam    Airway  Mallampati: II  TM distance: >3 FB  Neck ROM: full     Cardiovascular   Rate: normal     Dental   (+) upper dentures, lower dentures     Pulmonary   (+) decreased breath sounds     Abdominal            Anesthesia Plan    History of general anesthesia?: yes  History of complications of general anesthesia?: no    ASA 3     general     The patient is not a current smoker.    intravenous induction   Postoperative administration of opioids is intended.  Anesthetic plan and risks discussed with patient.

## 2024-01-27 NOTE — CARE PLAN
Problem: Pain - Adult  Goal: Verbalizes/displays adequate comfort level or baseline comfort level  Outcome: Progressing     Problem: Safety - Adult  Goal: Free from fall injury  Outcome: Progressing     Problem: Discharge Planning  Goal: Discharge to home or other facility with appropriate resources  Outcome: Progressing     Problem: Chronic Conditions and Co-morbidities  Goal: Patient's chronic conditions and co-morbidity symptoms are monitored and maintained or improved  Outcome: Progressing     Problem: Skin  Goal: Decreased wound size/increased tissue granulation at next dressing change  Outcome: Progressing  Goal: Participates in plan/prevention/treatment measures  Outcome: Progressing  Goal: Prevent/manage excess moisture  Outcome: Progressing  Goal: Prevent/minimize sheer/friction injuries  Outcome: Progressing  Goal: Promote/optimize nutrition  Outcome: Progressing  Goal: Promote skin healing  Outcome: Progressing     Problem: Fall/Injury  Goal: Not fall by end of shift  Outcome: Progressing  Goal: Be free from injury by end of the shift  Outcome: Progressing  Goal: Verbalize understanding of personal risk factors for fall in the hospital  Outcome: Progressing  Goal: Verbalize understanding of risk factor reduction measures to prevent injury from fall in the home  Outcome: Progressing  Goal: Use assistive devices by end of the shift  Outcome: Progressing  Goal: Pace activities to prevent fatigue by end of the shift  Outcome: Progressing     Problem: Pain  Goal: Takes deep breaths with improved pain control throughout the shift  Outcome: Progressing  Goal: Turns in bed with improved pain control throughout the shift  Outcome: Progressing  Goal: Walks with improved pain control throughout the shift  Outcome: Progressing  Goal: Performs ADL's with improved pain control throughout shift  Outcome: Progressing  Goal: Participates in PT with improved pain control throughout the shift  Outcome: Progressing  Goal:  Free from opioid side effects throughout the shift  Outcome: Progressing  Goal: Free from acute confusion related to pain meds throughout the shift  Outcome: Progressing   The patient's goals for the shift include to remain safe and not fall during shift    The clinical goals for the shift include to remain safe and not fall during shift    Over the shift, the patient did not make progress toward the following goals. Barriers to progression include . Recommendations to address these barriers include .

## 2024-01-27 NOTE — ED PROVIDER NOTES
"  Chief Complaint   Patient presents with    Fall     Tripped on o2 tubing today and fell.  C/o beck lower arms. L knee  abrasion.  States \"slid\" head a little bit no loc or pain       78-year-old female arrives to the emergency department with a chief complaint of a mechanical fall.  The patient was walking at home where she uses supplemental oxygen, patient states that she tripped over her oxygen cord falling forward onto carpet.  The patient first landed on her left knee, and while she was on the way down she attempted to catch herself falling forward with both hands, the patient has pain in both wrists, was unable to stop her fall and struck her head on the carpet.  The patient has a left frontal scalp hematoma with ecchymoses.  The patient's skin is very chronically pale, patient does have a history of anemia.  The patient denies any dizziness or any other precipitating factors to the fall, denies any loss of consciousness, patient states that she remembers events prior to, during, after the fall.  The patient had no dizziness, the patient ambulates into the emergency department without difficulty.  Patient has no focal neurological deficit, is not on any blood thinning medications, and has no unilateral weakness.  Patient endorses a pain of 8 out of 10 in bilateral wrist, the patient's  wrapped her wrist in an Ace wrap, upon unwrapping the wrist the left wrist shows a triangular shaped open wound on the dorsal wrist of the left as well as a laceration on the anterior wrist of the left.  The gauze is in place and is bloodsoaked, there is deformity and shortening appreciated of the left wrist.  Upon unwrapping the right wrist there is deformity appreciated, however there is no shortening or what appears to be impaction.  The patient denies any neck pain, denies any other injury.  Patient denies any knee pain other than a mild abrasion that is on the anterior aspect of the knee.      History provided by:  " Patient   used: No         PmHx, PsHx, Allergies, Family Hx, social Hx reviewed as documented    A complete 10 point review of systems was performed and is negative except for as mentioned in the HPI.    Physical Exam:    General: Patient is AAOx3, appears well developed, well nourished, is a good historian, answers questions appropriately    HEENT: Frontal scalp hematoma, PERRLA, EOMs intact, oropharynx without erythema or exudate, buccal mucosa intact without lesions, TMs unremarkable, nose is patent bilateral    Neck: supple, full ROM, negative for lymphadenopathy, JVD, thyromegaly, tracheal deviation, nuccal rigidity    Pulmonary: CTAB, no accessory muscle use, able to speak full clear sentences    Cardiac: HRRR, no murmurs, rubs or gallops    GI: soft, non-tender, non-distended, BS + x 4, no masses or organomegaly, no guarding or CVA tenderness noted, negative castrejon's, mcburney's    Musculoskeletal: Bilateral wrist pain, otherwise patient full weight bearing    Skin: Skin chronically pale, abrasion to the left knee with no bleeding appreciated, soft tissue swelling, ecchymosis, deformity noted to bilateral wrists, open wounds to the left wrist on the posterior and anterior aspect.  Patient has ecchymosis and frontal scalp hematoma towards the left of midline    Neuro: patient follow commands, cranial nerves 2-12 grossly intact, motor strengths 5/5 upper and lower extremities, DTR's and sensation are symmetrical. No focal deficits.    Rectal/: No urinary burning, urgency, change in frequency.  Patient has no rectal complaints        Medical Decision Making  This patient was seen, treated, and evaluated in conjunction with Dr. Cara King    Primary consideration for this patient would be an intracranial traumatic process, cervical fracture versus strain, though the patient denies any neck pain, with her distracting wrist and head injury this remains a possibility.  A CT of the head and neck  will be used to further evaluate.  Other consideration for this patient would be wrist and hand fractures, patient does have obvious deformity left worse than right, with a concern for an open wound on the left.  Patient initially given 50 mg of tramadol.    X-rays show bilateral radial and ulnar fractures with the left being displaced and impacted, with the wounds appreciated on the left dorsal wrist as well as anterior wrist, this will be treated as an open fracture, patient given 2 g of IV Ancef.  Given the patient is going to have bilateral wrist splints ultimately, I did place a left-sided EJ 20-gauge intravenous catheter.    The patient's CT of the head and neck are negative for any acute abnormality, chronic findings are appreciated.    Dr. Solorzano with trauma surgery was initially consulted, the patient's events prior to arrival as well as ED course was reviewed, she required the orthopedic to be on board with surgery prior to the patient's admission.      Dr. Arnold with orthopedic hand was consulted, the patient's events prior to arrival as well as the patient's imaging was reviewed, the patient has already received her Ancef, he ordered that the patient received 1 g of IV TXA, and be made n.p.o. further, requested Betadine soaked gauze placed over open wounds, and bilateral volar splints be placed    Rachele, the paramedic provided ordered wound care as well as volar splints to patient, the patient tolerated well after 50 mcg of fentanyl IV.     The patient's ordered diagnostic blood work is consistent with the patient's chronic anemia     was again consulted, with Dr. Arnold willingness to take the patient to surgery tomorrow morning, the patient will be admitted inpatient to [facility] for further treatment with IMS on consult        Amount and/or Complexity of Data Reviewed  Labs: ordered. Decision-making details documented in ED Course.  Radiology: ordered. Decision-making details documented in ED  Course.       Diagnoses as of 01/27/24 0132   Closed fracture of right radius and ulna, initial encounter   Type I or II open fracture of left radius and ulna, initial encounter   Fall, initial encounter   Closed head injury, initial encounter       The patient has had the following imaging during this ER visit: CT HEAD WO IV CONTRAST  CT CERVICAL SPINE WO IV CONTRAST  XR WRIST LEFT 3+ VIEWS  XR HAND LEFT 3+ VIEWS  XR HAND RIGHT 3+ VIEWS  XR WRIST RIGHT 3+ VIEWS  TELEMETRY MONITORING  SEQUENTIAL COMPRESSION DEVICE  PT EVAL AND TREAT  OT EVAL AND TREAT  ED TO FLOOR BED REQUEST  IP CONSULT TO ORTHOPAEDIC SURGERY  NPO DIET  ADMIT TO INPATIENT     Patient History   Past Medical History:   Diagnosis Date    COPD (chronic obstructive pulmonary disease) (CMS/[facility])     Disorder of the skin and subcutaneous tissue, unspecified 07/22/2016    Leg skin lesion, left    Personal history of other diseases of the respiratory system 02/06/2018    History of allergic rhinitis    Personal history of other specified conditions 07/22/2016    History of chest pain     Past Surgical History:   Procedure Laterality Date    OTHER SURGICAL HISTORY  09/25/2019    Cataract surgery     No family history on file.  Social History     Tobacco Use    Smoking status: Former     Types: Cigarettes    Smokeless tobacco: Not on file   Substance Use Topics    Alcohol use: Never    Drug use: Never       ED Triage Vitals   Temperature Heart Rate Respirations BP   01/26/24 1601 01/26/24 1601 01/26/24 1601 01/26/24 1601   36.8 °C (98.3 °F) 72 18 163/83      Pulse Ox Temp Source Heart Rate Source Patient Position   01/26/24 1601 01/26/24 2053 01/26/24 1601 01/26/24 1601   94 % Skin Monitor Sitting      BP Location FiO2 (%)     01/26/24 1601 01/26/24 2053     Right arm 36 %       Vitals:    01/26/24 1601 01/26/24 2053 01/27/24 0050   BP: 163/83 143/85 152/89   BP Location: Right arm Right arm    Patient Position: Sitting Sitting    Pulse: 72 72 82   Resp: 18 18  "16   Temp: 36.8 °C (98.3 °F) 36.4 °C (97.6 °F)    TempSrc:  Skin    SpO2: 94% 97% 98%   Weight: 54.4 kg (120 lb)     Height: 1.549 m (5' 1\")                 Sebastián Mcrae, AMARJIT-CNP  01/27/24 0132      This patient was seen by the FABIANA.  I have personally seen and examined the patient. I made / approved the management plan and take responsibility for patient management. I reviewed and edited the above documentation where necessary.     Patient presenting after mechanical fall.  She is hemodynamically stable however workup did identify bilateral fractures of the radius and ulna.  She does have a laceration on her left wrist that is concerning for possibility of open fracture.     Ortho and trauma were both consulted.  Patient will be admitted for further care.    Cara King DO  Emergency Medicine       Cara King DO  01/29/24 1100    "

## 2024-01-27 NOTE — OP NOTE
ORTHOPEDIC OPERATIVE NOTE    Name: Irish Davis  : 1945  Surgeon: Dirk Arnold DO  Facility: [facility]  Date of Surgery: 24     SURGEON:     Dirk Arnold DO  ASSISTANT:  SA Oc Greer  PREOPERATIVE DIAGNOSIS: Closed, 3 part intra-articular fracture, distal radius and ulnar fracture right, open 3 part intra-articular fracture, distal radius and ulnar fracture left  POSTOPERATIVE DIAGNOSIS: Same  PROCEDURE:   Open reduction internal fixation with volar plate bilateral with I and D left wrist  ANESTHESIA:    General and local  BLOOD LOSS: Minimal  IMPLANTS: Synthes variable angle locking distal radius plate (2) with associated screws and pegs    INDICATIONS: Patient is an 78 year odl female who had FOOSH injury tripping over her oxygen line.  She had an open left wrist fracture, given antibiotics, splinted in ED, admitted, and is now set for surgery the next morning.  I discussed the diagnosis and treatment options with the patient today along with their associated risks and benefits. After thorough discussion, the patient has elected to proceed with surgical intervention. The patient understands the risks of,including, but not limited to, bleeding, infection, loss of life or limb, pain, permanent numbness, tingling, weakness, loss of motion, failure of the goals of surgery or the potential need for additional surgery. The patient would like to accept these risks and proceed. All questions were answered to the patients satisfaction and the patient seems satisfied with the plan.      PROCEDURE: The patient was seen and consented preoperatively with the side and site of surgery appropriately marked. The patient was taken back to operative suite, placed supine on the operative table, and placed on monitor for the duration of the case. The patient was administered general anesthesia and monitored throughout the entire surgery by Department of Anesthesia. I injected 10 ml of lidocaine,  marcaine and tranexamic acid inline with my incision in both wrists.      The left upper extremity was sterilely prepped and draped in the sterile orthopedic fashion, elevated with an Esmarch, tourniquet inflated to 250 mmHg for duration of case. A time-out was performed confirming the site of surgery and surgery to be performed.    There was an ulnovolar laceration about 2 cm in length that communicated down to bone.  It was extended proximally and distally with a scalpel.  The area was debrided with a rongeur down to and including bone.  The area was copiously irrigated with 3 liters of saline.     There was a skin tear over the dorsal distal radius that did not communicate down to bone about 3 cm in length.    A longitudinal incision was made over the volar radial aspect of the wrist with a scalpel. Dissection was taken through the skin and subcutaneous tissue using electrocautery as necessary for hemostasis. The flexor carpi radialis tendon was identified and its sheath was released as far as possible with scalpel. The branch of the radial artery was protected.    An incision was made through the forearm fascia adjacent to the flexor carpi radialis along the radial aspect. Blunt dissection was taken through the muscles until the pronator quadratus was visualized. It was released sharply off the radius and again, hemostasis with electrocautery was performed.    The insertion of the brachioradialis was released with a scalpel to neutralize the displacement force on the distal radial fragment. Care was taken to avoid injury to any of the 1st compartment extensor tendons.    The 3 part intra-articular fracture was identified. The shaft of the radius was gently retracted away from the distal fragment to clear it of fracture hematoma.    Fracture reduction was then performed manually under direct visualization until satisfactory alignment was identified. This was supplemented with K-wires from radial styloid into the  ulnar side shaft.  Images taken showing adequate reduction.    The plate was applied to the volar aspect. C-arm was then used to assure proper alignments. Adjustments were made as necessary. First screw was placed in the oblong hole. The distal screws were then inserted using the guides in the usual manner, filling the distal row with pegs and a screw in the radial styloid.    The plate was then reduced along the shaft, further reducing the fracture and reproducing the volar tilt. The shaft of the plate was secured with two additional cortical screws in the usual manner. Once again, the procedure was performed with fluoroscopy to ensure satisfactory reduction. K-wire removed.  The wounds irrigated.  The pronator quadratus was placed back in its normal position and secured with 4-0 vicryl..    At the completion of the procedure, the wrist was placed through range of motion and noted to be essentially normal with good stability of the fracture.  DRUJ was stressed and found to be stable.  Final images showed the ulnar head well reduced, so I decided to not put hardware in for that fracture.      The tourniquet was then released. Adequate perfusion returned to the hand.  The wound was thoroughly irrigated with antibiotic solution. Hemostasis was acquired with pressure and electrocautery as necessary. The subcutaneous tissue was closed with 4-0 vicryl and the skin was closed with 3-0 nylon due to skin tears. Xeroform was placed over the incisions and skin tears.  A soft bulky dressing was applied along with a sugar tong splint in full supination    The right upper extremity was sterilely prepped and draped in the sterile orthopedic fashion, elevated with an Esmarch, tourniquet inflated to 250 mmHg for duration of case. A time-out was performed confirming the site of surgery and surgery to be performed.    A longitudinal incision was made over the volar radial aspect of the wrist. Dissection was taken through the skin and  subcutaneous tissue using electrocautery as necessary for hemostasis. The flexor carpi radialis tendon was identified and its sheath was released as far as possible. The branch of the radial artery was protected.    An incision was made through the forearm fascia adjacent to the flexor carpi radialis along the radial aspect. Blunt dissection was taken through the muscles until the pronator quadratus was visualized. It was released sharply off the radius and again, hemostasis with electrocautery was performed.    The insertion of the brachioradialis was released with a scalpel to neutralize the displacement force on the distal radial fragment. Care was taken to avoid injury to any of the 1st compartment extensor tendons.    The 3 part intra-articular fracture was identified. The shaft of the radius was gently retracted away from the distal fragment to clear it of fracture hematoma.    Fracture reduction was then performed manually under direct visualization until satisfactory alignment was identified. This was confirmed with mini c-arm.    The plate was applied to the volar aspect. C-arm was then used to assure proper alignments. Adjustments were made as necessary. First screw was placed in the oblong hole. The distal screws were then inserted using the guides in the usual manner, filling the distal row with pegs and a screw in the radial styloid.    The plate was then reduced along the shaft, further reducing the fracture and reproducing the volar tilt. The shaft of the plate was secured with two additional cortical screws in the usual manner. Once again, the procedure was performed with fluoroscopy to ensure satisfactory reduction. The wound irrigated.  The pronator quadratus was placed back in its normal position and secured with 4-0 vicryl..    At the completion of the procedure, the wrist was placed through range of motion and noted to be essentially normal with good stability of the fracture.  DRUJ was stressed  and found to be stable.     The tourniquet was then released. The wound was thoroughly irrigated with antibiotic solution. Hemostasis was acquired with pressure and electrocautery as necessary. The subcutaneous tissue was closed with 4-0 vicryl and the skin was closed with running 5-0 nylon. Xeroform was placed over the incision.  A soft bulky dressing was applied along with a volar splint.    The patient was taken to the recovery room in satisfactory condition.    Plan:  Patient will be discharged home tomorrow after 24 hours of antibiotics with instructions to be minimally weightbearing with that hand, no more than a coffee cup, about 1-2 pounds.  They will maintain their splint and follow up in office in 2 weeks for suture removal and wound check, where they will also meet with OT for custom brace fabrication and initiate exercises.     Electronically signed  Dirk Arnold DO

## 2024-01-27 NOTE — CARE PLAN
The patient's goals for the shift include to remain safe and not fall during shift    The clinical goals for the shift include to remain safe and not fall during shift    Over the shift, the patient did not make progress toward the following goals. Barriers to progression include . Recommendations to address these barriers include   Problem: Pain - Adult  Goal: Verbalizes/displays adequate comfort level or baseline comfort level  1/27/2024 0449 by Abhishek Calle RN  Outcome: Progressing  1/27/2024 0242 by Abhishek Calle RN  Outcome: Progressing     Problem: Safety - Adult  Goal: Free from fall injury  1/27/2024 0449 by Abhishek Calle RN  Outcome: Progressing  1/27/2024 0242 by Abhishek Calle RN  Outcome: Progressing     Problem: Discharge Planning  Goal: Discharge to home or other facility with appropriate resources  1/27/2024 0449 by Abhishek Calle RN  Outcome: Progressing  1/27/2024 0242 by Abhishek Calle RN  Outcome: Progressing     Problem: Chronic Conditions and Co-morbidities  Goal: Patient's chronic conditions and co-morbidity symptoms are monitored and maintained or improved  1/27/2024 0449 by Abhishek Calle RN  Outcome: Progressing  1/27/2024 0242 by Abhishek Calle RN  Outcome: Progressing     Problem: Skin  Goal: Decreased wound size/increased tissue granulation at next dressing change  1/27/2024 0449 by Abhishek Calle RN  Outcome: Progressing  1/27/2024 0242 by Abhishek Calle RN  Outcome: Progressing  Goal: Participates in plan/prevention/treatment measures  1/27/2024 0449 by Abhishek Calle RN  Outcome: Progressing  1/27/2024 0242 by Abhishek Calle RN  Outcome: Progressing  Goal: Prevent/manage excess moisture  1/27/2024 0449 by Abhishek Calle RN  Outcome: Progressing  1/27/2024 0242 by Abhishek Calle RN  Outcome: Progressing  Goal: Prevent/minimize sheer/friction injuries  1/27/2024 0449 by Abhishek Calle RN  Outcome: Progressing  1/27/2024 0449 by Abhishek Calle RN  Flowsheets (Taken 1/27/2024  0449)  Prevent/minimize sheer/friction injuries: Use pull sheet  1/27/2024 0242 by Abhishek Calle RN  Outcome: Progressing  Goal: Promote/optimize nutrition  1/27/2024 0449 by Abhishek Calle RN  Outcome: Progressing  1/27/2024 0242 by Abhishek Calle RN  Outcome: Progressing  Goal: Promote skin healing  1/27/2024 0449 by Abhishek Calle RN  Outcome: Progressing  1/27/2024 0242 by Abhishek Calle RN  Outcome: Progressing     Problem: Fall/Injury  Goal: Not fall by end of shift  1/27/2024 0449 by Abhishek Calle RN  Outcome: Progressing  1/27/2024 0242 by Abhishek Calle RN  Outcome: Progressing  Goal: Be free from injury by end of the shift  1/27/2024 0449 by Abhishek Calle RN  Outcome: Progressing  1/27/2024 0242 by Abhishek Calle RN  Outcome: Progressing  Goal: Verbalize understanding of personal risk factors for fall in the hospital  1/27/2024 0449 by Abhishek Calle RN  Outcome: Progressing  1/27/2024 0242 by Abhishek Calle RN  Outcome: Progressing  Goal: Verbalize understanding of risk factor reduction measures to prevent injury from fall in the home  1/27/2024 0449 by Abhishek Calle RN  Outcome: Progressing  1/27/2024 0242 by Abhishek Calle RN  Outcome: Progressing  Goal: Use assistive devices by end of the shift  1/27/2024 0449 by Abhishek Calle RN  Outcome: Progressing  1/27/2024 0242 by Abhishek Calle RN  Outcome: Progressing  Goal: Pace activities to prevent fatigue by end of the shift  1/27/2024 0449 by Abhishek Calle RN  Outcome: Progressing  1/27/2024 0242 by Abhishek Calle RN  Outcome: Progressing     Problem: Pain  Goal: Takes deep breaths with improved pain control throughout the shift  1/27/2024 0449 by Abhishek Calle RN  Outcome: Progressing  1/27/2024 0242 by Abhishek Calle RN  Outcome: Progressing  Goal: Turns in bed with improved pain control throughout the shift  1/27/2024 0449 by Abhishek Calle RN  Outcome: Progressing  1/27/2024 0242 by Abhishek Salser, RN  Outcome: Progressing  Goal: Walks with  improved pain control throughout the shift  1/27/2024 0449 by Abhishek Calle RN  Outcome: Progressing  1/27/2024 0242 by Abhishek Calle RN  Outcome: Progressing  Goal: Performs ADL's with improved pain control throughout shift  1/27/2024 0449 by Abhishek Calle RN  Outcome: Progressing  1/27/2024 0242 by Abhishek Calle RN  Outcome: Progressing  Goal: Participates in PT with improved pain control throughout the shift  1/27/2024 0449 by Abhishek Calle RN  Outcome: Progressing  1/27/2024 0242 by Abhishek Calle RN  Outcome: Progressing  Goal: Free from opioid side effects throughout the shift  1/27/2024 0449 by Abhishek Calle RN  Outcome: Progressing  1/27/2024 0242 by Abhishek Calle RN  Outcome: Progressing  Goal: Free from acute confusion related to pain meds throughout the shift  1/27/2024 0449 by Abhishek Calle RN  Outcome: Progressing  1/27/2024 0242 by Abhishek Calle RN  Outcome: Progressing   .

## 2024-01-27 NOTE — PROGRESS NOTES
Physical Therapy                 Therapy Communication Note    Patient Name: Irish Davis  MRN: [id number]  Today's Date: 1/27/2024     Discipline: Physical Therapy    Missed Visit Reason: Missed Visit Reason: Other (Comment) (patient is having surgery today. Will complete PT evaluation post-op.)    Missed Time: Attempt    Comment: Discussed holding PT eval with nursing until patient is post-op. Surgery is scheduled for today. Will re-attempt when able.

## 2024-01-27 NOTE — PROGRESS NOTES
Occupational Therapy                 Therapy Communication Note    Patient Name: Irish Davis  MRN: [id number]  Today's Date: 1/27/2024     Discipline: Occupational Therapy    Missed Visit Reason:  Pt was very surgery this morning.  She has bilateral wrist fractures from a fall at home.  Per MIC Mckeon wait until after surgery.    Missed Time: Attempt    Comment:

## 2024-01-27 NOTE — H&P
GENERAL SURGERY HISTORY AND PHYSICAL    Irish Davis   1945   [id number]     History Of Present Illness  Irish Davis is a 78 y.o. female presenting following a fall. She tripped over her oxygen tubing yesterday and fell onto her wrists, L knee and face. She was unable to get up on her own and her  assisted. She denies LOC. She only complains of pain her her bilateral wrists, R>L.    She is on 4L chronically. She lives with her  and adamantly refuses consideration for SNF following discharge.    Notified 1/26/24 8633, evaluated 1/27/24 8731     Past Medical History  She has a past medical history of COPD (chronic obstructive pulmonary disease) (CMS/[facility]), Disorder of the skin and subcutaneous tissue, unspecified (07/22/2016), Personal history of other diseases of the respiratory system (02/06/2018), and Personal history of other specified conditions (07/22/2016).    Surgical History  She has a past surgical history that includes Other surgical history (09/25/2019).    Medications  No current facility-administered medications on file prior to encounter.     Current Outpatient Medications on File Prior to Encounter   Medication Sig Dispense Refill    Atrovent HFA 17 mcg/actuation inhaler USE 2 INHALATIONS FOUR TIMES A DAY 51.6 g 0    budesonide-formoteroL (Symbicort) 160-4.5 mcg/actuation inhaler Inhale 2 puffs 2 times a day. Rinse mouth with water after use to reduce aftertaste and incidence of candidiasis. Do not swallow.      hydrOXYzine HCL (Atarax) 25 mg tablet Take 0.5 tablets (12.5 mg) by mouth every 8 hours if needed for anxiety. 270 tablet 1    metoprolol tartrate (Lopressor) 25 mg tablet TAKE ONE AND ONE-HALF TABLETS TWICE A  tablet 0    predniSONE (Deltasone) 10 mg tablet Take 1 tablet (10 mg) by mouth once daily. 90 tablet 3       Allergies  Erythromycin     Social History  She reports that she has quit smoking. Her smoking use included cigarettes. She does not have any  "smokeless tobacco history on file. She reports that she does not drink alcohol and does not use drugs.    Family History  No family history on file.     Review of Systems   Constitutional:  Negative for chills and fever.   Respiratory:  Negative for cough and shortness of breath.    Cardiovascular:  Negative for chest pain and palpitations.   Gastrointestinal:  Negative for abdominal pain, blood in stool, constipation, diarrhea, nausea and vomiting.   Musculoskeletal:         Pain in bilateral wrists   Neurological:  Negative for dizziness, syncope, weakness, numbness and headaches.   All other systems reviewed and are negative.      Last Recorded Vitals  Blood pressure (!) 164/92, pulse 76, temperature 36.3 °C (97.3 °F), temperature source Temporal, resp. rate 14, height 1.549 m (5' 1\"), weight 54.4 kg (120 lb), SpO2 98 %.     Physical Exam  Constitutional:       General: She is not in acute distress.     Appearance: Normal appearance. She is not ill-appearing.   HENT:      Head: Normocephalic.      Comments: L frontal cephalohematoms     Right Ear: External ear normal.      Left Ear: External ear normal.      Nose: Nose normal.   Cardiovascular:      Rate and Rhythm: Normal rate and regular rhythm.   Pulmonary:      Effort: Pulmonary effort is normal. No respiratory distress.      Breath sounds: Normal breath sounds.   Abdominal:      General: There is no distension.      Palpations: Abdomen is soft.      Tenderness: There is no abdominal tenderness. There is no guarding.   Musculoskeletal:         General: No swelling.      Cervical back: Normal range of motion. No tenderness.      Comments: BL forearms splinted down to hands. No neurologic deficits in fingertips   Skin:     General: Skin is warm and dry.   Neurological:      General: No focal deficit present.      Mental Status: She is alert and oriented to person, place, and time. Mental status is at baseline.      Cranial Nerves: No cranial nerve deficit.      " Sensory: No sensory deficit.      Motor: No weakness.   Psychiatric:         Mood and Affect: Mood normal.         Behavior: Behavior normal.          Relevant Results  Results for orders placed or performed during the hospital encounter of 01/26/24 (from the past 24 hour(s))   CBC and Auto Differential   Result Value Ref Range    WBC 10.6 4.4 - 11.3 x10*3/uL    nRBC 0.0 0.0 - 0.0 /100 WBCs    RBC 3.64 (L) 4.00 - 5.20 x10*6/uL    Hemoglobin 10.6 (L) 12.0 - 16.0 g/dL    Hematocrit 34.5 (L) 36.0 - 46.0 %    MCV 95 80 - 100 fL    MCH 29.1 26.0 - 34.0 pg    MCHC 30.7 (L) 32.0 - 36.0 g/dL    RDW 14.1 11.5 - 14.5 %    Platelets 246 150 - 450 x10*3/uL    Neutrophils % 79.4 40.0 - 80.0 %    Immature Granulocytes %, Automated 0.5 0.0 - 0.9 %    Lymphocytes % 10.8 13.0 - 44.0 %    Monocytes % 8.6 2.0 - 10.0 %    Eosinophils % 0.3 0.0 - 6.0 %    Basophils % 0.4 0.0 - 2.0 %    Neutrophils Absolute 8.41 (H) 1.60 - 5.50 x10*3/uL    Immature Granulocytes Absolute, Automated 0.05 0.00 - 0.50 x10*3/uL    Lymphocytes Absolute 1.14 0.80 - 3.00 x10*3/uL    Monocytes Absolute 0.91 (H) 0.05 - 0.80 x10*3/uL    Eosinophils Absolute 0.03 0.00 - 0.40 x10*3/uL    Basophils Absolute 0.04 0.00 - 0.10 x10*3/uL   Comprehensive metabolic panel   Result Value Ref Range    Glucose 77 74 - 99 mg/dL    Sodium 142 136 - 145 mmol/L    Potassium 3.4 (L) 3.5 - 5.3 mmol/L    Chloride 99 98 - 107 mmol/L    Bicarbonate 37 (H) 21 - 32 mmol/L    Anion Gap 9 (L) 10 - 20 mmol/L    Urea Nitrogen 19 6 - 23 mg/dL    Creatinine 0.57 0.50 - 1.05 mg/dL    eGFR >90 >60 mL/min/1.73m*2    Calcium 9.1 8.6 - 10.3 mg/dL    Albumin 3.7 3.4 - 5.0 g/dL    Alkaline Phosphatase 69 33 - 136 U/L    Total Protein 6.2 (L) 6.4 - 8.2 g/dL    AST 20 9 - 39 U/L    Bilirubin, Total 0.5 0.0 - 1.2 mg/dL    ALT 30 7 - 45 U/L   Protime-INR   Result Value Ref Range    Protime 12.5 9.8 - 12.8 seconds    INR 1.1 0.9 - 1.1   APTT   Result Value Ref Range    aPTT 35 27 - 38 seconds   Sars-CoV-2  and Influenza A/B PCR   Result Value Ref Range    Flu A Result Not Detected Not Detected    Flu B Result Not Detected Not Detected    Coronavirus 2019, PCR Not Detected Not Detected   Lavender Top   Result Value Ref Range    Extra Tube Hold for add-ons.    CBC   Result Value Ref Range    WBC 11.5 (H) 4.4 - 11.3 x10*3/uL    nRBC 0.0 0.0 - 0.0 /100 WBCs    RBC 3.53 (L) 4.00 - 5.20 x10*6/uL    Hemoglobin 9.9 (L) 12.0 - 16.0 g/dL    Hematocrit 33.1 (L) 36.0 - 46.0 %    MCV 94 80 - 100 fL    MCH 28.0 26.0 - 34.0 pg    MCHC 29.9 (L) 32.0 - 36.0 g/dL    RDW 14.2 11.5 - 14.5 %    Platelets 229 150 - 450 x10*3/uL   Renal Function Panel   Result Value Ref Range    Glucose 82 74 - 99 mg/dL    Sodium 141 136 - 145 mmol/L    Potassium 3.1 (L) 3.5 - 5.3 mmol/L    Chloride 99 98 - 107 mmol/L    Bicarbonate 34 (H) 21 - 32 mmol/L    Anion Gap 11 10 - 20 mmol/L    Urea Nitrogen 15 6 - 23 mg/dL    Creatinine 0.53 0.50 - 1.05 mg/dL    eGFR >90 >60 mL/min/1.73m*2    Calcium 8.2 (L) 8.6 - 10.3 mg/dL    Phosphorus 2.6 2.5 - 4.9 mg/dL    Albumin 3.4 3.4 - 5.0 g/dL   Magnesium   Result Value Ref Range    Magnesium 1.64 1.60 - 2.40 mg/dL       XR hand right 3+ views  Dorsally angulated and mildly displaced distal radius fracture. Ulnar positive variance. Minimal displaced ulnar styloid fracture.   MACRO: None   Signed by: Sebastián Schumacher 1/26/2024 9:58 PM Dictation workstation:   [id number]    XR wrist left 3+ views  Complex distal radius and ulnar fracture with impaction, dorsal angulation and displacement   No definite hand fracture. Demineralization does limit sensitivity.   MACRO: None   Signed by: Sebastián Schumacher 1/26/2024 9:56 PM Dictation workstation:   [id number]    CT head wo IV contrast  Global volume loss and chronic small vessel ischemic change. No evidence of acute intracranial hemorrhage.   Loss of normal cervical lordosis. Demineralization. Increased dens distance between the anterior arch of C1 measuring 3 mm. I do not  see a definite acute fracture. I suspect that this may be due to old injury as there is suggestion of some healing or healed fracture. If there is focal pain in the region, further evaluation is advised with MRI to evaluate for marrow edema.   Robust vascular calcification   Signed by: Sebastián Schumacher 1/26/2024 9:54 PM Dictation workstation:   [id number]    Assessment and Plan  Principal Problem:    Closed fracture of right radius and ulna, initial encounter    78 y.o. female following mechanical fall with bilateral fractures (radius and ulna bilaterally), L frontal cephalohematoma. No additional imaging recommended at this time. Will likely go to OR with Orthopedics today. IMS consult.    Isa Solorzano MD, FACS  General Surgery

## 2024-01-27 NOTE — CONSULTS
Reason For Consult  Bilateral wrist fracture, open left    History Of Present Illness  Irish Davis is a 78 y.o. female presenting with bilateral wrist fractures after fall.  The patient reports she tripped over her oxygen hose and landed on her outstretched hands. The DOI is 1/26, 1 day ago. Pain is controlled. Patient reports no numbness and tingling.  Reports no previous surgeries, injections, or trauma to the area.  Reports pain worse with use, better at rest in splint now.  Pain dull ache, right worse than left. Splint has been worn as directed.  RHD.  Per ED, left wrist had dorsal and volar lacerations suspicious for open fracture.  Interestingly enough, right wrist is more painful than left.     Past Medical History  She has a past medical history of COPD (chronic obstructive pulmonary disease) (CMS/[facility]), Disorder of the skin and subcutaneous tissue, unspecified (07/22/2016), Personal history of other diseases of the respiratory system (02/06/2018), and Personal history of other specified conditions (07/22/2016).    Surgical History  She has a past surgical history that includes Other surgical history (09/25/2019).     Social History  She reports that she has quit smoking. Her smoking use included cigarettes. She does not have any smokeless tobacco history on file. She reports that she does not drink alcohol and does not use drugs.    Family History  No family history on file.     Allergies  Erythromycin    Review of Systems    Constitutional: see HPI, no fever, no chills, not feeling tired, no significant weight gain or weight loss.   Eyes: No vision changes  ENT: no nosebleeds.   Cardiovascular: no chest pain.   Respiratory: no shortness of breath currently and no cough.   Gastrointestinal: no abdominal pain, no nausea, no vomiting and no diarrhea.   Musculoskeletal: per HPI  Neurological: no headache, no gait disturbances  Psychiatric: no depression and no sleep disturbances.   Endocrine: no muscle  "weakness and no muscle cramps.   Hematologic/Lymphatic: no swollen glands and no tendency for easy bruising or excessive swelling.     Patient's past medical history, past surgical history, allergies, and medications have been reviewed unless otherwise noted in the chart.      Physical Exam  RUE  SILT  Wiggles fingers  Warm fingers, BCR  TTP distal radius  ROM limited due to pain  Strtenght limited due to pain  NTTP elbow or shoulder  Mild edema and ecchymosis  No lacerations  FROM elbow and shoulder    LUE  SILT  Wiggles fingers  Warm fingers, BCR  TTP distal radius  Mild edema and ecchymosis  Dorsal and volar wrist lacerations about 2 cm in length at fracture site  ROM limited due to pain  Strtenght limited due to pain  NTTP elbow or shoulder  FROM elbow and shoulder       Last Recorded Vitals  Blood pressure 145/80, pulse 93, temperature 36.7 °C (98 °F), temperature source Temporal, resp. rate 16, height 1.549 m (5' 1\"), weight 54.4 kg (120 lb), SpO2 97 %.    Relevant Results      Scheduled medications  acetaminophen, 650 mg, oral, q6h  ceFAZolin, 2 g, intravenous, q8h  fluticasone furoate-vilanteroL, 1 puff, inhalation, Daily  heparin (porcine), 5,000 Units, subcutaneous, q8h  metoprolol tartrate, 37.5 mg, oral, BID  polyethylene glycol, 17 g, oral, Daily  potassium chloride, 20 mEq, intravenous, q2h  tiotropium, 2 Inhalation, inhalation, Daily      Continuous medications  sodium chloride 0.9%, 125 mL/hr, Last Rate: 125 mL/hr (01/27/24 0617)      PRN medications  PRN medications: albuterol, hydrOXYzine HCL, morphine, ondansetron **OR** ondansetron, oxyCODONE, oxyCODONE  Results for orders placed or performed during the hospital encounter of 01/26/24 (from the past 24 hour(s))   CBC and Auto Differential   Result Value Ref Range    WBC 10.6 4.4 - 11.3 x10*3/uL    nRBC 0.0 0.0 - 0.0 /100 WBCs    RBC 3.64 (L) 4.00 - 5.20 x10*6/uL    Hemoglobin 10.6 (L) 12.0 - 16.0 g/dL    Hematocrit 34.5 (L) 36.0 - 46.0 %    MCV 95 " 80 - 100 fL    MCH 29.1 26.0 - 34.0 pg    MCHC 30.7 (L) 32.0 - 36.0 g/dL    RDW 14.1 11.5 - 14.5 %    Platelets 246 150 - 450 x10*3/uL    Neutrophils % 79.4 40.0 - 80.0 %    Immature Granulocytes %, Automated 0.5 0.0 - 0.9 %    Lymphocytes % 10.8 13.0 - 44.0 %    Monocytes % 8.6 2.0 - 10.0 %    Eosinophils % 0.3 0.0 - 6.0 %    Basophils % 0.4 0.0 - 2.0 %    Neutrophils Absolute 8.41 (H) 1.60 - 5.50 x10*3/uL    Immature Granulocytes Absolute, Automated 0.05 0.00 - 0.50 x10*3/uL    Lymphocytes Absolute 1.14 0.80 - 3.00 x10*3/uL    Monocytes Absolute 0.91 (H) 0.05 - 0.80 x10*3/uL    Eosinophils Absolute 0.03 0.00 - 0.40 x10*3/uL    Basophils Absolute 0.04 0.00 - 0.10 x10*3/uL   Comprehensive metabolic panel   Result Value Ref Range    Glucose 77 74 - 99 mg/dL    Sodium 142 136 - 145 mmol/L    Potassium 3.4 (L) 3.5 - 5.3 mmol/L    Chloride 99 98 - 107 mmol/L    Bicarbonate 37 (H) 21 - 32 mmol/L    Anion Gap 9 (L) 10 - 20 mmol/L    Urea Nitrogen 19 6 - 23 mg/dL    Creatinine 0.57 0.50 - 1.05 mg/dL    eGFR >90 >60 mL/min/1.73m*2    Calcium 9.1 8.6 - 10.3 mg/dL    Albumin 3.7 3.4 - 5.0 g/dL    Alkaline Phosphatase 69 33 - 136 U/L    Total Protein 6.2 (L) 6.4 - 8.2 g/dL    AST 20 9 - 39 U/L    Bilirubin, Total 0.5 0.0 - 1.2 mg/dL    ALT 30 7 - 45 U/L   Protime-INR   Result Value Ref Range    Protime 12.5 9.8 - 12.8 seconds    INR 1.1 0.9 - 1.1   APTT   Result Value Ref Range    aPTT 35 27 - 38 seconds   Sars-CoV-2 and Influenza A/B PCR   Result Value Ref Range    Flu A Result Not Detected Not Detected    Flu B Result Not Detected Not Detected    Coronavirus 2019, PCR Not Detected Not Detected   Lavender Top   Result Value Ref Range    Extra Tube Hold for add-ons.    CBC   Result Value Ref Range    WBC 11.5 (H) 4.4 - 11.3 x10*3/uL    nRBC 0.0 0.0 - 0.0 /100 WBCs    RBC 3.53 (L) 4.00 - 5.20 x10*6/uL    Hemoglobin 9.9 (L) 12.0 - 16.0 g/dL    Hematocrit 33.1 (L) 36.0 - 46.0 %    MCV 94 80 - 100 fL    MCH 28.0 26.0 - 34.0 pg     MCHC 29.9 (L) 32.0 - 36.0 g/dL    RDW 14.2 11.5 - 14.5 %    Platelets 229 150 - 450 x10*3/uL   Renal Function Panel   Result Value Ref Range    Glucose 82 74 - 99 mg/dL    Sodium 141 136 - 145 mmol/L    Potassium 3.1 (L) 3.5 - 5.3 mmol/L    Chloride 99 98 - 107 mmol/L    Bicarbonate 34 (H) 21 - 32 mmol/L    Anion Gap 11 10 - 20 mmol/L    Urea Nitrogen 15 6 - 23 mg/dL    Creatinine 0.53 0.50 - 1.05 mg/dL    eGFR >90 >60 mL/min/1.73m*2    Calcium 8.2 (L) 8.6 - 10.3 mg/dL    Phosphorus 2.6 2.5 - 4.9 mg/dL    Albumin 3.4 3.4 - 5.0 g/dL   Magnesium   Result Value Ref Range    Magnesium 1.64 1.60 - 2.40 mg/dL       XR - bilateral distal radius fractures, right dorsally angulated 45 degrees and displaced 3 mm, left with distal ulna fracture, dorsally angulated 45 degrees and displaced 5 mm     Assessment/Plan   Bilateral distal radius fracture, open left  Surgery discussion: I discussed the diagnosis and treatment options with the patient today along with their associated risks and benefits. After thorough discussion, the patient has elected to proceed with surgical intervention. The patient understands the risks of,including, but not limited to, bleeding, infection, loss of life or limb, pain, permanent numbness, tingling, weakness, loss of motion, failure of the goals of surgery or the potential need for additional surgery. The patient would like to accept these risks and proceed. All questions were answered to the patients satisfaction and the patient seems satisfied with the plan.    Plan bilateral wrist ORIF with left wrist I and D - given her functional demand including caring for her , open left wrist fracture, and desire to return home, we should pursue surgical intervention  FU 2 weeks after for suture removal and wound check - XR    Bryant Arnold DO

## 2024-01-27 NOTE — DISCHARGE INSTRUCTIONS
[facility]  [phone]   Fax: [phone] [address]    [address] [facility]  [address] [place] [id number]    Upper Extremity   Post-Operatively (After Surgery) - Distal Radius Fracture    1. Post-Operative Course    Following surgery, your surgeon will see your family in the family call room. Once stable, and in the Post Anesthesia Care Unit (PACU), you will be transported to your hospital room.  Once cleared you may return home or go to a rehab facility.     2. Dressing    You have a splint (hard casting material), do NOT remove the dressing until follow up in 2 weeks. DO NOT GET DRESSING WET! Once at home, if your dressing, splint, or cast feels too tight or mistakenly gets wet, please contact the office. Further dressing instructions may be given at surgery.     Your sutures are visible (black strings), they will be removed about 10-14 days after surgery.  You should have an appointment to see your physician 10-14 after surgery.    3. Activity     Most people underestimate the length of time required to fully recover from surgery. If you had a block (where your arm feels numb), the sensation typically returns around 18-24 hours later.  It is recommended you take some pain medication the evening following your surgery so when the block wears off (in the middle of the night), you are not in severe pain.   With pain medication, start at the lowest dose that controls your pain.       After the first 1-3 days, we encourage you to balance your activity between ambulation, sitting in a chair,   and resting in bed with your arms elevated. Let swelling and pain be your guide to activity, you should   avoid prolonged (over 20 minutes) standing or sitting. In general, limit your activities to home for the first   1-3 days.  Do not lift more than a coffee cup in either hand.      4. Pain    You will be discharged to home with a prescription for  oral pain medication. A most important factor in pain control is rest and elevation. Ice may also be applied to the operative site for 30 minute intervals. Please use a waterproof bag for ice or cold packs.  Again, as you feel the numbness resolving and some onset of discomfort, please take pain medication, don't wait till full resolution of block.   Try to use the lowest dose of pain medication that controls your pain.      The pain medication may cause constipation. Drink plenty of fluids, eat fruits and vegetables. You may use an over the counter laxative or stool softener if necessary. Take pain medication with some food in your stomach, as prescribed by your pharmacist.     5. Elevation   Elevation of the operative extremity is critical. Elevation reduces swelling and minimizes pain. Less swelling is associated with a lower infectious rate, fewer wound complications, less post-operative stiffness, and more rapid recovery of function. To keep the swelling down, your hand must be kept above the level of your heart.

## 2024-01-27 NOTE — ANESTHESIA PROCEDURE NOTES
Airway  Date/Time: 1/27/2024 12:20 PM  Urgency: emergent    Airway not difficult    Staffing  Performed: attending   Authorized by: KVNG Michelle    Performed by: KVNG Michelle  Patient location during procedure: OR    Consent for Airway (if performed for an anesthetic, see related documentation for consents)  Patient identity confirmed: verbally with patient and arm band  Consent: The procedure was performed in an emergent situation. Verbal consent obtained.  Consent given by: patient      Indications and Patient Condition  Indications for airway management: anesthesia  Spontaneous ventilation: present  Sedation level: deep  Preoxygenated: yes  Patient position: sniffing  MILS maintained throughout  Mask difficulty assessment: 1 - vent by mask  Planned trial extubation    Final Airway Details  Final airway type: endotracheal airway      Successful airway: ETT  Cuffed: yes   Successful intubation technique: direct laryngoscopy  Endotracheal tube insertion site: oral  Blade: Donna  Blade size: #3  ETT size (mm): 6.5  Cormack-Lehane Classification: grade I - full view of glottis  Placement verified by: chest auscultation and capnometry   Measured from: gums  ETT to gums (cm): 22  Number of attempts at approach: 1  Ventilation between attempts: BVM  Number of other approaches attempted: 0

## 2024-01-27 NOTE — ANESTHESIA POSTPROCEDURE EVALUATION
Patient: Irish Davis    Procedure Summary       Date: 01/27/24 Room / Location: [facility] OR 07 / Virtual [facility] OR    Anesthesia Start: 1214 Anesthesia Stop: 1506    Procedure: Open Reduction Internal Fixation Radius BILATERAL WRISTS (Bilateral: Wrist) Diagnosis:       Closed fracture of right radius and ulna, initial encounter      Type I or II open fracture of left radius and ulna, initial encounter      (Closed fracture of right radius and ulna, initial encounter [S52.91XA, S52.201A])      (Type I or II open fracture of left radius and ulna, initial encounter [S52.92XB, S52.202B])    Surgeons: Bryant Arnold DO Responsible Provider: KVNG Michelle    Anesthesia Type: general ASA Status: 3            Anesthesia Type: general    Vitals Value Taken Time   /112 01/27/24 1503   Temp 36.4 °C (97.5 °F) 01/27/24 1425   Pulse 68 01/27/24 1501   Resp 21 01/27/24 1505   SpO2 100 % 01/27/24 1505   Vitals shown include unvalidated device data.    Anesthesia Post Evaluation    Patient location during evaluation: PACU  Patient participation: complete - patient participated  Level of consciousness: sleepy but conscious  Pain score: 2  Pain management: adequate  Multimodal analgesia pain management approach  Airway patency: patent  Two or more strategies used to mitigate risk of obstructive sleep apnea  Cardiovascular status: acceptable  Respiratory status: acceptable and nasal cannula  Hydration status: acceptable  Postoperative Nausea and Vomiting: none        No notable events documented.

## 2024-01-28 ENCOUNTER — APPOINTMENT (OUTPATIENT)
Dept: CARDIOLOGY | Facility: HOSPITAL | Age: 79
DRG: 510 | End: 2024-01-28
Payer: COMMERCIAL

## 2024-01-28 ENCOUNTER — APPOINTMENT (OUTPATIENT)
Dept: RADIOLOGY | Facility: HOSPITAL | Age: 79
DRG: 510 | End: 2024-01-28
Payer: COMMERCIAL

## 2024-01-28 LAB
ALBUMIN SERPL BCP-MCNC: 3 G/DL (ref 3.4–5)
ANION GAP SERPL CALC-SCNC: 11 MMOL/L (ref 10–20)
ANION GAP SERPL CALC-SCNC: 12 MMOL/L (ref 10–20)
BASE EXCESS BLDV CALC-SCNC: 7.4 MMOL/L (ref -2–3)
BASOPHILS # BLD AUTO: 0.04 X10*3/UL (ref 0–0.1)
BASOPHILS NFR BLD AUTO: 0.2 %
BNP SERPL-MCNC: 956 PG/ML (ref 0–99)
BODY TEMPERATURE: 37 DEGREES CELSIUS
BUN SERPL-MCNC: 13 MG/DL (ref 6–23)
BUN SERPL-MCNC: 13 MG/DL (ref 6–23)
CALCIUM SERPL-MCNC: 7.9 MG/DL (ref 8.6–10.3)
CALCIUM SERPL-MCNC: 8.3 MG/DL (ref 8.6–10.3)
CHLORIDE SERPL-SCNC: 100 MMOL/L (ref 98–107)
CHLORIDE SERPL-SCNC: 100 MMOL/L (ref 98–107)
CO2 SERPL-SCNC: 31 MMOL/L (ref 21–32)
CO2 SERPL-SCNC: 32 MMOL/L (ref 21–32)
CREAT SERPL-MCNC: 0.69 MG/DL (ref 0.5–1.05)
CREAT SERPL-MCNC: 0.73 MG/DL (ref 0.5–1.05)
EGFRCR SERPLBLD CKD-EPI 2021: 83 ML/MIN/1.73M*2
EGFRCR SERPLBLD CKD-EPI 2021: 88 ML/MIN/1.73M*2
EOSINOPHIL # BLD AUTO: 0 X10*3/UL (ref 0–0.4)
EOSINOPHIL NFR BLD AUTO: 0 %
ERYTHROCYTE [DISTWIDTH] IN BLOOD BY AUTOMATED COUNT: 14.3 % (ref 11.5–14.5)
ERYTHROCYTE [DISTWIDTH] IN BLOOD BY AUTOMATED COUNT: 14.4 % (ref 11.5–14.5)
FLOW: 6 LPM
GLUCOSE SERPL-MCNC: 93 MG/DL (ref 74–99)
GLUCOSE SERPL-MCNC: 95 MG/DL (ref 74–99)
HCO3 BLDV-SCNC: 35 MMOL/L (ref 22–26)
HCT VFR BLD AUTO: 32.5 % (ref 36–46)
HCT VFR BLD AUTO: 34.3 % (ref 36–46)
HGB BLD-MCNC: 10.1 G/DL (ref 12–16)
HGB BLD-MCNC: 9.9 G/DL (ref 12–16)
IMM GRANULOCYTES # BLD AUTO: 0.09 X10*3/UL (ref 0–0.5)
IMM GRANULOCYTES NFR BLD AUTO: 0.5 % (ref 0–0.9)
INHALED O2 CONCENTRATION: 96 %
LYMPHOCYTES # BLD AUTO: 1.68 X10*3/UL (ref 0.8–3)
LYMPHOCYTES NFR BLD AUTO: 10 %
MAGNESIUM SERPL-MCNC: 1.52 MG/DL (ref 1.6–2.4)
MCH RBC QN AUTO: 28 PG (ref 26–34)
MCH RBC QN AUTO: 28.9 PG (ref 26–34)
MCHC RBC AUTO-ENTMCNC: 29.4 G/DL (ref 32–36)
MCHC RBC AUTO-ENTMCNC: 30.5 G/DL (ref 32–36)
MCV RBC AUTO: 95 FL (ref 80–100)
MCV RBC AUTO: 95 FL (ref 80–100)
MONOCYTES # BLD AUTO: 1.62 X10*3/UL (ref 0.05–0.8)
MONOCYTES NFR BLD AUTO: 9.6 %
NEUTROPHILS # BLD AUTO: 13.37 X10*3/UL (ref 1.6–5.5)
NEUTROPHILS NFR BLD AUTO: 79.7 %
NRBC BLD-RTO: 0 /100 WBCS (ref 0–0)
NRBC BLD-RTO: 0 /100 WBCS (ref 0–0)
OXYHGB MFR BLDV: 94.6 % (ref 45–75)
PCO2 BLDV: 62 MM HG (ref 41–51)
PH BLDV: 7.36 PH (ref 7.33–7.43)
PHOSPHATE SERPL-MCNC: 5.8 MG/DL (ref 2.5–4.9)
PLATELET # BLD AUTO: 242 X10*3/UL (ref 150–450)
PLATELET # BLD AUTO: 268 X10*3/UL (ref 150–450)
PO2 BLDV: 81 MM HG (ref 35–45)
POTASSIUM SERPL-SCNC: 3.9 MMOL/L (ref 3.5–5.3)
POTASSIUM SERPL-SCNC: 4.2 MMOL/L (ref 3.5–5.3)
RBC # BLD AUTO: 3.42 X10*6/UL (ref 4–5.2)
RBC # BLD AUTO: 3.61 X10*6/UL (ref 4–5.2)
SAO2 % BLDV: 96 % (ref 45–75)
SODIUM SERPL-SCNC: 139 MMOL/L (ref 136–145)
SODIUM SERPL-SCNC: 139 MMOL/L (ref 136–145)
WBC # BLD AUTO: 14.4 X10*3/UL (ref 4.4–11.3)
WBC # BLD AUTO: 16.8 X10*3/UL (ref 4.4–11.3)

## 2024-01-28 PROCEDURE — 83880 ASSAY OF NATRIURETIC PEPTIDE: CPT | Performed by: INTERNAL MEDICINE

## 2024-01-28 PROCEDURE — 82805 BLOOD GASES W/O2 SATURATION: CPT | Performed by: INTERNAL MEDICINE

## 2024-01-28 PROCEDURE — 36415 COLL VENOUS BLD VENIPUNCTURE: CPT | Performed by: INTERNAL MEDICINE

## 2024-01-28 PROCEDURE — 80069 RENAL FUNCTION PANEL: CPT | Performed by: STUDENT IN AN ORGANIZED HEALTH CARE EDUCATION/TRAINING PROGRAM

## 2024-01-28 PROCEDURE — 2500000002 HC RX 250 W HCPCS SELF ADMINISTERED DRUGS (ALT 637 FOR MEDICARE OP, ALT 636 FOR OP/ED): Performed by: INTERNAL MEDICINE

## 2024-01-28 PROCEDURE — 80048 BASIC METABOLIC PNL TOTAL CA: CPT | Mod: CCI | Performed by: INTERNAL MEDICINE

## 2024-01-28 PROCEDURE — 71045 X-RAY EXAM CHEST 1 VIEW: CPT | Performed by: RADIOLOGY

## 2024-01-28 PROCEDURE — 94640 AIRWAY INHALATION TREATMENT: CPT

## 2024-01-28 PROCEDURE — 83735 ASSAY OF MAGNESIUM: CPT | Performed by: INTERNAL MEDICINE

## 2024-01-28 PROCEDURE — 94760 N-INVAS EAR/PLS OXIMETRY 1: CPT

## 2024-01-28 PROCEDURE — 2500000001 HC RX 250 WO HCPCS SELF ADMINISTERED DRUGS (ALT 637 FOR MEDICARE OP): Performed by: ORTHOPAEDIC SURGERY

## 2024-01-28 PROCEDURE — 93010 ELECTROCARDIOGRAM REPORT: CPT | Performed by: INTERNAL MEDICINE

## 2024-01-28 PROCEDURE — 94667 MNPJ CHEST WALL 1ST: CPT

## 2024-01-28 PROCEDURE — 94668 MNPJ CHEST WALL SBSQ: CPT

## 2024-01-28 PROCEDURE — 36415 COLL VENOUS BLD VENIPUNCTURE: CPT | Performed by: STUDENT IN AN ORGANIZED HEALTH CARE EDUCATION/TRAINING PROGRAM

## 2024-01-28 PROCEDURE — 2500000004 HC RX 250 GENERAL PHARMACY W/ HCPCS (ALT 636 FOR OP/ED): Performed by: INTERNAL MEDICINE

## 2024-01-28 PROCEDURE — 1200000002 HC GENERAL ROOM WITH TELEMETRY DAILY

## 2024-01-28 PROCEDURE — 2500000002 HC RX 250 W HCPCS SELF ADMINISTERED DRUGS (ALT 637 FOR MEDICARE OP, ALT 636 FOR OP/ED): Performed by: ORTHOPAEDIC SURGERY

## 2024-01-28 PROCEDURE — 93005 ELECTROCARDIOGRAM TRACING: CPT

## 2024-01-28 PROCEDURE — 99231 SBSQ HOSP IP/OBS SF/LOW 25: CPT | Performed by: SURGERY

## 2024-01-28 PROCEDURE — 71045 X-RAY EXAM CHEST 1 VIEW: CPT

## 2024-01-28 PROCEDURE — 99233 SBSQ HOSP IP/OBS HIGH 50: CPT | Performed by: INTERNAL MEDICINE

## 2024-01-28 PROCEDURE — 85025 COMPLETE CBC W/AUTO DIFF WBC: CPT | Performed by: STUDENT IN AN ORGANIZED HEALTH CARE EDUCATION/TRAINING PROGRAM

## 2024-01-28 PROCEDURE — 2500000004 HC RX 250 GENERAL PHARMACY W/ HCPCS (ALT 636 FOR OP/ED): Performed by: ORTHOPAEDIC SURGERY

## 2024-01-28 PROCEDURE — 85027 COMPLETE CBC AUTOMATED: CPT | Performed by: INTERNAL MEDICINE

## 2024-01-28 RX ORDER — ALBUTEROL SULFATE 0.83 MG/ML
2.5 SOLUTION RESPIRATORY (INHALATION) EVERY 6 HOURS PRN
Status: DISCONTINUED | OUTPATIENT
Start: 2024-01-28 | End: 2024-01-28

## 2024-01-28 RX ORDER — IPRATROPIUM BROMIDE AND ALBUTEROL SULFATE 2.5; .5 MG/3ML; MG/3ML
3 SOLUTION RESPIRATORY (INHALATION) 3 TIMES DAILY
Status: DISCONTINUED | OUTPATIENT
Start: 2024-01-28 | End: 2024-01-28

## 2024-01-28 RX ORDER — IPRATROPIUM BROMIDE AND ALBUTEROL SULFATE 2.5; .5 MG/3ML; MG/3ML
3 SOLUTION RESPIRATORY (INHALATION)
Status: DISCONTINUED | OUTPATIENT
Start: 2024-01-28 | End: 2024-01-28

## 2024-01-28 RX ORDER — ALBUTEROL SULFATE 0.83 MG/ML
2.5 SOLUTION RESPIRATORY (INHALATION) EVERY 2 HOUR PRN
Status: DISCONTINUED | OUTPATIENT
Start: 2024-01-28 | End: 2024-01-29

## 2024-01-28 RX ORDER — FUROSEMIDE 10 MG/ML
20 INJECTION INTRAMUSCULAR; INTRAVENOUS ONCE
Status: COMPLETED | OUTPATIENT
Start: 2024-01-28 | End: 2024-01-28

## 2024-01-28 RX ADMIN — METOPROLOL TARTRATE 37.5 MG: 25 TABLET, FILM COATED ORAL at 20:43

## 2024-01-28 RX ADMIN — CEFAZOLIN SODIUM 2 G: 2 INJECTION, SOLUTION INTRAVENOUS at 21:54

## 2024-01-28 RX ADMIN — ACETAMINOPHEN 650 MG: 325 TABLET ORAL at 09:12

## 2024-01-28 RX ADMIN — ALBUTEROL SULFATE 2.5 MG: 2.5 SOLUTION RESPIRATORY (INHALATION) at 20:35

## 2024-01-28 RX ADMIN — ALBUTEROL SULFATE 2.5 MG: 2.5 SOLUTION RESPIRATORY (INHALATION) at 16:44

## 2024-01-28 RX ADMIN — METOPROLOL TARTRATE 37.5 MG: 25 TABLET, FILM COATED ORAL at 09:10

## 2024-01-28 RX ADMIN — HEPARIN SODIUM 5000 UNITS: 5000 INJECTION INTRAVENOUS; SUBCUTANEOUS at 17:28

## 2024-01-28 RX ADMIN — FUROSEMIDE 20 MG: 10 INJECTION, SOLUTION INTRAMUSCULAR; INTRAVENOUS at 10:57

## 2024-01-28 RX ADMIN — ALBUTEROL SULFATE 2.5 MG: 2.5 SOLUTION RESPIRATORY (INHALATION) at 05:25

## 2024-01-28 RX ADMIN — ACETAMINOPHEN 650 MG: 325 TABLET ORAL at 02:50

## 2024-01-28 RX ADMIN — ACETAMINOPHEN 650 MG: 325 TABLET ORAL at 20:43

## 2024-01-28 RX ADMIN — CEFAZOLIN SODIUM 2 G: 2 INJECTION, SOLUTION INTRAVENOUS at 14:14

## 2024-01-28 RX ADMIN — CEFAZOLIN SODIUM 2 G: 2 INJECTION, SOLUTION INTRAVENOUS at 05:12

## 2024-01-28 RX ADMIN — TIOTROPIUM BROMIDE INHALATION SPRAY 2 PUFF: 3.12 SPRAY, METERED RESPIRATORY (INHALATION) at 09:07

## 2024-01-28 RX ADMIN — FLUTICASONE FUROATE AND VILANTEROL TRIFENATATE 1 PUFF: 200; 25 POWDER RESPIRATORY (INHALATION) at 09:07

## 2024-01-28 ASSESSMENT — COGNITIVE AND FUNCTIONAL STATUS - GENERAL
EATING MEALS: A LITTLE
DRESSING REGULAR LOWER BODY CLOTHING: A LOT
MOVING TO AND FROM BED TO CHAIR: A LOT
DRESSING REGULAR UPPER BODY CLOTHING: A LOT
PERSONAL GROOMING: A LOT
DAILY ACTIVITIY SCORE: 13
STANDING UP FROM CHAIR USING ARMS: A LOT
HELP NEEDED FOR BATHING: A LOT
MOBILITY SCORE: 11
CLIMB 3 TO 5 STEPS WITH RAILING: TOTAL
WALKING IN HOSPITAL ROOM: A LOT
TOILETING: A LOT
TURNING FROM BACK TO SIDE WHILE IN FLAT BAD: A LOT
MOVING FROM LYING ON BACK TO SITTING ON SIDE OF FLAT BED WITH BEDRAILS: A LOT

## 2024-01-28 ASSESSMENT — PAIN SCALES - GENERAL
PAINLEVEL_OUTOF10: 0 - NO PAIN
PAINLEVEL_OUTOF10: 5 - MODERATE PAIN
PAINLEVEL_OUTOF10: 3
PAINLEVEL_OUTOF10: 3

## 2024-01-28 ASSESSMENT — PAIN - FUNCTIONAL ASSESSMENT
PAIN_FUNCTIONAL_ASSESSMENT: 0-10

## 2024-01-28 ASSESSMENT — PAIN DESCRIPTION - DESCRIPTORS: DESCRIPTORS: ACHING

## 2024-01-28 ASSESSMENT — PAIN DESCRIPTION - ORIENTATION: ORIENTATION: RIGHT;LEFT

## 2024-01-28 ASSESSMENT — PAIN DESCRIPTION - LOCATION: LOCATION: WRIST

## 2024-01-28 NOTE — PROGRESS NOTES
Irish Davis is a 78 y.o. female on day 1 of admission presenting with Closed fracture of right radius and ulna, initial encounter.      Subjective   Irish Davis is a 78 y.o. female with PMHx s/f HTN, HLD, moderate aortic stenosis, chronic bilateral lower extremity edema (on as needed Lasix), COPD with chronic hypoxic respiratory failure (baseline 3-4 L nasal cannula) who presented to the emergency room after a fall at home with bilateral distal radius fracture.  On admission in the emergency room she was hemodynamically stable with a blood pressure of 163/83 mmHg, heart rate of 72/min, respirate rate of 18/min, temperature of 36.8 °C and was saturating 94% on her baseline 4 L nasal cannula oxygen.  Initial CBC showed a hemoglobin of 10.6 BMP were significant for a potassium of 3.4.  Influenza antigen and COVID PCR were negative.  CT of the head was negative for any acute intracranial process, CT cervical spine showed loss of normal cervical lordosis with demineralization in addition to some healing or healed fracture of the C1 vertebrae.  X-ray of the bilateral wrist and hand showed complex distal radius and ulna fracture with impaction and dorsal angulation and displacement on the left and dorsally angulated distal radius fracture and ulnar styloid fracture on the right.  General medicine has been consulted for medical management.  I saw patient on the regular nursing floor..  She was drowsy and barely arousable.  Daughter and  at bedside confirming initial  history prior to admission.  1/28: Patient was seen and examined.  She is more awake and alert and oriented x 3 when I saw her today.  She was complaining of some more difficulty in breathing.  Chest x-ray done and that showed pulmonary edema.  Will stop IV fluids.  I will give 1 dose of IV Lasix.  Strict input output chart.  As needed DuoNebs.  I will suspend any discharge plans for today.  Repeat BMP and CBC in AM.    Objective     Last  Recorded Vitals  /67 (BP Location: Right leg, Patient Position: Lying)   Pulse 86   Temp 37.1 °C (98.8 °F) (Temporal)   Resp 17   Wt 53.5 kg (118 lb)   SpO2 95%   Intake/Output last 3 Shifts:    Intake/Output Summary (Last 24 hours) at 1/28/2024 1103  Last data filed at 1/28/2024 0905  Gross per 24 hour   Intake 2150.42 ml   Output 300 ml   Net 1850.42 ml       Admission Weight  Weight: 54.4 kg (120 lb) (01/26/24 1601)    Daily Weight  01/28/24 : 53.5 kg (118 lb)    Image Results  XR chest 1 view  Narrative: Interpreted By:  Sebastián Schumacher,   STUDY:  XR CHEST 1 VIEW;  1/28/2024 5:56 am      INDICATION:  Signs/Symptoms:New oxygen demand, SOB, wheezing, crackles, Volume  overload?.      COMPARISON:  11/20/2023      ACCESSION NUMBER(S):  [id number]      ORDERING CLINICIAN:  AIYANA VO      FINDINGS:  Lungs are hyperinflated.. There does appear to be developing  pulmonary edema with increasing cephalization of the vessels. Heart  size is enlarged. Vascular calcification. Follow-up is advised. Right  midlung opacification is felt to be overlying soft tissues.      Impression: 1.  Features suspicious for progressive pulmonary edema . Follow-up  is advised hyperinflation of the lungs              MACRO:  None      Signed by: Sebastián Schumacher 1/28/2024 7:02 AM  Dictation workstation:   [id number]      Physical Exam  Constitutional:       General: Acute on chronically ill looking elderly  female; dry mucous membrane, awake and alert, oriented x 3     HENT:      Head: Normocephalic and atraumatic.      Right Ear: External ear normal.      Left Ear: External ear normal.      Nose: Nose normal.      Mouth/Throat:      Mouth: Mucous membranes are dry     Pharynx: Oropharynx is clear.   Eyes:      Extraocular Movements: Extraocular movements intact.      Conjunctiva/sclera: Conjunctivae normal.      Pupils: Pupils are equal, round, and reactive to light.   Cardiovascular:      Rate and Rhythm: Normal rate  and regular rhythm.      Pulses: Normal pulses.      Heart sounds: Normal heart sounds.   Pulmonary:      Effort: Pulmonary effort is normal. No respiratory distress.      Breath sounds: Wheezing and rales present. No rhonchi.      Comments: NC in place  Abdominal:      General: Abdomen is flat. Bowel sounds are normal.      Palpations: Abdomen is soft.      Tenderness: There is no abdominal tenderness. There is no right CVA tenderness, left CVA tenderness, guarding or rebound.   Musculoskeletal:      Bilateral ACE bandaging of the upper extremity with splinting  Skin:     General: Skin is warm and dry.      Capillary Refill: Capillary refill takes less than 2 seconds.      Findings: No lesion or rash.   Neurological:      General: No focal deficit present.      Mental Status: Awake and alert and oriented x 3 and moves all limbs.     Relevant Results               Assessment/Plan                  Principal Problem:    Closed fracture of right radius and ulna, initial encounter  Active Problems:    Type I or II open fracture of left radius and ulna    #Bilateral distal radius and ulna fracture  Admitted on regular nursing floor  Started on pain control  Orthopedic surgery consulted for surgical intervention     # Acute on chronic respiratory failure  Likely related to pulmonary edema plus bronchospasm  Chest x-ray reviewed  Given IV Lasix 20 mg  As needed DuoNebs  Continue nasal cannula oxygen and wean down as tolerated     #COPD  Currently stable  As needed DuoNebs     #Hypertension  Resumed metoprolol     #DVT prophylaxis  Subcutaneous heparin          Spent 40 minutes in the follow-up management of this patient today       Ortiz Marshall MD

## 2024-01-28 NOTE — PROGRESS NOTES
"[facility]  GENERAL SURGERY - PROGRESS NOTE    Patient Name: Irish Davis  MRN: [id number]  Admit Date: 126  : 1945  AGE: 78 y.o.   GENDER: female    CHIEF COMPLAINT / EVENTS LAST 24HRS / HPI:  Patient was seen and examined this morning.  Patient was having worsening shortness of breath, was on 6 L of nasal cannula upon examination.  Medical team examined her and ordered chest x-ray and RT evaluation.  She is being worked up at this time for this, IV fluids have been held.  Otherwise patient has no other concerns or complaints.  Patient states she is having pain in bilateral wrist mostly in the right but otherwise she states she has no new pains or aches    MEDICAL HISTORY / ROS:   Admission history and ROS reviewed. Pertinent changes as follows:    Review of Systems   Constitutional:   Negative for fever, chills, weight loss.   HENT:  Negative for congestion and dental problem.    Eyes:  Negative for discharge and itching.   Respiratory: . Positive for shortness of breath  Cardiovascular:  Positive for some peripheral edema  Gastrointestinal:  negative for abdominal pain,   Endocrine: Negative for cold intolerance and heat intolerance.   Musculoskeletal:  Negative for neck pain.Positive for pain in bilateral wrist   Skin:  Negative for color change.   Neurological:  Negative for tingling, loss of consciousness and numbness.   Psychiatric/Behavioral:  Negative for agitation and behavioral problems.      PHYSICAL EXAM:  Heart Rate:  [60-93]   Temp:  [36 °C (96.8 °F)-36.9 °C (98.4 °F)]   Resp:  [14-19]   BP: (106-170)/()   Height:  [154.9 cm (5' 1\")]   Weight:  [54.4 kg (120 lb)]   SpO2:  [86 %-100 %]   Physical Exam    Constitutional:       General: She is not in acute distress.     Appearance: Normal appearance. She is not ill-appearing.   HENT:      Head: Normocephalic.      Comments: L frontal cephalohematoma     Right Ear: External ear normal.      Left Ear: " External ear normal.      Nose: Nose normal.   Cardiovascular:      Rate and Rhythm: Normal rate and regular rhythm.   Pulmonary:      Effort: Pulmonary effort is normal. No respiratory distress.      Breath sounds: Normal breath sounds.   Abdominal:      General: There is no distension.      Palpations: Abdomen is soft.      Tenderness: There is no abdominal tenderness. There is no guarding.   Musculoskeletal:         General: No swelling.      Cervical back: Normal range of motion. No tenderness.      Comments: BL forearms splinted down to hands. No neurologic deficits in fingertips   Skin:     General: Skin is warm and dry.   Neurological:      General: No focal deficit present.      Mental Status: She is alert and oriented to person, place, and time. Mental status is at baseline.      Cranial Nerves: No cranial nerve deficit.      Sensory: No sensory deficit.      Motor: No weakness.   Psychiatric:         Mood and Affect: Mood normal.         Behavior: Behavior normal.     IMAGING SUMMARY:  (summary of new imaging findings, not a copy of dictation)  No new imaging to review    I have reviewed all medications, laboratory results, and imaging pertinent for today's encounter.    ==============================================================================  TODAY'S ASSESSMENT AND PLAN OF CARE:    This is an 78-year-old female status post mechanical fall with bilateral wrist fractures, left frontal cephalohematoma.  She underwent operative intervention yesterday for her left wrist.  She is doing okay at this time from a trauma surgical standpoint, but is having some shortness of breath and being worked up by the medical team.  Continue pain control  Do recommend DVT chemoprophylaxis, cephalohematoma unchanged and not a contraindication to DVT chemoprophylaxis in this patient  Appreciate [facility] care, will transfer services to [facility] today  Trauma surgery will sign off afterwards  Please call or page with any questions or  concerns    Ronaldo Mujahed DO PGY IV    ==============================================================================

## 2024-01-28 NOTE — CARE PLAN
The patient's goals for the shift include to remain safe and not fall during shift    The clinical goals for the shift include Patient's pain will be equal to or less than a 4/10 on a 0-10 pain scale which patient states is a tolerable level.    Over the shift, the patient denies pain. Is resting comfortably at this time.

## 2024-01-28 NOTE — SIGNIFICANT EVENT
1.  Please see initial H&P and subsequent progress notes and consult notes with regards to presentation and management thus far.  2.  In brief the patient has a history of HTN, HLD, aortic stenosis, but chronic bilateral lower extremity edema on as needed Lasix, COPD with chronic hypoxic respiratory failure on 3 to 4 L at baseline who presented to the ED after a fall after she tripped on her oxygen tubing and sustained bilateral distal radial fractures requiring surgical intervention which was performed yesterday afternoon.  3.  Patient has been on IV fluids since admission due to poor oral intake and initial n.p.o. status due to her limited functional ability given her fractures.  4.  I was notified by nursing staff at around 5:15 AM that the patient has had progressively worsening shortness of breath as well as crackles and increasing oxygen demands concerning for volume overload.  5.  A stat CBC/RFP/Mg/VBG and chest x-ray was ordered  6.  Upon my evaluation the patient she was slightly tachypneic on examination and slightly labored but otherwise in no acute distress, she did have audible crackles and upon auscultation does have diminished breath sounds are all lung fields with patchy crackles/questionable rhonchi throughout but more so in the mid to lower lung fields bilaterally.  AOx3, appropriately interactive to the best of her ability, regular rate and rhythm with a noted murmur best heard at the right upper sternal border, abdomen is soft and nontender, able to move bilateral lower extremities but patient is globally weak with range of motion/strength being decreased likely around 3-4 out of 5.  Patient does have bilateral upper extremity dressing s/p surgical intervention, patient is able to flex/slightly extend at the hip but is limited by dressing and generalized weakness.  She otherwise had no focal neurovascular symptoms but is hard to fully evaluate the upper extremities due to her most recent  injuries.  She had intact sensation to light touch and painful stimuli bilateral lower extremities.  7.  Given the patient's limited mobility, inability to care for herself at this point and concerns for developing pneumonia and volume overload from a volume standpoint.  8.  Nursing staff was notified to stop IV fluids and discontinue it at this time.  9.  Aggressive incentive spirometer, RT eval and treatment management.  Patient will require assistance with this given her limited hand mobility  10.  It was noted to the nurse that the patient does require full assistance at this time with all feeds and incentive spirometer and respiratory management.  11.  Acute on chronic hypoxic respiratory failure, concerns for volume overload/CHF, history of COPD without any grossly evident signs of exacerbation, bilateral distal radial fractures s/p surgical intervention  12.  Continue management as noted in EMR and surgery teams and medical team.  13.  Pending lab work and imaging patient may require further evaluation and management from an infectious standpoint as well as possible diuretics.  Daily weights, strict I's and O's

## 2024-01-28 NOTE — PROGRESS NOTES
Physical Therapy                 Therapy Communication Note    Patient Name: Irish Davis  MRN: [id number]  Today's Date: 1/28/2024     Discipline: Physical Therapy    Missed Visit Reason: Missed Visit Reason:  (Increased SOB this AM - check 1/29/24)    Missed Time: Attempt

## 2024-01-29 ENCOUNTER — TELEPHONE (OUTPATIENT)
Dept: ORTHOPEDIC SURGERY | Facility: CLINIC | Age: 79
End: 2024-01-29

## 2024-01-29 ENCOUNTER — APPOINTMENT (OUTPATIENT)
Dept: CARDIOLOGY | Facility: HOSPITAL | Age: 79
DRG: 510 | End: 2024-01-29
Payer: COMMERCIAL

## 2024-01-29 LAB
ANION GAP SERPL CALC-SCNC: 9 MMOL/L (ref 10–20)
AORTIC VALVE MEAN GRADIENT: 12.8 MMHG
AORTIC VALVE PEAK VELOCITY: 2.4 M/S
ATRIAL RATE: 85 BPM
AV PEAK GRADIENT: 23 MMHG
AVA (PEAK VEL): 1.58 CM2
AVA (VTI): 1.75 CM2
BASOPHILS # BLD AUTO: 0.03 X10*3/UL (ref 0–0.1)
BASOPHILS NFR BLD AUTO: 0.2 %
BUN SERPL-MCNC: 14 MG/DL (ref 6–23)
CALCIUM SERPL-MCNC: 8.3 MG/DL (ref 8.6–10.3)
CHLORIDE SERPL-SCNC: 97 MMOL/L (ref 98–107)
CO2 SERPL-SCNC: 37 MMOL/L (ref 21–32)
CREAT SERPL-MCNC: 0.63 MG/DL (ref 0.5–1.05)
EGFRCR SERPLBLD CKD-EPI 2021: 90 ML/MIN/1.73M*2
EJECTION FRACTION APICAL 4 CHAMBER: 73.1
EJECTION FRACTION: 72 %
EOSINOPHIL # BLD AUTO: 0.02 X10*3/UL (ref 0–0.4)
EOSINOPHIL NFR BLD AUTO: 0.2 %
ERYTHROCYTE [DISTWIDTH] IN BLOOD BY AUTOMATED COUNT: 14.5 % (ref 11.5–14.5)
GLUCOSE SERPL-MCNC: 77 MG/DL (ref 74–99)
HCT VFR BLD AUTO: 32 % (ref 36–46)
HGB BLD-MCNC: 9.7 G/DL (ref 12–16)
IMM GRANULOCYTES # BLD AUTO: 0.07 X10*3/UL (ref 0–0.5)
IMM GRANULOCYTES NFR BLD AUTO: 0.5 % (ref 0–0.9)
LEFT ATRIUM VOLUME AREA LENGTH INDEX BSA: 15.7 ML/M2
LEFT VENTRICLE INTERNAL DIMENSION DIASTOLE: 3.26 CM (ref 3.5–6)
LEFT VENTRICULAR OUTFLOW TRACT DIAMETER: 2.01 CM
LYMPHOCYTES # BLD AUTO: 0.83 X10*3/UL (ref 0.8–3)
LYMPHOCYTES NFR BLD AUTO: 6.3 %
MCH RBC QN AUTO: 28.6 PG (ref 26–34)
MCHC RBC AUTO-ENTMCNC: 30.3 G/DL (ref 32–36)
MCV RBC AUTO: 94 FL (ref 80–100)
MITRAL VALVE E/A RATIO: 1.04
MITRAL VALVE E/E' RATIO: 10.14
MONOCYTES # BLD AUTO: 1.17 X10*3/UL (ref 0.05–0.8)
MONOCYTES NFR BLD AUTO: 8.9 %
NEUTROPHILS # BLD AUTO: 11 X10*3/UL (ref 1.6–5.5)
NEUTROPHILS NFR BLD AUTO: 83.9 %
NRBC BLD-RTO: 0 /100 WBCS (ref 0–0)
P AXIS: 80 DEGREES
PLATELET # BLD AUTO: 225 X10*3/UL (ref 150–450)
POTASSIUM SERPL-SCNC: 3.7 MMOL/L (ref 3.5–5.3)
PR INTERVAL: 121 MS
Q ONSET: 253 MS
QRS COUNT: 14 BEATS
QRS DURATION: 91 MS
QT INTERVAL: 372 MS
QTC CALCULATION(BAZETT): 445 MS
QTC FREDERICIA: 419 MS
R AXIS: 88 DEGREES
RBC # BLD AUTO: 3.39 X10*6/UL (ref 4–5.2)
RIGHT VENTRICLE FREE WALL PEAK S': 10.46 CM/S
RIGHT VENTRICLE PEAK SYSTOLIC PRESSURE: 42.8 MMHG
SODIUM SERPL-SCNC: 139 MMOL/L (ref 136–145)
T AXIS: 39 DEGREES
T OFFSET: 439 MS
TRICUSPID ANNULAR PLANE SYSTOLIC EXCURSION: 1.9 CM
VENTRICULAR RATE: 86 BPM
WBC # BLD AUTO: 13.1 X10*3/UL (ref 4.4–11.3)

## 2024-01-29 PROCEDURE — 94760 N-INVAS EAR/PLS OXIMETRY 1: CPT

## 2024-01-29 PROCEDURE — 85025 COMPLETE CBC W/AUTO DIFF WBC: CPT | Performed by: INTERNAL MEDICINE

## 2024-01-29 PROCEDURE — 1200000002 HC GENERAL ROOM WITH TELEMETRY DAILY

## 2024-01-29 PROCEDURE — 97116 GAIT TRAINING THERAPY: CPT | Mod: GP | Performed by: PHYSICAL THERAPIST

## 2024-01-29 PROCEDURE — 94668 MNPJ CHEST WALL SBSQ: CPT

## 2024-01-29 PROCEDURE — 2500000004 HC RX 250 GENERAL PHARMACY W/ HCPCS (ALT 636 FOR OP/ED): Performed by: ORTHOPAEDIC SURGERY

## 2024-01-29 PROCEDURE — 97166 OT EVAL MOD COMPLEX 45 MIN: CPT | Mod: GO

## 2024-01-29 PROCEDURE — 2500000002 HC RX 250 W HCPCS SELF ADMINISTERED DRUGS (ALT 637 FOR MEDICARE OP, ALT 636 FOR OP/ED): Performed by: INTERNAL MEDICINE

## 2024-01-29 PROCEDURE — 87070 CULTURE OTHR SPECIMN AEROBIC: CPT | Mod: PORLAB | Performed by: INTERNAL MEDICINE

## 2024-01-29 PROCEDURE — 87186 SC STD MICRODIL/AGAR DIL: CPT | Mod: 59,PORLAB | Performed by: INTERNAL MEDICINE

## 2024-01-29 PROCEDURE — 93306 TTE W/DOPPLER COMPLETE: CPT

## 2024-01-29 PROCEDURE — 2500000001 HC RX 250 WO HCPCS SELF ADMINISTERED DRUGS (ALT 637 FOR MEDICARE OP): Performed by: ORTHOPAEDIC SURGERY

## 2024-01-29 PROCEDURE — 97162 PT EVAL MOD COMPLEX 30 MIN: CPT | Mod: GP | Performed by: PHYSICAL THERAPIST

## 2024-01-29 PROCEDURE — 36415 COLL VENOUS BLD VENIPUNCTURE: CPT | Performed by: INTERNAL MEDICINE

## 2024-01-29 PROCEDURE — 99233 SBSQ HOSP IP/OBS HIGH 50: CPT | Performed by: INTERNAL MEDICINE

## 2024-01-29 PROCEDURE — 93306 TTE W/DOPPLER COMPLETE: CPT | Performed by: INTERNAL MEDICINE

## 2024-01-29 PROCEDURE — 2500000001 HC RX 250 WO HCPCS SELF ADMINISTERED DRUGS (ALT 637 FOR MEDICARE OP): Performed by: STUDENT IN AN ORGANIZED HEALTH CARE EDUCATION/TRAINING PROGRAM

## 2024-01-29 PROCEDURE — 80048 BASIC METABOLIC PNL TOTAL CA: CPT | Performed by: INTERNAL MEDICINE

## 2024-01-29 PROCEDURE — 2500000004 HC RX 250 GENERAL PHARMACY W/ HCPCS (ALT 636 FOR OP/ED): Performed by: INTERNAL MEDICINE

## 2024-01-29 RX ORDER — PREDNISONE 10 MG/1
10 TABLET ORAL DAILY
Status: DISCONTINUED | OUTPATIENT
Start: 2024-01-29 | End: 2024-02-02 | Stop reason: HOSPADM

## 2024-01-29 RX ORDER — ALBUTEROL SULFATE 90 UG/1
2 INHALANT RESPIRATORY (INHALATION) EVERY 6 HOURS PRN
Status: DISCONTINUED | OUTPATIENT
Start: 2024-01-29 | End: 2024-02-02 | Stop reason: HOSPADM

## 2024-01-29 RX ADMIN — METOPROLOL TARTRATE 37.5 MG: 25 TABLET, FILM COATED ORAL at 11:00

## 2024-01-29 RX ADMIN — OXYCODONE HYDROCHLORIDE 5 MG: 5 TABLET ORAL at 11:01

## 2024-01-29 RX ADMIN — ACETAMINOPHEN 650 MG: 325 TABLET ORAL at 01:30

## 2024-01-29 RX ADMIN — ACETAMINOPHEN 650 MG: 325 TABLET ORAL at 11:02

## 2024-01-29 RX ADMIN — ACETAMINOPHEN 650 MG: 325 TABLET ORAL at 18:16

## 2024-01-29 RX ADMIN — HEPARIN SODIUM 5000 UNITS: 5000 INJECTION INTRAVENOUS; SUBCUTANEOUS at 18:16

## 2024-01-29 RX ADMIN — POLYETHYLENE GLYCOL 3350 17 G: 17 POWDER, FOR SOLUTION ORAL at 11:03

## 2024-01-29 RX ADMIN — TIOTROPIUM BROMIDE INHALATION SPRAY 2 PUFF: 3.12 SPRAY, METERED RESPIRATORY (INHALATION) at 11:03

## 2024-01-29 RX ADMIN — HEPARIN SODIUM 5000 UNITS: 5000 INJECTION INTRAVENOUS; SUBCUTANEOUS at 11:00

## 2024-01-29 RX ADMIN — PREDNISONE 10 MG: 10 TABLET ORAL at 14:34

## 2024-01-29 RX ADMIN — IPRATROPIUM BROMIDE 2 PUFF: 17 AEROSOL, METERED RESPIRATORY (INHALATION) at 18:16

## 2024-01-29 RX ADMIN — HEPARIN SODIUM 5000 UNITS: 5000 INJECTION INTRAVENOUS; SUBCUTANEOUS at 01:30

## 2024-01-29 ASSESSMENT — COGNITIVE AND FUNCTIONAL STATUS - GENERAL
MOVING FROM LYING ON BACK TO SITTING ON SIDE OF FLAT BED WITH BEDRAILS: A LITTLE
DRESSING REGULAR LOWER BODY CLOTHING: TOTAL
PERSONAL GROOMING: TOTAL
WALKING IN HOSPITAL ROOM: TOTAL
STANDING UP FROM CHAIR USING ARMS: A LOT
DRESSING REGULAR UPPER BODY CLOTHING: A LOT
TOILETING: TOTAL
DAILY ACTIVITIY SCORE: 8
CLIMB 3 TO 5 STEPS WITH RAILING: TOTAL
EATING MEALS: A LOT
TURNING FROM BACK TO SIDE WHILE IN FLAT BAD: A LITTLE
DRESSING REGULAR LOWER BODY CLOTHING: TOTAL
MOVING TO AND FROM BED TO CHAIR: A LOT
MOBILITY SCORE: 12
HELP NEEDED FOR BATHING: TOTAL

## 2024-01-29 ASSESSMENT — ACTIVITIES OF DAILY LIVING (ADL)
ADL_ASSISTANCE: INDEPENDENT
LACK_OF_TRANSPORTATION: NO
BATHING_ASSISTANCE: TOTAL

## 2024-01-29 ASSESSMENT — PAIN DESCRIPTION - DESCRIPTORS
DESCRIPTORS: ACHING

## 2024-01-29 ASSESSMENT — PAIN - FUNCTIONAL ASSESSMENT
PAIN_FUNCTIONAL_ASSESSMENT: 0-10

## 2024-01-29 ASSESSMENT — PAIN SCALES - GENERAL
PAINLEVEL_OUTOF10: 3
PAINLEVEL_OUTOF10: 5 - MODERATE PAIN
PAINLEVEL_OUTOF10: 7

## 2024-01-29 NOTE — PROGRESS NOTES
Occupational Therapy    Evaluation    Patient Name: Irish Davis  MRN: [id number]  Today's Date: 1/29/2024  Time Calculation  Start Time: 1150  Stop Time: 1215  Time Calculation (min): 25 min    Assessment  IP OT Assessment  OT Assessment: OT eval completed. The patient is functioning below baseline for ADLs and mobility. can benefit from continued OT  Barriers to Discharge: Decreased caregiver support  End of Session Communication: Bedside nurse, Care Coordinator, Physician  End of Session Patient Position: Up in chair, Alarm on    Plan:  Treatment Interventions: ADL retraining, Functional transfer training, UE strengthening/ROM, Endurance training, Patient/family training, Equipment evaluation/education, Neuromuscular reeducation, UE splinting, Compensatory technique education  OT Frequency: 5 times per week  OT Discharge Recommendations: Moderate intensity level of continued care  OT - OK to Discharge: Yes To next level of care, with consideration of recommendations established on OT eval, and once cleared by medical team/physician for DC.     Subjective     Current Problem:  1. Closed fracture of right radius and ulna, initial encounter  Case Request Operating Room: Open Reduction Internal Fixation Radius    Case Request Operating Room: Open Reduction Internal Fixation Radius      2. Type I or II open fracture of left radius and ulna, initial encounter  Case Request Operating Room: Open Reduction Internal Fixation Radius    Case Request Operating Room: Open Reduction Internal Fixation Radius      3. Fall, initial encounter        4. Closed head injury, initial encounter        5. Congestive heart failure, unspecified HF chronicity, unspecified heart failure type (CMS/HCC)  Transthoracic Echo (TTE) Complete    Transthoracic Echo (TTE) Complete          General:  General  Reason for Referral: ADLs, safety assessment  Referred By: TARA NESBITT (ORTHO)  Past Medical History Relevant to Rehab: pt admit for fall at  home, tripped over O2 tubing. DX SUNITA wrist fractures. LEFT: Complex distal radius and ulnar fracture with impaction, dorsal  angulation and displacement. RIGHT: Dorsally angulated and mildly displaced distal radius fracture. Ulnar  positive variance. Minimal displaced ulnar styloid fracture. S/P SUNITA ORIFs. and I&D L wrist. PMH: recent adm in Dec 2023 for pna/COPD exac.  Co-Treatment: PT  Co-Treatment Reason: to optimize safety and mobility, while focusing on discipline specific goals  Prior to Session Communication: Bedside nurse  Patient Position Received: Bed, 3 rail up, Alarm on  General Comment: pt alert and agreeable to therapy    Precautions:  UE Weight Bearing Status:  (SUNITA minimal WB. per ortho: lift no more than 1-2 lbs)  LE Weight Bearing Status: Weight Bearing as Tolerated  Medical Precautions: Fall precautions, Oxygen therapy device and L/min  Splinting: bilateral wrists/forearms splinted and casted. notable drainage from R bandage discolored/saturated. RN aware.    Vital Signs:  Heart Rate: 98  SpO2: (!) 86 % (dropped to 86% with activity. recovered to 89%. maintained 89% for over 5 minutes before she was able to increase spO2 to >90%. at end of session 93% on 6L NC.)    Pain:  Pain Assessment  Pain Assessment: 0-10  Pain Score:  (not rating given)  Pain Type: Acute pain, Surgical pain  Pain Location: Wrist  Pain Orientation: Right, Left  Pain Descriptors: Aching  Effect of Pain on Daily Activities: very guarded. will not attempt to move UEs without prompting and encouragement  Pain Interventions: Repositioned, Distraction, Elevated, Emotional support, Environmental changes, Relaxation technique    Objective     Cognition:  Overall Cognitive Status: Within Functional Limits (except mild delays and difficulty processing)  Orientation Level:  (Oriented to person, place, time)             Home Living:  Type of Home: House  Lives With: Spouse  Home Adaptive Equipment: Walker rolling or standard  Home  "Layout: One level  Home Access: Stairs to enter without rails  Entrance Stairs-Number of Steps: pt voiced she has several steps to enter  Home Living Comments: reports she recent challenges at home. 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 pt attempted to compensate by having staff raise height of table, so she could lean forward head and trunk to drink from cup while it was stationary on table.)  Grooming Assistance: Total (anticipated)  Bathing Assistance: Total (anticipated)  UE Dressing Assistance: Maximal (anticipated)  LE Dressing Assistance: Total  LE Dressing Deficit: Thread RLE into underwear, Thread LLE into underwear, Pull up over hips  Toileting Assistance with Device: Total  Toileting Deficit:  (anticipated)    Activity Tolerance:  Activity Tolerance Comments: PATEL, hypoxia    Bed Mobility/Transfers:   Bed Mobility 1  Bed Mobility 1: Supine to sitting  Level of Assistance 1: Minimum assistance (x2)  Bed Mobility Comments 1: VC to not push or pull with UEs. VC for scooting  Transfers  Transfer: Yes  Transfer 1  Transfer From 1: Sit to  Transfer to 1: Stand  Technique 1: Stand to sit  Transfer Device 1: Gait belt  Transfer Level of Assistance 1: Minimum assistance, +2, Minimal verbal cues, Minimal tactile cues  Transfers 2  Transfer From 2: Bed " to  Transfer to 2: Chair with arms  Technique 2:  (functional mobility a few feet to the chair)  Transfer Device 2: Gait belt  Transfer Level of Assistance 2: +2, Moderate assistance, Minimal verbal cues, Minimal tactile cues    Standing Balance:  Static Standing Balance  Static Standing-Level of Assistance: Minimum assistance, Moderate assistance  Static Standing-Comment/Number of Minutes: posterior lean  Dynamic Standing Balance  Dynamic Standing-Comments: mod A with posterior leaning      Strength:  Strength Comments: MMT not performed d/t pt's recent bilateral UE surgeries. proximal strength is grossly 4-/5      Hand Function:  Hand Function  Gross Grasp: Impaired (bilateral)    Extremities: RUE   RUE :  (proximal ROM at shoulders and elbow WFL. wrist NT. can move fingers minimally. ROM limited) and LUE   LUE:  (proximal ROM at shoulders and elbow WFL. wrist NT. can move fingers minimally. ROM limited)    Outcome Measures: [facility] Daily Activity  Putting on and taking off regular lower body clothing: Total  Bathing (including washing, rinsing, drying): Total  Putting on and taking off regular upper body clothing: A lot  Toileting, which includes using toilet, bedpan or urinal: Total  Taking care of personal grooming such as brushing teeth: Total  Eating Meals: A lot  Daily Activity - Total Score: 8                    EDUCATION:     Education Documentation  ADL Training, taught by Vernell Tomlinson OT at 1/29/2024  1:37 PM.  Learner: Patient  Readiness: Acceptance  Method: Explanation  Response: Needs Reinforcement    Education Comments  No comments found.        Goals:   Encounter Problems       Encounter Problems (Active)       ADLs       Patient will perform UB bathing from seated position with minimal assist  level of assistance. (Progressing)       Start:  01/29/24    Expected End:  02/12/24            Patient with complete upper body dressing with set-up and stand by assist level of assistance donning and  doffing all UE clothes while supported sitting (Progressing)       Start:  01/29/24    Expected End:  02/12/24            Patient with complete lower body dressing with minimal assist  level of assistance donning and doffing pants without a zipper/fasteners and underwear while supported sitting and standing (Progressing)       Start:  01/29/24    Expected End:  02/12/24            Patient will feed self with set-up SBA level of assistance, modified techniques and PRN adaptive equipment. (Progressing)       Start:  01/29/24    Expected End:  02/12/24            Patient will complete daily grooming tasks brushing teeth and washing face/hair with set-up and stand by assist level of assistance and PRN adaptive equipment while supported sitting. (Progressing)       Start:  01/29/24    Expected End:  02/12/24            Patient will complete toileting including hygiene with minimal assist  level of assistance and toileting aid device PRN. (Progressing)       Start:  01/29/24    Expected End:  02/12/24               COGNITION/SAFETY       Patient will recall and adhere to weight bearing restriction BUEs with all ADL and functional mobility in order to promote healing and safety with functional tasks (Progressing)       Start:  01/29/24    Expected End:  02/12/24               TRANSFERS       Patient will complete functional transfers with least restrictive device with stand by assist level of assistance. (Progressing)       Start:  01/29/24    Expected End:  02/12/24

## 2024-01-29 NOTE — CARE PLAN
The patient's goals for the shift include to remain safe and not fall during shift    The clinical goals for the shift include Patient's pain will be equal to or less than a 4/10 on a 0-10 pain scale which patient states is a tolerable level.    Over the shift, the patient did not make progress toward the following goals. Barriers to progression include . Recommendations to address these barriers include .

## 2024-01-29 NOTE — PROGRESS NOTES
"Irish Davis is a 78 y.o. female on day 2 of admission presenting with Closed fracture of right radius and ulna, initial encounter.    Subjective   Patient resting in chair comfortably.  States pain improving.  States left elbow had some bloody drainage but reinforced and doing well now.  States pain much improved prior to surgery.  She reports she wants to go home because she needs o take care of her .  She does have family and friends to help support her and him.     Objective   Bilateral UE  SILT  Warm fingers, BCR  Wiggles fingers, can oppose thumb o index and long bilaterally  FROM right elbow, limited left due to splint      Physical Exam    Last Recorded Vitals  Blood pressure 146/74, pulse 65, temperature 36.8 °C (98.2 °F), temperature source Temporal, resp. rate 18, height 1.549 m (5' 1\"), weight 53.5 kg (118 lb), SpO2 98 %.  Intake/Output last 3 Shifts:  I/O last 3 completed shifts:  In: 1036.3 (19.4 mL/kg) [P.O.:100; I.V.:536.3 (10 mL/kg); IV Piggyback:400]  Out: 1125 (21 mL/kg) [Urine:1125 (0.6 mL/kg/hr)]  Weight: 53.5 kg     Relevant Results      Scheduled medications  acetaminophen, 650 mg, oral, q6h  fluticasone furoate-vilanteroL, 1 puff, inhalation, Daily  heparin (porcine), 5,000 Units, subcutaneous, q8h  ipratropium, 2 puff, inhalation, 4x daily  metoprolol tartrate, 37.5 mg, oral, BID  perflutren lipid microspheres, 0.5-10 mL of dilution, intravenous, Once in imaging  perflutren protein A microsphere, 0.5 mL, intravenous, Once in imaging  polyethylene glycol, 17 g, oral, Daily  predniSONE, 10 mg, oral, Daily  sulfur hexafluoride microsphr, 2 mL, intravenous, Once in imaging      Continuous medications     PRN medications  PRN medications: albuterol, hydrOXYzine HCL, morphine, ondansetron **OR** ondansetron, oxyCODONE, oxyCODONE  Results for orders placed or performed during the hospital encounter of 01/26/24 (from the past 24 hour(s))   CBC and Auto Differential   Result Value Ref " Range    WBC 13.1 (H) 4.4 - 11.3 x10*3/uL    nRBC 0.0 0.0 - 0.0 /100 WBCs    RBC 3.39 (L) 4.00 - 5.20 x10*6/uL    Hemoglobin 9.7 (L) 12.0 - 16.0 g/dL    Hematocrit 32.0 (L) 36.0 - 46.0 %    MCV 94 80 - 100 fL    MCH 28.6 26.0 - 34.0 pg    MCHC 30.3 (L) 32.0 - 36.0 g/dL    RDW 14.5 11.5 - 14.5 %    Platelets 225 150 - 450 x10*3/uL    Neutrophils % 83.9 40.0 - 80.0 %    Immature Granulocytes %, Automated 0.5 0.0 - 0.9 %    Lymphocytes % 6.3 13.0 - 44.0 %    Monocytes % 8.9 2.0 - 10.0 %    Eosinophils % 0.2 0.0 - 6.0 %    Basophils % 0.2 0.0 - 2.0 %    Neutrophils Absolute 11.00 (H) 1.60 - 5.50 x10*3/uL    Immature Granulocytes Absolute, Automated 0.07 0.00 - 0.50 x10*3/uL    Lymphocytes Absolute 0.83 0.80 - 3.00 x10*3/uL    Monocytes Absolute 1.17 (H) 0.05 - 0.80 x10*3/uL    Eosinophils Absolute 0.02 0.00 - 0.40 x10*3/uL    Basophils Absolute 0.03 0.00 - 0.10 x10*3/uL   Basic metabolic panel   Result Value Ref Range    Glucose 77 74 - 99 mg/dL    Sodium 139 136 - 145 mmol/L    Potassium 3.7 3.5 - 5.3 mmol/L    Chloride 97 (L) 98 - 107 mmol/L    Bicarbonate 37 (H) 21 - 32 mmol/L    Anion Gap 9 (L) 10 - 20 mmol/L    Urea Nitrogen 14 6 - 23 mg/dL    Creatinine 0.63 0.50 - 1.05 mg/dL    eGFR 90 >60 mL/min/1.73m*2    Calcium 8.3 (L) 8.6 - 10.3 mg/dL   Transthoracic Echo (TTE) Complete   Result Value Ref Range    BSA 1.52 m2             Assessment/Plan   2 days s/p left distal radius ORIF with I and D for open fracture and right distal radius ORIF  Based on the history, physical exam and imaging studies above, the patient's presentation is consistent with the above diagnosis.  I had a long discussion with the patient regarding their presentation and the treatment options.    We again discussed her treatment options going forward along with their associated risks and benefits. After thorough discussion, the patient has elected to proceed with postoperative care. All questions were answered to the patients satisfaction who  seems satisfied with the plan.  They will call the office with any questions/concerns.     Maintain splints  Finished antibiotics  Pain control per IMS  Stable for discharge form orthopedic perspective  FU 2 weeks for suture removal and splint fabrication and wound check - XR bilateral wrists    Bryant Arnold DO

## 2024-01-29 NOTE — CONSULTS
Wound Care Consult     Visit Date: 1/29/2024      Patient Name: Irish Davis         MRN: [id number]           YOB: 1945      Pertinent Labs:   Albumin   Date Value Ref Range Status   01/28/2024 3.0 (L) 3.4 - 5.0 g/dL Final     Wound Assessment:  Wound 11/21/23 Other (comment) Pretibial Right (Active)   Wound Image   01/29/24 1554   Site Assessment Yellow;Tan;Sloughing;Red 01/29/24 1554   Leticia-Wound Assessment Erythematous 01/29/24 1554   Non-staged Wound Description Full thickness 01/29/24 1554   Shape irregular 01/29/24 1554   Wound Length (cm) 7 cm 01/29/24 1554   Wound Width (cm) 1.5 cm 01/29/24 1554   Wound Surface Area (cm^2) 10.5 cm^2 01/29/24 1554   Wound Depth (cm) 0.3 cm 01/29/24 1554   Wound Volume (cm^3) 3.15 cm^3 01/29/24 1554   State of Healing Non-healing 01/27/24 0800   Wound Bed Granulation (%) 10 % 01/29/24 1554   Wound Bed Slough (%) 90 % 01/29/24 1554   Margins Well-defined edges 01/29/24 1554   Drainage Description Green;Yellow 01/29/24 1554   Drainage Amount Moderate 01/29/24 1554   Dressing Silver dressing;ABD;Kerlix/rolled gauze 01/29/24 1554   Dressing Changed Changed 01/29/24 1554   Dressing Status Clean;Dry;Occlusive 01/29/24 1554       Wound 11/21/23 Other (comment) Leg Dorsal;Right;Upper (Active)       Wound 01/27/24 Traumatic Knee Left (Active)   Wound Image   01/29/24 1559   Site Assessment Dry;Red 01/29/24 1559   Lteicia-Wound Assessment Fragile;Ecchymotic 01/29/24 1559   Non-staged Wound Description Partial thickness 01/29/24 1559   Wound Length (cm) 1.5 cm 01/29/24 1559   Wound Width (cm) 2 cm 01/29/24 1559   Wound Surface Area (cm^2) 3 cm^2 01/29/24 1559   Drainage Description None 01/29/24 1559   Drainage Amount None 01/29/24 1559   Dressing Petroleum gauze;Foam 01/29/24 1559   Dressing Changed Changed 01/29/24 1559   Dressing Status Clean;Dry;Occlusive 01/29/24 1559       Wound 01/27/24 Incision Arm Distal;Left;Lower (Active)   Site Assessment Unable to assess  01/29/24 0808   Drainage Amount Moderate 01/28/24 0800   Dressing Xeroform;Gauze 01/29/24 0808   Dressing Changed Reinforced 01/28/24 2015       Wound 01/27/24 Incision Arm Distal;Lower;Right (Active)   Site Assessment Clean;Dry 01/29/24 0808   Drainage Amount Small 01/27/24 2030   Dressing Xeroform;Gauze 01/29/24 0808   Dressing Changed Reinforced 01/28/24 2015     Patient seen for wound to right lower leg (present on admission) complicated by PMH: HTN, HLD, moderate aortic stenosis, chronic bilateral lower extremity edema (on as needed Lasix), COPD with chronic hypoxic respiratory failure (baseline 3-4 L nasal cannula) per chart review. Exam conducted with , daughter, and neighbors at bedside. Patient has orthopedics on for bilateral wrist fractures from fall. Patient was previously seen by this RN for wound consult on 11/21/23 for blister of RLE- past wound care note states “Patient states she has had wound to right posterior thigh for years, unsure of what caused it. She states “my  is my doctor at home and fixed it up when it was infected”. Patient reports she has not seen an actual doctor for her wound. Foul smell and yellow drainage coming from right posterior full thickness ulceration, wound culture obtained per order. Right lower leg swelling noted with intact blister”. Small red/raised intact area to right upper posterior thigh, small hard center, unable to obtain photo at this time. Patient states she does not want anything done with it. Left open to air. Skin tear noted to left knee during exam. Right lower leg (previous blistered area) is now an open wound with yellow/tan adherent slough, draining moderate amount of yellow/green drainage, culture obtained. When asked what patient was doing with wound at home, she stated they had a bandaide on it, when explained that I placed a dressing on it last admission, she agreed and stated that was what she had on it. Unclear if that means she left  the same bandage on it that was placed last admission. When asked patients daughter about the wound she did not know of the wound, the patient states she did not tell anyone as she cares for herself independently. Skin hygiene and dressing care provided. See detailed assessment above from flowsheet. Recommendations below, reviewed with Dr. Marshall.    Wound location: Left knee    Treatment protocol recommended:  Cleanse with normal saline and pat dry.  Apply adaptic, clean dry dressing every 3 days/prn.  Continue to off load, turning at least every 2 hours. Offload heels.    Wound location: Right lower leg   Treatment protocol recommended:  Cleanse with normal saline and pat dry.  Apply aquacel ag cut to size, cover with ABD and wrap with kerlix every other day/prn.   Consider Podiatry consult.   Outpatient wound care.   Continue to off load, turning at least every 2 hours. Offload heels.    Therapeutic surface: Patient sitting up in chair during exam. Centrella standard pressure relieving mattress in room. Staff to continue to turn/reposition patient atleast every 2 hours. Offload heels.     Nursing updated, continue pressure injury preventions, nursing to follow provider orders and re-consult wound RN if needed.    See above recommendations for treatment. I would recommend follow up in [facility] upon discharge. Patient will require outpatient wound care upon discharge as well as skilled assistance with skin hygiene and wound maintenance.     Please contact me with questions or changes in patient condition.  Diamond Basurto RN  [facility]   [phone]

## 2024-01-29 NOTE — PROGRESS NOTES
"Physical Therapy    Physical Therapy Evaluation    Patient Name: Irish Davis  MRN: [id number]  Today's Date: 1/29/2024   Time Calculation  Start Time: 1149  Stop Time: 1230  Time Calculation (min): 41 min    Assessment/Plan   PT Assessment  PT Assessment Results: Impaired balance, Decreased mobility, Orthopedic restrictions, Pain (SUNITA wrist surgery/splinting/NWB)  Rehab Prognosis: Good  Barriers to Discharge: PATEL, hypoxia  Evaluation/Treatment Tolerance: Treatment limited secondary to medical complications (Comment)  Medical Staff Made Aware: Yes  End of Session Communication: Bedside nurse, Care Coordinator, Physician  Assessment Comment: pt tripped over O2 line at home, fall with SUNITA wrist fx's, surgery 1/28 foir ORIF SUNITA wrist and I&D L wrist. Demos need for full care d/t SUNITA wrist fx's, assist for all mobility d/t imbalance and fall risk.  End of Session Patient Position: Up in chair, Alarm on  IP OR SWING BED PT PLAN  Inpatient or Swing Bed: Inpatient  PT Plan  Treatment/Interventions: Bed mobility, Transfer training, Gait training, Balance training, Neuromuscular re-education, Therapeutic exercise, Therapeutic activity  PT Plan: Skilled PT  PT Frequency: 5 times per week  PT Discharge Recommendations: Moderate intensity level of continued care  Equipment Recommended upon Discharge: Other (comment) (determine best AD (consider \"UP WALKER\") to accommodate SUNITA wrist fx's)  PT Recommended Transfer Status: Assist x2  PT - OK to Discharge: Yes (diego next level of care when cleared by medical team)      Subjective   General Visit Information:  General  Reason for Referral: PT FOR RECENT SURG: UE SUNITA WRIST FX'S  Referred By: TARA NESBITT (ORTHO)  Past Medical History Relevant to Rehab: recent adm in Dec 2023 for pna/COPD exac.  Family/Caregiver Present: No  Co-Treatment: OT  Co-Treatment Reason: optimize pt safety with difficult transfers, while focus discipline specific goals (ie OT attention to LB dressing while PT " "focus standing balance)  Prior to Session Communication: Bedside nurse  Patient Position Received: Bed, 3 rail up, Alarm on  Preferred Learning Style: verbal  General Comment: pt seen in 3312, alert and conversant, very motivated for therapy. had pain meds earlier. pt has SUNITA wrist/splints in place postop. observed swelling R fingers and arm proximal to splint, bleed thru splint--RN notified and did assess. observe pt with avoidance of any use SUNITA UE.  Home Living:  Home Living  Type of Home: House  Lives With: Spouse  Home Adaptive Equipment: Walker rolling or standard  Home Layout: One level  Home Access: Stairs to enter without rails  Entrance Stairs-Number of Steps: \"few\"  Home Living Comments: pt describes \"it all blew up at [place]: spouse picked pt up from hospital, the next day he fell flat on his face--had a stroke and HF, he also has dementia\"  Prior Level of Function:  Prior Function Per Pt/Caregiver Report  Receives Help From: Other (Comment) (pt is the CAREGIVER for her spouse with dementia and recent stroke)  Prior Function Comments: pt states she used a walker at home x2-3 wks only after disch home from hospital, progressed to NO AD recently.  Precautions:  Precautions  UE Weight Bearing Status: Right Non-Weight Bearing, Left Non-Weight Bearing  LE Weight Bearing Status: Other (Comment) (bandage L ant knee only)  Medical Precautions: Fall precautions  Splinting: SUNITA wrist/hand postop  Precautions Comment: 1-2# limit SUNITA UE  Vital Signs:  Vital Signs  Heart Rate: 98  SpO2: (!) 86 % (91% lying/rest, prolonged rebound to 93% HR 91 on 6L in chair, mod PATEL with activity)  Patient Position: Standing    Objective   Pain:  Pain Assessment  Pain Assessment: 0-10  Pain Score:  (uanble to rate)  Pain Type: Acute pain  Pain Location: Wrist  Pain Orientation: Right, Left  Pain Descriptors: Aching  Response to Interventions: elevation  Cognition:  Cognition  Overall Cognitive Status: Within Functional " Limits  Orientation Level: Oriented X4    General Assessments:  General Observation  General Observation: transfer OOB, stood EOB to work on standing balance and hygiene, return to sitting for hygiene completion, stood 2nd time and took steps over to chair, seated rest then stood again to work on balance and upright tolerance. PULSE OX MONITORED THROUGHOUT     Activity Tolerance  Endurance: Decreased tolerance for upright activites  Activity Tolerance Comments: PATEL, hypoxia    Dynamic Sitting Balance  Dynamic Sitting-Comments: good-    Dynamic Standing Balance  Dynamic Standing-Comments: poor falling backward  Functional Assessments:  Bed Mobility  Bed Mobility: Yes  Bed Mobility 1  Bed Mobility 1: Supine to sitting  Level of Assistance 1: Minimum assistance (x2)    Transfers  Transfer: Yes  Transfer 1  Transfer From 1: Bed to  Transfer to 1: Chair with arms  Technique 1: Sit to stand, Stand to sit  Transfer Level of Assistance 1: Arm in arm assistance, Minimum assistance, +2  Trials/Comments 1: 4    Ambulation/Gait Training  Ambulation/Gait Training Performed: Yes  Ambulation/Gait Training 1  Gait Support Devices: Gait belt  Assistance 1: Arm in arm assistance, Moderate assistance (x2)  Quality of Gait 1:  (LOB backward)  Comments/Distance (ft) 1: 4    Stairs  Stairs: No  Extremity/Trunk Assessments:  RLE   RLE : Exceptions to WFL  AROM RLE (degrees)  RLE AROM Comment: WFL  Strength RLE  RLE Overall Strength: Other (Comment) (grossly 4+/5)  LLE   LLE : Exceptions to WFL  AROM LLE (degrees)  LLE AROM Comment: WFL  Strength LLE  LLE Overall Strength: Other (Comment) (grossly 4+/5 not limited by knee   abrasion s/p fall)  Outcome Measures:  [facility] Basic Mobility  Turning from your back to your side while in a flat bed without using bedrails: A little  Moving from lying on your back to sitting on the side of a flat bed without using bedrails: A little  Moving to and from bed to chair (including a wheelchair): A  "lot  Standing up from a chair using your arms (e.g. wheelchair or bedside chair): A lot  To walk in hospital room: Total  Climbing 3-5 steps with railing: Total  Basic Mobility - Total Score: 12    Encounter Problems       Encounter Problems (Active)       Mobility       LTG - Patient will ambulate household distance (Not Progressing)       Start:  01/29/24    Expected End:  02/12/24       DETERMINE APPROP AD, CONSIDER SUNITA PLATFORM FWW (\"UP WALKER\" FOR HOME?)            Transfers       LTG - Patient will demonstrate safe transfer techniques (Progressing)       Start:  01/29/24    Expected End:  02/12/24       MAINTAIN NWB SUNITA UE            strengthening       Perform 20 reps AAROM/AAROM/RROM       Start:  01/29/24                   Education Documentation  Precautions, taught by Khalida Rivero, PT at 1/29/2024 12:56 PM.  Learner: Patient  Readiness: Acceptance  Method: Explanation, Demonstration  Response: Needs Reinforcement    Mobility Training, taught by Khalida Rivero, PT at 1/29/2024 12:56 PM.  Learner: Patient  Readiness: Acceptance  Method: Explanation, Demonstration  Response: Needs Reinforcement    Education Comments  No comments found.            "

## 2024-01-29 NOTE — PROGRESS NOTES
"2/1/24  1544  Patient is scheduled for  to go to the [facility] at 7:00 p.m., patient and neighbor Joseph notified (as Joseph is driving the  over), notified bedside RN, ambulance form on chart.    2/1/24  1438  Received confirmation from Maribeth hernandez [facility] that they had information for patient's  for respite stay.  Discussed with patient that she would be discharged today, advised about not being able to provide transportation for the  from the hospital, she verbalized understanding and  is being transported by friends.  Requested transportation time for patient, patient is agreeable for this discharge plan.    1/31/24  1110  Spoke with patient this morning, her  was at bedside, she stated she is still thinking things through, her neighbor (who drove patient's  to the hospital) was also expressing concerns with patient coming home in her current state as he and his wife are unable to care for patient and her  to the extent they will need assistance.  Patient did agree to have referral placed, will discuss with her again after lunch.    1/30/24  1632  Met with patient, she is still declining all SNF placement at this time.  I discussed with her the safety issues as she is insistent about going home because of her  needing her, but she is unable to care for him and if he were to leave, she is unable to get up to look after him.  She states she understands but just keeps advising that she \"will be fine.\"  I had asked her about private paying for a respite stay for her , we discussed [facility], I spoke with Maribeth at [facility] who advised they could private pay for respite and they would have patient and her  in the same room, while allowing her to rehab and have her  close by.  [facility] charges $1,904/week for the respite for Mr. Davis.  When I discussed this with patient, family was in the room and she stated she wanted to speak " "with everyone and asked that I come by tomorrow morning to discuss further.      1/29/24  1615  Met with patient, , daughter and friends at the bedside in the presence of ZACKARY.  Patient's  had left the house with a friend and had come up to the hospital without his daughter knowing which ended up involving the police.  Patient's  suffers from dementia and his behavior is best when she is around.  Patient is requiring 2 assist to transfers and is unable to utilize a walker due to the bilateral wrist fractures.  Patient is adamantly declining any kind of SNF placement stating \"I will be fine at home.\"  Patient's daughter states she will also be there 24 hours a day and they have friends who can also assist.  I discussed with patient the safety of going home with such a loss of mobility and and that this would leave her open to a possible fall and she verbalized understanding, but still declines rehab.  They are asking for BSC, will request order, they were given a private duty home health aide list, SW advised he would do a [facility] referral.  Per wound care, patient will need close wound care follow up as the wound she presented with in November is now much worse, unsure of family is able to assist with dressing changes.  Physician aware, podiatry consult placed, will send referrals for home care as well.  Care transitions team following to manage discharge plan.     01/29/24 3508   Discharge Planning   Living Arrangements Spouse/significant other   Support Systems Children   Assistance Needed oxygen   Type of Residence Private residence   Number of Stairs to Enter Residence 2   Number of Stairs Within Residence 0   Who is requesting discharge planning? Patient   Home or Post Acute Services Post acute facilities (Rehab/SNF/etc)   Type of Post Acute Facility Services Rehab   Patient expects to be discharged to: Home   Does the patient need discharge transport arranged? No   Financial Resource " Strain   How hard is it for you to pay for the very basics like food, housing, medical care, and heating? Not hard   Housing Stability   In the last 12 months, was there a time when you were not able to pay the mortgage or rent on time? N   In the last 12 months, how many places have you lived? 1   In the last 12 months, was there a time when you did not have a steady place to sleep or slept in a shelter (including now)? N   Transportation Needs   In the past 12 months, has lack of transportation kept you from medical appointments or from getting medications? no   In the past 12 months, has lack of transportation kept you from meetings, work, or from getting things needed for daily living? No   Patient Choice   Provider Choice list and CMS website (https://medicare.gov/care-compare#search) for post-acute Quality and Resource Measure Data were provided and reviewed with: Patient     Spoke with patient at the bedside, she lives home with her , her daughter lives close by, she has friends that drive her where she needs to go.  Patient had a fall with bilateral wrist fractures, she is being recommended SNF.  Patient adamantly refuses to go to rehab, she states she is her 's caregiver and the only place she'll agree to go is home.  I discussed with her the safety of needing 2 person assist for all transfers.  She stated to me that she would be fine and that I could call her daughter and confirm.    I spoke with her daughter Susie, who advised that patient will have help at home. I began discussing home care with her, but she had to hang up as she couldn't figure out what her dad was doing.  I then received a call from Susie that her dad just left in a black car, and she was letting me know in case he came to the hospital.  I did advise her that I am not on the floor, but she gave the phone to a friend who took the number to the 3rd floor nurse's station and also asked for private duty caregivers.  When I  walked upstairs to put the caregiver list in the home, the patient's  was sitting in there.  I called Susie and advised her that her father was here, she said she would get a ride and come up.  Patient is currently on 5.5 L of oxygen, her baseline is 4.  Updated Dr. Marshall regarding same.  Plan will be for her to return home tomorrow with home care, awaiting choices.

## 2024-01-29 NOTE — TELEPHONE ENCOUNTER
3 Ric called requesting dressing orders or wound care orders to be placed for the patient. She had a ORIF R radius fracture done 1/27/24 and the nurse said it looks like she is having blood pooling by her elbow.

## 2024-01-30 PROCEDURE — 94668 MNPJ CHEST WALL SBSQ: CPT

## 2024-01-30 PROCEDURE — 2500000002 HC RX 250 W HCPCS SELF ADMINISTERED DRUGS (ALT 637 FOR MEDICARE OP, ALT 636 FOR OP/ED): Performed by: INTERNAL MEDICINE

## 2024-01-30 PROCEDURE — 2500000004 HC RX 250 GENERAL PHARMACY W/ HCPCS (ALT 636 FOR OP/ED): Performed by: INTERNAL MEDICINE

## 2024-01-30 PROCEDURE — 97530 THERAPEUTIC ACTIVITIES: CPT | Mod: GP,CQ | Performed by: PHYSICAL THERAPY ASSISTANT

## 2024-01-30 PROCEDURE — 97530 THERAPEUTIC ACTIVITIES: CPT | Mod: GO,CO

## 2024-01-30 PROCEDURE — 2500000004 HC RX 250 GENERAL PHARMACY W/ HCPCS (ALT 636 FOR OP/ED): Performed by: ORTHOPAEDIC SURGERY

## 2024-01-30 PROCEDURE — 1200000002 HC GENERAL ROOM WITH TELEMETRY DAILY

## 2024-01-30 PROCEDURE — 2500000001 HC RX 250 WO HCPCS SELF ADMINISTERED DRUGS (ALT 637 FOR MEDICARE OP): Performed by: ORTHOPAEDIC SURGERY

## 2024-01-30 PROCEDURE — 97535 SELF CARE MNGMENT TRAINING: CPT | Mod: GO,CO

## 2024-01-30 PROCEDURE — 97116 GAIT TRAINING THERAPY: CPT | Mod: GP,CQ | Performed by: PHYSICAL THERAPY ASSISTANT

## 2024-01-30 PROCEDURE — 2500000002 HC RX 250 W HCPCS SELF ADMINISTERED DRUGS (ALT 637 FOR MEDICARE OP, ALT 636 FOR OP/ED): Performed by: ORTHOPAEDIC SURGERY

## 2024-01-30 PROCEDURE — 99233 SBSQ HOSP IP/OBS HIGH 50: CPT | Performed by: INTERNAL MEDICINE

## 2024-01-30 RX ORDER — MAGNESIUM SULFATE HEPTAHYDRATE 40 MG/ML
2 INJECTION, SOLUTION INTRAVENOUS ONCE
Status: COMPLETED | OUTPATIENT
Start: 2024-01-30 | End: 2024-01-30

## 2024-01-30 RX ADMIN — METOPROLOL TARTRATE 37.5 MG: 25 TABLET, FILM COATED ORAL at 20:44

## 2024-01-30 RX ADMIN — ACETAMINOPHEN 650 MG: 325 TABLET ORAL at 08:43

## 2024-01-30 RX ADMIN — FLUTICASONE FUROATE AND VILANTEROL TRIFENATATE 1 PUFF: 200; 25 POWDER RESPIRATORY (INHALATION) at 09:00

## 2024-01-30 RX ADMIN — ACETAMINOPHEN 650 MG: 325 TABLET ORAL at 14:43

## 2024-01-30 RX ADMIN — IPRATROPIUM BROMIDE 2 PUFF: 17 AEROSOL, METERED RESPIRATORY (INHALATION) at 12:09

## 2024-01-30 RX ADMIN — MAGNESIUM SULFATE HEPTAHYDRATE 2 G: 40 INJECTION, SOLUTION INTRAVENOUS at 14:43

## 2024-01-30 RX ADMIN — IPRATROPIUM BROMIDE 2 PUFF: 17 AEROSOL, METERED RESPIRATORY (INHALATION) at 20:44

## 2024-01-30 RX ADMIN — HEPARIN SODIUM 5000 UNITS: 5000 INJECTION INTRAVENOUS; SUBCUTANEOUS at 18:06

## 2024-01-30 RX ADMIN — HEPARIN SODIUM 5000 UNITS: 5000 INJECTION INTRAVENOUS; SUBCUTANEOUS at 10:00

## 2024-01-30 RX ADMIN — ACETAMINOPHEN 650 MG: 325 TABLET ORAL at 02:00

## 2024-01-30 RX ADMIN — METOPROLOL TARTRATE 37.5 MG: 25 TABLET, FILM COATED ORAL at 08:43

## 2024-01-30 RX ADMIN — HEPARIN SODIUM 5000 UNITS: 5000 INJECTION INTRAVENOUS; SUBCUTANEOUS at 02:30

## 2024-01-30 RX ADMIN — IPRATROPIUM BROMIDE 2 PUFF: 17 AEROSOL, METERED RESPIRATORY (INHALATION) at 16:14

## 2024-01-30 RX ADMIN — IPRATROPIUM BROMIDE 2 PUFF: 17 AEROSOL, METERED RESPIRATORY (INHALATION) at 09:00

## 2024-01-30 RX ADMIN — PREDNISONE 10 MG: 10 TABLET ORAL at 08:43

## 2024-01-30 RX ADMIN — ACETAMINOPHEN 650 MG: 325 TABLET ORAL at 20:44

## 2024-01-30 ASSESSMENT — ACTIVITIES OF DAILY LIVING (ADL): HOME_MANAGEMENT_TIME_ENTRY: 20

## 2024-01-30 ASSESSMENT — COGNITIVE AND FUNCTIONAL STATUS - GENERAL
STANDING UP FROM CHAIR USING ARMS: A LOT
TOILETING: A LOT
STANDING UP FROM CHAIR USING ARMS: A LOT
DRESSING REGULAR LOWER BODY CLOTHING: TOTAL
EATING MEALS: A LOT
EATING MEALS: A LOT
CLIMB 3 TO 5 STEPS WITH RAILING: TOTAL
PERSONAL GROOMING: A LOT
WALKING IN HOSPITAL ROOM: A LOT
TURNING FROM BACK TO SIDE WHILE IN FLAT BAD: A LOT
CLIMB 3 TO 5 STEPS WITH RAILING: TOTAL
DRESSING REGULAR UPPER BODY CLOTHING: TOTAL
EATING MEALS: A LOT
TOILETING: A LOT
MOVING TO AND FROM BED TO CHAIR: A LOT
HELP NEEDED FOR BATHING: TOTAL
MOVING FROM LYING ON BACK TO SITTING ON SIDE OF FLAT BED WITH BEDRAILS: A LITTLE
MOBILITY SCORE: 11
DAILY ACTIVITIY SCORE: 9
MOBILITY SCORE: 12
MOVING FROM LYING ON BACK TO SITTING ON SIDE OF FLAT BED WITH BEDRAILS: A LITTLE
TURNING FROM BACK TO SIDE WHILE IN FLAT BAD: A LOT
CLIMB 3 TO 5 STEPS WITH RAILING: TOTAL
PERSONAL GROOMING: A LOT
DRESSING REGULAR UPPER BODY CLOTHING: TOTAL
MOVING TO AND FROM BED TO CHAIR: A LOT
STANDING UP FROM CHAIR USING ARMS: A LOT
DRESSING REGULAR LOWER BODY CLOTHING: TOTAL
TOILETING: A LOT
MOVING FROM LYING ON BACK TO SITTING ON SIDE OF FLAT BED WITH BEDRAILS: A LOT
TURNING FROM BACK TO SIDE WHILE IN FLAT BAD: A LOT
WALKING IN HOSPITAL ROOM: A LOT
DRESSING REGULAR LOWER BODY CLOTHING: TOTAL
DAILY ACTIVITIY SCORE: 9
MOVING TO AND FROM BED TO CHAIR: A LOT
WALKING IN HOSPITAL ROOM: A LOT
DAILY ACTIVITIY SCORE: 9
HELP NEEDED FOR BATHING: TOTAL
DRESSING REGULAR UPPER BODY CLOTHING: TOTAL
PERSONAL GROOMING: A LOT
MOBILITY SCORE: 12
HELP NEEDED FOR BATHING: TOTAL

## 2024-01-30 ASSESSMENT — PAIN SCALES - GENERAL
PAINLEVEL_OUTOF10: 3
PAINLEVEL_OUTOF10: 0 - NO PAIN
PAINLEVEL_OUTOF10: 0 - NO PAIN
PAINLEVEL_OUTOF10: 4

## 2024-01-30 ASSESSMENT — PAIN - FUNCTIONAL ASSESSMENT
PAIN_FUNCTIONAL_ASSESSMENT: 0-10

## 2024-01-30 NOTE — PROGRESS NOTES
Irish Davis is a 78 y.o. female on day 3 of admission presenting with Closed fracture of right radius and ulna, initial encounter.      Subjective   Irish Davis is a 78 y.o. female with PMHx s/f HTN, HLD, moderate aortic stenosis, chronic bilateral lower extremity edema (on as needed Lasix), COPD with chronic hypoxic respiratory failure (baseline 3-4 L nasal cannula) who presented to the emergency room after a fall at home with bilateral distal radius fracture.  On admission in the emergency room she was hemodynamically stable with a blood pressure of 163/83 mmHg, heart rate of 72/min, respirate rate of 18/min, temperature of 36.8 °C and was saturating 94% on her baseline 4 L nasal cannula oxygen.  Initial CBC showed a hemoglobin of 10.6 BMP were significant for a potassium of 3.4.  Influenza antigen and COVID PCR were negative.  CT of the head was negative for any acute intracranial process, CT cervical spine showed loss of normal cervical lordosis with demineralization in addition to some healing or healed fracture of the C1 vertebrae.  X-ray of the bilateral wrist and hand showed complex distal radius and ulna fracture with impaction and dorsal angulation and displacement on the left and dorsally angulated distal radius fracture and ulnar styloid fracture on the right.  General medicine has been consulted for medical management.  I saw patient on the regular nursing floor..  She was drowsy and barely arousable.  Daughter and  at bedside confirming initial  history prior to admission.  1/28: Patient was seen and examined.  She is more awake and alert and oriented x 3 when I saw her today.  She was complaining of some more difficulty in breathing.  Chest x-ray done and that showed pulmonary edema.  Will stop IV fluids.  I will give 1 dose of IV Lasix.  Strict input output chart.  As needed DuoNebs.  I will suspend any discharge plans for today.  Repeat BMP and CBC in AM.  1/29: Patient was seen  and examined.  She is refusing discharge to skilled nursing facility even though patient is severely deconditioned.  Hemoglobin is stable at 9.9.  Hypomagnesemia noted; we will supplement with admission.  Patient and family will discuss and make a final decision with regards to extended-care facility.  PT OT recommendations appreciated.      Objective     Last Recorded Vitals  /68 (BP Location: Left arm, Patient Position: Sitting)   Pulse 78   Temp 36.6 °C (97.9 °F)   Resp 20   Wt 53.5 kg (117 lb 14.4 oz)   SpO2 92%   Intake/Output last 3 Shifts:    Intake/Output Summary (Last 24 hours) at 1/30/2024 1424  Last data filed at 1/30/2024 0620  Gross per 24 hour   Intake --   Output 650 ml   Net -650 ml         Admission Weight  Weight: 54.4 kg (120 lb) (01/26/24 1601)    Daily Weight  01/30/24 : 53.5 kg (117 lb 14.4 oz)    Image Results  Transthoracic Echo (TTE) [place] [id number]       Phone [phone] Fax [phone]    TRANSTHORACIC ECHOCARDIOGRAM REPORT       Patient Name:      ROMULO DOHERTY   Reading Physician:    [id number] Annika Rene MD  Study Date:        1/29/2024            Ordering Provider:    [id number] AIYANA VO  MRN/PID:           [id number]             Fellow:  Accession#:        [id number]         Nurse:  Date of Birth/Age: 1945 / 78 years Sonographer:          Huyen Reagan RDCS  Gender:            F                    Additional Staff:  Height:            154.94 cm            Admit Date:           1/26/2024  Weight:            53.52 kg             Admission Status:     Inpatient -                                                                Routine  BSA:               1.51 m2              Department Location:   [place] [facility]  Blood Pressure: 130 /77 mmHg    Study Type:    TRANSTHORACIC ECHO (TTE) COMPLETE  Diagnosis/ICD: Heart failure, unspecified-I50.9  Indication:    Congestive Heart Failure  CPT Codes:     Echo Complete w Full [id number]    Patient History:  Pertinent History: AS.    Study Detail: The following Echo studies were performed: 2D, M-Mode, Doppler and                color flow.       PHYSICIAN INTERPRETATION:  Left Ventricle: Left ventricular systolic function is normal, with an estimated ejection fraction of 60-65%. There are no regional wall motion abnormalities. The left ventricular cavity size is normal. Spectral Doppler shows a normal pattern of left ventricular diastolic filling.  Left Atrium: The left atrium is normal in size.  Right Ventricle: The right ventricle is normal in size. There is low normal right ventricular systolic function.  Right Atrium: The right atrium is normal in size.  Aortic Valve: The aortic valve was not well visualized. There is mild aortic valve cusp calcification. There is evidence of mild aortic valve stenosis.  The aortic valve dimensionless index is 0.55. There is no evidence of aortic valve regurgitation. The peak instantaneous gradient of the aortic valve is 23.0 mmHg. The mean gradient of the aortic valve is 12.8 mmHg.  Mitral Valve: The mitral valve is mildly thickened. There is trace mitral valve regurgitation.  Tricuspid Valve: The tricuspid valve is structurally normal. There is mild tricuspid regurgitation.  Pulmonic Valve: The pulmonic valve is structurally normal. There is no indication of pulmonic valve regurgitation.  Pericardium: There is a trivial pericardial effusion.  Aorta: The aortic root is normal.       CONCLUSIONS:   1. Left ventricular systolic function is normal with a 60-65% estimated ejection fraction.   2. There is low normal right ventricular systolic function.   3. Mild aortic valve stenosis.    QUANTITATIVE DATA SUMMARY:  2D  MEASUREMENTS:                           Normal Ranges:  LAs:           2.25 cm   (2.7-4.0cm)  IVSd:          0.78 cm   (0.6-1.1cm)  LVPWd:         0.92 cm   (0.6-1.1cm)  LVIDd:         3.26 cm   (3.9-5.9cm)  LVIDs:         2.38 cm  LV Mass Index: 48.6 g/m2  LV % FS        27.1 %    LA VOLUME:                                Normal Ranges:  LA Vol A4C:        17.3 ml    (22+/-6mL/m2)  LA Vol A2C:        29.8 ml  LA Vol BP:         23.6 ml  LA Vol Index A4C:  11.5 ml/m2  LA Vol Index A2C:  19.7 ml/m2  LA Vol Index BP:   15.7 ml/m2  LA Area A4C:       10.4 cm2  LA Area A2C:       13.1 cm2  LA Major Axis A4C: 5.3 cm  LA Major Axis A2C: 4.9 cm  LA Volume Index:   15.7 ml/m2  LA Vol A4C:        15.5 ml  LA Vol A2C:        28.5 ml    RA VOLUME BY A/L METHOD:                        Normal Ranges:  RA Area A4C: 10.7 cm2    AORTA MEASUREMENTS:                       Normal Ranges:  Ao Sinus, d: 2.80 cm (2.1-3.5cm)  Ao STJ, d:   2.50 cm (1.7-3.4cm)  Asc Ao, d:   2.80 cm (2.1-3.4cm)    LV SYSTOLIC FUNCTION BY 2D PLANIMETRY (MOD):                      Normal Ranges:  EF-A4C View: 73.1 % (>=55%)  EF-A2C View: 68.9 %  EF-Biplane:  71.8 %    LV DIASTOLIC FUNCTION:                           Normal Ranges:  MV Peak E:    0.91 m/s   (0.7-1.2 m/s)  MV Peak A:    0.88 m/s   (0.42-0.7 m/s)  E/A Ratio:    1.04       (1.0-2.2)  MV e'         0.09 m/s   (>8.0)  MV lateral e' 0.10 m/s  MV medial e'  0.08 m/s  MV A Dur:     82.78 msec  E/e' Ratio:   10.14      (<8.0)    MITRAL VALVE:                  Normal Ranges:  MV DT: 176 msec (150-240msec)    AORTIC VALVE:                                     Normal Ranges:  AoV Vmax:                2.40 m/s  (<=1.7m/s)  AoV Peak P.0 mmHg (<20mmHg)  AoV Mean P.8 mmHg (1.7-11.5mmHg)  LVOT Max Pradeep:            1.19 m/s  (<=1.1m/s)  AoV VTI:                 48.90 cm  (18-25cm)  LVOT VTI:                26.86 cm  LVOT Diameter:           2.01 cm   (1.8-2.4cm)  AoV Area, VTI:            1.75 cm2  (2.5-5.5cm2)  AoV Area,Vmax:           1.58 cm2  (2.5-4.5cm2)  AoV Dimensionless Index: 0.55       RIGHT VENTRICLE:  RV Basal 2.66 cm  RV Mid   1.70 cm  RV Major 6.6 cm  TAPSE:   19.5 mm  RV s'    0.10 m/s    TRICUSPID VALVE/RVSP:                              Normal Ranges:  Peak TR Velocity: 3.15 m/s  Est. RA Pressure: 3 mmHg  RV Syst Pressure: 42.8 mmHg (< 30mmHg)  IVC Diam:         1.82 cm    AORTA:  Asc Ao Diam 2.81 cm       [id number] Annika Rene MD  Electronically signed on 1/29/2024 at 6:03:24 PM       ** Final **  ECG 12 Lead  Sinus rhythm  Borderline right axis deviation  Low voltage, extremity leads    Confirmed by Annika Rene ([id number]) on 1/29/2024 5:52:59 PM      Physical Exam  Constitutional:       General: Acute on chronically ill looking elderly  female; dry mucous membrane, awake and alert, oriented x 3     HENT:      Head: Normocephalic and atraumatic.      Right Ear: External ear normal.      Left Ear: External ear normal.      Nose: Nose normal.      Mouth/Throat:      Mouth: Mucous membranes are dry     Pharynx: Oropharynx is clear.   Eyes:      Extraocular Movements: Extraocular movements intact.      Conjunctiva/sclera: Conjunctivae normal.      Pupils: Pupils are equal, round, and reactive to light.   Cardiovascular:      Rate and Rhythm: Normal rate and regular rhythm.      Pulses: Normal pulses.      Heart sounds: Normal heart sounds.   Pulmonary:      Effort: Pulmonary effort is normal. No respiratory distress.      Breath sounds: Wheezing and rales present. No rhonchi.      Comments: NC in place  Abdominal:      General: Abdomen is flat. Bowel sounds are normal.      Palpations: Abdomen is soft.      Tenderness: There is no abdominal tenderness. There is no right CVA tenderness, left CVA tenderness, guarding or rebound.   Musculoskeletal:      Bilateral ACE bandaging of the upper extremity with splinting  Skin:     General: Skin is warm and dry.       Capillary Refill: Capillary refill takes less than 2 seconds.      Findings: No lesion or rash.   Neurological:      General: No focal deficit present.      Mental Status: Awake and alert and oriented x 3 and moves all limbs.     Relevant Results               Assessment/Plan                  Principal Problem:    Closed fracture of right radius and ulna, initial encounter  Active Problems:    Type I or II open fracture of left radius and ulna    #Bilateral distal radius and ulna fracture  Status post ORIF  Admitted on regular nursing floor  Started on pain control  Orthopedic surgery consulted for surgical intervention     # Acute on chronic respiratory failure  Likely related to pulmonary edema plus bronchospasm  Chest x-ray reviewed  Given IV Lasix 20 mg  As needed DuoNebs  Continue nasal cannula oxygen and wean down as tolerated     #COPD  Currently stable  As needed DuoNebs     #Hypertension  Resumed metoprolol     #DVT prophylaxis  Subcutaneous heparin          Spent 40 minutes in the follow-up management of this patient today       Ortiz Marshall MD

## 2024-01-30 NOTE — CARE PLAN
The patient's goals for the shift include   Problem: Pain - Adult  Goal: Verbalizes/displays adequate comfort level or baseline comfort level  Outcome: Progressing     Problem: Safety - Adult  Goal: Free from fall injury  Outcome: Progressing     Problem: Discharge Planning  Goal: Discharge to home or other facility with appropriate resources  Outcome: Progressing     Problem: Chronic Conditions and Co-morbidities  Goal: Patient's chronic conditions and co-morbidity symptoms are monitored and maintained or improved  Outcome: Progressing     Problem: Skin  Goal: Decreased wound size/increased tissue granulation at next dressing change  Recent Flowsheet Documentation  Taken 1/30/2024 0735 by Gabby Chand RN  Decreased wound size/increased tissue granulation at next dressing change: Promote sleep for wound healing  Goal: Participates in plan/prevention/treatment measures  Recent Flowsheet Documentation  Taken 1/30/2024 0735 by Gabby Chand RN  Participates in plan/prevention/treatment measures:   Elevate heels   Discuss with provider PT/OT consult   Increase activity/out of bed for meals  Goal: Prevent/manage excess moisture  Recent Flowsheet Documentation  Taken 1/30/2024 0735 by Gabby Chand RN  Prevent/manage excess moisture: Monitor for/manage infection if present  Goal: Prevent/minimize sheer/friction injuries  Recent Flowsheet Documentation  Taken 1/30/2024 0735 by Gabby Chand RN  Prevent/minimize sheer/friction injuries: Turn/reposition every 2 hours/use positioning/transfer devices  Goal: Promote/optimize nutrition  Recent Flowsheet Documentation  Taken 1/30/2024 0735 by Gabby Chand RN  Promote/optimize nutrition: Monitor/record intake including meals  Goal: Promote skin healing  Recent Flowsheet Documentation  Taken 1/30/2024 0735 by Gabby Chand RN  Promote skin healing:   Assess skin/pad under line(s)/device(s)   Turn/reposition every 2 hours/use  positioning/transfer devices     Problem: Skin  Goal: Decreased wound size/increased tissue granulation at next dressing change  Outcome: Progressing  Flowsheets (Taken 1/30/2024 0735)  Decreased wound size/increased tissue granulation at next dressing change: Promote sleep for wound healing  Goal: Participates in plan/prevention/treatment measures  Outcome: Progressing  Flowsheets (Taken 1/30/2024 0735)  Participates in plan/prevention/treatment measures:   Elevate heels   Discuss with provider PT/OT consult   Increase activity/out of bed for meals  Goal: Prevent/manage excess moisture  Outcome: Progressing  Flowsheets (Taken 1/30/2024 0735)  Prevent/manage excess moisture: Monitor for/manage infection if present  Goal: Prevent/minimize sheer/friction injuries  Outcome: Progressing  Flowsheets (Taken 1/30/2024 0735)  Prevent/minimize sheer/friction injuries: Turn/reposition every 2 hours/use positioning/transfer devices  Goal: Promote/optimize nutrition  Outcome: Progressing  Flowsheets (Taken 1/30/2024 0735)  Promote/optimize nutrition: Monitor/record intake including meals  Goal: Promote skin healing  Outcome: Progressing  Flowsheets (Taken 1/30/2024 0735)  Promote skin healing:   Assess skin/pad under line(s)/device(s)   Turn/reposition every 2 hours/use positioning/transfer devices     Problem: Fall/Injury  Goal: Not fall by end of shift  Outcome: Progressing  Goal: Be free from injury by end of the shift  Outcome: Progressing  Goal: Verbalize understanding of personal risk factors for fall in the hospital  Outcome: Progressing  Goal: Verbalize understanding of risk factor reduction measures to prevent injury from fall in the home  Outcome: Progressing  Goal: Use assistive devices by end of the shift  Outcome: Progressing  Goal: Pace activities to prevent fatigue by end of the shift  Outcome: Progressing     Problem: Pain  Goal: Takes deep breaths with improved pain control throughout the shift  Outcome:  Progressing  Goal: Turns in bed with improved pain control throughout the shift  Outcome: Progressing  Goal: Walks with improved pain control throughout the shift  Outcome: Progressing  Goal: Performs ADL's with improved pain control throughout shift  Outcome: Progressing  Goal: Participates in PT with improved pain control throughout the shift  Outcome: Progressing  Goal: Free from opioid side effects throughout the shift  Outcome: Progressing  Goal: Free from acute confusion related to pain meds throughout the shift  Outcome: Progressing

## 2024-01-30 NOTE — DOCUMENTATION CLARIFICATION NOTE
"    PATIENT:               ROMULO DOHERTY  ACCT #:                  [id number]  MRN:                       [id number]  :                       1945  ADMIT DATE:       2024 7:32 PM  DISCH DATE:  RESPONDING PROVIDER #:        [id number]          PROVIDER RESPONSE TEXT:    pulmonary edema is acute    CDI QUERY TEXT:    [facility]_Acuity of Condition      Instruction:  Based on your assessment of the patient and the clinical information, please provide the requested documentation by clicking on the appropriate radio button and enter any additional information if prompted.    Question: Please further clarify the acuity of pulmonary edema        When answering this query, please exercise your independent professional judgment. The fact that a question is being asked, does not imply that any particular answer is desired or expected.      The patient's clinical indicators include:  Clinical Information: 78 yr. old admitted with fall s/p left distal radius ORIF and I and D for open fracture and right distal radius ORIF.    Clinical Indicators:  24 Medicine: \"Patient was seen and examined.  She is more awake and alert and oriented x 3 when I saw her today.  She was complaining of some more difficulty in breathing.  Chest x-ray done and that showed pulmonary edema.  Will stop IV fluids.  I will give 1 dose of IV Lasix.  Strict input output chart.  As needed DuoNebs.  I will suspend any discharge plans for today. Acute on chronic respiratory failure likely related to pulmonary edema plus bronchospasm.\"  24 CXR: \" Features suspicious for progressive pulmonary edema.\"      Treatment: IV Lasix, Duonebs, CXR. Usually on 4liters of oxygen for copd but had to increase to 6liters.    Risk Factors: IV fluids. Hx of aortic stenosis.  Options provided:  -- Pulmonary edema is acute  -- Pulmonary edema is chronic  -- Other - I will add my own diagnosis  -- Refer to Clinical Documentation Reviewer    Query created by: Romulo Borges on " 1/30/2024 6:22 AM      Electronically signed by:  MY WEN MD 1/30/2024 3:05 PM

## 2024-01-30 NOTE — PROGRESS NOTES
Irish Davis is a 78 y.o. female on day 3 of admission presenting with Closed fracture of right radius and ulna, initial encounter.      Subjective   Irish Davis is a 78 y.o. female with PMHx s/f HTN, HLD, moderate aortic stenosis, chronic bilateral lower extremity edema (on as needed Lasix), COPD with chronic hypoxic respiratory failure (baseline 3-4 L nasal cannula) who presented to the emergency room after a fall at home with bilateral distal radius fracture.  On admission in the emergency room she was hemodynamically stable with a blood pressure of 163/83 mmHg, heart rate of 72/min, respirate rate of 18/min, temperature of 36.8 °C and was saturating 94% on her baseline 4 L nasal cannula oxygen.  Initial CBC showed a hemoglobin of 10.6 BMP were significant for a potassium of 3.4.  Influenza antigen and COVID PCR were negative.  CT of the head was negative for any acute intracranial process, CT cervical spine showed loss of normal cervical lordosis with demineralization in addition to some healing or healed fracture of the C1 vertebrae.  X-ray of the bilateral wrist and hand showed complex distal radius and ulna fracture with impaction and dorsal angulation and displacement on the left and dorsally angulated distal radius fracture and ulnar styloid fracture on the right.  General medicine has been consulted for medical management.  I saw patient on the regular nursing floor..  She was drowsy and barely arousable.  Daughter and  at bedside confirming initial  history prior to admission.  1/28: Patient was seen and examined.  She is more awake and alert and oriented x 3 when I saw her today.  She was complaining of some more difficulty in breathing.  Chest x-ray done and that showed pulmonary edema.  Will stop IV fluids.  I will give 1 dose of IV Lasix.  Strict input output chart.  As needed DuoNebs.  I will suspend any discharge plans for today.  Repeat BMP and CBC in AM.  1/29: Patient was seen  and examined.  She is refusing discharge to skilled nursing facility even though patient is severely deconditioned.  Hemoglobin is stable at 9.9.  Hypomagnesemia noted; we will supplement with admission.  Patient and family will discuss and make a final decision with regards to extended-care facility.  PT OT recommendations appreciated.  1/30: Patient was seen and examined.  Now agrees to go to a skilled nursing facility.  Hemoglobin is remaining stable.  Patient now agrees to go to a skilled nursing facility on discharge with restrictions to facilities close to home.  Awaiting choice and authorization for placement.      Objective     Last Recorded Vitals  /68 (BP Location: Left arm, Patient Position: Sitting)   Pulse 78   Temp 36.6 °C (97.9 °F)   Resp 20   Wt 53.5 kg (117 lb 14.4 oz)   SpO2 92%   Intake/Output last 3 Shifts:    Intake/Output Summary (Last 24 hours) at 1/30/2024 1426  Last data filed at 1/30/2024 0620  Gross per 24 hour   Intake --   Output 650 ml   Net -650 ml         Admission Weight  Weight: 54.4 kg (120 lb) (01/26/24 1601)    Daily Weight  01/30/24 : 53.5 kg (117 lb 14.4 oz)    Image Results  Transthoracic Echo (TTE) [place] [address]       Phone [phone] Fax [phone]    TRANSTHORACIC ECHOCARDIOGRAM REPORT       Patient Name:      ROMULO DOHERTY   Reading Physician:    [id number] Annika Rene MD  Study Date:        1/29/2024            Ordering Provider:    [id number] AIYANA VO  MRN/PID:           [id number]             Fellow:  Accession#:        [id number]         Nurse:  Date of Birth/Age: 1945 / 78 years Sonographer:          Huyen Reagan RDCS  Gender:            F                    Additional  Staff:  Height:            154.94 cm            Admit Date:           1/26/2024  Weight:            53.52 kg             Admission Status:     Inpatient -                                                                Routine  BSA:               1.51 m2              Department Location:  [address]  Blood Pressure: 130 /77 mmHg    Study Type:    TRANSTHORACIC ECHO (TTE) COMPLETE  Diagnosis/ICD: Heart failure, unspecified-I50.9  Indication:    Congestive Heart Failure  CPT Codes:     Echo Complete w Full Doppler-[id number]    Patient History:  Pertinent History: AS.    Study Detail: The following Echo studies were performed: 2D, M-Mode, Doppler and                color flow.       PHYSICIAN INTERPRETATION:  Left Ventricle: Left ventricular systolic function is normal, with an estimated ejection fraction of 60-65%. There are no regional wall motion abnormalities. The left ventricular cavity size is normal. Spectral Doppler shows a normal pattern of left ventricular diastolic filling.  Left Atrium: The left atrium is normal in size.  Right Ventricle: The right ventricle is normal in size. There is low normal right ventricular systolic function.  Right Atrium: The right atrium is normal in size.  Aortic Valve: The aortic valve was not well visualized. There is mild aortic valve cusp calcification. There is evidence of mild aortic valve stenosis.  The aortic valve dimensionless index is 0.55. There is no evidence of aortic valve regurgitation. The peak instantaneous gradient of the aortic valve is 23.0 mmHg. The mean gradient of the aortic valve is 12.8 mmHg.  Mitral Valve: The mitral valve is mildly thickened. There is trace mitral valve regurgitation.  Tricuspid Valve: The tricuspid valve is structurally normal. There is mild tricuspid regurgitation.  Pulmonic Valve: The pulmonic valve is structurally normal. There is no indication of pulmonic valve regurgitation.  Pericardium: There is a trivial pericardial  effusion.  Aorta: The aortic root is normal.       CONCLUSIONS:   1. Left ventricular systolic function is normal with a 60-65% estimated ejection fraction.   2. There is low normal right ventricular systolic function.   3. Mild aortic valve stenosis.    QUANTITATIVE DATA SUMMARY:  2D MEASUREMENTS:                           Normal Ranges:  LAs:           2.25 cm   (2.7-4.0cm)  IVSd:          0.78 cm   (0.6-1.1cm)  LVPWd:         0.92 cm   (0.6-1.1cm)  LVIDd:         3.26 cm   (3.9-5.9cm)  LVIDs:         2.38 cm  LV Mass Index: 48.6 g/m2  LV % FS        27.1 %    LA VOLUME:                                Normal Ranges:  LA Vol A4C:        17.3 ml    (22+/-6mL/m2)  LA Vol A2C:        29.8 ml  LA Vol BP:         23.6 ml  LA Vol Index A4C:  11.5 ml/m2  LA Vol Index A2C:  19.7 ml/m2  LA Vol Index BP:   15.7 ml/m2  LA Area A4C:       10.4 cm2  LA Area A2C:       13.1 cm2  LA Major Axis A4C: 5.3 cm  LA Major Axis A2C: 4.9 cm  LA Volume Index:   15.7 ml/m2  LA Vol A4C:        15.5 ml  LA Vol A2C:        28.5 ml    RA VOLUME BY A/L METHOD:                        Normal Ranges:  RA Area A4C: 10.7 cm2    AORTA MEASUREMENTS:                       Normal Ranges:  Ao Sinus, d: 2.80 cm (2.1-3.5cm)  Ao STJ, d:   2.50 cm (1.7-3.4cm)  Asc Ao, d:   2.80 cm (2.1-3.4cm)    LV SYSTOLIC FUNCTION BY 2D PLANIMETRY (MOD):                      Normal Ranges:  EF-A4C View: 73.1 % (>=55%)  EF-A2C View: 68.9 %  EF-Biplane:  71.8 %    LV DIASTOLIC FUNCTION:                           Normal Ranges:  MV Peak E:    0.91 m/s   (0.7-1.2 m/s)  MV Peak A:    0.88 m/s   (0.42-0.7 m/s)  E/A Ratio:    1.04       (1.0-2.2)  MV e'         0.09 m/s   (>8.0)  MV lateral e' 0.10 m/s  MV medial e'  0.08 m/s  MV A Dur:     82.78 msec  E/e' Ratio:   10.14      (<8.0)    MITRAL VALVE:                  Normal Ranges:  MV DT: 176 msec (150-240msec)    AORTIC VALVE:                                     Normal Ranges:  AoV Vmax:                2.40 m/s  (<=1.7m/s)  AoV  Peak P.0 mmHg (<20mmHg)  AoV Mean P.8 mmHg (1.7-11.5mmHg)  LVOT Max Pradeep:            1.19 m/s  (<=1.1m/s)  AoV VTI:                 48.90 cm  (18-25cm)  LVOT VTI:                26.86 cm  LVOT Diameter:           2.01 cm   (1.8-2.4cm)  AoV Area, VTI:           1.75 cm2  (2.5-5.5cm2)  AoV Area,Vmax:           1.58 cm2  (2.5-4.5cm2)  AoV Dimensionless Index: 0.55       RIGHT VENTRICLE:  RV Basal 2.66 cm  RV Mid   1.70 cm  RV Major 6.6 cm  TAPSE:   19.5 mm  RV s'    0.10 m/s    TRICUSPID VALVE/RVSP:                              Normal Ranges:  Peak TR Velocity: 3.15 m/s  Est. RA Pressure: 3 mmHg  RV Syst Pressure: 42.8 mmHg (< 30mmHg)  IVC Diam:         1.82 cm    AORTA:  Asc Ao Diam 2.81 cm       [id number] Annika Rene MD  Electronically signed on 2024 at 6:03:24 PM       ** Final **  ECG 12 Lead  Sinus rhythm  Borderline right axis deviation  Low voltage, extremity leads    Confirmed by Annika Rene ([id number]) on 2024 5:52:59 PM      Physical Exam  Constitutional:       General: Acute on chronically ill looking elderly  female; dry mucous membrane, awake and alert, oriented x 3     HENT:      Head: Normocephalic and atraumatic.      Right Ear: External ear normal.      Left Ear: External ear normal.      Nose: Nose normal.      Mouth/Throat:      Mouth: Mucous membranes are dry     Pharynx: Oropharynx is clear.   Eyes:      Extraocular Movements: Extraocular movements intact.      Conjunctiva/sclera: Conjunctivae normal.      Pupils: Pupils are equal, round, and reactive to light.   Cardiovascular:      Rate and Rhythm: Normal rate and regular rhythm.      Pulses: Normal pulses.      Heart sounds: Normal heart sounds.   Pulmonary:      Effort: Pulmonary effort is normal. No respiratory distress.      Breath sounds: Wheezing and rales present. No rhonchi.      Comments: NC in place  Abdominal:      General: Abdomen is flat. Bowel sounds are normal.       Palpations: Abdomen is soft.      Tenderness: There is no abdominal tenderness. There is no right CVA tenderness, left CVA tenderness, guarding or rebound.   Musculoskeletal:      Bilateral ACE bandaging of the upper extremity with splinting  Skin:     General: Skin is warm and dry.      Capillary Refill: Capillary refill takes less than 2 seconds.      Findings: No lesion or rash.   Neurological:      General: No focal deficit present.      Mental Status: Awake and alert and oriented x 3 and moves all limbs.     Relevant Results               Assessment/Plan                  Principal Problem:    Closed fracture of right radius and ulna, initial encounter  Active Problems:    Type I or II open fracture of left radius and ulna    #Bilateral distal radius and ulna fracture  Status post ORIF  Admitted on regular nursing floor  Started on pain control  Orthopedic surgery consulted for surgical intervention     # Acute on chronic respiratory failure  Likely related to pulmonary edema plus bronchospasm  Chest x-ray reviewed  Given IV Lasix 20 mg  As needed DuoNebs  Continue nasal cannula oxygen and wean down as tolerated     #COPD  Currently stable  As needed DuoNebs     #Hypertension  Resumed metoprolol     #DVT prophylaxis  Subcutaneous heparin          Spent 40 minutes in the follow-up management of this patient today       Ortiz Marshall MD

## 2024-01-30 NOTE — PROGRESS NOTES
"Physical Therapy    Physical Therapy Treatment    Patient Name: Irish Davis  MRN: [id number]  Today's Date: 1/30/2024  Time Calculation  Start Time: 0920  Stop Time: 0949  Time Calculation (min): 29 min       Assessment/Plan   PT Assessment  End of Session Communication: Bedside nurse  Assessment Comment: pt is unsteady and at risk of falls. pt required assist for all mobility and care. She would benefit from further skilled treatment. pt will require 24 hour assist at home.  End of Session Patient Position: Up in chair, Alarm on     PT Plan  Treatment/Interventions: Bed mobility, Transfer training, Gait training, Balance training, Neuromuscular re-education, Therapeutic exercise, Therapeutic activity  PT Plan: Skilled PT  PT Frequency: 5 times per week  PT Discharge Recommendations: Moderate intensity level of continued care  Equipment Recommended upon Discharge: Other (comment) (determine best AD (consider \"UP WALKER\") to accommodate SUNITA wrist fx's)  PT Recommended Transfer Status: Assist x2  PT - OK to Discharge: Yes (to next level of care when cleared by medical team)      General Visit Information:   PT  Visit  PT Received On: 01/30/24  Response to Previous Treatment: Patient with no complaints from previous session.  General  Reason for Referral: impaired mobility  Referred By: TARA NESBITT (ORTHO)  Past Medical History Relevant to Rehab: pt admit for fall at home, tripped over O2 tubing. DX SUNITA wrist fractures. LEFT: Complex distal radius and ulnar fracture with impaction, dorsal  angulation and displacement. RIGHT: Dorsally angulated and mildly displaced distal radius fracture. Ulnar  positive variance. Minimal displaced ulnar styloid fracture. S/P SUNITA ORIFs. and I&D L wrist. PMH: recent adm in Dec 2023 for pna/COPD exac.  Prior to Session Communication: Bedside nurse  Patient Position Received: Bed, 3 rail up, Alarm on  Preferred Learning Style: verbal  General Comment: pt agreeable to treatment. pt " reported she was weak and SOB prior to fall.      Precautions:  Precautions  UE Weight Bearing Status: Right Non-Weight Bearing, Left Non-Weight Bearing  Medical Precautions: Fall precautions, Oxygen therapy device and L/min  Precautions Comment: 1-2# limit SUNITA UE            Cognition:  Cognition  Overall Cognitive Status: Within Functional Limits       Treatments:  Balance/Neuromuscular Re-Education  Balance/Neuromuscular Re-Education Activity Performed: Yes  Balance/Neuromuscular Re-Education Activity 1: pt performed static and dynamic balance activities with MIN Ax 2 with pt unsteady throughout , pt sat EOB with SBA and no LOB  noted.    Bed Mobility  Bed Mobility: Yes  Bed Mobility 1  Bed Mobility 1: Supine to sitting  Level of Assistance 1: Minimum assistance  Bed Mobility Comments 1: cues to follow NWB on B UE and improved technique.    Ambulation/Gait Training  Ambulation/Gait Training Performed: Yes  Ambulation/Gait Training 1  Surface 1: Level tile  Assistance 1: Arm in arm assistance, Moderate assistance, Minimal verbal cues (pt varied between MOD Ax1 and MIN Ax 2 secondary to safety, antique and SOB,)  Quality of Gait 1: Decreased step length  Comments/Distance (ft) 1: 20'x4 with cues for safety and technique. pt is unsteady.  Transfers  Transfer: Yes  Transfer 1  Technique 1: Sit to stand, Stand to sit  Transfer Level of Assistance 1: Arm in arm assistance, Minimal verbal cues, Moderate assistance, Minimum assistance, +2  Trials/Comments 1: pt varies between MOD Ax1 and min Ax 2 with cues for improved technique.  Transfers 2  Technique 2: Stand pivot  Transfer Level of Assistance 2: Minimum assistance, +2, Moderate assistance, Moderate tactile cues  Trials/Comments 2: cues for safety and to complete turn prior to sitting.    Outcome Measures:  [facility] Basic Mobility  Turning from your back to your side while in a flat bed without using bedrails: A little  Moving from lying on your back to sitting on the  "side of a flat bed without using bedrails: A lot  Moving to and from bed to chair (including a wheelchair): A lot  Standing up from a chair using your arms (e.g. wheelchair or bedside chair): A lot  To walk in hospital room: A lot  Climbing 3-5 steps with railing: Total  Basic Mobility - Total Score: 12    Education Documentation  Precautions, taught by Karen James PTA at 1/30/2024 10:54 AM.  Learner: Patient  Readiness: Acceptance  Method: Explanation  Response: Verbalizes Understanding, Needs Reinforcement    Mobility Training, taught by Karen James PTA at 1/30/2024 10:54 AM.  Learner: Patient  Readiness: Acceptance  Method: Explanation  Response: Verbalizes Understanding, Needs Reinforcement    Education Comments  No comments found.               Encounter Problems       Encounter Problems (Active)       Mobility       LTG - Patient will ambulate household distance (Progressing)       Start:  01/29/24    Expected End:  02/12/24       DETERMINE APPROP AD, CONSIDER SUNITA PLATFORM FWW (\"UP WALKER\" FOR HOME?)            Pain - Adult          Transfers       LTG - Patient will demonstrate safe transfer techniques (Progressing)       Start:  01/29/24    Expected End:  02/12/24       MAINTAIN NWB SUNITA UE            strengthening       Perform 20 reps AAROM/AAROM/RROM (Progressing)       Start:  01/29/24                   "

## 2024-01-30 NOTE — PROGRESS NOTES
Occupational Therapy    Occupational Therapy Treatment    Name: Irish Davis  MRN: [id number]  : 1945  Date: 24  Time Calculation  Start Time: 918  Stop Time: 949  Time Calculation (min): 31 min    Assessment:  OT Assessment: Pt tolerated session fairly with education and cues on safety, improvement insight of deficits and maintaining NWB precautions.  End of Session Communication: Bedside nurse, Care Coordinator, Physician  End of Session Patient Position: Up in chair, Alarm on  Plan:  Treatment Interventions: ADL retraining, Endurance training, Functional transfer training  OT Frequency: 5 times per week  OT Discharge Recommendations: Moderate intensity level of continued care  OT - OK to Discharge: Yes    Subjective   Previous Visit Info:  OT Last Visit  OT Received On: 24  General:  General  Reason for Referral: ADLs, safety assessment  Referred By: TARA NESBITT (ORTHO)  Co-Treatment: PT  Co-Treatment Reason: to optimize safety and mobility, while focusing on discipline specific goals  Prior to Session Communication: Bedside nurse  Patient Position Received: Bed, 3 rail up, Alarm on  Preferred Learning Style: verbal  General Comment: pt alert and agreeable to therapy  Precautions:  UE Weight Bearing Status: Right Non-Weight Bearing, Left Non-Weight Bearing  Medical Precautions: Fall precautions, Oxygen therapy device and L/min  Vitals:     Pain Assessment:  Pain Assessment  Pain Assessment: 0-10  Pain Score: 0 - No pain (didn't rate pain at this time)  Pain Type: Surgical pain, Acute pain     Objective   Activities of Daily Living:      Toileting  Toileting Level of Assistance: Maximum assistance  Where Assessed: Toilet  Toileting Comments: pt completed toileting at standard toilet with maxA for toileting hygiene    Functional Standing Tolerance:  Functional Standing Tolerance  Time: <1 min standing bouts to complete fxl t/fs, mobility and toileting hygiene  Functional Standing Tolerance  Comments: no LOB noted, tolerated stands fairly with minAx2 for maintaining stands  Bed Mobility/Transfers: Bed Mobility  Bed Mobility: Yes  Bed Mobility 1  Bed Mobility 1: Supine to sitting  Level of Assistance 1: Minimum assistance  Bed Mobility Comments 1: pt completed sup to sit with minAx2 for maintaining BUE NWB precautions and safety    Transfers  Transfer: Yes  Transfer 1  Transfer From 1: Sit to  Transfer to 1: Stand  Technique 1: Stand to sit  Transfer Level of Assistance 1: Arm in arm assistance, Minimal verbal cues, +2  Trials/Comments 1: pt completed x1 STS from EOB with bilat arm in arm assist, x3 STS from armed chair with vc reminders to maintain NWB precautions when standing from sitting position. Pt demos improvement in following NWB precuaitons throughout session with various t/fs.    Toilet Transfers  Toilet Transfer From: Chair  Toilet Transfer Type: To and from  Toilet Transfer to: Standard toilet  Toilet Transfer Technique: Ambulating, Stand pivot  Toilet Transfers: Minimal assistance  Toilet Transfers Comments: minAx2 for fxl mobility and SPT to toilet with bilat arm in arm assist. Pt requirng min vc's for safety and proper technique. Pt requirng modA to complete sit to stand from toilet d/t lower surface and unable to push through arms at this time.    Ambulation/Gait Training:  Ambulation/Gait Training  Ambulation/Gait Training Performed: Yes  Ambulation/Gait Training 1  Comments/Distance (ft) 1: pt competed fxl mobility in personal room to toilet and back with bilat arm in arm assist and minAx2 for maintaining balance and reducing fall risk. Pt's O2 monitored throughout d/t SOB. Pt required seated RB's to recovery throughout session. Pt SpO2 trending between 83-90% with increased activity and movement.  Sitting Balance:     Standing Balance:  Static Standing Balance  Static Standing-Level of Assistance: Minimum assistance, Moderate assistance  Static Standing-Comment/Number of Minutes:  posterior lean, min vc's for improvement body mechanics and weight shifting      Balance/Neuromuscular Re-Education  Balance/Neuromuscular Re-Education Activity Performed: Yes  Balance/Neuromuscular Re-Education Activity 1: pt engaged in static standing and dynamic standing throughout session. Noted this date, pt unsteady throughout standing bout and required minAx2 for safety and to maintain standing balance         Outcome Measures:  [facility] Daily Activity  Putting on and taking off regular lower body clothing: Total  Bathing (including washing, rinsing, drying): Total  Putting on and taking off regular upper body clothing: Total  Toileting, which includes using toilet, bedpan or urinal: A lot  Taking care of personal grooming such as brushing teeth: A lot  Eating Meals: A lot  Daily Activity - Total Score: 9        Education Documentation  ADL Training, taught by GARLAND Borges at 1/30/2024 12:04 PM.  Learner: Patient  Readiness: Acceptance  Method: Explanation  Response: Verbalizes Understanding    Education Comments  No comments found.      Goals:  Encounter Problems       Encounter Problems (Active)       ADLs       Patient will perform UB bathing from seated position with minimal assist  level of assistance. (Progressing)       Start:  01/29/24    Expected End:  02/12/24            Patient with complete upper body dressing with set-up and stand by assist level of assistance donning and doffing all UE clothes while supported sitting (Not Progressing)       Start:  01/29/24    Expected End:  02/12/24            Patient with complete lower body dressing with minimal assist  level of assistance donning and doffing pants without a zipper/fasteners and underwear while supported sitting and standing (Not Progressing)       Start:  01/29/24    Expected End:  02/12/24            Patient will feed self with set-up SBA level of assistance, modified techniques and PRN adaptive equipment. (Progressing)       Start:   01/29/24    Expected End:  02/12/24            Patient will complete daily grooming tasks brushing teeth and washing face/hair with set-up and stand by assist level of assistance and PRN adaptive equipment while supported sitting. (Not Progressing)       Start:  01/29/24    Expected End:  02/12/24            Patient will complete toileting including hygiene with minimal assist  level of assistance and toileting aid device PRN. (Progressing)       Start:  01/29/24    Expected End:  02/12/24               COGNITION/SAFETY       Patient will recall and adhere to weight bearing restriction BUEs with all ADL and functional mobility in order to promote healing and safety with functional tasks (Progressing)       Start:  01/29/24    Expected End:  02/12/24                 TRANSFERS       Patient will complete functional transfers with least restrictive device with stand by assist level of assistance. (Progressing)       Start:  01/29/24    Expected End:  02/12/24

## 2024-01-31 ENCOUNTER — TELEPHONE (OUTPATIENT)
Dept: PRIMARY CARE | Facility: CLINIC | Age: 79
End: 2024-01-31

## 2024-01-31 LAB
ANION GAP SERPL CALC-SCNC: 8 MMOL/L (ref 10–20)
BASOPHILS # BLD AUTO: 0.02 X10*3/UL (ref 0–0.1)
BASOPHILS NFR BLD AUTO: 0.2 %
BUN SERPL-MCNC: 21 MG/DL (ref 6–23)
CALCIUM SERPL-MCNC: 8.1 MG/DL (ref 8.6–10.3)
CHLORIDE SERPL-SCNC: 98 MMOL/L (ref 98–107)
CO2 SERPL-SCNC: 39 MMOL/L (ref 21–32)
CREAT SERPL-MCNC: 0.64 MG/DL (ref 0.5–1.05)
EGFRCR SERPLBLD CKD-EPI 2021: 89 ML/MIN/1.73M*2
EOSINOPHIL # BLD AUTO: 0.14 X10*3/UL (ref 0–0.4)
EOSINOPHIL NFR BLD AUTO: 1.5 %
ERYTHROCYTE [DISTWIDTH] IN BLOOD BY AUTOMATED COUNT: 14.4 % (ref 11.5–14.5)
GLUCOSE SERPL-MCNC: 87 MG/DL (ref 74–99)
HCT VFR BLD AUTO: 28.8 % (ref 36–46)
HGB BLD-MCNC: 8.7 G/DL (ref 12–16)
IMM GRANULOCYTES # BLD AUTO: 0.07 X10*3/UL (ref 0–0.5)
IMM GRANULOCYTES NFR BLD AUTO: 0.7 % (ref 0–0.9)
LYMPHOCYTES # BLD AUTO: 0.67 X10*3/UL (ref 0.8–3)
LYMPHOCYTES NFR BLD AUTO: 7.1 %
MAGNESIUM SERPL-MCNC: 2.25 MG/DL (ref 1.6–2.4)
MCH RBC QN AUTO: 28.3 PG (ref 26–34)
MCHC RBC AUTO-ENTMCNC: 30.2 G/DL (ref 32–36)
MCV RBC AUTO: 94 FL (ref 80–100)
MONOCYTES # BLD AUTO: 0.74 X10*3/UL (ref 0.05–0.8)
MONOCYTES NFR BLD AUTO: 7.8 %
NEUTROPHILS # BLD AUTO: 7.81 X10*3/UL (ref 1.6–5.5)
NEUTROPHILS NFR BLD AUTO: 82.7 %
NRBC BLD-RTO: 0 /100 WBCS (ref 0–0)
PLATELET # BLD AUTO: 257 X10*3/UL (ref 150–450)
POTASSIUM SERPL-SCNC: 3.2 MMOL/L (ref 3.5–5.3)
RBC # BLD AUTO: 3.07 X10*6/UL (ref 4–5.2)
SODIUM SERPL-SCNC: 142 MMOL/L (ref 136–145)
WBC # BLD AUTO: 9.5 X10*3/UL (ref 4.4–11.3)

## 2024-01-31 PROCEDURE — 99233 SBSQ HOSP IP/OBS HIGH 50: CPT | Performed by: INTERNAL MEDICINE

## 2024-01-31 PROCEDURE — 2500000004 HC RX 250 GENERAL PHARMACY W/ HCPCS (ALT 636 FOR OP/ED): Performed by: INTERNAL MEDICINE

## 2024-01-31 PROCEDURE — 97116 GAIT TRAINING THERAPY: CPT | Mod: GP,CQ

## 2024-01-31 PROCEDURE — 97110 THERAPEUTIC EXERCISES: CPT | Mod: GP,CQ

## 2024-01-31 PROCEDURE — 85025 COMPLETE CBC W/AUTO DIFF WBC: CPT | Performed by: INTERNAL MEDICINE

## 2024-01-31 PROCEDURE — 2500000004 HC RX 250 GENERAL PHARMACY W/ HCPCS (ALT 636 FOR OP/ED): Performed by: ORTHOPAEDIC SURGERY

## 2024-01-31 PROCEDURE — 1210000001 HC SEMI-PRIVATE ROOM DAILY

## 2024-01-31 PROCEDURE — 80048 BASIC METABOLIC PNL TOTAL CA: CPT | Performed by: INTERNAL MEDICINE

## 2024-01-31 PROCEDURE — 36415 COLL VENOUS BLD VENIPUNCTURE: CPT | Performed by: INTERNAL MEDICINE

## 2024-01-31 PROCEDURE — 2500000002 HC RX 250 W HCPCS SELF ADMINISTERED DRUGS (ALT 637 FOR MEDICARE OP, ALT 636 FOR OP/ED): Performed by: INTERNAL MEDICINE

## 2024-01-31 PROCEDURE — 94668 MNPJ CHEST WALL SBSQ: CPT

## 2024-01-31 PROCEDURE — 2500000001 HC RX 250 WO HCPCS SELF ADMINISTERED DRUGS (ALT 637 FOR MEDICARE OP): Performed by: INTERNAL MEDICINE

## 2024-01-31 PROCEDURE — 2500000001 HC RX 250 WO HCPCS SELF ADMINISTERED DRUGS (ALT 637 FOR MEDICARE OP): Performed by: ORTHOPAEDIC SURGERY

## 2024-01-31 PROCEDURE — 83735 ASSAY OF MAGNESIUM: CPT | Performed by: INTERNAL MEDICINE

## 2024-01-31 PROCEDURE — 97535 SELF CARE MNGMENT TRAINING: CPT | Mod: GO,CO

## 2024-01-31 RX ORDER — CEPHALEXIN 500 MG/1
500 CAPSULE ORAL EVERY 8 HOURS SCHEDULED
Status: DISCONTINUED | OUTPATIENT
Start: 2024-01-31 | End: 2024-02-01

## 2024-01-31 RX ORDER — DOXYCYCLINE 100 MG/1
100 CAPSULE ORAL EVERY 12 HOURS SCHEDULED
Status: DISCONTINUED | OUTPATIENT
Start: 2024-01-31 | End: 2024-02-01

## 2024-01-31 RX ORDER — POTASSIUM CHLORIDE 1.5 G/1.58G
40 POWDER, FOR SOLUTION ORAL 2 TIMES DAILY
Status: DISPENSED | OUTPATIENT
Start: 2024-01-31 | End: 2024-02-01

## 2024-01-31 RX ORDER — POTASSIUM CHLORIDE 750 MG/1
40 TABLET, FILM COATED, EXTENDED RELEASE ORAL 2 TIMES DAILY
Status: DISPENSED | OUTPATIENT
Start: 2024-01-31 | End: 2024-02-01

## 2024-01-31 RX ORDER — FUROSEMIDE 10 MG/ML
20 INJECTION INTRAMUSCULAR; INTRAVENOUS ONCE
Status: COMPLETED | OUTPATIENT
Start: 2024-01-31 | End: 2024-01-31

## 2024-01-31 RX ADMIN — IPRATROPIUM BROMIDE 2 PUFF: 17 AEROSOL, METERED RESPIRATORY (INHALATION) at 20:56

## 2024-01-31 RX ADMIN — POTASSIUM CHLORIDE 40 MEQ: 750 TABLET, FILM COATED, EXTENDED RELEASE ORAL at 13:56

## 2024-01-31 RX ADMIN — ACETAMINOPHEN 650 MG: 325 TABLET ORAL at 20:51

## 2024-01-31 RX ADMIN — CEPHALEXIN 500 MG: 500 CAPSULE ORAL at 20:56

## 2024-01-31 RX ADMIN — ACETAMINOPHEN 650 MG: 325 TABLET ORAL at 02:30

## 2024-01-31 RX ADMIN — IPRATROPIUM BROMIDE 2 PUFF: 17 AEROSOL, METERED RESPIRATORY (INHALATION) at 09:34

## 2024-01-31 RX ADMIN — POTASSIUM CHLORIDE 40 MEQ: 1.5 POWDER, FOR SOLUTION ORAL at 10:14

## 2024-01-31 RX ADMIN — PREDNISONE 10 MG: 10 TABLET ORAL at 09:31

## 2024-01-31 RX ADMIN — IPRATROPIUM BROMIDE 2 PUFF: 17 AEROSOL, METERED RESPIRATORY (INHALATION) at 17:01

## 2024-01-31 RX ADMIN — HEPARIN SODIUM 5000 UNITS: 5000 INJECTION INTRAVENOUS; SUBCUTANEOUS at 02:30

## 2024-01-31 RX ADMIN — FUROSEMIDE 20 MG: 10 INJECTION, SOLUTION INTRAMUSCULAR; INTRAVENOUS at 13:55

## 2024-01-31 RX ADMIN — DOXYCYCLINE 100 MG: 100 CAPSULE ORAL at 20:50

## 2024-01-31 RX ADMIN — ACETAMINOPHEN 650 MG: 325 TABLET ORAL at 13:56

## 2024-01-31 RX ADMIN — POTASSIUM CHLORIDE 40 MEQ: 750 TABLET, FILM COATED, EXTENDED RELEASE ORAL at 20:51

## 2024-01-31 RX ADMIN — HEPARIN SODIUM 5000 UNITS: 5000 INJECTION INTRAVENOUS; SUBCUTANEOUS at 09:31

## 2024-01-31 RX ADMIN — POTASSIUM CHLORIDE 40 MEQ: 1.5 POWDER, FOR SOLUTION ORAL at 20:52

## 2024-01-31 RX ADMIN — DOXYCYCLINE 100 MG: 100 CAPSULE ORAL at 10:14

## 2024-01-31 RX ADMIN — CEPHALEXIN 500 MG: 500 CAPSULE ORAL at 13:56

## 2024-01-31 RX ADMIN — Medication 1 CAPSULE: at 10:15

## 2024-01-31 RX ADMIN — CEPHALEXIN 500 MG: 500 CAPSULE ORAL at 10:14

## 2024-01-31 RX ADMIN — METOPROLOL TARTRATE 37.5 MG: 25 TABLET, FILM COATED ORAL at 09:31

## 2024-01-31 RX ADMIN — FLUTICASONE FUROATE AND VILANTEROL TRIFENATATE 1 PUFF: 200; 25 POWDER RESPIRATORY (INHALATION) at 09:32

## 2024-01-31 RX ADMIN — Medication 1 CAPSULE: at 20:52

## 2024-01-31 RX ADMIN — METOPROLOL TARTRATE 37.5 MG: 25 TABLET, FILM COATED ORAL at 20:52

## 2024-01-31 RX ADMIN — ACETAMINOPHEN 650 MG: 325 TABLET ORAL at 09:31

## 2024-01-31 RX ADMIN — HEPARIN SODIUM 5000 UNITS: 5000 INJECTION INTRAVENOUS; SUBCUTANEOUS at 18:47

## 2024-01-31 RX ADMIN — IPRATROPIUM BROMIDE 2 PUFF: 17 AEROSOL, METERED RESPIRATORY (INHALATION) at 13:56

## 2024-01-31 ASSESSMENT — COGNITIVE AND FUNCTIONAL STATUS - GENERAL
TURNING FROM BACK TO SIDE WHILE IN FLAT BAD: A LITTLE
DRESSING REGULAR LOWER BODY CLOTHING: A LOT
DRESSING REGULAR UPPER BODY CLOTHING: A LOT
MOVING FROM LYING ON BACK TO SITTING ON SIDE OF FLAT BED WITH BEDRAILS: A LOT
MOVING FROM LYING ON BACK TO SITTING ON SIDE OF FLAT BED WITH BEDRAILS: A LITTLE
TOILETING: A LOT
TOILETING: A LOT
DAILY ACTIVITIY SCORE: 12
STANDING UP FROM CHAIR USING ARMS: A LITTLE
EATING MEALS: A LOT
CLIMB 3 TO 5 STEPS WITH RAILING: A LOT
TOILETING: A LOT
CLIMB 3 TO 5 STEPS WITH RAILING: TOTAL
TURNING FROM BACK TO SIDE WHILE IN FLAT BAD: A LITTLE
DRESSING REGULAR LOWER BODY CLOTHING: A LOT
HELP NEEDED FOR BATHING: A LOT
HELP NEEDED FOR BATHING: A LOT
MOVING TO AND FROM BED TO CHAIR: A LITTLE
EATING MEALS: A LOT
DRESSING REGULAR LOWER BODY CLOTHING: A LOT
MOBILITY SCORE: 16
PERSONAL GROOMING: A LOT
HELP NEEDED FOR BATHING: A LOT
DAILY ACTIVITIY SCORE: 12
STANDING UP FROM CHAIR USING ARMS: A LITTLE
PERSONAL GROOMING: A LOT
MOBILITY SCORE: 14
DRESSING REGULAR UPPER BODY CLOTHING: A LOT
DAILY ACTIVITIY SCORE: 12
WALKING IN HOSPITAL ROOM: A LOT
MOVING TO AND FROM BED TO CHAIR: A LITTLE
STANDING UP FROM CHAIR USING ARMS: A LITTLE
MOBILITY SCORE: 14
EATING MEALS: A LOT
MOVING TO AND FROM BED TO CHAIR: A LITTLE
DRESSING REGULAR UPPER BODY CLOTHING: A LOT
TURNING FROM BACK TO SIDE WHILE IN FLAT BAD: A LITTLE
PERSONAL GROOMING: A LOT
MOVING FROM LYING ON BACK TO SITTING ON SIDE OF FLAT BED WITH BEDRAILS: A LOT
WALKING IN HOSPITAL ROOM: A LOT
WALKING IN HOSPITAL ROOM: A LOT
CLIMB 3 TO 5 STEPS WITH RAILING: TOTAL

## 2024-01-31 ASSESSMENT — PAIN - FUNCTIONAL ASSESSMENT
PAIN_FUNCTIONAL_ASSESSMENT: 0-10

## 2024-01-31 ASSESSMENT — ACTIVITIES OF DAILY LIVING (ADL): HOME_MANAGEMENT_TIME_ENTRY: 21

## 2024-01-31 ASSESSMENT — PAIN SCALES - GENERAL
PAINLEVEL_OUTOF10: 0 - NO PAIN

## 2024-01-31 NOTE — TELEPHONE ENCOUNTER
Anabel from [facility] would like to know if you will follow orders for nursing, PT and OT.

## 2024-01-31 NOTE — PROGRESS NOTES
"Physical Therapy    Physical Therapy Treatment    Patient Name: Irish Davis  MRN: [id number]  Today's Date: 1/31/2024  Time Calculation  Start Time: 1440  Stop Time: 1504  Time Calculation (min): 24 min       Assessment/Plan   PT Assessment  End of Session Communication: Bedside nurse  End of Session Patient Position: Up in chair, Alarm on     PT Plan  Treatment/Interventions: Bed mobility, Transfer training, Gait training, Balance training, Neuromuscular re-education, Therapeutic exercise, Therapeutic activity  PT Plan: Skilled PT  PT Frequency: 5 times per week  PT Discharge Recommendations: Moderate intensity level of continued care  Equipment Recommended upon Discharge: Other (comment) (determine best AD (consider \"UP WALKER\") to accommodate SUNITA wrist fx's)  PT Recommended Transfer Status: Assist x2  PT - OK to Discharge: Yes (diego next level of care when cleared by medical team)    General Visit Information:   PT  Visit  PT Received On: 01/31/24  General  Prior to Session Communication: Bedside nurse  Patient Position Received: Up in chair, Alarm on    Subjective   Precautions:  Precautions  UE Weight Bearing Status: Right Non-Weight Bearing, Left Non-Weight Bearing  LE Weight Bearing Status:  (bandage L ant knee only)  Medical Precautions: Fall precautions  Splinting: SUNITA wrist/hand post op  Precautions Comment: 1-2# limit SUNITA UE  Vital Signs:  Vital Signs  SpO2:  (pt maintained 90% or above on 5L)    Objective   Pain:  Pain Assessment  Pain Assessment: 0-10  Pain Score: 0 - No pain  Cognition:  Cognition  Overall Cognitive Status: Within Functional Limits    Treatments:  Therapeutic Exercise  Therapeutic Exercise Performed: Yes  Therapeutic Exercise Activity 1: pt completed seated LE exercises: AP x 10 reps, LAQ x10 reps; hip adduction x 10 reps    Ambulation/Gait Training  Ambulation/Gait Training Performed: Yes  Ambulation/Gait Training 1  Surface 1: Level tile  Device 1: No device  Assistance 1: Arm in " "arm assistance, Minimum assistance, Moderate verbal cues  Quality of Gait 1: Decreased step length, Shuffling gait, Forward flexed posture (decreased reciprocal arm swing)  Comments/Distance (ft) 1: 40 feet; 15 feet x 2; pt cued to stand tall and to increase step length/reciprocal arm swing  Transfers  Transfer: Yes  Transfer 1  Technique 1: Sit to stand  Transfer Device 1:  (no device)  Transfer Level of Assistance 1: Minimum assistance, +2, Minimal verbal cues  Trials/Comments 1: pt cued to maintain NWB on SUNITA wrists    Outcome Measures:  [facility] Basic Mobility  Turning from your back to your side while in a flat bed without using bedrails: A lot  Moving from lying on your back to sitting on the side of a flat bed without using bedrails: A little  Moving to and from bed to chair (including a wheelchair): A little  Standing up from a chair using your arms (e.g. wheelchair or bedside chair): A little  To walk in hospital room: A lot  Climbing 3-5 steps with railing: Total  Basic Mobility - Total Score: 14    Education Documentation  Precautions, taught by Lola Al PTA at 1/31/2024  3:35 PM.  Learner: Patient  Readiness: Acceptance  Method: Explanation  Response: Needs Reinforcement    Mobility Training, taught by Lola Al PTA at 1/31/2024  3:35 PM.  Learner: Patient  Readiness: Acceptance  Method: Explanation  Response: Needs Reinforcement    Education Comments  No comments found.        OP EDUCATION:       Encounter Problems       Encounter Problems (Active)       Mobility       LTG - Patient will ambulate household distance (Progressing)       Start:  01/29/24    Expected End:  02/12/24       DETERMINE APPROP AD, CONSIDER SUNITA PLATFORM FWW (\"UP WALKER\" FOR HOME?)            Pain - Adult          Transfers       LTG - Patient will demonstrate safe transfer techniques (Progressing)       Start:  01/29/24    Expected End:  02/12/24       MAINTAIN NWB SUNITA UE            strengthening       Perform 20 reps " AAROM/AAROM/RROM (Progressing)       Start:  01/29/24    Expected End:  02/02/24

## 2024-01-31 NOTE — CARE PLAN
The patient's goals for the shift include to remain safe and not fall during shift    Problem: Pain - Adult  Goal: Verbalizes/displays adequate comfort level or baseline comfort level  Outcome: Progressing     Problem: Safety - Adult  Goal: Free from fall injury  Outcome: Progressing     Problem: Discharge Planning  Goal: Discharge to home or other facility with appropriate resources  Outcome: Progressing     Problem: Chronic Conditions and Co-morbidities  Goal: Patient's chronic conditions and co-morbidity symptoms are monitored and maintained or improved  Outcome: Progressing     Problem: Skin  Goal: Decreased wound size/increased tissue granulation at next dressing change  Recent Flowsheet Documentation  Taken 1/31/2024 1401 by Manuela Worthington RN  Decreased wound size/increased tissue granulation at next dressing change: Promote sleep for wound healing  Goal: Participates in plan/prevention/treatment measures  Recent Flowsheet Documentation  Taken 1/31/2024 1401 by Manuela Worthington RN  Participates in plan/prevention/treatment measures: Increase activity/out of bed for meals  Goal: Promote/optimize nutrition  Recent Flowsheet Documentation  Taken 1/31/2024 1401 by Manuela Worthington RN  Promote/optimize nutrition:   Consume > 50% meals/supplements   Offer water/supplements/favorite foods     Problem: Skin  Goal: Decreased wound size/increased tissue granulation at next dressing change  Outcome: Progressing  Flowsheets (Taken 1/31/2024 1401)  Decreased wound size/increased tissue granulation at next dressing change: Promote sleep for wound healing  Goal: Participates in plan/prevention/treatment measures  Outcome: Progressing  Flowsheets (Taken 1/31/2024 1401)  Participates in plan/prevention/treatment measures: Increase activity/out of bed for meals  Goal: Prevent/manage excess moisture  Outcome: Progressing  Goal: Prevent/minimize sheer/friction injuries  Outcome: Progressing  Goal: Promote/optimize  nutrition  Outcome: Progressing  Flowsheets (Taken 1/31/2024 1401)  Promote/optimize nutrition:   Consume > 50% meals/supplements   Offer water/supplements/favorite foods  Goal: Promote skin healing  Outcome: Progressing     Problem: Fall/Injury  Goal: Not fall by end of shift  Outcome: Progressing  Goal: Be free from injury by end of the shift  Outcome: Progressing  Goal: Verbalize understanding of personal risk factors for fall in the hospital  Outcome: Progressing  Goal: Verbalize understanding of risk factor reduction measures to prevent injury from fall in the home  Outcome: Progressing  Goal: Use assistive devices by end of the shift  Outcome: Progressing  Goal: Pace activities to prevent fatigue by end of the shift  Outcome: Progressing     Problem: Pain  Goal: Takes deep breaths with improved pain control throughout the shift  Outcome: Progressing  Goal: Turns in bed with improved pain control throughout the shift  Outcome: Progressing  Goal: Walks with improved pain control throughout the shift  Outcome: Progressing  Goal: Performs ADL's with improved pain control throughout shift  Outcome: Progressing  Goal: Participates in PT with improved pain control throughout the shift  Outcome: Progressing  Goal: Free from opioid side effects throughout the shift  Outcome: Progressing  Goal: Free from acute confusion related to pain meds throughout the shift  Outcome: Progressing       The clinical goals for the shift include Patient will maintain safety throughout shift    See assessment and mar. Remains on 5 liters NC. AM labs ordered. Potassium replaced.

## 2024-01-31 NOTE — PROGRESS NOTES
Irish Davis is a 78 y.o. female on day 4 of admission presenting with Closed fracture of right radius and ulna, initial encounter.      Subjective   Irish Davis is a 78 y.o. female with PMHx s/f HTN, HLD, moderate aortic stenosis, chronic bilateral lower extremity edema (on as needed Lasix), COPD with chronic hypoxic respiratory failure (baseline 3-4 L nasal cannula) who presented to the emergency room after a fall at home with bilateral distal radius fracture.  On admission in the emergency room she was hemodynamically stable with a blood pressure of 163/83 mmHg, heart rate of 72/min, respirate rate of 18/min, temperature of 36.8 °C and was saturating 94% on her baseline 4 L nasal cannula oxygen.  Initial CBC showed a hemoglobin of 10.6 BMP were significant for a potassium of 3.4.  Influenza antigen and COVID PCR were negative.  CT of the head was negative for any acute intracranial process, CT cervical spine showed loss of normal cervical lordosis with demineralization in addition to some healing or healed fracture of the C1 vertebrae.  X-ray of the bilateral wrist and hand showed complex distal radius and ulna fracture with impaction and dorsal angulation and displacement on the left and dorsally angulated distal radius fracture and ulnar styloid fracture on the right.  General medicine has been consulted for medical management.  I saw patient on the regular nursing floor..  She was drowsy and barely arousable.  Daughter and  at bedside confirming initial  history prior to admission.  1/28: Patient was seen and examined.  She is more awake and alert and oriented x 3 when I saw her today.  She was complaining of some more difficulty in breathing.  Chest x-ray done and that showed pulmonary edema.  Will stop IV fluids.  I will give 1 dose of IV Lasix.  Strict input output chart.  As needed DuoNebs.  I will suspend any discharge plans for today.  Repeat BMP and CBC in AM.  1/29: Patient was seen  and examined.  She is refusing discharge to skilled nursing facility even though patient is severely deconditioned.  Hemoglobin is stable at 9.9.  Hypomagnesemia noted; we will supplement with admission.  Patient and family will discuss and make a final decision with regards to extended-care facility.  PT OT recommendations appreciated.  1/30: Patient was seen and examined.  Now agrees to go to a skilled nursing facility.  Hemoglobin is remaining stable.  Patient now agrees to go to a skilled nursing facility on discharge with restrictions to facilities close to home.  Awaiting choice and authorization for placement.  1/31: Patient was seen and examined.  Saturating well on 5 L nasal cannula oxygen.  Her baseline is 3 to 4 L nasal cannula oxygen.  Chest x-ray showed some pulmonary edema so I will order 1 dose of IV Lasix.  Continue to wean oxygen as tolerated.  Patient has agreed to go to extended-care facility so currently awaiting authorization for placement in the [place].  Hypokalemia noted and potassium supplementation given.  Started on IV antibiotics due to lower extremity cellulitis and shallow ulcer.  Repeat CBC and BMP in a.m.    Objective     Last Recorded Vitals  /64 (BP Location: Left arm, Patient Position: Lying)   Pulse 88   Temp 36.7 °C (98.1 °F) (Temporal)   Resp 19   Wt 53.5 kg (117 lb 14.4 oz)   SpO2 92%   Intake/Output last 3 Shifts:    Intake/Output Summary (Last 24 hours) at 1/31/2024 1256  Last data filed at 1/31/2024 0912  Gross per 24 hour   Intake 260.71 ml   Output 0 ml   Net 260.71 ml         Admission Weight  Weight: 54.4 kg (120 lb) (01/26/24 1601)    Daily Weight  01/30/24 : 53.5 kg (117 lb 14.4 oz)    Image Results  Transthoracic Echo (TTE) [place] [id number]       Phone [phone] Fax [phone]    TRANSTHORACIC ECHOCARDIOGRAM REPORT       Patient Name:      ROMULO DOHERTY   Mik  Physician:    [id number] Annika Rene MD  Study Date:        1/29/2024            Ordering Provider:    [id number] AIYANA VO  MRN/PID:           [id number]             Fellow:  Accession#:        [id number]         Nurse:  Date of Birth/Age: 1945 / 78 years Sonographer:          Huyen Reagan RDCS  Gender:            F                    Additional Staff:  Height:            154.94 cm            Admit Date:           1/26/2024  Weight:            53.52 kg             Admission Status:     Inpatient -                                                                Routine  BSA:               1.51 m2              Department Location:  [address]  Blood Pressure: 130 /77 mmHg    Study Type:    TRANSTHORACIC ECHO (TTE) COMPLETE  Diagnosis/ICD: Heart failure, unspecified-I50.9  Indication:    Congestive Heart Failure  CPT Codes:     Echo Complete w Full [id number]    Patient History:  Pertinent History: AS.    Study Detail: The following Echo studies were performed: 2D, M-Mode, Doppler and                color flow.       PHYSICIAN INTERPRETATION:  Left Ventricle: Left ventricular systolic function is normal, with an estimated ejection fraction of 60-65%. There are no regional wall motion abnormalities. The left ventricular cavity size is normal. Spectral Doppler shows a normal pattern of left ventricular diastolic filling.  Left Atrium: The left atrium is normal in size.  Right Ventricle: The right ventricle is normal in size. There is low normal right ventricular systolic function.  Right Atrium: The right atrium is normal in size.  Aortic Valve: The aortic valve was not well visualized. There is mild aortic valve cusp calcification. There is evidence of mild aortic valve stenosis.  The aortic valve dimensionless index  is 0.55. There is no evidence of aortic valve regurgitation. The peak instantaneous gradient of the aortic valve is 23.0 mmHg. The mean gradient of the aortic valve is 12.8 mmHg.  Mitral Valve: The mitral valve is mildly thickened. There is trace mitral valve regurgitation.  Tricuspid Valve: The tricuspid valve is structurally normal. There is mild tricuspid regurgitation.  Pulmonic Valve: The pulmonic valve is structurally normal. There is no indication of pulmonic valve regurgitation.  Pericardium: There is a trivial pericardial effusion.  Aorta: The aortic root is normal.       CONCLUSIONS:   1. Left ventricular systolic function is normal with a 60-65% estimated ejection fraction.   2. There is low normal right ventricular systolic function.   3. Mild aortic valve stenosis.    QUANTITATIVE DATA SUMMARY:  2D MEASUREMENTS:                           Normal Ranges:  LAs:           2.25 cm   (2.7-4.0cm)  IVSd:          0.78 cm   (0.6-1.1cm)  LVPWd:         0.92 cm   (0.6-1.1cm)  LVIDd:         3.26 cm   (3.9-5.9cm)  LVIDs:         2.38 cm  LV Mass Index: 48.6 g/m2  LV % FS        27.1 %    LA VOLUME:                                Normal Ranges:  LA Vol A4C:        17.3 ml    (22+/-6mL/m2)  LA Vol A2C:        29.8 ml  LA Vol BP:         23.6 ml  LA Vol Index A4C:  11.5 ml/m2  LA Vol Index A2C:  19.7 ml/m2  LA Vol Index BP:   15.7 ml/m2  LA Area A4C:       10.4 cm2  LA Area A2C:       13.1 cm2  LA Major Axis A4C: 5.3 cm  LA Major Axis A2C: 4.9 cm  LA Volume Index:   15.7 ml/m2  LA Vol A4C:        15.5 ml  LA Vol A2C:        28.5 ml    RA VOLUME BY A/L METHOD:                        Normal Ranges:  RA Area A4C: 10.7 cm2    AORTA MEASUREMENTS:                       Normal Ranges:  Ao Sinus, d: 2.80 cm (2.1-3.5cm)  Ao STJ, d:   2.50 cm (1.7-3.4cm)  Asc Ao, d:   2.80 cm (2.1-3.4cm)    LV SYSTOLIC FUNCTION BY 2D PLANIMETRY (MOD):                      Normal Ranges:  EF-A4C View: 73.1 % (>=55%)  EF-A2C View: 68.9  %  EF-Biplane:  71.8 %    LV DIASTOLIC FUNCTION:                           Normal Ranges:  MV Peak E:    0.91 m/s   (0.7-1.2 m/s)  MV Peak A:    0.88 m/s   (0.42-0.7 m/s)  E/A Ratio:    1.04       (1.0-2.2)  MV e'         0.09 m/s   (>8.0)  MV lateral e' 0.10 m/s  MV medial e'  0.08 m/s  MV A Dur:     82.78 msec  E/e' Ratio:   10.14      (<8.0)    MITRAL VALVE:                  Normal Ranges:  MV DT: 176 msec (150-240msec)    AORTIC VALVE:                                     Normal Ranges:  AoV Vmax:                2.40 m/s  (<=1.7m/s)  AoV Peak P.0 mmHg (<20mmHg)  AoV Mean P.8 mmHg (1.7-11.5mmHg)  LVOT Max Pradeep:            1.19 m/s  (<=1.1m/s)  AoV VTI:                 48.90 cm  (18-25cm)  LVOT VTI:                26.86 cm  LVOT Diameter:           2.01 cm   (1.8-2.4cm)  AoV Area, VTI:           1.75 cm2  (2.5-5.5cm2)  AoV Area,Vmax:           1.58 cm2  (2.5-4.5cm2)  AoV Dimensionless Index: 0.55       RIGHT VENTRICLE:  RV Basal 2.66 cm  RV Mid   1.70 cm  RV Major 6.6 cm  TAPSE:   19.5 mm  RV s'    0.10 m/s    TRICUSPID VALVE/RVSP:                              Normal Ranges:  Peak TR Velocity: 3.15 m/s  Est. RA Pressure: 3 mmHg  RV Syst Pressure: 42.8 mmHg (< 30mmHg)  IVC Diam:         1.82 cm    AORTA:  Asc Ao Diam 2.81 cm       [id number] Annika Rene MD  Electronically signed on 2024 at 6:03:24 PM       ** Final **  ECG 12 Lead  Sinus rhythm  Borderline right axis deviation  Low voltage, extremity leads    Confirmed by Annika Rene ([id number]) on 2024 5:52:59 PM      Physical Exam  Constitutional:       General: Acute on chronically ill looking elderly  female; dry mucous membrane, awake and alert, oriented x 3     HENT:      Head: Normocephalic and atraumatic.      Right Ear: External ear normal.      Left Ear: External ear normal.      Nose: Nose normal.      Mouth/Throat:      Mouth: Mucous membranes are dry     Pharynx: Oropharynx is clear.   Eyes:       Extraocular Movements: Extraocular movements intact.      Conjunctiva/sclera: Conjunctivae normal.      Pupils: Pupils are equal, round, and reactive to light.   Cardiovascular:      Rate and Rhythm: Normal rate and regular rhythm.      Pulses: Normal pulses.      Heart sounds: Normal heart sounds.   Pulmonary:      Effort: Pulmonary effort is normal. No respiratory distress.      Breath sounds: Wheezing and rales present. No rhonchi.      Comments: NC in place  Abdominal:      General: Abdomen is flat. Bowel sounds are normal.      Palpations: Abdomen is soft.      Tenderness: There is no abdominal tenderness. There is no right CVA tenderness, left CVA tenderness, guarding or rebound.   Musculoskeletal:      Bilateral ACE bandaging of the upper extremity with splinting  Skin:     General: Skin is warm and dry.      Capillary Refill: Capillary refill takes less than 2 seconds.      Findings: No lesion or rash.   Neurological:      General: No focal deficit present.      Mental Status: Awake and alert and oriented x 3 and moves all limbs.     Relevant Results               Assessment/Plan                  Principal Problem:    Closed fracture of right radius and ulna, initial encounter  Active Problems:    Type I or II open fracture of left radius and ulna    #Bilateral distal radius and ulna fracture  Status post ORIF  Admitted on regular nursing floor  Started on pain control  Orthopedic surgery consulted for surgical intervention     # Acute on chronic respiratory failure  Likely related to pulmonary edema plus bronchospasm  Chest x-ray reviewed  Given IV Lasix 20 mg  As needed DuoNebs  Continue nasal cannula oxygen and wean down as tolerated     #COPD  Currently stable  As needed DuoNebs    #Right lower extremity ulcer/cellulitis  Started on antibiotics p.o. Keflex and doxycycline      #Hypertension  Resumed metoprolol     #DVT prophylaxis  Subcutaneous heparin          Spent 40 minutes in the follow-up  management of this patient today       Bonaventsilvana Marshall MD

## 2024-01-31 NOTE — PROGRESS NOTES
Occupational Therapy    Occupational Therapy Treatment    Name: Irish Davis  MRN: [id number]  : 1945  Date: 24  Time Calculation  Start Time: 1441  Stop Time: 1502  Time Calculation (min): 21 min    Assessment:  OT Assessment: pt tolerated session fairly  Barriers to Discharge: Decreased caregiver support  End of Session Communication: Bedside nurse  End of Session Patient Position: Up in chair, Alarm on  Plan:  Treatment Interventions: ADL retraining, Functional transfer training, Endurance training, Neuromuscular reeducation  OT Frequency: 5 times per week  OT Discharge Recommendations: Moderate intensity level of continued care  OT - OK to Discharge: Yes    Subjective   Previous Visit Info:  OT Last Visit  OT Received On: 24  General:  General  Reason for Referral: impaired mobilty  Referred By: TARA NESBITT (ORTHO)  Past Medical History Relevant to Rehab: pt admit for fall at home, tripped over O2 tubing. DX SUNITA wrist fractures. LEFT: Complex distal radius and ulnar fracture with impaction, dorsal  angulation and displacement. RIGHT: Dorsally angulated and mildly displaced distal radius fracture. Ulnar  positive variance. Minimal displaced ulnar styloid fracture. S/P SUNITA ORIFs. and I&D L wrist. PMH: recent adm in Dec 2023 for pna/COPD exac.  Co-Treatment: PT  Co-Treatment Reason: to optimize safety and mobility, while focusing on discipline specific goals  Prior to Session Communication: Bedside nurse  Patient Position Received: Bed, 3 rail up, Alarm on  Preferred Learning Style: verbal  General Comment: pt agreeable to OT/PT tx at this tiem. Pt sitting up in chair  Precautions:  UE Weight Bearing Status: Right Non-Weight Bearing, Left Non-Weight Bearing  Medical Precautions: Fall precautions, Oxygen therapy device and L/min  Vitals:     Pain Assessment:  Pain Assessment  Pain Assessment: 0-10  Pain Score: 0 - No pain     Objective   Activities of Daily Living: Grooming  Grooming Level of  Assistance: Minimum assistance, Setup  Grooming Where Assessed: Standing sinkside, Chair  Grooming Comments: pt completed oral hygiene task with set up and Amy for opening toothpaste    Functional Standing Tolerance:  Functional Standing Tolerance  Time: ~1 min standing bout  Activity: standing at sinkside to completed oral hygiene task  Functional Standing Tolerance Comments: pt tolerated standing fairly with Amy to maintain stand,  pt required a seated RB after standing at sink for ~1 min.  Bed Mobility/Transfers: Transfers  Transfer: Yes  Transfer 1  Transfer From 1: Sit to  Transfer to 1: Stand  Technique 1: Sit to stand, Stand to sit  Transfer Level of Assistance 1: Arm in arm assistance, +2  Trials/Comments 1: pt completed x3 STS with minAx2      Ambulation/Gait Training:  Ambulation/Gait Training  Ambulation/Gait Training Performed: Yes  Ambulation/Gait Training 1  Comments/Distance (ft) 1: pt completed fxl mobility to improve endurance. activity tolerance and safety with mobility. Pt completed household distance with Amy  Sitting Balance:     Standing Balance:  Static Standing Balance  Static Standing-Level of Assistance: Minimum assistance  Static Standing-Comment/Number of Minutes: Amy and min vc's for correcting posture and improving body mechanics for safety       Therapy/Activity: Balance/Neuromuscular Re-Education  Balance/Neuromuscular Re-Education Activity Performed: Yes  Balance/Neuromuscular Re-Education Activity 1: pt performed dynamic standing task at sink, no LOB and Amy required at this time      Outcome Measures:  [facility] Daily Activity  Putting on and taking off regular lower body clothing: A lot  Bathing (including washing, rinsing, drying): A lot  Putting on and taking off regular upper body clothing: A lot  Toileting, which includes using toilet, bedpan or urinal: A lot  Taking care of personal grooming such as brushing teeth: A lot  Eating Meals: A lot  Daily Activity - Total Score:  12        Education Documentation  ADL Training, taught by GARLAND Borges at 1/31/2024  3:25 PM.  Learner: Patient  Readiness: Acceptance  Method: Explanation  Response: Verbalizes Understanding    Education Comments  No comments found.      Goals:  Encounter Problems       Encounter Problems (Active)       ADLs       Patient will perform UB bathing from seated position with minimal assist  level of assistance. (Progressing)       Start:  01/29/24    Expected End:  02/12/24            Patient with complete upper body dressing with set-up and stand by assist level of assistance donning and doffing all UE clothes while supported sitting (Not Progressing)       Start:  01/29/24    Expected End:  02/12/24            Patient with complete lower body dressing with minimal assist  level of assistance donning and doffing pants without a zipper/fasteners and underwear while supported sitting and standing (Not Progressing)       Start:  01/29/24    Expected End:  02/12/24            Patient will feed self with set-up SBA level of assistance, modified techniques and PRN adaptive equipment. (Progressing)       Start:  01/29/24    Expected End:  02/12/24            Patient will complete daily grooming tasks brushing teeth and washing face/hair with set-up and stand by assist level of assistance and PRN adaptive equipment while supported sitting. (Progressing)       Start:  01/29/24    Expected End:  02/12/24            Patient will complete toileting including hygiene with minimal assist  level of assistance and toileting aid device PRN. (Progressing)       Start:  01/29/24    Expected End:  02/12/24               COGNITION/SAFETY       Patient will recall and adhere to weight bearing restriction BUEs with all ADL and functional mobility in order to promote healing and safety with functional tasks (Progressing)       Start:  01/29/24    Expected End:  02/12/24                 TRANSFERS       Patient will complete functional  transfers with least restrictive device with stand by assist level of assistance. (Progressing)       Start:  01/29/24    Expected End:  02/12/24

## 2024-02-01 VITALS
BODY MASS INDEX: 22.26 KG/M2 | SYSTOLIC BLOOD PRESSURE: 143 MMHG | WEIGHT: 117.9 LBS | HEIGHT: 61 IN | OXYGEN SATURATION: 90 % | DIASTOLIC BLOOD PRESSURE: 73 MMHG | HEART RATE: 84 BPM | RESPIRATION RATE: 17 BRPM | TEMPERATURE: 95.6 F

## 2024-02-01 LAB
ANION GAP SERPL CALC-SCNC: 9 MMOL/L (ref 10–20)
B-LACTAMASE ORGANISM ISLT: NEGATIVE
BACTERIA SPEC CULT: ABNORMAL
BASOPHILS # BLD AUTO: 0.02 X10*3/UL (ref 0–0.1)
BASOPHILS NFR BLD AUTO: 0.2 %
BUN SERPL-MCNC: 21 MG/DL (ref 6–23)
CALCIUM SERPL-MCNC: 8.8 MG/DL (ref 8.6–10.3)
CHLORIDE SERPL-SCNC: 101 MMOL/L (ref 98–107)
CO2 SERPL-SCNC: 37 MMOL/L (ref 21–32)
CREAT SERPL-MCNC: 0.63 MG/DL (ref 0.5–1.05)
EGFRCR SERPLBLD CKD-EPI 2021: 90 ML/MIN/1.73M*2
EOSINOPHIL # BLD AUTO: 0.1 X10*3/UL (ref 0–0.4)
EOSINOPHIL NFR BLD AUTO: 1.1 %
ERYTHROCYTE [DISTWIDTH] IN BLOOD BY AUTOMATED COUNT: 14.7 % (ref 11.5–14.5)
GLUCOSE SERPL-MCNC: 85 MG/DL (ref 74–99)
GRAM STN SPEC: ABNORMAL
GRAM STN SPEC: ABNORMAL
HCT VFR BLD AUTO: 33 % (ref 36–46)
HGB BLD-MCNC: 9.6 G/DL (ref 12–16)
IMM GRANULOCYTES # BLD AUTO: 0.05 X10*3/UL (ref 0–0.5)
IMM GRANULOCYTES NFR BLD AUTO: 0.5 % (ref 0–0.9)
LYMPHOCYTES # BLD AUTO: 1.33 X10*3/UL (ref 0.8–3)
LYMPHOCYTES NFR BLD AUTO: 14.3 %
MCH RBC QN AUTO: 27.7 PG (ref 26–34)
MCHC RBC AUTO-ENTMCNC: 29.1 G/DL (ref 32–36)
MCV RBC AUTO: 95 FL (ref 80–100)
MONOCYTES # BLD AUTO: 0.96 X10*3/UL (ref 0.05–0.8)
MONOCYTES NFR BLD AUTO: 10.3 %
NEUTROPHILS # BLD AUTO: 6.82 X10*3/UL (ref 1.6–5.5)
NEUTROPHILS NFR BLD AUTO: 73.6 %
NRBC BLD-RTO: 0 /100 WBCS (ref 0–0)
PLATELET # BLD AUTO: 314 X10*3/UL (ref 150–450)
POTASSIUM SERPL-SCNC: 5 MMOL/L (ref 3.5–5.3)
RBC # BLD AUTO: 3.46 X10*6/UL (ref 4–5.2)
SODIUM SERPL-SCNC: 142 MMOL/L (ref 136–145)
WBC # BLD AUTO: 9.3 X10*3/UL (ref 4.4–11.3)

## 2024-02-01 PROCEDURE — 2500000001 HC RX 250 WO HCPCS SELF ADMINISTERED DRUGS (ALT 637 FOR MEDICARE OP): Performed by: ORTHOPAEDIC SURGERY

## 2024-02-01 PROCEDURE — 2500000004 HC RX 250 GENERAL PHARMACY W/ HCPCS (ALT 636 FOR OP/ED): Performed by: INTERNAL MEDICINE

## 2024-02-01 PROCEDURE — 85025 COMPLETE CBC W/AUTO DIFF WBC: CPT | Performed by: INTERNAL MEDICINE

## 2024-02-01 PROCEDURE — 36415 COLL VENOUS BLD VENIPUNCTURE: CPT | Performed by: INTERNAL MEDICINE

## 2024-02-01 PROCEDURE — 97110 THERAPEUTIC EXERCISES: CPT | Mod: GP,CQ | Performed by: PHYSICAL THERAPY ASSISTANT

## 2024-02-01 PROCEDURE — 80048 BASIC METABOLIC PNL TOTAL CA: CPT | Performed by: INTERNAL MEDICINE

## 2024-02-01 PROCEDURE — 97530 THERAPEUTIC ACTIVITIES: CPT | Mod: GO,CO

## 2024-02-01 PROCEDURE — 2500000001 HC RX 250 WO HCPCS SELF ADMINISTERED DRUGS (ALT 637 FOR MEDICARE OP): Performed by: INTERNAL MEDICINE

## 2024-02-01 PROCEDURE — 2500000002 HC RX 250 W HCPCS SELF ADMINISTERED DRUGS (ALT 637 FOR MEDICARE OP, ALT 636 FOR OP/ED): Performed by: INTERNAL MEDICINE

## 2024-02-01 PROCEDURE — 97530 THERAPEUTIC ACTIVITIES: CPT | Mod: GP,CQ | Performed by: PHYSICAL THERAPY ASSISTANT

## 2024-02-01 PROCEDURE — 99239 HOSP IP/OBS DSCHRG MGMT >30: CPT | Performed by: INTERNAL MEDICINE

## 2024-02-01 RX ORDER — SULFAMETHOXAZOLE AND TRIMETHOPRIM 800; 160 MG/1; MG/1
1 TABLET ORAL EVERY 12 HOURS SCHEDULED
Qty: 20 TABLET | Refills: 0 | Status: SHIPPED | OUTPATIENT
Start: 2024-02-01 | End: 2024-02-05 | Stop reason: HOSPADM

## 2024-02-01 RX ORDER — L. ACIDOPHILUS/L.BULGARICUS 1MM CELL
TABLET ORAL 2 TIMES DAILY
Qty: 20 TABLET | Refills: 0 | Status: SHIPPED | OUTPATIENT
Start: 2024-02-01 | End: 2024-05-19 | Stop reason: HOSPADM

## 2024-02-01 RX ORDER — ACETAMINOPHEN 325 MG/1
650 TABLET ORAL EVERY 6 HOURS PRN
Qty: 40 TABLET | Refills: 0 | Status: SHIPPED | OUTPATIENT
Start: 2024-02-01 | End: 2024-02-06

## 2024-02-01 RX ORDER — OXYCODONE HYDROCHLORIDE 5 MG/1
5 TABLET ORAL EVERY 6 HOURS PRN
Qty: 12 TABLET | Refills: 0 | Status: ON HOLD | OUTPATIENT
Start: 2024-02-01 | End: 2024-02-05

## 2024-02-01 RX ORDER — SULFAMETHOXAZOLE AND TRIMETHOPRIM 800; 160 MG/1; MG/1
160 TABLET ORAL EVERY 12 HOURS SCHEDULED
Status: DISCONTINUED | OUTPATIENT
Start: 2024-02-01 | End: 2024-02-02 | Stop reason: HOSPADM

## 2024-02-01 RX ADMIN — ACETAMINOPHEN 650 MG: 325 TABLET ORAL at 11:10

## 2024-02-01 RX ADMIN — IPRATROPIUM BROMIDE 2 PUFF: 17 AEROSOL, METERED RESPIRATORY (INHALATION) at 08:12

## 2024-02-01 RX ADMIN — ACETAMINOPHEN 650 MG: 325 TABLET ORAL at 16:47

## 2024-02-01 RX ADMIN — SULFAMETHOXAZOLE AND TRIMETHOPRIM 160 MG: 800; 160 TABLET ORAL at 21:08

## 2024-02-01 RX ADMIN — ACETAMINOPHEN 650 MG: 325 TABLET ORAL at 05:04

## 2024-02-01 RX ADMIN — DOXYCYCLINE 100 MG: 100 CAPSULE ORAL at 08:04

## 2024-02-01 RX ADMIN — SULFAMETHOXAZOLE AND TRIMETHOPRIM 160 MG: 800; 160 TABLET ORAL at 11:10

## 2024-02-01 RX ADMIN — CEPHALEXIN 500 MG: 500 CAPSULE ORAL at 05:04

## 2024-02-01 RX ADMIN — IPRATROPIUM BROMIDE 2 PUFF: 17 AEROSOL, METERED RESPIRATORY (INHALATION) at 19:53

## 2024-02-01 RX ADMIN — METOPROLOL TARTRATE 37.5 MG: 25 TABLET, FILM COATED ORAL at 21:08

## 2024-02-01 RX ADMIN — Medication 1 CAPSULE: at 08:04

## 2024-02-01 RX ADMIN — FLUTICASONE FUROATE AND VILANTEROL TRIFENATATE 1 PUFF: 200; 25 POWDER RESPIRATORY (INHALATION) at 08:12

## 2024-02-01 RX ADMIN — IPRATROPIUM BROMIDE 2 PUFF: 17 AEROSOL, METERED RESPIRATORY (INHALATION) at 16:47

## 2024-02-01 RX ADMIN — ACETAMINOPHEN 650 MG: 325 TABLET ORAL at 19:51

## 2024-02-01 RX ADMIN — IPRATROPIUM BROMIDE 2 PUFF: 17 AEROSOL, METERED RESPIRATORY (INHALATION) at 11:12

## 2024-02-01 RX ADMIN — METOPROLOL TARTRATE 37.5 MG: 25 TABLET, FILM COATED ORAL at 08:04

## 2024-02-01 RX ADMIN — Medication 1 CAPSULE: at 21:08

## 2024-02-01 RX ADMIN — PREDNISONE 10 MG: 10 TABLET ORAL at 08:04

## 2024-02-01 ASSESSMENT — COGNITIVE AND FUNCTIONAL STATUS - GENERAL
DAILY ACTIVITIY SCORE: 13
CLIMB 3 TO 5 STEPS WITH RAILING: TOTAL
STANDING UP FROM CHAIR USING ARMS: A LITTLE
EATING MEALS: A LITTLE
DRESSING REGULAR LOWER BODY CLOTHING: A LOT
HELP NEEDED FOR BATHING: A LOT
MOVING TO AND FROM BED TO CHAIR: A LITTLE
MOVING TO AND FROM BED TO CHAIR: A LITTLE
STANDING UP FROM CHAIR USING ARMS: A LOT
WALKING IN HOSPITAL ROOM: A LOT
DAILY ACTIVITIY SCORE: 13
MOVING TO AND FROM BED TO CHAIR: A LITTLE
CLIMB 3 TO 5 STEPS WITH RAILING: TOTAL
PERSONAL GROOMING: A LOT
DRESSING REGULAR UPPER BODY CLOTHING: A LOT
PERSONAL GROOMING: A LOT
WALKING IN HOSPITAL ROOM: A LITTLE
MOVING FROM LYING ON BACK TO SITTING ON SIDE OF FLAT BED WITH BEDRAILS: A LOT
TOILETING: A LOT
MOBILITY SCORE: 13
DRESSING REGULAR LOWER BODY CLOTHING: A LOT
WALKING IN HOSPITAL ROOM: A LOT
DRESSING REGULAR UPPER BODY CLOTHING: A LOT
MOBILITY SCORE: 15
STANDING UP FROM CHAIR USING ARMS: A LOT
DAILY ACTIVITIY SCORE: 13
EATING MEALS: A LITTLE
TURNING FROM BACK TO SIDE WHILE IN FLAT BAD: A LITTLE
TURNING FROM BACK TO SIDE WHILE IN FLAT BAD: A LITTLE
HELP NEEDED FOR BATHING: A LOT
CLIMB 3 TO 5 STEPS WITH RAILING: TOTAL
DRESSING REGULAR LOWER BODY CLOTHING: A LOT
MOBILITY SCORE: 13
DRESSING REGULAR UPPER BODY CLOTHING: A LOT
PERSONAL GROOMING: A LOT
MOVING FROM LYING ON BACK TO SITTING ON SIDE OF FLAT BED WITH BEDRAILS: A LOT
HELP NEEDED FOR BATHING: A LOT
TURNING FROM BACK TO SIDE WHILE IN FLAT BAD: A LITTLE
EATING MEALS: A LITTLE
MOVING FROM LYING ON BACK TO SITTING ON SIDE OF FLAT BED WITH BEDRAILS: A LOT
TOILETING: A LOT
TOILETING: A LOT

## 2024-02-01 ASSESSMENT — PAIN SCALES - GENERAL
PAINLEVEL_OUTOF10: 0 - NO PAIN
PAINLEVEL_OUTOF10: 0 - NO PAIN

## 2024-02-01 ASSESSMENT — PAIN - FUNCTIONAL ASSESSMENT
PAIN_FUNCTIONAL_ASSESSMENT: 0-10
PAIN_FUNCTIONAL_ASSESSMENT: 0-10

## 2024-02-01 NOTE — PROGRESS NOTES
"Physical Therapy    Physical Therapy Treatment    Patient Name: Iirsh Davis  MRN: [id number]  Today's Date: 2/1/2024  Time Calculation  Start Time: 0825  Stop Time: 0848  Time Calculation (min): 23 min       Assessment/Plan   PT Assessment  End of Session Communication: Bedside nurse  Assessment Comment: pt is unsteady and at risk of falls. pt required assist for all mobility and care. She would benefit from further skilled treatment. pt will require 24 hour assist at home.  End of Session Patient Position: Up in chair, Alarm on     PT Plan  Treatment/Interventions: Bed mobility, Transfer training, Gait training, Balance training, Neuromuscular re-education, Therapeutic exercise, Therapeutic activity  PT Plan: Skilled PT  PT Frequency: 5 times per week  PT Discharge Recommendations: Moderate intensity level of continued care  Equipment Recommended upon Discharge: Other (comment) (determine best AD (consider \"UP WALKER\") to accommodate SUNITA wrist fx's)  PT Recommended Transfer Status: Assist x2  PT - OK to Discharge: Yes (diego next level of care when cleared by medical team)      General Visit Information:   PT  Visit  PT Received On: 02/01/24  Response to Previous Treatment: Patient with no complaints from previous session.  General  Reason for Referral: impaired mobility  Referred By: TARA NESBITT (ORTHO)  Past Medical History Relevant to Rehab: pt admit for fall at home, tripped over O2 tubing. DX SUNITA wrist fractures. LEFT: Complex distal radius and ulnar fracture with impaction, dorsal  angulation and displacement. RIGHT: Dorsally angulated and mildly displaced distal radius fracture. Ulnar  positive variance. Minimal displaced ulnar styloid fracture. S/P SUNITA ORIFs. and I&D L wrist. PMH: recent adm in Dec 2023 for pna/COPD exac.  Patient Position Received: Bed, 3 rail up, Alarm on  Preferred Learning Style: verbal  General Comment: pt in bed and agreeable to treatment. pt cleared by RN. pt reporting feeling SOB " pulse ox was 91% during treatment.      Precautions:  Precautions  UE Weight Bearing Status: Right Non-Weight Bearing, Left Non-Weight Bearing  Medical Precautions: Fall precautions  Precautions Comment: 1-2# limit SUNITA UE           Cognition:  Cognition  Orientation Level: Oriented X4    Treatments:  Therapeutic Exercise  Therapeutic Exercise Performed: Yes  Therapeutic Exercise Activity 1: ankle pumps x15  Therapeutic Exercise Activity 2: Marches x15  Therapeutic Exercise Activity 3: LAQS x15  Therapeutic Exercise Activity 4: Glut sets x15  Therapeutic Exercise Activity 5: hip ab/adduction x15    Bed Mobility  Bed Mobility: Yes  Bed Mobility 1  Bed Mobility 1: Supine to sitting  Level of Assistance 1: Minimum assistance  Bed Mobility Comments 1: pt educated on improved technique and cues to not use UE's.       Transfers  Transfer: Yes  Transfer 1  Technique 1: Sit to stand, Stand to sit  Transfer Level of Assistance 1: Hand held assistance, Moderate assistance, Moderate verbal cues  Trials/Comments 1: pt required cues to use LES and weight shift forward to stand. pt plops into sitting without assist.  Transfers 2  Technique 2: Stand pivot  Transfer Level of Assistance 2: Minimum assistance, Minimal verbal cues  Trials/Comments 2: educated on safety and technique.    Outcome Measures:  [facility] Basic Mobility  Turning from your back to your side while in a flat bed without using bedrails: A lot  Moving from lying on your back to sitting on the side of a flat bed without using bedrails: A little  Moving to and from bed to chair (including a wheelchair): A little  Standing up from a chair using your arms (e.g. wheelchair or bedside chair): A lot  To walk in hospital room: A lot  Climbing 3-5 steps with railing: Total  Basic Mobility - Total Score: 13    Education Documentation  Precautions, taught by Karen James PTA at 2/1/2024 10:24 AM.  Learner: Patient  Readiness: Acceptance  Method: Explanation  Response: Verbalizes  "Understanding, Needs Reinforcement    Mobility Training, taught by Karen James PTA at 2/1/2024 10:24 AM.  Learner: Patient  Readiness: Acceptance  Method: Explanation  Response: Verbalizes Understanding, Needs Reinforcement    Education Comments  No comments found.               Encounter Problems       Encounter Problems (Active)       Mobility       LTG - Patient will ambulate household distance (Progressing)       Start:  01/29/24    Expected End:  02/12/24       DETERMINE APPROP AD, CONSIDER SUNITA PLATFORM FWW (\"UP WALKER\" FOR HOME?)            Pain - Adult          Transfers       LTG - Patient will demonstrate safe transfer techniques (Progressing)       Start:  01/29/24    Expected End:  02/12/24       MAINTAIN NWB SUNITA UE            strengthening       Perform 20 reps AAROM/AAROM/RROM (Progressing)       Start:  01/29/24    Expected End:  02/02/24                   "

## 2024-02-01 NOTE — CARE PLAN
Problem: Discharge Planning  Goal: Discharge to home or other facility with appropriate resources  Outcome: Progressing     Problem: Chronic Conditions and Co-morbidities  Goal: Patient's chronic conditions and co-morbidity symptoms are monitored and maintained or improved  Outcome: Progressing     Problem: Skin  Goal: Decreased wound size/increased tissue granulation at next dressing change  Outcome: Progressing  Flowsheets (Taken 2/1/2024 0749)  Decreased wound size/increased tissue granulation at next dressing change: Promote sleep for wound healing  Goal: Participates in plan/prevention/treatment measures  Outcome: Progressing  Flowsheets (Taken 2/1/2024 0749)  Participates in plan/prevention/treatment measures: Increase activity/out of bed for meals  Goal: Prevent/manage excess moisture  Outcome: Progressing  Flowsheets (Taken 2/1/2024 0749)  Prevent/manage excess moisture: Monitor for/manage infection if present  Goal: Prevent/minimize sheer/friction injuries  Outcome: Progressing  Flowsheets (Taken 2/1/2024 0749)  Prevent/minimize sheer/friction injuries: Turn/reposition every 2 hours/use positioning/transfer devices  Goal: Promote/optimize nutrition  Outcome: Progressing  Flowsheets (Taken 2/1/2024 0749)  Promote/optimize nutrition:   Consume > 50% meals/supplements   Offer water/supplements/favorite foods  Goal: Promote skin healing  Outcome: Progressing  Flowsheets (Taken 2/1/2024 0749)  Promote skin healing:   Assess skin/pad under line(s)/device(s)   Turn/reposition every 2 hours/use positioning/transfer devices     Problem: Fall/Injury  Goal: Not fall by end of shift  Outcome: Progressing  Goal: Be free from injury by end of the shift  Outcome: Progressing  Goal: Verbalize understanding of personal risk factors for fall in the hospital  Outcome: Progressing  Goal: Verbalize understanding of risk factor reduction measures to prevent injury from fall in the home  Outcome: Progressing  Goal: Use  assistive devices by end of the shift  Outcome: Progressing  Goal: Pace activities to prevent fatigue by end of the shift  Outcome: Progressing     Problem: Pain  Goal: Takes deep breaths with improved pain control throughout the shift  Outcome: Progressing  Goal: Turns in bed with improved pain control throughout the shift  Outcome: Progressing  Goal: Walks with improved pain control throughout the shift  Outcome: Progressing  Goal: Performs ADL's with improved pain control throughout shift  Outcome: Progressing  Goal: Participates in PT with improved pain control throughout the shift  Outcome: Progressing  Goal: Free from opioid side effects throughout the shift  Outcome: Progressing  Goal: Free from acute confusion related to pain meds throughout the shift  Outcome: Progressing     The patient's goals for the shift include to remain safe and not fall during shift    The clinical goals for the shift include Remain free from falls and injury during shift    Over the shift, the patient did not make progress toward the following goals.

## 2024-02-01 NOTE — DISCHARGE SUMMARY
Discharge Diagnosis  Closed fracture of right radius and ulna, initial encounter  Acute on chronic respiratory failure  COPD  Right lower extremity ulcer/cellulitis  Hypertension  DVT prophylaxis      Issues Requiring Follow-Up      Discharge Meds     Your medication list        START taking these medications        Instructions Last Dose Given Next Dose Due   acetaminophen 325 mg tablet  Commonly known as: Tylenol      Take 2 tablets (650 mg) by mouth every 6 hours if needed for mild pain (1 - 3) or headaches for up to 5 days.       lactobacillus acidophilus capsule      Take 1 capsule by mouth 2 times a day for 10 days.       oxyCODONE 5 mg immediate release tablet  Commonly known as: Roxicodone      Take 1 tablet (5 mg) by mouth every 6 hours if needed for severe pain (7 - 10) for up to 3 days.       sulfamethoxazole-trimethoprim 800-160 mg tablet  Commonly known as: Bactrim DS      Take 1 tablet by mouth every 12 hours for 10 days.              CONTINUE taking these medications        Instructions Last Dose Given Next Dose Due   Atrovent HFA 17 mcg/actuation inhaler  Generic drug: ipratropium      USE 2 INHALATIONS FOUR TIMES A DAY       hydrOXYzine HCL 25 mg tablet  Commonly known as: Atarax      Take 0.5 tablets (12.5 mg) by mouth every 8 hours if needed for anxiety.       metoprolol tartrate 25 mg tablet  Commonly known as: Lopressor      TAKE ONE AND ONE-HALF TABLETS TWICE A DAY       predniSONE 10 mg tablet  Commonly known as: Deltasone      Take 1 tablet (10 mg) by mouth once daily.       Symbicort 160-4.5 mcg/actuation inhaler  Generic drug: budesonide-formoteroL                     Where to Get Your Medications        These medications were sent to [facility]  [address] [place] [address]      Hours: 8AM to 6PM Mon-Fri, 8AM to 2PM Sat, 8AM to 12PM Sun Phone: [phone]   acetaminophen 325 mg tablet  lactobacillus acidophilus capsule  sulfamethoxazole-trimethoprim 800-160 mg  tablet       You can get these medications from any pharmacy    Bring a paper prescription for each of these medications  oxyCODONE 5 mg immediate release tablet         Test Results Pending At Discharge  Pending Labs       Order Current Status    Tissue/Wound Culture/Smear Preliminary result            Hospital Course  Irish Davis is a 78 y.o. female with PMHx s/f HTN, HLD, moderate aortic stenosis, chronic bilateral lower extremity edema (on as needed Lasix), COPD with chronic hypoxic respiratory failure (baseline 3-4 L nasal cannula) who presented to the emergency room after a fall at home with bilateral distal radius fracture.  On admission in the emergency room she was hemodynamically stable with a blood pressure of 163/83 mmHg, heart rate of 72/min, respirate rate of 18/min, temperature of 36.8 °C and was saturating 94% on her baseline 4 L nasal cannula oxygen.  Initial CBC showed a hemoglobin of 10.6 BMP were significant for a potassium of 3.4.  Influenza antigen and COVID PCR were negative.  CT of the head was negative for any acute intracranial process, CT cervical spine showed loss of normal cervical lordosis with demineralization in addition to some healing or healed fracture of the C1 vertebrae.  X-ray of the bilateral wrist and hand showed complex distal radius and ulna fracture with impaction and dorsal angulation and displacement on the left and dorsally angulated distal radius fracture and ulnar styloid fracture on the right.  General medicine has been consulted for medical management.  I saw patient on the regular nursing floor..  She was drowsy and barely arousable.  Daughter and  at bedside confirming initial  history prior to admission.  1/28: Patient was seen and examined.  She is more awake and alert and oriented x 3 when I saw her today.  She was complaining of some more difficulty in breathing.  Chest x-ray done and that showed pulmonary edema.  Will stop IV fluids.  I will give  1 dose of IV Lasix.  Strict input output chart.  As needed DuoNebs.   I will suspend any discharge plans for today.  Repeat BMP and CBC in AM.  1/29: Patient was seen and examined.  She is refusing discharge to skilled nursing facility even though patient is severely deconditioned.  Hemoglobin is stable at 9.9.  Hypomagnesemia noted; we will supplement with admission.  Patient and family will discuss and make a final decision with regards to extended-care facility.  PT OT recommendations appreciated.  1/30: Patient was seen and examined.  Now agrees to go to a skilled nursing facility.  Hemoglobin is remaining stable.  Patient now agrees to go to a skilled nursing facility on discharge with restrictions to facilities close to home.  Awaiting choice and authorization for placement.  1/31: Patient was seen and examined.  Saturating well on 5 L nasal cannula oxygen.  Her baseline is 3 to 4 L nasal cannula oxygen.  Chest x-ray showed some pulmonary edema so I will order 1 dose of IV Lasix.  Continue to wean oxygen as tolerated.  Patient has agreed to go to extended-care facility so currently awaiting authorization for placement in the [place].  Hypokalemia noted and potassium supplementation given.  Started on IV antibiotics due to lower extremity cellulitis and shallow ulcer.  Repeat CBC and BMP in a.m.  2/1: Patient was seen and examined.  Still on 6 L nasal cannula oxygen but saturating 96%.  We will wean down to 4 L today.  Wound culture positive for MSSA sensitive to Bactrim.  Started patient on p.o. Bactrim.  PT OT on board.  Potential discharge to extended-care facility within the next 24 hours.  Addendum: Patient was discharged in stable condition to an extended care facility for further management.      The discharge process took about 35    Pertinent Physical Exam At Time of Discharge  Physical Exam  Constitutional:       General: Acute on chronically ill looking elderly  female; dry mucous  membrane, awake and alert, oriented x 3     HENT:      Head: Normocephalic and atraumatic.      Right Ear: External ear normal.      Left Ear: External ear normal.      Nose: Nose normal.      Mouth/Throat:      Mouth: Mucous membranes are dry     Pharynx: Oropharynx is clear.   Eyes:      Extraocular Movements: Extraocular movements intact.      Conjunctiva/sclera: Conjunctivae normal.      Pupils: Pupils are equal, round, and reactive to light.   Cardiovascular:      Rate and Rhythm: Normal rate and regular rhythm.      Pulses: Normal pulses.      Heart sounds: Normal heart sounds.   Pulmonary:      Effort: Pulmonary effort is normal. No respiratory distress.      Breath sounds: Wheezing and rales present. No rhonchi.      Comments: NC in place  Abdominal:      General: Abdomen is flat. Bowel sounds are normal.      Palpations: Abdomen is soft.      Tenderness: There is no abdominal tenderness. There is no right CVA tenderness, left CVA tenderness, guarding or rebound.   Musculoskeletal:      Bilateral ACE bandaging of the upper extremity with splinting  Skin:     General: Skin is warm and dry.      Capillary Refill: Capillary refill takes less than 2 seconds.      Findings: No lesion or rash.   Neurological:      General: No focal deficit present.      Mental Status: Awake and alert and oriented x 3 and moves all limbs.  Outpatient Follow-Up  Future Appointments   Date Time Provider Department Center   2/12/2024  1:00 PM Bryant Arnold DO [id number]         [facility] [place]

## 2024-02-01 NOTE — PROGRESS NOTES
Irish Davis is a 78 y.o. female on day 5 of admission presenting with Closed fracture of right radius and ulna, initial encounter.    Subjective   Patient resting in chair comfortably,  and friend at bedside.  States pain improving, working with OT.  States pain much improved prior to surgery.  She reports she wants to go home because she needs o take care of her .  She does have family and friends to help support her and him.  However, after much consideration, she is now open to going to a rehab facliity and will be going to the [facility].     Objective   Bilateral UE  SILT  Warm fingers, BCR  Wiggles fingers, can oppose thumb to index and long bilaterally  FROM right elbow, limited left due to splint       Assessment/Plan   5 days s/p left distal radius ORIF with I and D for open fracture and right distal radius ORIF  Based on the history, physical exam and imaging studies above, the patient's presentation is consistent with the above diagnosis.  I had a long discussion with the patient regarding their presentation and the treatment options.    We again discussed her treatment options going forward along with their associated risks and benefits. After thorough discussion, the patient has elected to proceed with postoperative care. All questions were answered to the patients satisfaction who seems satisfied with the plan.  They will call the office with any questions/concerns.     Maintain splints  Finished antibiotics  Pain control per IMS  OT - E/T, 2 lbs WB per extremity  Stable for discharge form orthopedic perspective  FU 2 weeks for suture removal and splint fabrication and wound check - XR bilateral wrists - gave her appointment card for 2/12/24 1:00 pm at [facility]    Bryant Arnold DO

## 2024-02-01 NOTE — PROGRESS NOTES
Irish Davis is a 78 y.o. female on day 5 of admission presenting with Closed fracture of right radius and ulna, initial encounter.      Subjective   Irish Davis is a 78 y.o. female with PMHx s/f HTN, HLD, moderate aortic stenosis, chronic bilateral lower extremity edema (on as needed Lasix), COPD with chronic hypoxic respiratory failure (baseline 3-4 L nasal cannula) who presented to the emergency room after a fall at home with bilateral distal radius fracture.  On admission in the emergency room she was hemodynamically stable with a blood pressure of 163/83 mmHg, heart rate of 72/min, respirate rate of 18/min, temperature of 36.8 °C and was saturating 94% on her baseline 4 L nasal cannula oxygen.  Initial CBC showed a hemoglobin of 10.6 BMP were significant for a potassium of 3.4.  Influenza antigen and COVID PCR were negative.  CT of the head was negative for any acute intracranial process, CT cervical spine showed loss of normal cervical lordosis with demineralization in addition to some healing or healed fracture of the C1 vertebrae.  X-ray of the bilateral wrist and hand showed complex distal radius and ulna fracture with impaction and dorsal angulation and displacement on the left and dorsally angulated distal radius fracture and ulnar styloid fracture on the right.  General medicine has been consulted for medical management.  I saw patient on the regular nursing floor..  She was drowsy and barely arousable.  Daughter and  at bedside confirming initial  history prior to admission.  1/28: Patient was seen and examined.  She is more awake and alert and oriented x 3 when I saw her today.  She was complaining of some more difficulty in breathing.  Chest x-ray done and that showed pulmonary edema.  Will stop IV fluids.  I will give 1 dose of IV Lasix.  Strict input output chart.  As needed DuoNebs.  I will suspend any discharge plans for today.  Repeat BMP and CBC in AM.  1/29: Patient was seen  and examined.  She is refusing discharge to skilled nursing facility even though patient is severely deconditioned.  Hemoglobin is stable at 9.9.  Hypomagnesemia noted; we will supplement with admission.  Patient and family will discuss and make a final decision with regards to extended-care facility.  PT OT recommendations appreciated.  1/30: Patient was seen and examined.  Now agrees to go to a skilled nursing facility.  Hemoglobin is remaining stable.  Patient now agrees to go to a skilled nursing facility on discharge with restrictions to facilities close to home.  Awaiting choice and authorization for placement.  1/31: Patient was seen and examined.  Saturating well on 5 L nasal cannula oxygen.  Her baseline is 3 to 4 L nasal cannula oxygen.  Chest x-ray showed some pulmonary edema so I will order 1 dose of IV Lasix.  Continue to wean oxygen as tolerated.  Patient has agreed to go to extended-care facility so currently awaiting authorization for placement in the [place].  Hypokalemia noted and potassium supplementation given.  Started on IV antibiotics due to lower extremity cellulitis and shallow ulcer.  Repeat CBC and BMP in a.m.  2/1: Patient was seen and examined.  Still on 6 L nasal cannula oxygen but saturating 96%.  We will wean down to 4 L today.  Wound culture positive for MSSA sensitive to Bactrim.  Started patient on p.o. Bactrim.  PT OT on board.  Potential discharge to extended-care facility within the next 24 hours.    Objective     Last Recorded Vitals  /64 (BP Location: Left arm, Patient Position: Sitting)   Pulse 72   Temp 36.9 °C (98.4 °F)   Resp 20   Wt 53.5 kg (117 lb 14.4 oz)   SpO2 96%   Intake/Output last 3 Shifts:    Intake/Output Summary (Last 24 hours) at 2/1/2024 1050  Last data filed at 2/1/2024 0825  Gross per 24 hour   Intake 563 ml   Output --   Net 563 ml         Admission Weight  Weight: 54.4 kg (120 lb) (01/26/24 1601)    Daily Weight  01/30/24 : 53.5 kg (117 lb  14.4 oz)    Image Results  Transthoracic Echo (TTE) Complete                [place] [id number]       Phone [phone] Fax [phone]    TRANSTHORACIC ECHOCARDIOGRAM REPORT       Patient Name:      ROMULO DOHERTY   Reading Physician:    [id number] Annika Rene MD  Study Date:        1/29/2024            Ordering Provider:    [id number] AIAYNA VO  MRN/PID:           [id number]             Fellow:  Accession#:        [id number]         Nurse:  Date of Birth/Age: 1945 / 78 years Sonographer:          Huyen Reagan RDCS  Gender:            F                    Additional Staff:  Height:            154.94 cm            Admit Date:           1/26/2024  Weight:            53.52 kg             Admission Status:     Inpatient -                                                                Routine  BSA:               1.51 m2              Department Location:  [address]  Blood Pressure: 130 /77 mmHg    Study Type:    TRANSTHORACIC ECHO (TTE) COMPLETE  Diagnosis/ICD: Heart failure, unspecified-I50.9  Indication:    Congestive Heart Failure  CPT Codes:     Echo Complete w Full [id number]    Patient History:  Pertinent History: AS.    Study Detail: The following Echo studies were performed: 2D, M-Mode, Doppler and                color flow.       PHYSICIAN INTERPRETATION:  Left Ventricle: Left ventricular systolic function is normal, with an estimated ejection fraction of 60-65%. There are no regional wall motion abnormalities. The left ventricular cavity size is normal. Spectral Doppler shows a normal pattern of left ventricular diastolic filling.  Left Atrium: The left atrium is normal in size.  Right Ventricle: The right ventricle is normal in size. There is low normal  right ventricular systolic function.  Right Atrium: The right atrium is normal in size.  Aortic Valve: The aortic valve was not well visualized. There is mild aortic valve cusp calcification. There is evidence of mild aortic valve stenosis.  The aortic valve dimensionless index is 0.55. There is no evidence of aortic valve regurgitation. The peak instantaneous gradient of the aortic valve is 23.0 mmHg. The mean gradient of the aortic valve is 12.8 mmHg.  Mitral Valve: The mitral valve is mildly thickened. There is trace mitral valve regurgitation.  Tricuspid Valve: The tricuspid valve is structurally normal. There is mild tricuspid regurgitation.  Pulmonic Valve: The pulmonic valve is structurally normal. There is no indication of pulmonic valve regurgitation.  Pericardium: There is a trivial pericardial effusion.  Aorta: The aortic root is normal.       CONCLUSIONS:   1. Left ventricular systolic function is normal with a 60-65% estimated ejection fraction.   2. There is low normal right ventricular systolic function.   3. Mild aortic valve stenosis.    QUANTITATIVE DATA SUMMARY:  2D MEASUREMENTS:                           Normal Ranges:  LAs:           2.25 cm   (2.7-4.0cm)  IVSd:          0.78 cm   (0.6-1.1cm)  LVPWd:         0.92 cm   (0.6-1.1cm)  LVIDd:         3.26 cm   (3.9-5.9cm)  LVIDs:         2.38 cm  LV Mass Index: 48.6 g/m2  LV % FS        27.1 %    LA VOLUME:                                Normal Ranges:  LA Vol A4C:        17.3 ml    (22+/-6mL/m2)  LA Vol A2C:        29.8 ml  LA Vol BP:         23.6 ml  LA Vol Index A4C:  11.5 ml/m2  LA Vol Index A2C:  19.7 ml/m2  LA Vol Index BP:   15.7 ml/m2  LA Area A4C:       10.4 cm2  LA Area A2C:       13.1 cm2  LA Major Axis A4C: 5.3 cm  LA Major Axis A2C: 4.9 cm  LA Volume Index:   15.7 ml/m2  LA Vol A4C:        15.5 ml  LA Vol A2C:        28.5 ml    RA VOLUME BY A/L METHOD:                        Normal Ranges:  RA Area A4C: 10.7 cm2    AORTA MEASUREMENTS:                        Normal Ranges:  Ao Sinus, d: 2.80 cm (2.1-3.5cm)  Ao STJ, d:   2.50 cm (1.7-3.4cm)  Asc Ao, d:   2.80 cm (2.1-3.4cm)    LV SYSTOLIC FUNCTION BY 2D PLANIMETRY (MOD):                      Normal Ranges:  EF-A4C View: 73.1 % (>=55%)  EF-A2C View: 68.9 %  EF-Biplane:  71.8 %    LV DIASTOLIC FUNCTION:                           Normal Ranges:  MV Peak E:    0.91 m/s   (0.7-1.2 m/s)  MV Peak A:    0.88 m/s   (0.42-0.7 m/s)  E/A Ratio:    1.04       (1.0-2.2)  MV e'         0.09 m/s   (>8.0)  MV lateral e' 0.10 m/s  MV medial e'  0.08 m/s  MV A Dur:     82.78 msec  E/e' Ratio:   10.14      (<8.0)    MITRAL VALVE:                  Normal Ranges:  MV DT: 176 msec (150-240msec)    AORTIC VALVE:                                     Normal Ranges:  AoV Vmax:                2.40 m/s  (<=1.7m/s)  AoV Peak P.0 mmHg (<20mmHg)  AoV Mean P.8 mmHg (1.7-11.5mmHg)  LVOT Max Pradeep:            1.19 m/s  (<=1.1m/s)  AoV VTI:                 48.90 cm  (18-25cm)  LVOT VTI:                26.86 cm  LVOT Diameter:           2.01 cm   (1.8-2.4cm)  AoV Area, VTI:           1.75 cm2  (2.5-5.5cm2)  AoV Area,Vmax:           1.58 cm2  (2.5-4.5cm2)  AoV Dimensionless Index: 0.55       RIGHT VENTRICLE:  RV Basal 2.66 cm  RV Mid   1.70 cm  RV Major 6.6 cm  TAPSE:   19.5 mm  RV s'    0.10 m/s    TRICUSPID VALVE/RVSP:                              Normal Ranges:  Peak TR Velocity: 3.15 m/s  Est. RA Pressure: 3 mmHg  RV Syst Pressure: 42.8 mmHg (< 30mmHg)  IVC Diam:         1.82 cm    AORTA:  Asc Ao Diam 2.81 cm       [id number] Annika Rene MD  Electronically signed on 2024 at 6:03:24 PM       ** Final **  ECG 12 Lead  Sinus rhythm  Borderline right axis deviation  Low voltage, extremity leads    Confirmed by Annika Rene ([id number]) on 2024 5:52:59 PM      Physical Exam  Constitutional:       General: Acute on chronically ill looking elderly  female; dry mucous membrane, awake and  alert, oriented x 3     HENT:      Head: Normocephalic and atraumatic.      Right Ear: External ear normal.      Left Ear: External ear normal.      Nose: Nose normal.      Mouth/Throat:      Mouth: Mucous membranes are dry     Pharynx: Oropharynx is clear.   Eyes:      Extraocular Movements: Extraocular movements intact.      Conjunctiva/sclera: Conjunctivae normal.      Pupils: Pupils are equal, round, and reactive to light.   Cardiovascular:      Rate and Rhythm: Normal rate and regular rhythm.      Pulses: Normal pulses.      Heart sounds: Normal heart sounds.   Pulmonary:      Effort: Pulmonary effort is normal. No respiratory distress.      Breath sounds: Wheezing and rales present. No rhonchi.      Comments: NC in place  Abdominal:      General: Abdomen is flat. Bowel sounds are normal.      Palpations: Abdomen is soft.      Tenderness: There is no abdominal tenderness. There is no right CVA tenderness, left CVA tenderness, guarding or rebound.   Musculoskeletal:      Bilateral ACE bandaging of the upper extremity with splinting  Skin:     General: Skin is warm and dry.      Capillary Refill: Capillary refill takes less than 2 seconds.      Findings: No lesion or rash.   Neurological:      General: No focal deficit present.      Mental Status: Awake and alert and oriented x 3 and moves all limbs.     Relevant Results               Assessment/Plan                  Principal Problem:    Closed fracture of right radius and ulna, initial encounter  Active Problems:    Type I or II open fracture of left radius and ulna    #Bilateral distal radius and ulna fracture  Status post ORIF  Admitted on regular nursing floor  Continue on pain control  Orthopedic surgery consulted for surgical intervention     # Acute on chronic respiratory failure  Likely related to pulmonary edema plus bronchospasm  Chest x-ray reviewed  As needed DuoNebs  Continue nasal cannula oxygen and wean down as tolerated     #COPD   Currently  stable  As needed DuoNebs    #Right lower extremity ulcer/cellulitis  Started on antibiotics p.o. Bactrim    #Hypertension  Resumed metoprolol     #DVT prophylaxis  Subcutaneous heparin          Spent 35 minutes in the follow-up management of this patient today       Ortiz Marshall MD

## 2024-02-01 NOTE — CARE PLAN
The patient's goals for the shift include to remain safe and not fall during shift    The clinical goals for the shift include progressing

## 2024-02-01 NOTE — PROGRESS NOTES
Occupational Therapy    Occupational Therapy Treatment    Name: Irish Davis  MRN: [id number]  : 1945  Date: 24  Time Calculation  Start Time: 1307  Stop Time: 1320  Time Calculation (min): 13 min    Assessment:  OT Assessment: OT tx session limited at this time d/t increased fatigue and SOB at this date.  End of Session Communication: Bedside nurse  End of Session Patient Position: Up in chair, Alarm on  Plan:  Treatment Interventions: ADL retraining, Endurance training, Functional transfer training  OT Frequency: 5 times per week  OT Discharge Recommendations: Moderate intensity level of continued care  OT - OK to Discharge: Yes    Subjective   Previous Visit Info:  OT Last Visit  OT Received On: 24  General:  General  Reason for Referral: impaired mobilty  Referred By: TARA NESBITT (ORTHO)  Past Medical History Relevant to Rehab: pt admit for fall at home, tripped over O2 tubing. DX SUNITA wrist fractures. LEFT: Complex distal radius and ulnar fracture with impaction, dorsal  angulation and displacement. RIGHT: Dorsally angulated and mildly displaced distal radius fracture. Ulnar  positive variance. Minimal displaced ulnar styloid fracture. S/P SUNITA ORIFs. and I&D L wrist. PMH: recent adm in Dec 2023 for pna/COPD exac.  Family/Caregiver Present: Yes  Prior to Session Communication: Bedside nurse  Patient Position Received: Up in chair, Alarm on  Preferred Learning Style: verbal  General Comment: pt reluctant to OT tx session at this time, however willing to attempt some therapy with encouragement  Precautions:  UE Weight Bearing Status: Right Non-Weight Bearing, Left Non-Weight Bearing  Medical Precautions: Fall precautions  Vitals:     Pain Assessment:  Pain Assessment  Pain Assessment: 0-10  Pain Score: 0 - No pain  Pain Type: Surgical pain     Objective     Functional Standing Tolerance:  Functional Standing Tolerance  Time: <1 min stands at armed chair with arm in arm assist  Activity:  standing tolerance at armed chair with Amy  Functional Standing Tolerance Comments: Amy, pt tolerated standing <1 min  Bed Mobility/Transfers: Transfers  Transfer: Yes  Transfer 1  Transfer From 1: Sit to  Transfer to 1: Stand  Technique 1: Sit to stand, Stand to sit  Transfer Level of Assistance 1: Arm in arm assistance  Trials/Comments 1: pt achieved x2 STS with Amy and cues to adhere to NWB precautions of BUE.      Standing Balance:  Static Standing Balance  Static Standing-Level of Assistance: Minimum assistance  Static Standing-Comment/Number of Minutes: no LOB noted      Balance/Neuromuscular Re-Education  Balance/Neuromuscular Re-Education Activity Performed: Yes  Balance/Neuromuscular Re-Education Activity 1: pt performed static standing at armed chair with Amy and cues to proper technique. Pt also able to recall NWB precautions for bilat UE at this time, improving follow through with precautions    Outcome Measures:  [facility] Daily Activity  Putting on and taking off regular lower body clothing: A lot  Bathing (including washing, rinsing, drying): A lot  Putting on and taking off regular upper body clothing: A lot  Toileting, which includes using toilet, bedpan or urinal: A lot  Taking care of personal grooming such as brushing teeth: A lot  Eating Meals: A little  Daily Activity - Total Score: 13        Education Documentation  ADL Training, taught by GARLAND Borges at 2/1/2024  2:44 PM.  Learner: Patient  Readiness: Acceptance  Method: Explanation  Response: Verbalizes Understanding    ADL Training, taught by GARLAND Borges at 1/31/2024  3:25 PM.  Learner: Patient  Readiness: Acceptance  Method: Explanation  Response: Verbalizes Understanding    Education Comments  No comments found.      Goals:  Encounter Problems       Encounter Problems (Active)       ADLs       Patient will perform UB bathing from seated position with minimal assist  level of assistance. (Not Progressing)       Start:   01/29/24    Expected End:  02/12/24            Patient with complete upper body dressing with set-up and stand by assist level of assistance donning and doffing all UE clothes while supported sitting (Not Progressing)       Start:  01/29/24    Expected End:  02/12/24            Patient with complete lower body dressing with minimal assist  level of assistance donning and doffing pants without a zipper/fasteners and underwear while supported sitting and standing (Not Progressing)       Start:  01/29/24    Expected End:  02/12/24            Patient will feed self with set-up SBA level of assistance, modified techniques and PRN adaptive equipment. (Not Progressing)       Start:  01/29/24    Expected End:  02/12/24            Patient will complete daily grooming tasks brushing teeth and washing face/hair with set-up and stand by assist level of assistance and PRN adaptive equipment while supported sitting. (Progressing)       Start:  01/29/24    Expected End:  02/12/24            Patient will complete toileting including hygiene with minimal assist  level of assistance and toileting aid device PRN. (Progressing)       Start:  01/29/24    Expected End:  02/12/24               COGNITION/SAFETY       Patient will recall and adhere to weight bearing restriction BUEs with all ADL and functional mobility in order to promote healing and safety with functional tasks (Progressing)       Start:  01/29/24    Expected End:  02/12/24                 TRANSFERS       Patient will complete functional transfers with least restrictive device with stand by assist level of assistance. (Progressing)       Start:  01/29/24    Expected End:  02/12/24

## 2024-02-02 ENCOUNTER — APPOINTMENT (OUTPATIENT)
Dept: CARDIOLOGY | Facility: HOSPITAL | Age: 79
DRG: 190 | End: 2024-02-02
Payer: MEDICARE

## 2024-02-02 ENCOUNTER — HOSPITAL ENCOUNTER (INPATIENT)
Facility: HOSPITAL | Age: 79
LOS: 3 days | Discharge: NURSING, REHAB OR LONG-TERM CARE | DRG: 190 | End: 2024-02-05
Attending: EMERGENCY MEDICINE | Admitting: FAMILY MEDICINE
Payer: MEDICARE

## 2024-02-02 ENCOUNTER — APPOINTMENT (OUTPATIENT)
Dept: RADIOLOGY | Facility: HOSPITAL | Age: 79
DRG: 190 | End: 2024-02-02
Payer: MEDICARE

## 2024-02-02 DIAGNOSIS — S52.201A CLOSED FRACTURE OF RIGHT RADIUS AND ULNA, INITIAL ENCOUNTER: ICD-10-CM

## 2024-02-02 DIAGNOSIS — J96.21 ACUTE ON CHRONIC HYPOXIC RESPIRATORY FAILURE (MULTI): ICD-10-CM

## 2024-02-02 DIAGNOSIS — S52.91XA CLOSED FRACTURE OF RIGHT RADIUS AND ULNA, INITIAL ENCOUNTER: ICD-10-CM

## 2024-02-02 DIAGNOSIS — J44.1 COPD EXACERBATION (MULTI): Primary | ICD-10-CM

## 2024-02-02 PROBLEM — S52.202B TYPE I OR II OPEN FRACTURE OF LEFT RADIUS AND ULNA: Chronic | Status: ACTIVE | Noted: 2024-01-26

## 2024-02-02 PROBLEM — S52.92XB TYPE I OR II OPEN FRACTURE OF LEFT RADIUS AND ULNA: Chronic | Status: ACTIVE | Noted: 2024-01-26

## 2024-02-02 PROBLEM — I10 ESSENTIAL HYPERTENSION: Chronic | Status: ACTIVE | Noted: 2024-02-02

## 2024-02-02 PROBLEM — E78.2 MIXED HYPERLIPIDEMIA: Chronic | Status: ACTIVE | Noted: 2024-02-02

## 2024-02-02 LAB
ALBUMIN SERPL BCP-MCNC: 3.3 G/DL (ref 3.4–5)
ALP SERPL-CCNC: 97 U/L (ref 33–136)
ALT SERPL W P-5'-P-CCNC: 19 U/L (ref 7–45)
ANION GAP BLDV CALCULATED.4IONS-SCNC: -1 MMOL/L (ref 10–25)
ANION GAP SERPL CALC-SCNC: 13 MMOL/L (ref 10–20)
AST SERPL W P-5'-P-CCNC: 44 U/L (ref 9–39)
BASE EXCESS BLDV CALC-SCNC: 17.5 MMOL/L (ref -2–3)
BASOPHILS # BLD AUTO: 0.03 X10*3/UL (ref 0–0.1)
BASOPHILS NFR BLD AUTO: 0.4 %
BILIRUB SERPL-MCNC: 0.6 MG/DL (ref 0–1.2)
BNP SERPL-MCNC: 426 PG/ML (ref 0–99)
BODY TEMPERATURE: 37 DEGREES CELSIUS
BUN SERPL-MCNC: 22 MG/DL (ref 6–23)
CA-I BLDV-SCNC: 1.22 MMOL/L (ref 1.1–1.33)
CALCIUM SERPL-MCNC: 9.3 MG/DL (ref 8.6–10.3)
CARDIAC TROPONIN I PNL SERPL HS: 28 NG/L (ref 0–13)
CHLORIDE BLDV-SCNC: 97 MMOL/L (ref 98–107)
CHLORIDE SERPL-SCNC: 94 MMOL/L (ref 98–107)
CO2 SERPL-SCNC: 33 MMOL/L (ref 21–32)
CREAT SERPL-MCNC: 0.82 MG/DL (ref 0.5–1.05)
EGFRCR SERPLBLD CKD-EPI 2021: 72 ML/MIN/1.73M*2
EOSINOPHIL # BLD AUTO: 0.14 X10*3/UL (ref 0–0.4)
EOSINOPHIL NFR BLD AUTO: 1.6 %
ERYTHROCYTE [DISTWIDTH] IN BLOOD BY AUTOMATED COUNT: 14.6 % (ref 11.5–14.5)
FLUAV RNA RESP QL NAA+PROBE: NOT DETECTED
FLUBV RNA RESP QL NAA+PROBE: NOT DETECTED
GLUCOSE BLDV-MCNC: 109 MG/DL (ref 74–99)
GLUCOSE SERPL-MCNC: 103 MG/DL (ref 74–99)
HCO3 BLDV-SCNC: 43.7 MMOL/L (ref 22–26)
HCT VFR BLD AUTO: 33.8 % (ref 36–46)
HCT VFR BLD EST: 31 % (ref 36–46)
HGB BLD-MCNC: 10.3 G/DL (ref 12–16)
HGB BLDV-MCNC: 10.3 G/DL (ref 12–16)
IMM GRANULOCYTES # BLD AUTO: 0.12 X10*3/UL (ref 0–0.5)
IMM GRANULOCYTES NFR BLD AUTO: 1.4 % (ref 0–0.9)
INHALED O2 CONCENTRATION: 44 %
INR PPP: 1.1 (ref 0.9–1.1)
LACTATE BLDV-SCNC: 2 MMOL/L (ref 0.4–2)
LYMPHOCYTES # BLD AUTO: 0.75 X10*3/UL (ref 0.8–3)
LYMPHOCYTES NFR BLD AUTO: 8.8 %
MCH RBC QN AUTO: 28.2 PG (ref 26–34)
MCHC RBC AUTO-ENTMCNC: 30.5 G/DL (ref 32–36)
MCV RBC AUTO: 93 FL (ref 80–100)
MONOCYTES # BLD AUTO: 0.8 X10*3/UL (ref 0.05–0.8)
MONOCYTES NFR BLD AUTO: 9.4 %
NEUTROPHILS # BLD AUTO: 6.66 X10*3/UL (ref 1.6–5.5)
NEUTROPHILS NFR BLD AUTO: 78.4 %
NRBC BLD-RTO: 0 /100 WBCS (ref 0–0)
OXYHGB MFR BLDV: 89.3 % (ref 45–75)
PCO2 BLDV: 60 MM HG (ref 41–51)
PH BLDV: 7.47 PH (ref 7.33–7.43)
PLATELET # BLD AUTO: 363 X10*3/UL (ref 150–450)
PO2 BLDV: 61 MM HG (ref 35–45)
POTASSIUM BLDV-SCNC: 4.5 MMOL/L (ref 3.5–5.3)
POTASSIUM SERPL-SCNC: 4.3 MMOL/L (ref 3.5–5.3)
PROT SERPL-MCNC: 6.3 G/DL (ref 6.4–8.2)
PROTHROMBIN TIME: 12.2 SECONDS (ref 9.8–12.8)
RBC # BLD AUTO: 3.65 X10*6/UL (ref 4–5.2)
SAO2 % BLDV: 92 % (ref 45–75)
SARS-COV-2 RNA RESP QL NAA+PROBE: NOT DETECTED
SODIUM BLDV-SCNC: 135 MMOL/L (ref 136–145)
SODIUM SERPL-SCNC: 136 MMOL/L (ref 136–145)
WBC # BLD AUTO: 8.5 X10*3/UL (ref 4.4–11.3)

## 2024-02-02 PROCEDURE — 71045 X-RAY EXAM CHEST 1 VIEW: CPT | Performed by: STUDENT IN AN ORGANIZED HEALTH CARE EDUCATION/TRAINING PROGRAM

## 2024-02-02 PROCEDURE — 99285 EMERGENCY DEPT VISIT HI MDM: CPT | Mod: 25 | Performed by: EMERGENCY MEDICINE

## 2024-02-02 PROCEDURE — 84132 ASSAY OF SERUM POTASSIUM: CPT | Performed by: EMERGENCY MEDICINE

## 2024-02-02 PROCEDURE — 2500000004 HC RX 250 GENERAL PHARMACY W/ HCPCS (ALT 636 FOR OP/ED): Performed by: EMERGENCY MEDICINE

## 2024-02-02 PROCEDURE — 93005 ELECTROCARDIOGRAM TRACING: CPT

## 2024-02-02 PROCEDURE — 99223 1ST HOSP IP/OBS HIGH 75: CPT | Performed by: STUDENT IN AN ORGANIZED HEALTH CARE EDUCATION/TRAINING PROGRAM

## 2024-02-02 PROCEDURE — 96374 THER/PROPH/DIAG INJ IV PUSH: CPT

## 2024-02-02 PROCEDURE — 87636 SARSCOV2 & INF A&B AMP PRB: CPT | Performed by: EMERGENCY MEDICINE

## 2024-02-02 PROCEDURE — 84484 ASSAY OF TROPONIN QUANT: CPT | Performed by: EMERGENCY MEDICINE

## 2024-02-02 PROCEDURE — 2060000001 HC INTERMEDIATE ICU ROOM DAILY

## 2024-02-02 PROCEDURE — 2500000002 HC RX 250 W HCPCS SELF ADMINISTERED DRUGS (ALT 637 FOR MEDICARE OP, ALT 636 FOR OP/ED): Mod: MUE | Performed by: FAMILY MEDICINE

## 2024-02-02 PROCEDURE — 2500000002 HC RX 250 W HCPCS SELF ADMINISTERED DRUGS (ALT 637 FOR MEDICARE OP, ALT 636 FOR OP/ED): Mod: MUE | Performed by: EMERGENCY MEDICINE

## 2024-02-02 PROCEDURE — 2500000002 HC RX 250 W HCPCS SELF ADMINISTERED DRUGS (ALT 637 FOR MEDICARE OP, ALT 636 FOR OP/ED): Performed by: STUDENT IN AN ORGANIZED HEALTH CARE EDUCATION/TRAINING PROGRAM

## 2024-02-02 PROCEDURE — 71045 X-RAY EXAM CHEST 1 VIEW: CPT

## 2024-02-02 PROCEDURE — 2500000001 HC RX 250 WO HCPCS SELF ADMINISTERED DRUGS (ALT 637 FOR MEDICARE OP): Performed by: STUDENT IN AN ORGANIZED HEALTH CARE EDUCATION/TRAINING PROGRAM

## 2024-02-02 PROCEDURE — 2500000004 HC RX 250 GENERAL PHARMACY W/ HCPCS (ALT 636 FOR OP/ED): Performed by: STUDENT IN AN ORGANIZED HEALTH CARE EDUCATION/TRAINING PROGRAM

## 2024-02-02 PROCEDURE — 83880 ASSAY OF NATRIURETIC PEPTIDE: CPT | Performed by: EMERGENCY MEDICINE

## 2024-02-02 PROCEDURE — 36415 COLL VENOUS BLD VENIPUNCTURE: CPT | Performed by: EMERGENCY MEDICINE

## 2024-02-02 PROCEDURE — 85025 COMPLETE CBC W/AUTO DIFF WBC: CPT | Performed by: EMERGENCY MEDICINE

## 2024-02-02 PROCEDURE — 94640 AIRWAY INHALATION TREATMENT: CPT

## 2024-02-02 PROCEDURE — 85610 PROTHROMBIN TIME: CPT | Performed by: EMERGENCY MEDICINE

## 2024-02-02 RX ORDER — HYDROXYZINE HYDROCHLORIDE 25 MG/1
12.5 TABLET, FILM COATED ORAL EVERY 8 HOURS PRN
Status: DISCONTINUED | OUTPATIENT
Start: 2024-02-02 | End: 2024-02-05 | Stop reason: HOSPADM

## 2024-02-02 RX ORDER — ENOXAPARIN SODIUM 100 MG/ML
40 INJECTION SUBCUTANEOUS EVERY 24 HOURS
Status: DISCONTINUED | OUTPATIENT
Start: 2024-02-02 | End: 2024-02-05 | Stop reason: HOSPADM

## 2024-02-02 RX ORDER — METOPROLOL TARTRATE 25 MG/1
37.5 TABLET, FILM COATED ORAL 2 TIMES DAILY
Status: DISCONTINUED | OUTPATIENT
Start: 2024-02-02 | End: 2024-02-05 | Stop reason: HOSPADM

## 2024-02-02 RX ORDER — IPRATROPIUM BROMIDE AND ALBUTEROL SULFATE 2.5; .5 MG/3ML; MG/3ML
3 SOLUTION RESPIRATORY (INHALATION) ONCE
Status: COMPLETED | OUTPATIENT
Start: 2024-02-02 | End: 2024-02-02

## 2024-02-02 RX ORDER — OXYCODONE HYDROCHLORIDE 5 MG/1
5 TABLET ORAL EVERY 6 HOURS PRN
Status: DISCONTINUED | OUTPATIENT
Start: 2024-02-02 | End: 2024-02-05 | Stop reason: HOSPADM

## 2024-02-02 RX ORDER — HYDRALAZINE HYDROCHLORIDE 25 MG/1
25 TABLET, FILM COATED ORAL 3 TIMES DAILY PRN
Status: DISCONTINUED | OUTPATIENT
Start: 2024-02-02 | End: 2024-02-05 | Stop reason: HOSPADM

## 2024-02-02 RX ORDER — PREDNISONE 10 MG/1
10 TABLET ORAL DAILY
Status: DISCONTINUED | OUTPATIENT
Start: 2024-02-02 | End: 2024-02-03

## 2024-02-02 RX ORDER — FLUTICASONE FUROATE AND VILANTEROL 200; 25 UG/1; UG/1
1 POWDER RESPIRATORY (INHALATION)
Status: DISCONTINUED | OUTPATIENT
Start: 2024-02-02 | End: 2024-02-05 | Stop reason: HOSPADM

## 2024-02-02 RX ORDER — ACETAMINOPHEN 325 MG/1
650 TABLET ORAL EVERY 6 HOURS PRN
Status: DISCONTINUED | OUTPATIENT
Start: 2024-02-02 | End: 2024-02-05 | Stop reason: HOSPADM

## 2024-02-02 RX ORDER — IPRATROPIUM BROMIDE AND ALBUTEROL SULFATE 2.5; .5 MG/3ML; MG/3ML
3 SOLUTION RESPIRATORY (INHALATION) 3 TIMES DAILY
Status: DISCONTINUED | OUTPATIENT
Start: 2024-02-02 | End: 2024-02-04

## 2024-02-02 RX ORDER — AMOXICILLIN AND CLAVULANATE POTASSIUM 875; 125 MG/1; MG/1
1 TABLET, FILM COATED ORAL 2 TIMES DAILY
Status: DISCONTINUED | OUTPATIENT
Start: 2024-02-02 | End: 2024-02-05 | Stop reason: HOSPADM

## 2024-02-02 RX ORDER — HYDRALAZINE HYDROCHLORIDE 20 MG/ML
10 INJECTION INTRAMUSCULAR; INTRAVENOUS ONCE
Status: DISCONTINUED | OUTPATIENT
Start: 2024-02-02 | End: 2024-02-03

## 2024-02-02 RX ORDER — PANTOPRAZOLE SODIUM 40 MG/1
40 TABLET, DELAYED RELEASE ORAL
Status: DISCONTINUED | OUTPATIENT
Start: 2024-02-03 | End: 2024-02-05 | Stop reason: HOSPADM

## 2024-02-02 RX ORDER — PANTOPRAZOLE SODIUM 40 MG/10ML
40 INJECTION, POWDER, LYOPHILIZED, FOR SOLUTION INTRAVENOUS
Status: DISCONTINUED | OUTPATIENT
Start: 2024-02-03 | End: 2024-02-05 | Stop reason: HOSPADM

## 2024-02-02 RX ORDER — IPRATROPIUM BROMIDE AND ALBUTEROL SULFATE 2.5; .5 MG/3ML; MG/3ML
3 SOLUTION RESPIRATORY (INHALATION)
Status: DISCONTINUED | OUTPATIENT
Start: 2024-02-02 | End: 2024-02-02

## 2024-02-02 RX ORDER — FUROSEMIDE 10 MG/ML
40 INJECTION INTRAMUSCULAR; INTRAVENOUS ONCE
Status: COMPLETED | OUTPATIENT
Start: 2024-02-02 | End: 2024-02-03

## 2024-02-02 RX ORDER — FUROSEMIDE 10 MG/ML
40 INJECTION INTRAMUSCULAR; INTRAVENOUS ONCE
Status: DISCONTINUED | OUTPATIENT
Start: 2024-02-02 | End: 2024-02-02

## 2024-02-02 RX ORDER — POLYETHYLENE GLYCOL 3350 17 G/17G
17 POWDER, FOR SOLUTION ORAL DAILY
Status: DISCONTINUED | OUTPATIENT
Start: 2024-02-02 | End: 2024-02-05 | Stop reason: HOSPADM

## 2024-02-02 RX ADMIN — ENOXAPARIN SODIUM 40 MG: 40 INJECTION SUBCUTANEOUS at 17:27

## 2024-02-02 RX ADMIN — AMOXICILLIN AND CLAVULANATE POTASSIUM 875 MG: 875; 125 TABLET, FILM COATED ORAL at 20:27

## 2024-02-02 RX ADMIN — IPRATROPIUM BROMIDE AND ALBUTEROL SULFATE 3 ML: 2.5; .5 SOLUTION RESPIRATORY (INHALATION) at 16:51

## 2024-02-02 RX ADMIN — IPRATROPIUM BROMIDE AND ALBUTEROL SULFATE 3 ML: 2.5; .5 SOLUTION RESPIRATORY (INHALATION) at 11:00

## 2024-02-02 RX ADMIN — METHYLPREDNISOLONE SODIUM SUCCINATE 60 MG: 125 INJECTION, POWDER, FOR SOLUTION INTRAMUSCULAR; INTRAVENOUS at 14:32

## 2024-02-02 RX ADMIN — IPRATROPIUM BROMIDE AND ALBUTEROL SULFATE 3 ML: 2.5; .5 SOLUTION RESPIRATORY (INHALATION) at 20:11

## 2024-02-02 RX ADMIN — METOPROLOL TARTRATE 37.5 MG: 25 TABLET, FILM COATED ORAL at 20:26

## 2024-02-02 RX ADMIN — HYDRALAZINE HYDROCHLORIDE 25 MG: 25 TABLET, FILM COATED ORAL at 17:37

## 2024-02-02 SDOH — SOCIAL STABILITY: SOCIAL INSECURITY: DO YOU FEEL ANYONE HAS EXPLOITED OR TAKEN ADVANTAGE OF YOU FINANCIALLY OR OF YOUR PERSONAL PROPERTY?: NO

## 2024-02-02 SDOH — SOCIAL STABILITY: SOCIAL INSECURITY: DOES ANYONE TRY TO KEEP YOU FROM HAVING/CONTACTING OTHER FRIENDS OR DOING THINGS OUTSIDE YOUR HOME?: NO

## 2024-02-02 SDOH — SOCIAL STABILITY: SOCIAL INSECURITY: ABUSE: ADULT

## 2024-02-02 SDOH — SOCIAL STABILITY: SOCIAL INSECURITY: DO YOU FEEL UNSAFE GOING BACK TO THE PLACE WHERE YOU ARE LIVING?: NO

## 2024-02-02 SDOH — SOCIAL STABILITY: SOCIAL INSECURITY: WERE YOU ABLE TO COMPLETE ALL THE BEHAVIORAL HEALTH SCREENINGS?: YES

## 2024-02-02 SDOH — SOCIAL STABILITY: SOCIAL INSECURITY: ARE YOU OR HAVE YOU BEEN THREATENED OR ABUSED PHYSICALLY, EMOTIONALLY, OR SEXUALLY BY ANYONE?: NO

## 2024-02-02 SDOH — SOCIAL STABILITY: SOCIAL INSECURITY: HAVE YOU HAD THOUGHTS OF HARMING ANYONE ELSE?: NO

## 2024-02-02 SDOH — SOCIAL STABILITY: SOCIAL INSECURITY: ARE THERE ANY APPARENT SIGNS OF INJURIES/BEHAVIORS THAT COULD BE RELATED TO ABUSE/NEGLECT?: NO

## 2024-02-02 SDOH — SOCIAL STABILITY: SOCIAL INSECURITY: HAS ANYONE EVER THREATENED TO HURT YOUR FAMILY OR YOUR PETS?: NO

## 2024-02-02 ASSESSMENT — COLUMBIA-SUICIDE SEVERITY RATING SCALE - C-SSRS
2. HAVE YOU ACTUALLY HAD ANY THOUGHTS OF KILLING YOURSELF?: NO
1. IN THE PAST MONTH, HAVE YOU WISHED YOU WERE DEAD OR WISHED YOU COULD GO TO SLEEP AND NOT WAKE UP?: NO
6. HAVE YOU EVER DONE ANYTHING, STARTED TO DO ANYTHING, OR PREPARED TO DO ANYTHING TO END YOUR LIFE?: NO

## 2024-02-02 ASSESSMENT — ACTIVITIES OF DAILY LIVING (ADL)
HEARING - RIGHT EAR: FUNCTIONAL
PATIENT'S MEMORY ADEQUATE TO SAFELY COMPLETE DAILY ACTIVITIES?: YES
DRESSING YOURSELF: DEPENDENT
LACK_OF_TRANSPORTATION: PATIENT DECLINED
ADEQUATE_TO_COMPLETE_ADL: YES
FEEDING YOURSELF: DEPENDENT
TOILETING: DEPENDENT
BATHING: DEPENDENT
JUDGMENT_ADEQUATE_SAFELY_COMPLETE_DAILY_ACTIVITIES: YES
GROOMING: DEPENDENT
WALKS IN HOME: INDEPENDENT
HEARING - LEFT EAR: FUNCTIONAL

## 2024-02-02 ASSESSMENT — COGNITIVE AND FUNCTIONAL STATUS - GENERAL
STANDING UP FROM CHAIR USING ARMS: A LOT
TOILETING: TOTAL
PATIENT BASELINE BEDBOUND: NO
PERSONAL GROOMING: TOTAL
PERSONAL GROOMING: TOTAL
WALKING IN HOSPITAL ROOM: A LOT
MOVING TO AND FROM BED TO CHAIR: A LOT
EATING MEALS: TOTAL
WALKING IN HOSPITAL ROOM: A LOT
TOILETING: TOTAL
DRESSING REGULAR LOWER BODY CLOTHING: TOTAL
MOBILITY SCORE: 9
DAILY ACTIVITIY SCORE: 6
MOVING FROM LYING ON BACK TO SITTING ON SIDE OF FLAT BED WITH BEDRAILS: TOTAL
MOVING FROM LYING ON BACK TO SITTING ON SIDE OF FLAT BED WITH BEDRAILS: TOTAL
CLIMB 3 TO 5 STEPS WITH RAILING: TOTAL
MOBILITY SCORE: 9
DRESSING REGULAR UPPER BODY CLOTHING: TOTAL
HELP NEEDED FOR BATHING: TOTAL
DAILY ACTIVITIY SCORE: 6
EATING MEALS: TOTAL
DRESSING REGULAR LOWER BODY CLOTHING: TOTAL
TURNING FROM BACK TO SIDE WHILE IN FLAT BAD: TOTAL
TURNING FROM BACK TO SIDE WHILE IN FLAT BAD: TOTAL
HELP NEEDED FOR BATHING: TOTAL
STANDING UP FROM CHAIR USING ARMS: A LOT
MOVING TO AND FROM BED TO CHAIR: A LOT
DRESSING REGULAR UPPER BODY CLOTHING: TOTAL
CLIMB 3 TO 5 STEPS WITH RAILING: TOTAL

## 2024-02-02 ASSESSMENT — ENCOUNTER SYMPTOMS
SLEEP DISTURBANCE: 0
WEAKNESS: 1
SORE THROAT: 0
LIGHT-HEADEDNESS: 0
PALPITATIONS: 0
COLOR CHANGE: 0
PHOTOPHOBIA: 0
COUGH: 0
DYSURIA: 0
BLOOD IN STOOL: 0
ACTIVITY CHANGE: 1
TREMORS: 0
SHORTNESS OF BREATH: 1
WHEEZING: 1
EYE PAIN: 0
STRIDOR: 0
NAUSEA: 0
DIZZINESS: 0
ARTHRALGIAS: 0
ABDOMINAL PAIN: 0
POLYDIPSIA: 0
CHILLS: 0
HEMATURIA: 0
FEVER: 0
VOMITING: 0
CHEST TIGHTNESS: 0
SEIZURES: 0
COUGH: 1
CONFUSION: 0
BACK PAIN: 0

## 2024-02-02 ASSESSMENT — LIFESTYLE VARIABLES
HAVE YOU EVER FELT YOU SHOULD CUT DOWN ON YOUR DRINKING: NO
EVER HAD A DRINK FIRST THING IN THE MORNING TO STEADY YOUR NERVES TO GET RID OF A HANGOVER: NO
HOW OFTEN DO YOU HAVE A DRINK CONTAINING ALCOHOL: NEVER
SKIP TO QUESTIONS 9-10: 1
HOW OFTEN DO YOU HAVE 6 OR MORE DRINKS ON ONE OCCASION: NEVER
AUDIT-C TOTAL SCORE: 0
HOW MANY STANDARD DRINKS CONTAINING ALCOHOL DO YOU HAVE ON A TYPICAL DAY: PATIENT DOES NOT DRINK
HAVE PEOPLE ANNOYED YOU BY CRITICIZING YOUR DRINKING: NO
AUDIT-C TOTAL SCORE: 0
EVER FELT BAD OR GUILTY ABOUT YOUR DRINKING: NO

## 2024-02-02 ASSESSMENT — PAIN SCALES - GENERAL
PAINLEVEL_OUTOF10: 0 - NO PAIN
PAINLEVEL_OUTOF10: 0 - NO PAIN

## 2024-02-02 ASSESSMENT — PAIN - FUNCTIONAL ASSESSMENT: PAIN_FUNCTIONAL_ASSESSMENT: 0-10

## 2024-02-02 NOTE — ED PROVIDER NOTES
HPI   Chief Complaint   Patient presents with    Shortness of Breath       HPI  Irish Davis is a 78 y.o. female who presents for shortness of breath. States that she has a history of being winded but when she got up early this morning to go to the bathroom she became short of breath and it wouldn't improve. She is on 4-4.5L of O2 at baseline and still sating in the upper 80s low 90s.                       [place] Coma Scale Score: 15                  Current Facility-Administered Medications on File Prior to Encounter   Medication Dose Route Frequency Provider Last Rate Last Admin    [] potassium chloride (Klor-Con) packet 40 mEq  40 mEq oral BID Ortiz Marshall MD   40 mEq at 24    [] potassium chloride CR (Klor-Con) ER tablet 40 mEq  40 mEq oral BID Ortiz Marshall MD   40 mEq at 24    [DISCONTINUED] acetaminophen (Tylenol) tablet 650 mg  650 mg oral q6h Bryant Arnold DO   650 mg at 24    [DISCONTINUED] albuterol 90 mcg/actuation inhaler 2 puff  2 puff inhalation q6h PRN Ortiz Marshall MD        [DISCONTINUED] fluticasone furoate-vilanteroL (Breo Ellipta) 200-25 mcg/dose inhaler 1 puff  1 puff inhalation Daily Bryant Arnold DO   1 puff at 24 08    [DISCONTINUED] heparin (porcine) injection 5,000 Units  5,000 Units subcutaneous q8h Bryant Arnold DO   5,000 Units at 24    [DISCONTINUED] hydrOXYzine HCL (Atarax) tablet 12.5 mg  12.5 mg oral q8h PRN Bryant Arnold DO        [DISCONTINUED] ipratropium (Atrovent) 17 mcg/actuation inhaler 2 puff  2 puff inhalation 4x daily Ortiz Marshall MD   2 puff at 24    [DISCONTINUED] lactobacillus acidophilus capsule 1 capsule  1 capsule oral BID Ortiz Marshall MD   1 capsule at 24    [DISCONTINUED] metoprolol tartrate (Lopressor) tablet 37.5 mg  37.5 mg oral BID Bryant Arnold DO   37.5 mg at 24    [DISCONTINUED] morphine  injection 2 mg  2 mg intravenous q4h PRN Bryant Arnold DO        [DISCONTINUED] ondansetron (Zofran) injection 4 mg  4 mg intravenous q8h PRN Bryant Arnold DO   4 mg at 01/27/24 1230    [DISCONTINUED] ondansetron (Zofran) tablet 4 mg  4 mg oral q8h PRN Bryant Arnold DO        [DISCONTINUED] oxyCODONE (Roxicodone) immediate release tablet 2.5 mg  2.5 mg oral q6h PRN Ronaldo Murichhed, DO        [DISCONTINUED] oxyCODONE (Roxicodone) immediate release tablet 5 mg  5 mg oral q6h PRN Ronaldo Mujahed, DO   5 mg at 01/29/24 1101    [DISCONTINUED] oxygen (O2) therapy   inhalation Continuous PRN - O2/gases Ortiz Marshall MD   4.5 L/min at 02/01/24 1246    [DISCONTINUED] perflutren lipid microspheres (Definity) injection 0.5-10 mL of dilution  0.5-10 mL of dilution intravenous Once in imaging Amarjit Pham DO        [DISCONTINUED] perflutren protein A microsphere (Optison) injection 0.5 mL  0.5 mL intravenous Once in imaging Amarjit Pham DO        [DISCONTINUED] polyethylene glycol (Glycolax, Miralax) packet 17 g  17 g oral Daily Bryant Arnold DO   17 g at 01/29/24 1103    [DISCONTINUED] predniSONE (Deltasone) tablet 10 mg  10 mg oral Daily Ortiz Marshall MD   10 mg at 02/01/24 0804    [DISCONTINUED] sulfamethoxazole-trimethoprim (Bactrim DS) 800-160 mg per tablet 160 mg  160 mg oral q12h [facility] Ortiz Marshall MD   160 mg at 02/01/24 2108    [DISCONTINUED] sulfur hexafluoride microsphr (Lumason) injection 24.28 mg  2 mL intravenous Once in imaging Amarjit Pham DO         Current Outpatient Medications on File Prior to Encounter   Medication Sig Dispense Refill    acetaminophen (Tylenol) 325 mg tablet Take 2 tablets (650 mg) by mouth every 6 hours if needed for mild pain (1 - 3) or headaches for up to 5 days. 40 tablet 0    Atrovent HFA 17 mcg/actuation inhaler USE 2 INHALATIONS FOUR TIMES A DAY 51.6 g 0    budesonide-formoteroL (Symbicort) 160-4.5 mcg/actuation inhaler Inhale 2 puffs 2 times a  day. Rinse mouth with water after use to reduce aftertaste and incidence of candidiasis. Do not swallow.      hydrOXYzine HCL (Atarax) 25 mg tablet Take 0.5 tablets (12.5 mg) by mouth every 8 hours if needed for anxiety. 270 tablet 1    lactobacillus acidophilus tablet tablet Take by mouth 2 times a day. 20 tablet 0    metoprolol tartrate (Lopressor) 25 mg tablet TAKE ONE AND ONE-HALF TABLETS TWICE A  tablet 0    oxyCODONE (Roxicodone) 5 mg immediate release tablet Take 1 tablet (5 mg) by mouth every 6 hours if needed for severe pain (7 - 10) for up to 3 days. 12 tablet 0    predniSONE (Deltasone) 10 mg tablet Take 1 tablet (10 mg) by mouth once daily. 90 tablet 3    sulfamethoxazole-trimethoprim (Bactrim DS) 800-160 mg tablet Take 1 tablet by mouth every 12 hours for 10 days. 20 tablet 0        Allergies   Allergen Reactions    Erythromycin Itching       Review of Systems   Constitutional:  Negative for chills and fever.   HENT:  Negative for ear pain and sore throat.    Eyes:  Negative for pain and visual disturbance.   Respiratory:  Positive for shortness of breath and wheezing. Negative for cough, chest tightness and stridor.    Cardiovascular:  Negative for chest pain and palpitations.   Gastrointestinal:  Negative for abdominal pain, nausea and vomiting.   Genitourinary:  Negative for dysuria and hematuria.   Musculoskeletal:  Negative for arthralgias and back pain.   Skin:  Negative for color change and rash.   Neurological:  Negative for seizures and syncope.   All other systems reviewed and are negative.      ROS: Complete 12 point review of systems performed, otherwise negative except as noted in the history of present illness    PMH: Reviewed, documented below in note. Pertinents in HPI  PSH: Reviewed and documented below in note. Pertinents in HPI  SH: No tobacco alcohol or illicits   Fam: Reviewed, noncontributory to patients current complaint  MEDS: Reviewed and documented below in note.  Pertinents in HPI  ALLERGIES: Reviewed and documented below in note.    Patient History   Past Medical History:   Diagnosis Date    COPD (chronic obstructive pulmonary disease) (CMS/HCC)     Disorder of the skin and subcutaneous tissue, unspecified 07/22/2016    Leg skin lesion, left    Personal history of other diseases of the respiratory system 02/06/2018    History of allergic rhinitis    Personal history of other specified conditions 07/22/2016    History of chest pain     Past Surgical History:   Procedure Laterality Date    OTHER SURGICAL HISTORY  09/25/2019    Cataract surgery     No family history on file.  Social History     Tobacco Use    Smoking status: Former     Types: Cigarettes    Smokeless tobacco: Not on file   Substance Use Topics    Alcohol use: Never    Drug use: Never       Physical Exam   ED Triage Vitals   Temperature Heart Rate Respirations BP   02/02/24 1035 02/02/24 1031 02/02/24 1031 02/02/24 1031   36.8 °C (98.3 °F) (!) 116 (!) 26 144/86      Pulse Ox Temp src Heart Rate Source Patient Position   02/02/24 1031 -- -- --   (!) 86 %         BP Location FiO2 (%)     -- --             Physical Exam  Vitals and nursing note reviewed.   Constitutional:       General: She is not in acute distress.     Appearance: She is well-developed.   HENT:      Head: Normocephalic and atraumatic.   Eyes:      Conjunctiva/sclera: Conjunctivae normal.   Cardiovascular:      Rate and Rhythm: Normal rate and regular rhythm.      Heart sounds: No murmur heard.  Pulmonary:      Effort: Tachypnea and accessory muscle usage present.      Breath sounds: No stridor. Examination of the left-lower field reveals rhonchi. Rhonchi present. No wheezing or rales.   Abdominal:      Palpations: Abdomen is soft.      Tenderness: There is no abdominal tenderness.   Musculoskeletal:         General: No swelling.      Cervical back: Neck supple.   Skin:     General: Skin is warm and dry.      Capillary Refill: Capillary refill  takes less than 2 seconds.   Neurological:      Mental Status: She is alert.   Psychiatric:         Mood and Affect: Mood normal.       Labs Reviewed   CBC WITH AUTO DIFFERENTIAL - Abnormal       Result Value    WBC 8.5      nRBC 0.0      RBC 3.65 (*)     Hemoglobin 10.3 (*)     Hematocrit 33.8 (*)     MCV 93      MCH 28.2      MCHC 30.5 (*)     RDW 14.6 (*)     Platelets 363      Neutrophils % 78.4      Immature Granulocytes %, Automated 1.4 (*)     Lymphocytes % 8.8      Monocytes % 9.4      Eosinophils % 1.6      Basophils % 0.4      Neutrophils Absolute 6.66 (*)     Immature Granulocytes Absolute, Automated 0.12      Lymphocytes Absolute 0.75 (*)     Monocytes Absolute 0.80      Eosinophils Absolute 0.14      Basophils Absolute 0.03     COMPREHENSIVE METABOLIC PANEL - Abnormal    Glucose 103 (*)     Sodium 136      Potassium 4.3      Chloride 94 (*)     Bicarbonate 33 (*)     Anion Gap 13      Urea Nitrogen 22      Creatinine 0.82      eGFR 72      Calcium 9.3      Albumin 3.3 (*)     Alkaline Phosphatase 97      Total Protein 6.3 (*)     AST 44 (*)     Bilirubin, Total 0.6      ALT 19     TROPONIN I, HIGH SENSITIVITY - Abnormal    Troponin I, High Sensitivity 28 (*)     Narrative:     Less than 99th percentile of normal range cutoff-                  Female and children under 18 years old <14 ng/L; Male <21 ng/L: Negative                  Repeat testing should be performed if clinically indicated.                                     Female and children under 18 years old 14-50 ng/L; Male 21-50 ng/L:                  Consistent with possible cardiac damage and possible increased clinical                   risk. Serial measurements may help to assess extent of myocardial damage.                                     >50 ng/L: Consistent with cardiac damage, increased clinical risk and                  myocardial infarction. Serial measurements may help assess extent of                   myocardial damage.                                       NOTE: Children less than 1 year old may have higher baseline troponin                   levels and results should be interpreted in conjunction with the overall                   clinical context.                                     NOTE: Troponin I testing is performed using a different                   testing methodology at [facility] than at other                   system hospitals. Direct result comparisons should only                   be made within the same method.   B-TYPE NATRIURETIC PEPTIDE - Abnormal     (*)     Narrative:        <100 pg/mL - Heart failure unlikely                  100-299 pg/mL - Intermediate probability of acute heart                                  failure exacerbation. Correlate with clinical                                  context and patient history.                    >=300 pg/mL - Heart Failure likely. Correlate with clinical                                  context and patient history.                                    BNP testing is performed using different testing methodology at [facility] than at other [facility] hospitals. Direct result comparisons should only be made within the same method.                      BLOOD GAS VENOUS FULL PANEL - Abnormal    POCT pH, Venous 7.47 (*)     POCT pCO2, Venous 60 (*)     POCT pO2, Venous 61 (*)     POCT SO2, Venous 92 (*)     POCT Oxy Hemoglobin, Venous 89.3 (*)     POCT Hematocrit Calculated, Venous 31.0 (*)     POCT Sodium, Venous 135 (*)     POCT Potassium, Venous 4.5      POCT Chloride, Venous 97 (*)     POCT Ionized Calicum, Venous 1.22      POCT Glucose, Venous 109 (*)     POCT Lactate, Venous 2.0      POCT Base Excess, Venous 17.5 (*)     POCT HCO3 Calculated, Venous 43.7 (*)     POCT Hemoglobin, Venous 10.3 (*)     POCT Anion Gap, Venous -1.0 (*)     Patient Temperature 37.0      FiO2 44     PROTIME-INR - Normal    Protime 12.2      INR 1.1     SARS-COV-2 AND  INFLUENZA A/B PCR - Normal    Flu A Result Not Detected      Flu B Result Not Detected      Coronavirus 2019, PCR Not Detected      Narrative:     This assay has received FDA Emergency Use Authorization (EUA) and  is only authorized for the duration of time that circumstances exist to justify the authorization of the emergency use of in vitro diagnostic tests for the detection of SARS-CoV-2 virus and/or diagnosis of COVID-19 infection under section 564(b)(1) of the Act, 21 U.S.C. 360bbb-3(b)(1). Testing for SARS-CoV-2 is only recommended for patients who meet current clinical and/or epidemiological criteria as defined by federal, state, or local public health directives. This assay is an in vitro diagnostic nucleic acid amplification test for the qualitative detection of SARS-CoV-2, Influenza A, and Influenza B from nasopharyngeal specimens and has been validated for use at [facility]. Negative results do not preclude COVID-19 infections or Influenza A/B infections, and should not be used as the sole basis for diagnosis, treatment, or other management decisions. If Influenza A/B and RSV PCR results are negative, testing for Parainfluenza virus, Adenovirus and Metapneumovirus is routinely performed for [facility] pediatric oncology and intensive care inpatients, and is available on other patients by placing an add-on request.      Pain Management Panel           No data to display              XR chest 1 view   Final Result   1. Slightly improved bilateral interstitial opacities when compared   with chest x-ray dated 01/20/2024.   2. Trace bilateral pleural effusion as in prior.        Signed by: Ronaldo Horn 2/2/2024 11:50 AM   Dictation workstation:   [id number]        ED Course & MDM   Diagnoses as of 02/02/24 1415   COPD exacerbation (CMS/[facility])           Medical Decision Making    Patient presents emergency department with shortness of breath and persistent cough.  Patient is hypoxic.  She  normally wears 4 to 4-1/2 L.  She is on 6 to 7 L to maintain her O2 saturation in the emergency department.  She has diminished breath sounds throughout with left base rhonchi.  Patient has differential diagnosis of pneumonia, congestive heart failure, COPD exacerbation, pneumothorax or other acute cause.  Patient is evaluated in the emergency department laboratory workup EKG and chest x-ray.  Chest x-ray shows mild improvement in aeration with mild pleural effusions.  EKG shows no acute ischemic changes.  Initial troponin is mildly elevated with BNP being elevated also.  Patient was given a breathing treatment with improvement in her O2 saturation.  She is now maintaining 91 to 93% on 4 L.  Patient notes that she has been significantly short of breath even with short distances.  She has difficulty getting up and going to the bathroom.  She even inquired about having an indwelling Thompson catheter because she gets so short of breath and winded that she needs a 30-minute recovery.  At this time she would benefit from hospitalization for improvement in her respiratory status.  She may need to be seen by social work and physical therapy to evaluate safety at home.  Procedure  Procedures    EKG was obtained at 10:38 AM.  It is sinus rhythm rate of 105.  No acute ST elevation.  IL interval is 104 and QTc is 455.  Mild nonspecific ST changes.          The diagnosis and plan of care was also discussed with the patient. All questions were answered. The patient was receptive and agreeable to the plan of care. The patient was instructed to return to the emergency department if any symptoms recurred, worsened, or if there were any additional concerns.    *Disclaimer: This note was dictated by speech recognition. Minor errors in transcription may be present. Please call with questions.    Dannie Gonzalez, [facility] [place]  Medical student on Emergency Medicine service    Patient was examined by myself.  Plan of care was discussed with  medical student.  Note was edited by myself after initial data collection was performed by the medical student.  Medical decision making was performed by myself and patient's plan of care was performed by myself.  Patient is admitted for further treatment  Yessenia Saunders MD  02/02/24 1415       Yessenia Saunders MD  02/02/24 1436       Yessenia Saunders MD  02/26/24 1667

## 2024-02-02 NOTE — NURSING NOTE
Patient being discharged to [facility]. All questions and concerns answered. IVs removed and patient leaving with all personal belongings.

## 2024-02-03 LAB
ANION GAP SERPL CALC-SCNC: 13 MMOL/L (ref 10–20)
ATRIAL RATE: 104 BPM
BUN SERPL-MCNC: 23 MG/DL (ref 6–23)
CALCIUM SERPL-MCNC: 8.7 MG/DL (ref 8.6–10.3)
CHLORIDE SERPL-SCNC: 94 MMOL/L (ref 98–107)
CO2 SERPL-SCNC: 36 MMOL/L (ref 21–32)
CREAT SERPL-MCNC: 0.78 MG/DL (ref 0.5–1.05)
EGFRCR SERPLBLD CKD-EPI 2021: 77 ML/MIN/1.73M*2
ERYTHROCYTE [DISTWIDTH] IN BLOOD BY AUTOMATED COUNT: 14.6 % (ref 11.5–14.5)
GLUCOSE SERPL-MCNC: 98 MG/DL (ref 74–99)
HCT VFR BLD AUTO: 31.9 % (ref 36–46)
HGB BLD-MCNC: 9.7 G/DL (ref 12–16)
MAGNESIUM SERPL-MCNC: 1.9 MG/DL (ref 1.6–2.4)
MCH RBC QN AUTO: 28.2 PG (ref 26–34)
MCHC RBC AUTO-ENTMCNC: 30.4 G/DL (ref 32–36)
MCV RBC AUTO: 93 FL (ref 80–100)
NRBC BLD-RTO: 0 /100 WBCS (ref 0–0)
P AXIS: 84 DEGREES
PLATELET # BLD AUTO: 377 X10*3/UL (ref 150–450)
POTASSIUM SERPL-SCNC: 4.7 MMOL/L (ref 3.5–5.3)
PR INTERVAL: 104 MS
Q ONSET: 249 MS
QRS COUNT: 16 BEATS
QRS DURATION: 97 MS
QT INTERVAL: 344 MS
QTC CALCULATION(BAZETT): 455 MS
QTC FREDERICIA: 414 MS
R AXIS: 86 DEGREES
RBC # BLD AUTO: 3.44 X10*6/UL (ref 4–5.2)
SODIUM SERPL-SCNC: 138 MMOL/L (ref 136–145)
T AXIS: 31 DEGREES
T OFFSET: 421 MS
VENTRICULAR RATE: 105 BPM
WBC # BLD AUTO: 13.4 X10*3/UL (ref 4.4–11.3)

## 2024-02-03 PROCEDURE — 2500000001 HC RX 250 WO HCPCS SELF ADMINISTERED DRUGS (ALT 637 FOR MEDICARE OP): Performed by: STUDENT IN AN ORGANIZED HEALTH CARE EDUCATION/TRAINING PROGRAM

## 2024-02-03 PROCEDURE — 2500000002 HC RX 250 W HCPCS SELF ADMINISTERED DRUGS (ALT 637 FOR MEDICARE OP, ALT 636 FOR OP/ED): Performed by: STUDENT IN AN ORGANIZED HEALTH CARE EDUCATION/TRAINING PROGRAM

## 2024-02-03 PROCEDURE — 36415 COLL VENOUS BLD VENIPUNCTURE: CPT | Performed by: STUDENT IN AN ORGANIZED HEALTH CARE EDUCATION/TRAINING PROGRAM

## 2024-02-03 PROCEDURE — 85027 COMPLETE CBC AUTOMATED: CPT | Performed by: STUDENT IN AN ORGANIZED HEALTH CARE EDUCATION/TRAINING PROGRAM

## 2024-02-03 PROCEDURE — 99233 SBSQ HOSP IP/OBS HIGH 50: CPT | Performed by: INTERNAL MEDICINE

## 2024-02-03 PROCEDURE — 2500000004 HC RX 250 GENERAL PHARMACY W/ HCPCS (ALT 636 FOR OP/ED): Performed by: INTERNAL MEDICINE

## 2024-02-03 PROCEDURE — 80048 BASIC METABOLIC PNL TOTAL CA: CPT | Performed by: STUDENT IN AN ORGANIZED HEALTH CARE EDUCATION/TRAINING PROGRAM

## 2024-02-03 PROCEDURE — 2500000002 HC RX 250 W HCPCS SELF ADMINISTERED DRUGS (ALT 637 FOR MEDICARE OP, ALT 636 FOR OP/ED): Mod: MUE | Performed by: FAMILY MEDICINE

## 2024-02-03 PROCEDURE — 94640 AIRWAY INHALATION TREATMENT: CPT

## 2024-02-03 PROCEDURE — 83735 ASSAY OF MAGNESIUM: CPT | Performed by: STUDENT IN AN ORGANIZED HEALTH CARE EDUCATION/TRAINING PROGRAM

## 2024-02-03 PROCEDURE — 2060000001 HC INTERMEDIATE ICU ROOM DAILY

## 2024-02-03 PROCEDURE — 2500000001 HC RX 250 WO HCPCS SELF ADMINISTERED DRUGS (ALT 637 FOR MEDICARE OP): Performed by: INTERNAL MEDICINE

## 2024-02-03 PROCEDURE — 2500000004 HC RX 250 GENERAL PHARMACY W/ HCPCS (ALT 636 FOR OP/ED): Performed by: STUDENT IN AN ORGANIZED HEALTH CARE EDUCATION/TRAINING PROGRAM

## 2024-02-03 RX ORDER — PREDNISONE 20 MG/1
40 TABLET ORAL DAILY
Status: DISCONTINUED | OUTPATIENT
Start: 2024-02-03 | End: 2024-02-05 | Stop reason: HOSPADM

## 2024-02-03 RX ORDER — FUROSEMIDE 40 MG/1
40 TABLET ORAL DAILY
Status: DISCONTINUED | OUTPATIENT
Start: 2024-02-03 | End: 2024-02-05 | Stop reason: HOSPADM

## 2024-02-03 RX ADMIN — ACETAMINOPHEN 650 MG: 325 TABLET ORAL at 08:50

## 2024-02-03 RX ADMIN — IPRATROPIUM BROMIDE AND ALBUTEROL SULFATE 3 ML: 2.5; .5 SOLUTION RESPIRATORY (INHALATION) at 08:15

## 2024-02-03 RX ADMIN — METHYLPREDNISOLONE SODIUM SUCCINATE 40 MG: 40 INJECTION, POWDER, FOR SOLUTION INTRAMUSCULAR; INTRAVENOUS at 04:52

## 2024-02-03 RX ADMIN — IPRATROPIUM BROMIDE AND ALBUTEROL SULFATE 3 ML: 2.5; .5 SOLUTION RESPIRATORY (INHALATION) at 19:24

## 2024-02-03 RX ADMIN — FLUTICASONE FUROATE AND VILANTEROL TRIFENATATE 1 PUFF: 200; 25 POWDER RESPIRATORY (INHALATION) at 08:46

## 2024-02-03 RX ADMIN — TIOTROPIUM BROMIDE INHALATION SPRAY 2 PUFF: 3.12 SPRAY, METERED RESPIRATORY (INHALATION) at 08:46

## 2024-02-03 RX ADMIN — AMOXICILLIN AND CLAVULANATE POTASSIUM 875 MG: 875; 125 TABLET, FILM COATED ORAL at 21:09

## 2024-02-03 RX ADMIN — METOPROLOL TARTRATE 37.5 MG: 25 TABLET, FILM COATED ORAL at 21:09

## 2024-02-03 RX ADMIN — FUROSEMIDE 40 MG: 40 TABLET ORAL at 17:27

## 2024-02-03 RX ADMIN — AMOXICILLIN AND CLAVULANATE POTASSIUM 875 MG: 875; 125 TABLET, FILM COATED ORAL at 08:46

## 2024-02-03 RX ADMIN — FUROSEMIDE 40 MG: 10 INJECTION, SOLUTION INTRAMUSCULAR; INTRAVENOUS at 04:52

## 2024-02-03 RX ADMIN — ENOXAPARIN SODIUM 40 MG: 40 INJECTION SUBCUTANEOUS at 13:58

## 2024-02-03 RX ADMIN — METHYLPREDNISOLONE SODIUM SUCCINATE 40 MG: 40 INJECTION, POWDER, FOR SOLUTION INTRAMUSCULAR; INTRAVENOUS at 13:58

## 2024-02-03 RX ADMIN — PANTOPRAZOLE SODIUM 40 MG: 40 TABLET, DELAYED RELEASE ORAL at 04:48

## 2024-02-03 RX ADMIN — PREDNISONE 40 MG: 20 TABLET ORAL at 17:27

## 2024-02-03 RX ADMIN — METOPROLOL TARTRATE 37.5 MG: 25 TABLET, FILM COATED ORAL at 08:46

## 2024-02-03 RX ADMIN — OXYCODONE HYDROCHLORIDE 5 MG: 5 TABLET ORAL at 21:12

## 2024-02-03 ASSESSMENT — PAIN DESCRIPTION - ORIENTATION: ORIENTATION: RIGHT;LEFT

## 2024-02-03 ASSESSMENT — PAIN - FUNCTIONAL ASSESSMENT
PAIN_FUNCTIONAL_ASSESSMENT: 0-10
PAIN_FUNCTIONAL_ASSESSMENT: 0-10

## 2024-02-03 ASSESSMENT — COGNITIVE AND FUNCTIONAL STATUS - GENERAL
CLIMB 3 TO 5 STEPS WITH RAILING: TOTAL
TURNING FROM BACK TO SIDE WHILE IN FLAT BAD: A LOT
MOVING FROM LYING ON BACK TO SITTING ON SIDE OF FLAT BED WITH BEDRAILS: A LITTLE
MOVING TO AND FROM BED TO CHAIR: A LOT
TOILETING: TOTAL
HELP NEEDED FOR BATHING: TOTAL
WALKING IN HOSPITAL ROOM: A LOT
MOBILITY SCORE: 12
DAILY ACTIVITIY SCORE: 6
DRESSING REGULAR UPPER BODY CLOTHING: TOTAL
EATING MEALS: TOTAL
DRESSING REGULAR LOWER BODY CLOTHING: TOTAL
STANDING UP FROM CHAIR USING ARMS: A LOT
PERSONAL GROOMING: TOTAL

## 2024-02-03 ASSESSMENT — PAIN SCALES - GENERAL
PAINLEVEL_OUTOF10: 4
PAINLEVEL_OUTOF10: 8
PAINLEVEL_OUTOF10: 0 - NO PAIN

## 2024-02-03 ASSESSMENT — PAIN DESCRIPTION - LOCATION: LOCATION: WRIST

## 2024-02-03 ASSESSMENT — ACTIVITIES OF DAILY LIVING (ADL): LACK_OF_TRANSPORTATION: NO

## 2024-02-03 NOTE — H&P
"History Of Present Illness:  Irish Davis is a 78 y.o. female with PMHx s/f HTN, HLD, moderate aortic stenosis, chronic bilateral lower extremity edema (on as needed Lasix), COPD with chronic hypoxic respiratory failure (baseline 4-4.5 L nasal cannula), presenting with worsening shortness of breath, cough, wheezing, congestion.  Patient was just admitted to our hospital 01/27/2024-02/01/2024 in setting of falling on her oxygen tubing and breaking both arms.  She presented back today due to respiratory symptoms.  Patient reports that her symptoms actually did start day of admission, and significantly worsened upon her arrival back home.  She noted it was most significant when getting up to ambulate around her home, stating that going a few steps from her bedroom into her bathroom caused her to be significantly winded to the point where she had to sit for 30+ minutes to be able to regain her breath.  She has not had any chest pain associated with this.  She denies any fevers or chills.  In regard to her arms, her fingers are not numb.  She does not feel that she is having any significant lower extremity swelling.  Compared to presentation in the ED, the patient reports that she is feeling better when sitting, but if she were to need to get up, she would probably feel \"worse all over again.\"  Full ROS as detailed below.    ED Course (Summary):   Vitals on presentation: T98.3, /86, , R26, SpO2 86% 4 L nasal cannula  Labs: CBC with WBC 8.5, Hb 10.3, platelets 363.  Chemistries with glucose 103, sodium 136, potassium 4.3, BUN 22, serum creatinine 0.82.   (down from 956).  High-sensitivity troponin 28.  Viral panel negative.  CXR with slightly improved interstitial infiltrate/edema bilaterally, still notable for trace bilateral pleural effusions  Patient was given breathing treatment, 60 mg Solu-Medrol in the ED.  Due to significant increased work of breathing and hypoxia with minimal movement in " the bed she was admitted.    ED Course:  Diagnoses as of 02/02/24 2211   COPD exacerbation (CMS/[facility])     Relevant Results  Results for orders placed or performed during the hospital encounter of 02/02/24 (from the past 24 hour(s))   ECG 12 lead   Result Value Ref Range    Ventricular Rate 105 BPM    Atrial Rate 104 BPM    KY Interval 104 ms    QRS Duration 97 ms    QT Interval 344 ms    QTC Calculation(Bazett) 455 ms    P Axis 84 degrees    R Axis 86 degrees    T Axis 31 degrees    QRS Count 16 beats    Q Onset 249 ms    T Offset 421 ms    QTC Fredericia 414 ms   CBC and Auto Differential   Result Value Ref Range    WBC 8.5 4.4 - 11.3 x10*3/uL    nRBC 0.0 0.0 - 0.0 /100 WBCs    RBC 3.65 (L) 4.00 - 5.20 x10*6/uL    Hemoglobin 10.3 (L) 12.0 - 16.0 g/dL    Hematocrit 33.8 (L) 36.0 - 46.0 %    MCV 93 80 - 100 fL    MCH 28.2 26.0 - 34.0 pg    MCHC 30.5 (L) 32.0 - 36.0 g/dL    RDW 14.6 (H) 11.5 - 14.5 %    Platelets 363 150 - 450 x10*3/uL    Neutrophils % 78.4 40.0 - 80.0 %    Immature Granulocytes %, Automated 1.4 (H) 0.0 - 0.9 %    Lymphocytes % 8.8 13.0 - 44.0 %    Monocytes % 9.4 2.0 - 10.0 %    Eosinophils % 1.6 0.0 - 6.0 %    Basophils % 0.4 0.0 - 2.0 %    Neutrophils Absolute 6.66 (H) 1.60 - 5.50 x10*3/uL    Immature Granulocytes Absolute, Automated 0.12 0.00 - 0.50 x10*3/uL    Lymphocytes Absolute 0.75 (L) 0.80 - 3.00 x10*3/uL    Monocytes Absolute 0.80 0.05 - 0.80 x10*3/uL    Eosinophils Absolute 0.14 0.00 - 0.40 x10*3/uL    Basophils Absolute 0.03 0.00 - 0.10 x10*3/uL   Comprehensive metabolic panel   Result Value Ref Range    Glucose 103 (H) 74 - 99 mg/dL    Sodium 136 136 - 145 mmol/L    Potassium 4.3 3.5 - 5.3 mmol/L    Chloride 94 (L) 98 - 107 mmol/L    Bicarbonate 33 (H) 21 - 32 mmol/L    Anion Gap 13 10 - 20 mmol/L    Urea Nitrogen 22 6 - 23 mg/dL    Creatinine 0.82 0.50 - 1.05 mg/dL    eGFR 72 >60 mL/min/1.73m*2    Calcium 9.3 8.6 - 10.3 mg/dL    Albumin 3.3 (L) 3.4 - 5.0 g/dL    Alkaline Phosphatase 97  33 - 136 U/L    Total Protein 6.3 (L) 6.4 - 8.2 g/dL    AST 44 (H) 9 - 39 U/L    Bilirubin, Total 0.6 0.0 - 1.2 mg/dL    ALT 19 7 - 45 U/L   Protime-INR   Result Value Ref Range    Protime 12.2 9.8 - 12.8 seconds    INR 1.1 0.9 - 1.1   Troponin I, High Sensitivity   Result Value Ref Range    Troponin I, High Sensitivity 28 (H) 0 - 13 ng/L   B-Type Natriuretic Peptide   Result Value Ref Range     (H) 0 - 99 pg/mL   BLOOD GAS VENOUS FULL PANEL   Result Value Ref Range    POCT pH, Venous 7.47 (H) 7.33 - 7.43 pH    POCT pCO2, Venous 60 (H) 41 - 51 mm Hg    POCT pO2, Venous 61 (H) 35 - 45 mm Hg    POCT SO2, Venous 92 (H) 45 - 75 %    POCT Oxy Hemoglobin, Venous 89.3 (H) 45.0 - 75.0 %    POCT Hematocrit Calculated, Venous 31.0 (L) 36.0 - 46.0 %    POCT Sodium, Venous 135 (L) 136 - 145 mmol/L    POCT Potassium, Venous 4.5 3.5 - 5.3 mmol/L    POCT Chloride, Venous 97 (L) 98 - 107 mmol/L    POCT Ionized Calicum, Venous 1.22 1.10 - 1.33 mmol/L    POCT Glucose, Venous 109 (H) 74 - 99 mg/dL    POCT Lactate, Venous 2.0 0.4 - 2.0 mmol/L    POCT Base Excess, Venous 17.5 (H) -2.0 - 3.0 mmol/L    POCT HCO3 Calculated, Venous 43.7 (H) 22.0 - 26.0 mmol/L    POCT Hemoglobin, Venous 10.3 (L) 12.0 - 16.0 g/dL    POCT Anion Gap, Venous -1.0 (L) 10.0 - 25.0 mmol/L    Patient Temperature 37.0 degrees Celsius    FiO2 44 %   Sars-CoV-2 and Influenza A/B PCR   Result Value Ref Range    Flu A Result Not Detected Not Detected    Flu B Result Not Detected Not Detected    Coronavirus 2019, PCR Not Detected Not Detected      XR chest 1 view    Result Date: 2/2/2024  Interpreted By:  Ronaldo Horn, STUDY: XR CHEST 1 VIEW; 2/2/2024 10:57 am   INDICATION: Signs/Symptoms:shortness of breath.   COMPARISON: None   ACCESSION NUMBER(S): [id number]   ORDERING CLINICIAN: DOMINIQUE SNEED   TECHNIQUE: 1 view of the chest was performed.   FINDINGS: Slightly improved bilateral interstitial opacities. Trace bilateral pleural effusion. No pneumothorax.   The cardiomediastinal silhouette is stable.       1. Slightly improved bilateral interstitial opacities when compared with chest x-ray dated 01/20/2024. 2. Trace bilateral pleural effusion as in prior.   Signed by: Ronaldo Horn 2/2/2024 11:50 AM Dictation workstation:   [id number]    ECG 12 lead    Result Date: 2/2/2024  Sinus tachycardia Atrial premature complex Probable left atrial enlargement Borderline right axis deviation Minimal ST depression, inferior leads    Transthoracic Echo (TTE) Complete    Result Date: 1/29/2024              [place] [address]      Phone [phone] Fax [phone] TRANSTHORACIC ECHOCARDIOGRAM REPORT  Patient Name:      ROMULO DOHERTY   Reading Physician:    [id number] Annika Rene MD Study Date:        1/29/2024            Ordering Provider:    [id number] AIYANA VO MRN/PID:           [id number]             Fellow: Accession#:        [id number]         Nurse: Date of Birth/Age: 1945 / 78 years Sonographer:          Huyen Reagan RDCS Gender:            F                    Additional Staff: Height:            154.94 cm            Admit Date:           1/26/2024 Weight:            53.52 kg             Admission Status:     Inpatient -                                                               Routine BSA:               1.51 m2              Department Location:  [address] Blood Pressure: 130 /77 mmHg Study Type:    TRANSTHORACIC ECHO (TTE) COMPLETE Diagnosis/ICD: Heart failure, unspecified-I50.9 Indication:    Congestive Heart Failure CPT Codes:     Echo Complete w Full [id number] Patient History: Pertinent History: AS. Study Detail: The following Echo studies were performed: 2D, M-Mode, Doppler and               color flow.   PHYSICIAN INTERPRETATION: Left Ventricle: Left ventricular systolic function is normal, with an estimated ejection fraction of 60-65%. There are no regional wall motion abnormalities. The left ventricular cavity size is normal. Spectral Doppler shows a normal pattern of left ventricular diastolic filling. Left Atrium: The left atrium is normal in size. Right Ventricle: The right ventricle is normal in size. There is low normal right ventricular systolic function. Right Atrium: The right atrium is normal in size. Aortic Valve: The aortic valve was not well visualized. There is mild aortic valve cusp calcification. There is evidence of mild aortic valve stenosis. The aortic valve dimensionless index is 0.55. There is no evidence of aortic valve regurgitation. The peak instantaneous gradient of the aortic valve is 23.0 mmHg. The mean gradient of the aortic valve is 12.8 mmHg. Mitral Valve: The mitral valve is mildly thickened. There is trace mitral valve regurgitation. Tricuspid Valve: The tricuspid valve is structurally normal. There is mild tricuspid regurgitation. Pulmonic Valve: The pulmonic valve is structurally normal. There is no indication of pulmonic valve regurgitation. Pericardium: There is a trivial pericardial effusion. Aorta: The aortic root is normal.  CONCLUSIONS:  1. Left ventricular systolic function is normal with a 60-65% estimated ejection fraction.  2. There is low normal right ventricular systolic function.  3. Mild aortic valve stenosis. QUANTITATIVE DATA SUMMARY: 2D MEASUREMENTS:                          Normal Ranges: LAs:           2.25 cm   (2.7-4.0cm) IVSd:          0.78 cm   (0.6-1.1cm) LVPWd:         0.92 cm   (0.6-1.1cm) LVIDd:         3.26 cm   (3.9-5.9cm) LVIDs:         2.38 cm LV Mass Index: 48.6 g/m2 LV % FS        27.1 % LA VOLUME:                               Normal Ranges: LA Vol A4C:        17.3 ml    (22+/-6mL/m2) LA Vol A2C:        29.8 ml LA Vol BP:         23.6 ml LA Vol  Index A4C:  11.5 ml/m2 LA Vol Index A2C:  19.7 ml/m2 LA Vol Index BP:   15.7 ml/m2 LA Area A4C:       10.4 cm2 LA Area A2C:       13.1 cm2 LA Major Axis A4C: 5.3 cm LA Major Axis A2C: 4.9 cm LA Volume Index:   15.7 ml/m2 LA Vol A4C:        15.5 ml LA Vol A2C:        28.5 ml RA VOLUME BY A/L METHOD:                       Normal Ranges: RA Area A4C: 10.7 cm2 AORTA MEASUREMENTS:                      Normal Ranges: Ao Sinus, d: 2.80 cm (2.1-3.5cm) Ao STJ, d:   2.50 cm (1.7-3.4cm) Asc Ao, d:   2.80 cm (2.1-3.4cm) LV SYSTOLIC FUNCTION BY 2D PLANIMETRY (MOD):                     Normal Ranges: EF-A4C View: 73.1 % (>=55%) EF-A2C View: 68.9 % EF-Biplane:  71.8 % LV DIASTOLIC FUNCTION:                          Normal Ranges: MV Peak E:    0.91 m/s   (0.7-1.2 m/s) MV Peak A:    0.88 m/s   (0.42-0.7 m/s) E/A Ratio:    1.04       (1.0-2.2) MV e'         0.09 m/s   (>8.0) MV lateral e' 0.10 m/s MV medial e'  0.08 m/s MV A Dur:     82.78 msec E/e' Ratio:   10.14      (<8.0) MITRAL VALVE:                 Normal Ranges: MV DT: 176 msec (150-240msec) AORTIC VALVE:                                    Normal Ranges: AoV Vmax:                2.40 m/s  (<=1.7m/s) AoV Peak P.0 mmHg (<20mmHg) AoV Mean P.8 mmHg (1.7-11.5mmHg) LVOT Max Pradeep:            1.19 m/s  (<=1.1m/s) AoV VTI:                 48.90 cm  (18-25cm) LVOT VTI:                26.86 cm LVOT Diameter:           2.01 cm   (1.8-2.4cm) AoV Area, VTI:           1.75 cm2  (2.5-5.5cm2) AoV Area,Vmax:           1.58 cm2  (2.5-4.5cm2) AoV Dimensionless Index: 0.55  RIGHT VENTRICLE: RV Basal 2.66 cm RV Mid   1.70 cm RV Major 6.6 cm TAPSE:   19.5 mm RV s'    0.10 m/s TRICUSPID VALVE/RVSP:                             Normal Ranges: Peak TR Velocity: 3.15 m/s Est. RA Pressure: 3 mmHg RV Syst Pressure: 42.8 mmHg (< 30mmHg) IVC Diam:         1.82 cm AORTA: Asc Ao Diam 2.81 cm  [id number] Annika Rene MD Electronically signed on 2024 at 6:03:24 PM  **  Final **     ECG 12 Lead    Result Date: 1/29/2024  Sinus rhythm Borderline right axis deviation Low voltage, extremity leads Confirmed by Annika Rene ([id number]) on 1/29/2024 5:52:59 PM    XR chest 1 view    Result Date: 1/28/2024  Interpreted By:  Festus Schumacher, STUDY: XR CHEST 1 VIEW;  1/28/2024 5:56 am   INDICATION: Signs/Symptoms:New oxygen demand, SOB, wheezing, crackles, Volume overload?.   COMPARISON: 11/20/2023   ACCESSION NUMBER(S): [id number]   ORDERING CLINICIAN: AIYANA VO   FINDINGS: Lungs are hyperinflated.. There does appear to be developing pulmonary edema with increasing cephalization of the vessels. Heart size is enlarged. Vascular calcification. Follow-up is advised. Right midlung opacification is felt to be overlying soft tissues.       1.  Features suspicious for progressive pulmonary edema . Follow-up is advised hyperinflation of the lungs       MACRO: None   Signed by: Festus Schumacher 1/28/2024 7:02 AM Dictation workstation:   [id number]    FL less than 1 hour    Result Date: 1/27/2024  These images are not reportable by radiology and will not be interpreted by  Radiologists.    XR hand right 3+ views    Result Date: 1/26/2024  Interpreted By:  Festus Schumacher, STUDY: XR HAND RIGHT 3+ VIEWS; XR WRIST RIGHT 3+ VIEWS; ;  1/26/2024 9:10 pm   INDICATION: Signs/Symptoms:fall.   COMPARISON: None.   ACCESSION NUMBER(S): [id number]; [id number]   ORDERING CLINICIAN: FESTUS BERMEO   FINDINGS: Evaluation of the right hand and wrist. No evidence of metacarpal fracture.   Dorsally angulated distal radius fracture and probable ulnar styloid fracture. Fracture is less displaced than on the left. The distal radius is angulated dorsally proximally 50 degrees. Resultant ulnar positive variance.         Dorsally angulated and mildly displaced distal radius fracture. Ulnar positive variance. Minimal displaced ulnar styloid fracture.   MACRO: None   Signed by: Festus Schumacher 1/26/2024 9:58 PM  Dictation workstation:   [id number]    XR wrist right 3+ views    Result Date: 1/26/2024  Interpreted By:  Festus Schumacher, STUDY: XR HAND RIGHT 3+ VIEWS; XR WRIST RIGHT 3+ VIEWS; ;  1/26/2024 9:10 pm   INDICATION: Signs/Symptoms:fall.   COMPARISON: None.   ACCESSION NUMBER(S): [id number]; [id number]   ORDERING CLINICIAN: FESTUS BERMEO   FINDINGS: Evaluation of the right hand and wrist. No evidence of metacarpal fracture.   Dorsally angulated distal radius fracture and probable ulnar styloid fracture. Fracture is less displaced than on the left. The distal radius is angulated dorsally proximally 50 degrees. Resultant ulnar positive variance.         Dorsally angulated and mildly displaced distal radius fracture. Ulnar positive variance. Minimal displaced ulnar styloid fracture.   MACRO: None   Signed by: Festus Schuamcher 1/26/2024 9:58 PM Dictation workstation:   [id number]    XR wrist left 3+ views    Result Date: 1/26/2024  Interpreted By:  Festus Schumacher, STUDY: XR HAND LEFT 3+ VIEWS; XR WRIST LEFT 3+ VIEWS; ;  1/26/2024 9:10 pm   INDICATION: Signs/Symptoms:fall.   COMPARISON: None.   ACCESSION NUMBER(S): [id number]; [id number]   ORDERING CLINICIAN: FESTUS BERMEO   FINDINGS: Left hand and wrist   Marked demineralization. Comminuted dorsally angulated and displaced distal radius fracture. Displacement approximately 4 mm as well as dorsal angulation. Distal ulnar shaft fracture noted with resultant ulnar positive variance.   No definite metacarpal fracture is seen. Severe base of thumb osteoarthritis       Complex distal radius and ulnar fracture with impaction, dorsal angulation and displacement   No definite hand fracture. Demineralization does limit sensitivity.   MACRO: None   Signed by: Festus Schumacher 1/26/2024 9:56 PM Dictation workstation:   [id number]    XR hand left 3+ views    Result Date: 1/26/2024  Interpreted By:  Festus Schumcaher, STUDY: XR HAND LEFT 3+ VIEWS; XR WRIST LEFT 3+ VIEWS; ;   1/26/2024 9:10 pm   INDICATION: Signs/Symptoms:fall.   COMPARISON: None.   ACCESSION NUMBER(S): [id number]; [id number]   ORDERING CLINICIAN: FESTUS BERMEO   FINDINGS: Left hand and wrist   Marked demineralization. Comminuted dorsally angulated and displaced distal radius fracture. Displacement approximately 4 mm as well as dorsal angulation. Distal ulnar shaft fracture noted with resultant ulnar positive variance.   No definite metacarpal fracture is seen. Severe base of thumb osteoarthritis       Complex distal radius and ulnar fracture with impaction, dorsal angulation and displacement   No definite hand fracture. Demineralization does limit sensitivity.   MACRO: None   Signed by: Festus Schumacher 1/26/2024 9:56 PM Dictation workstation:   [id number]    CT head wo IV contrast    Result Date: 1/26/2024  Interpreted By:  Festus Schumacher, STUDY: CT HEAD WO IV CONTRAST; CT CERVICAL SPINE WO IV CONTRAST;  1/26/2024 9:22 pm   INDICATION: Signs/Symptoms:fall   COMPARISON: None.   ACCESSION NUMBER(S): [id number]; [id number]   ORDERING CLINICIAN: FESTUS BERMEO   TECHNIQUE: Axial noncontrast CT images of head with coronal and sagittal reconstructed images. Axial noncontrast CT images of the cervical spine with coronal and sagittal reconstructed images.   FINDINGS: CT HEAD:   BRAIN PARENCHYMA:  No evidence of acute intraparenchymal hemorrhage or parenchymal evidence of acute large territory ischemic infarct. No mass-effect, midline shift or effacement of cerebral sulci. Gray-white matter distinction is preserved. Global volume loss and chronic small vessel ischemic change   VENTRICLES and EXTRA-AXIAL SPACES:  No acute extra-axial or intraventricular hemorrhage. Ventricles and sulci are age-concordant.   PARANASAL SINUSES/MASTOIDS:  No hemorrhage or air-fluid levels within the visualized paranasal sinuses. The mastoid air cells are well-aerated.   CALVARIUM/ORBITS:  No skull fracture.  The orbits and globes are intact  to the extent visualized.   EXTRACRANIAL SOFT TISSUES: No discernible abnormality.     CT CERVICAL SPINE:   Straightening of the cervical spine detected. There is demineralization of the bones. Loss of normal cervical lordosis. Robust vascular calcification. Multilevel foraminal stenosis.   There is increased distance between the anterior arch of C1 and dens. There is a lucency across the body of the dens. I suspect that this is due to old injury as there is no definitive fracture line seen.. If there is focal pain in the region, it may be reasonable to evaluate from marrow edema with obtaining an MRI. Severe vascular calcification   Emphysema:       Global volume loss and chronic small vessel ischemic change. No evidence of acute intracranial hemorrhage.   Loss of normal cervical lordosis. Demineralization. Increased dens distance between the anterior arch of C1 measuring 3 mm. I do not see a definite acute fracture. I suspect that this may be due to old injury as there is suggestion of some healing or healed fracture. If there is focal pain in the region, further evaluation is advised with MRI to evaluate for marrow edema.   Robust vascular calcification   Signed by: Festus Schumacher 1/26/2024 9:54 PM Dictation workstation:   [id number]    CT cervical spine wo IV contrast    Result Date: 1/26/2024  Interpreted By:  Festus Schumacher, STUDY: CT HEAD WO IV CONTRAST; CT CERVICAL SPINE WO IV CONTRAST;  1/26/2024 9:22 pm   INDICATION: Signs/Symptoms:fall   COMPARISON: None.   ACCESSION NUMBER(S): [id number]; [id number]   ORDERING CLINICIAN: FESTUS BERMEO   TECHNIQUE: Axial noncontrast CT images of head with coronal and sagittal reconstructed images. Axial noncontrast CT images of the cervical spine with coronal and sagittal reconstructed images.   FINDINGS: CT HEAD:   BRAIN PARENCHYMA:  No evidence of acute intraparenchymal hemorrhage or parenchymal evidence of acute large territory ischemic infarct. No mass-effect,  midline shift or effacement of cerebral sulci. Gray-white matter distinction is preserved. Global volume loss and chronic small vessel ischemic change   VENTRICLES and EXTRA-AXIAL SPACES:  No acute extra-axial or intraventricular hemorrhage. Ventricles and sulci are age-concordant.   PARANASAL SINUSES/MASTOIDS:  No hemorrhage or air-fluid levels within the visualized paranasal sinuses. The mastoid air cells are well-aerated.   CALVARIUM/ORBITS:  No skull fracture.  The orbits and globes are intact to the extent visualized.   EXTRACRANIAL SOFT TISSUES: No discernible abnormality.     CT CERVICAL SPINE:   Straightening of the cervical spine detected. There is demineralization of the bones. Loss of normal cervical lordosis. Robust vascular calcification. Multilevel foraminal stenosis.   There is increased distance between the anterior arch of C1 and dens. There is a lucency across the body of the dens. I suspect that this is due to old injury as there is no definitive fracture line seen.. If there is focal pain in the region, it may be reasonable to evaluate from marrow edema with obtaining an MRI. Severe vascular calcification   Emphysema:       Global volume loss and chronic small vessel ischemic change. No evidence of acute intracranial hemorrhage.   Loss of normal cervical lordosis. Demineralization. Increased dens distance between the anterior arch of C1 measuring 3 mm. I do not see a definite acute fracture. I suspect that this may be due to old injury as there is suggestion of some healing or healed fracture. If there is focal pain in the region, further evaluation is advised with MRI to evaluate for marrow edema.   Robust vascular calcification   Signed by: Sebastián Schumacher 1/26/2024 9:54 PM Dictation workstation:   [id number]     Scheduled medications:  amoxicillin-pot clavulanate, 1 tablet, oral, BID  enoxaparin, 40 mg, subcutaneous, q24h  fluticasone furoate-vilanteroL, 1 puff, inhalation,  Daily  furosemide, 40 mg, intramuscular, Once  hydrALAZINE, 10 mg, intravenous, Once  ipratropium-albuteroL, 3 mL, nebulization, TID  [START ON 2/3/2024] methylPREDNISolone sodium succinate (PF), 40 mg, intramuscular, q8h DORIS  metoprolol tartrate, 37.5 mg, oral, BID  [START ON 2/3/2024] pantoprazole, 40 mg, oral, Daily before breakfast   Or  [START ON 2/3/2024] pantoprazole, 40 mg, intravenous, Daily before breakfast  polyethylene glycol, 17 g, oral, Daily  [Held by provider] predniSONE, 10 mg, oral, Daily  tiotropium, 2 Inhalation, inhalation, Daily      Continuous medications:     PRN medications:  PRN medications: acetaminophen, hydrALAZINE, hydrOXYzine HCL, oxyCODONE      Past Medical History  She has a past medical history of COPD (chronic obstructive pulmonary disease) (CMS/[facility]), Disorder of the skin and subcutaneous tissue, unspecified (07/22/2016), Personal history of other diseases of the respiratory system (02/06/2018), and Personal history of other specified conditions (07/22/2016).    Surgical History  She has a past surgical history that includes Other surgical history (09/25/2019).     Social History  She reports that she quit smoking about 6 years ago. Her smoking use included cigarettes. She has a 7.50 pack-year smoking history. She does not have any smokeless tobacco history on file. She reports that she does not drink alcohol and does not use drugs.    Family History  No family history on file.     Allergies  Erythromycin    Code Status  Full Code     Review of Systems   Constitutional:  Positive for activity change. Negative for chills and fever.   HENT:  Positive for congestion.    Eyes:  Negative for photophobia and visual disturbance.   Respiratory:  Positive for cough, shortness of breath and wheezing.    Cardiovascular:  Negative for chest pain, palpitations and leg swelling.   Gastrointestinal:  Negative for abdominal pain, blood in stool, nausea and vomiting.   Endocrine: Negative for  polydipsia and polyuria.   Genitourinary:  Negative for decreased urine volume, dysuria, hematuria and urgency.   Musculoskeletal:         Doing okay with soft cast in place to bilateral upper extremities, distal neurovascular status okay   Skin:  Negative for color change and rash.   Neurological:  Positive for weakness. Negative for dizziness, tremors, syncope and light-headedness.   Psychiatric/Behavioral:  Negative for confusion and sleep disturbance.    All other systems reviewed and are negative.      Last Recorded Vitals  /84 (BP Location: Left leg, Patient Position: Lying)   Pulse 96   Temp 36.3 °C (97.4 °F) (Temporal)   Resp 16   Wt 49 kg (108 lb)   SpO2 96%      Physical Exam  Vitals and nursing note reviewed.   Constitutional:       General: She is awake. She is not in acute distress.     Appearance: Normal appearance. She is well-developed. She is not ill-appearing or toxic-appearing.   HENT:      Head: Normocephalic and atraumatic.      Right Ear: External ear normal.      Left Ear: External ear normal.      Nose: Nose normal.      Mouth/Throat:      Mouth: Mucous membranes are moist.   Eyes:      General: No scleral icterus.     Extraocular Movements: Extraocular movements intact.      Pupils: Pupils are equal, round, and reactive to light.   Cardiovascular:      Rate and Rhythm: Regular rhythm. Tachycardia present.      Pulses: Normal pulses.      Heart sounds: No murmur heard.     No friction rub. No gallop.   Pulmonary:      Effort: Pulmonary effort is normal. No respiratory distress.      Breath sounds: Wheezing present. No rhonchi or rales.   Abdominal:      General: Bowel sounds are normal. There is no distension.      Palpations: Abdomen is soft. There is no hepatomegaly, splenomegaly or mass.      Tenderness: There is no abdominal tenderness.   Musculoskeletal:         General: No deformity or signs of injury.      Cervical back: Normal range of motion and neck supple. No rigidity or  tenderness.      Right lower leg: No edema.      Left lower leg: No edema.      Comments: Patient with casts to bilateral upper extremities   Skin:     General: Skin is warm and dry.      Capillary Refill: Capillary refill takes less than 2 seconds.      Coloration: Skin is not pale.      Findings: No erythema, lesion or rash.   Neurological:      General: No focal deficit present.      Mental Status: She is alert and oriented to person, place, and time.      Cranial Nerves: No cranial nerve deficit.      Motor: Weakness (generalized) present.   Psychiatric:         Mood and Affect: Mood normal.         Behavior: Behavior normal. Behavior is cooperative.         Thought Content: Thought content normal.         Judgment: Judgment normal.       Assessment/Plan   Principal Problem:    COPD with acute exacerbation (CMS/HCC)  Active Problems:    Acute on chronic hypoxic respiratory failure (CMS/HCC)    Moderate aortic stenosis    Closed fracture of right radius and ulna, initial encounter    Essential hypertension    Mixed hyperlipidemia    Type I or II open fracture of left radius and ulna    78 y.o. female with PMHx s/f HTN, HLD, moderate aortic stenosis, chronic bilateral lower extremity edema (on as needed Lasix), COPD with chronic hypoxic respiratory failure (baseline 4-4.5 L nasal cannula), presenting with worsening shortness of breath, cough, wheezing, congestion.    Acute on chronic hypoxic respiratory failure secondary to AECOPD  -Patient's baseline oxygen is 4-4.5 L; she is currently requiring about 6.5 L nasal cannula to maintain sats in the lower 90s  -She is diffusely wheezy on examination and has had increased cough and sputum production  -Unfortunately, during admission, patient lost IV access.  She also had another IV infiltrate.  She also has a hematoma associated with one of her other IV sites.  Multiple attempts were made by both dayshift nurses and nursing coordinator with ultrasound guidance without  significant improvement in ability to obtain access.  Patient previously had required midline versus CVC.   -Will use IM formulations of meds for tonight; discussed with patient and she would be agreeable for additional access (midline versus central line) but unable to obtain at this time    Elevated BNP, chronic bilateral lower extremity edema  -Patient with a minimally elevated BNP at 426 compared to in the 900s during last admission.  Her last echocardiogram was completed at that time.  -Echo (01/29/2024): LVEF 60-65%, low normal RV systolic function, mild aortic valve stenosis  -Will give 40 mg IM Lasix x 1 and then resume oral as appropriate    HTN, HLD  -Continue home medications  -Monitor and adjust as needed    Deconditioning and debility  -In setting of recent hospitalization and upper extremity fractures with cast in place  -PT/OT  -Social work consultation appreciated         Fabiana Shin PA-C    Dragon dictation software was used to dictate this note and thus there may be minor errors in translation/transcription including garbled speech or misspellings. Please contact for clarification if needed.

## 2024-02-03 NOTE — PROGRESS NOTES
02/03/24 1228   Discharge Planning   Living Arrangements Spouse/significant other   Support Systems Spouse/significant other;Children   Assistance Needed ADL's   Type of Residence Private residence   Number of Stairs to Enter Residence 2   Number of Stairs Within Residence 0   Who is requesting discharge planning? Provider   Home or Post Acute Services Post acute facilities (Rehab/SNF/etc)   Type of Post Acute Facility Services Rehab;Skilled nursing   [facility]   Does the patient need discharge transport arranged? Yes   RoundTrip coordination needed? Yes   Financial Resource Strain   How hard is it for you to pay for the very basics like food, housing, medical care, and heating? Not very   Housing Stability   In the last 12 months, was there a time when you were not able to pay the mortgage or rent on time? N   In the last 12 months, how many places have you lived? 1   In the last 12 months, was there a time when you did not have a steady place to sleep or slept in a shelter (including now)? N   Transportation Needs   In the past 12 months, has lack of transportation kept you from medical appointments or from getting medications? no   In the past 12 months, has lack of transportation kept you from meetings, work, or from getting things needed for daily living? No   Patient Choice   Patient / Family choosing to utilize agency / facility established prior to hospitalization Yes     Spoke with patient at the bedside to discuss discharge planning.  Prior to her recent admission, patient lived at home with her .  He has dementia and per family, he's difficult to deal with even without the dementia and the wife is the only person that can seem to keep him from wandering.  Patient discharged on 2/1 to the [facility] with her  being there on a private pay respite stay because he will not stay anywhere without the patient.  Patient returned to the hospital the very next  day, she plans to return to the [facility] when she's medically ready, sent message to [facility] requesting return referral, PT/OT evaluations are pending. Care transitions to follow to manage discharge plan.

## 2024-02-03 NOTE — PROGRESS NOTES
"Irish Davis is a 78 y.o. female on day 1 of admission presenting with COPD with acute exacerbation (CMS/[facility]).      Subjective     Irish Davis is a 78 y.o. female with PMHx s/f HTN, HLD, moderate aortic stenosis, chronic bilateral lower extremity edema (on as needed Lasix), COPD with chronic hypoxic respiratory failure (baseline 4-4.5 L nasal cannula), presenting with worsening shortness of breath, cough, wheezing, congestion.  Patient was just admitted to our hospital 01/27/2024-02/01/2024 in setting of falling on her oxygen tubing and breaking both arms.  She presented back today due to respiratory symptoms.  Patient reports that her symptoms actually did start day of admission, and significantly worsened upon her arrival back home.  She noted it was most significant when getting up to ambulate around her home, stating that going a few steps from her bedroom into her bathroom caused her to be significantly winded to the point where she had to sit for 30+ minutes to be able to regain her breath.  She has not had any chest pain associated with this.  She denies any fevers or chills.  In regard to her arms, her fingers are not numb.  She does not feel that she is having any significant lower extremity swelling.  Compared to presentation in the ED, the patient reports that she is feeling better when sitting, but if she were to need to get up, she would probably feel \"worse all over again.\"  Full ROS as detailed below.     ED Course (Summary):   Vitals on presentation: T98.3, /86, , R26, SpO2 86% 4 L nasal cannula  Labs: CBC with WBC 8.5, Hb 10.3, platelets 363.  Chemistries with glucose 103, sodium 136, potassium 4.3, BUN 22, serum creatinine 0.82.   (down from 956).  High-sensitivity troponin 28.  Viral panel negative.  CXR with slightly improved interstitial infiltrate/edema bilaterally, still notable for trace bilateral pleural effusions  Patient was given breathing treatment, 60 mg " Solu-Medrol in the ED.  Due to significant increased work of breathing and hypoxia with minimal movement in the bed she was admitted.    2/3: No acute events overnight.  History and chart reviewed.  Patient seen and examined.  Patient still with some shortness of breath.  Denies any cough, chest pain, palpitations,lightheadedness, fevers or chills.  She is currently being treated for COPD exacerbation with presumed acute on chronic cor pulmonale.  Continue treatment for another 24 to 48 hours.       Objective     Last Recorded Vitals  /71   Pulse 75   Temp 36.4 °C (97.6 °F)   Resp 18   Wt 47.6 kg (104 lb 15 oz)   SpO2 91%   Intake/Output last 3 Shifts:  No intake or output data in the 24 hours ending 02/03/24 1551    Admission Weight  Weight: 49 kg (108 lb) (02/02/24 1031)    Daily Weight  02/03/24 : 47.6 kg (104 lb 15 oz)    Image Results  ECG 12 lead  Sinus tachycardia  Atrial premature complex  Probable left atrial enlargement  Borderline right axis deviation  Minimal ST depression, inferior leads    See ED provider note for full interpretation and clinical correlation  Confirmed by Abigail Chauhan ([id number]) on 2/3/2024 10:32:55 AM      Physical Exam  CONSTITUTIONAL - alert, in no acute distress, not ill-appearing  SKIN - normal skin color ,warm  HEAD - no trauma, normocephalic  EYES - pupils are equal and reactive to light  CHEST - decreased air entry bilaterally, scattered wheeze  CARDIAC - regular rate and regular rhythm, no murmur  ABDOMEN - no organomegaly, soft, nontender, nondistended, normal bowel sounds, no guarding/rebound/rigidity  EXTREMITIES - no edema, no deformities  NEUROLOGICAL - alert, oriented x3 and no acute focal signs  PSYCHIATRIC - alert, pleasant and cordial, age-appropriate    Relevant Results               Assessment/Plan   This patient currently has cardiac telemetry ordered; if you would like to modify or discontinue the telemetry order, click here to go to the orders  activity to modify/discontinue the order.              Principal Problem:    COPD with acute exacerbation (CMS/HCC)  Active Problems:    Acute on chronic hypoxic respiratory failure (CMS/HCC)    Moderate aortic stenosis    Closed fracture of right radius and ulna, initial encounter    Essential hypertension    Mixed hyperlipidemia    Type I or II open fracture of left radius and ulna  78 y.o. female with PMHx s/f HTN, HLD, moderate aortic stenosis, chronic bilateral lower extremity edema (on as needed Lasix), COPD with chronic hypoxic respiratory failure (baseline 4-4.5 L nasal cannula), presenting with worsening shortness of breath, cough, wheezing, congestion.     Acute on chronic hypoxic respiratory failure secondary to AECOPD  -Patient's baseline oxygen is 4-4.5 L; she is currently requiring about 6.5 L nasal cannula to maintain sats in the lower 90s  -She is diffusely wheezy on examination and has had increased cough and sputum production  -Unfortunately, during admission, patient lost IV access.  She also had another IV infiltrate.  She also has a hematoma associated with one of her other IV sites.  Multiple attempts were made by both dayshift nurses and nursing coordinator with ultrasound guidance without significant improvement in ability to obtain access.  Patient previously had required midline versus CVC.   -Will use IM formulations of meds for tonight; discussed with patient and she would be agreeable for additional access (midline versus central line) but unable to obtain at this time  2/3: Continue current treatment for COPD exacerbation.  Wean down oxygen as tolerated.     Elevated BNP, chronic bilateral lower extremity edema  -Patient with a minimally elevated BNP at 426 compared to in the 900s during last admission.  Her last echocardiogram was completed at that time.  -Echo (01/29/2024): LVEF 60-65%, low normal RV systolic function, mild aortic valve stenosis  -Will give 40 mg IM Lasix x 1 and then  resume oral as appropriate  2/3: Started on oral Lasix 40 mg daily.  Monitor renal function.     HTN, HLD  -Continue home medications  -Monitor and adjust as needed     Deconditioning and debility  -In setting of recent hospitalization and upper extremity fractures with cast in place  -PT/OT  -Social work consultation appreciated          Dragon dictation software was used to dictate this note and thus there may be minor errors in translation/transcription including garbled speech or misspellings. Please contact for clarification if needed.     Mariano Levi MD

## 2024-02-04 ENCOUNTER — APPOINTMENT (OUTPATIENT)
Dept: CARDIOLOGY | Facility: HOSPITAL | Age: 79
DRG: 190 | End: 2024-02-04
Payer: MEDICARE

## 2024-02-04 LAB
ANION GAP SERPL CALC-SCNC: 12 MMOL/L (ref 10–20)
BUN SERPL-MCNC: 39 MG/DL (ref 6–23)
CALCIUM SERPL-MCNC: 8.5 MG/DL (ref 8.6–10.3)
CHLORIDE SERPL-SCNC: 93 MMOL/L (ref 98–107)
CO2 SERPL-SCNC: 40 MMOL/L (ref 21–32)
CREAT SERPL-MCNC: 0.84 MG/DL (ref 0.5–1.05)
EGFRCR SERPLBLD CKD-EPI 2021: 70 ML/MIN/1.73M*2
GLUCOSE SERPL-MCNC: 143 MG/DL (ref 74–99)
POTASSIUM SERPL-SCNC: 3.6 MMOL/L (ref 3.5–5.3)
SODIUM SERPL-SCNC: 141 MMOL/L (ref 136–145)

## 2024-02-04 PROCEDURE — 80048 BASIC METABOLIC PNL TOTAL CA: CPT | Performed by: INTERNAL MEDICINE

## 2024-02-04 PROCEDURE — 2500000001 HC RX 250 WO HCPCS SELF ADMINISTERED DRUGS (ALT 637 FOR MEDICARE OP): Performed by: INTERNAL MEDICINE

## 2024-02-04 PROCEDURE — 93010 ELECTROCARDIOGRAM REPORT: CPT | Performed by: INTERNAL MEDICINE

## 2024-02-04 PROCEDURE — 99232 SBSQ HOSP IP/OBS MODERATE 35: CPT | Performed by: INTERNAL MEDICINE

## 2024-02-04 PROCEDURE — 2060000001 HC INTERMEDIATE ICU ROOM DAILY

## 2024-02-04 PROCEDURE — 93005 ELECTROCARDIOGRAM TRACING: CPT

## 2024-02-04 PROCEDURE — 2500000004 HC RX 250 GENERAL PHARMACY W/ HCPCS (ALT 636 FOR OP/ED): Performed by: STUDENT IN AN ORGANIZED HEALTH CARE EDUCATION/TRAINING PROGRAM

## 2024-02-04 PROCEDURE — 36415 COLL VENOUS BLD VENIPUNCTURE: CPT | Performed by: INTERNAL MEDICINE

## 2024-02-04 PROCEDURE — 2500000004 HC RX 250 GENERAL PHARMACY W/ HCPCS (ALT 636 FOR OP/ED): Performed by: INTERNAL MEDICINE

## 2024-02-04 PROCEDURE — 2500000002 HC RX 250 W HCPCS SELF ADMINISTERED DRUGS (ALT 637 FOR MEDICARE OP, ALT 636 FOR OP/ED): Performed by: FAMILY MEDICINE

## 2024-02-04 PROCEDURE — 94640 AIRWAY INHALATION TREATMENT: CPT

## 2024-02-04 PROCEDURE — 2500000001 HC RX 250 WO HCPCS SELF ADMINISTERED DRUGS (ALT 637 FOR MEDICARE OP): Performed by: STUDENT IN AN ORGANIZED HEALTH CARE EDUCATION/TRAINING PROGRAM

## 2024-02-04 RX ORDER — IPRATROPIUM BROMIDE AND ALBUTEROL SULFATE 2.5; .5 MG/3ML; MG/3ML
3 SOLUTION RESPIRATORY (INHALATION) EVERY 2 HOUR PRN
Status: DISCONTINUED | OUTPATIENT
Start: 2024-02-04 | End: 2024-02-05 | Stop reason: HOSPADM

## 2024-02-04 RX ADMIN — AMOXICILLIN AND CLAVULANATE POTASSIUM 875 MG: 875; 125 TABLET, FILM COATED ORAL at 08:28

## 2024-02-04 RX ADMIN — PREDNISONE 40 MG: 20 TABLET ORAL at 08:28

## 2024-02-04 RX ADMIN — TIOTROPIUM BROMIDE INHALATION SPRAY 2 PUFF: 3.12 SPRAY, METERED RESPIRATORY (INHALATION) at 08:29

## 2024-02-04 RX ADMIN — AMOXICILLIN AND CLAVULANATE POTASSIUM 875 MG: 875; 125 TABLET, FILM COATED ORAL at 20:23

## 2024-02-04 RX ADMIN — ACETAMINOPHEN 650 MG: 325 TABLET ORAL at 08:36

## 2024-02-04 RX ADMIN — ENOXAPARIN SODIUM 40 MG: 40 INJECTION SUBCUTANEOUS at 12:04

## 2024-02-04 RX ADMIN — FLUTICASONE FUROATE AND VILANTEROL TRIFENATATE 1 PUFF: 200; 25 POWDER RESPIRATORY (INHALATION) at 08:29

## 2024-02-04 RX ADMIN — FUROSEMIDE 40 MG: 40 TABLET ORAL at 08:28

## 2024-02-04 RX ADMIN — METOPROLOL TARTRATE 37.5 MG: 25 TABLET, FILM COATED ORAL at 08:28

## 2024-02-04 RX ADMIN — IPRATROPIUM BROMIDE AND ALBUTEROL SULFATE 3 ML: 2.5; .5 SOLUTION RESPIRATORY (INHALATION) at 07:24

## 2024-02-04 RX ADMIN — OXYCODONE HYDROCHLORIDE 5 MG: 5 TABLET ORAL at 20:23

## 2024-02-04 ASSESSMENT — COGNITIVE AND FUNCTIONAL STATUS - GENERAL
DRESSING REGULAR UPPER BODY CLOTHING: TOTAL
DRESSING REGULAR LOWER BODY CLOTHING: TOTAL
TOILETING: TOTAL
DAILY ACTIVITIY SCORE: 6
PERSONAL GROOMING: TOTAL
TURNING FROM BACK TO SIDE WHILE IN FLAT BAD: A LOT
CLIMB 3 TO 5 STEPS WITH RAILING: TOTAL
STANDING UP FROM CHAIR USING ARMS: A LOT
MOVING TO AND FROM BED TO CHAIR: A LOT
EATING MEALS: TOTAL
HELP NEEDED FOR BATHING: TOTAL
WALKING IN HOSPITAL ROOM: A LOT
MOBILITY SCORE: 12
MOVING FROM LYING ON BACK TO SITTING ON SIDE OF FLAT BED WITH BEDRAILS: A LITTLE

## 2024-02-04 ASSESSMENT — PAIN DESCRIPTION - LOCATION
LOCATION: WRIST
LOCATION: HEAD

## 2024-02-04 ASSESSMENT — PAIN SCALES - GENERAL
PAINLEVEL_OUTOF10: 2
PAINLEVEL_OUTOF10: 8

## 2024-02-04 ASSESSMENT — PAIN - FUNCTIONAL ASSESSMENT: PAIN_FUNCTIONAL_ASSESSMENT: 0-10

## 2024-02-04 NOTE — PROGRESS NOTES
"Irish Davis is a 78 y.o. female on day 2 of admission presenting with COPD with acute exacerbation (CMS/[facility]).      Subjective     Irish Davis is a 78 y.o. female with PMHx s/f HTN, HLD, moderate aortic stenosis, chronic bilateral lower extremity edema (on as needed Lasix), COPD with chronic hypoxic respiratory failure (baseline 4-4.5 L nasal cannula), presenting with worsening shortness of breath, cough, wheezing, congestion.  Patient was just admitted to our hospital 01/27/2024-02/01/2024 in setting of falling on her oxygen tubing and breaking both arms.  She presented back today due to respiratory symptoms.  Patient reports that her symptoms actually did start day of admission, and significantly worsened upon her arrival back home.  She noted it was most significant when getting up to ambulate around her home, stating that going a few steps from her bedroom into her bathroom caused her to be significantly winded to the point where she had to sit for 30+ minutes to be able to regain her breath.  She has not had any chest pain associated with this.  She denies any fevers or chills.  In regard to her arms, her fingers are not numb.  She does not feel that she is having any significant lower extremity swelling.  Compared to presentation in the ED, the patient reports that she is feeling better when sitting, but if she were to need to get up, she would probably feel \"worse all over again.\"  Full ROS as detailed below.     ED Course (Summary):   Vitals on presentation: T98.3, /86, , R26, SpO2 86% 4 L nasal cannula  Labs: CBC with WBC 8.5, Hb 10.3, platelets 363.  Chemistries with glucose 103, sodium 136, potassium 4.3, BUN 22, serum creatinine 0.82.   (down from 956).  High-sensitivity troponin 28.  Viral panel negative.  CXR with slightly improved interstitial infiltrate/edema bilaterally, still notable for trace bilateral pleural effusions  Patient was given breathing treatment, 60 mg " Solu-Medrol in the ED.  Due to significant increased work of breathing and hypoxia with minimal movement in the bed she was admitted.    2/3: No acute events overnight.  History and chart reviewed.  Patient seen and examined.  Patient still with some shortness of breath.  Denies any cough, chest pain, palpitations,lightheadedness, fevers or chills.  She is currently being treated for COPD exacerbation with presumed acute on chronic cor pulmonale.  Continue treatment for another 24 to 48 hours.  2/4: No acute events overnight.  Denies any shortness of breath, chest pain very, abdominal pain, nausea or vomiting.  Patient has bilateral calf oncology couple extremities.  She is eager to go back to the skilled nursing [facility] a and is worried about her demented  in the facility.  Will discuss with case management discharge planning tomorrow.        Objective     Last Recorded Vitals  /68 (BP Location: Right arm, Patient Position: Lying)   Pulse 84   Temp 36.9 °C (98.5 °F) (Temporal)   Resp 17   Wt 47.3 kg (104 lb 4.4 oz)   SpO2 92% Comment: 4 liters  Intake/Output last 3 Shifts:    Intake/Output Summary (Last 24 hours) at 2/4/2024 1716  Last data filed at 2/4/2024 1109  Gross per 24 hour   Intake 370 ml   Output 1050 ml   Net -680 ml       Admission Weight  Weight: 49 kg (108 lb) (02/02/24 1031)    Daily Weight  02/04/24 : 47.3 kg (104 lb 4.4 oz)    Image Results  ECG 12 lead  Sinus tachycardia  Atrial premature complex  Probable left atrial enlargement  Borderline right axis deviation  Minimal ST depression, inferior leads    See ED provider note for full interpretation and clinical correlation  Confirmed by Abigail Chauhan ([id number]) on 2/3/2024 10:32:55 AM      Physical Exam  CONSTITUTIONAL -pleasant elderly female alert, in no acute distress, not ill-appearing  SKIN - normal skin color ,warm  HEAD - no trauma, normocephalic  EYES - pupils are equal and reactive to light  CHEST - decreased air  entry bilaterally, scattered wheeze  CARDIAC - regular rate and regular rhythm, no murmur  ABDOMEN - no organomegaly, soft, nontender, nondistended, normal bowel sounds, no guarding/rebound/rigidity  EXTREMITIES - no edema, no deformities, has cast on both forearms  NEUROLOGICAL - alert, oriented x3 and no acute focal signs  PSYCHIATRIC - alert, pleasant and cordial, age-appropriate    Relevant Results               Assessment/Plan   This patient currently has cardiac telemetry ordered; if you would like to modify or discontinue the telemetry order, click here to go to the orders activity to modify/discontinue the order.              Principal Problem:    COPD with acute exacerbation (CMS/HCC)  Active Problems:    Acute on chronic hypoxic respiratory failure (CMS/HCC)    Moderate aortic stenosis    Closed fracture of right radius and ulna, initial encounter    Essential hypertension    Mixed hyperlipidemia    Type I or II open fracture of left radius and ulna  78 y.o. female with PMHx s/f HTN, HLD, moderate aortic stenosis, chronic bilateral lower extremity edema (on as needed Lasix), COPD with chronic hypoxic respiratory failure (baseline 4-4.5 L nasal cannula), presenting with worsening shortness of breath, cough, wheezing, congestion.     Acute on chronic hypoxic respiratory failure secondary to AECOPD  -Patient's baseline oxygen is 4-4.5 L; she is currently requiring about 6.5 L nasal cannula to maintain sats in the lower 90s  -She is diffusely wheezy on examination and has had increased cough and sputum production  -Unfortunately, during admission, patient lost IV access.  She also had another IV infiltrate.  She also has a hematoma associated with one of her other IV sites.  Multiple attempts were made by both dayshift nurses and nursing coordinator with ultrasound guidance without significant improvement in ability to obtain access.  Patient previously had required midline versus CVC.   -Will use IM  formulations of meds for tonight; discussed with patient and she would be agreeable for additional access (midline versus central line) but unable to obtain at this time  2/3: Continue current treatment for COPD exacerbation.  Wean down oxygen as tolerated.  2/4: Resolved.  Start discharge planning     Elevated BNP, chronic bilateral lower extremity edema  -Patient with a minimally elevated BNP at 426 compared to in the 900s during last admission.  Her last echocardiogram was completed at that time.  -Echo (01/29/2024): LVEF 60-65%, low normal RV systolic function, mild aortic valve stenosis  -Will give 40 mg IM Lasix x 1 and then resume oral as appropriate  2/3: Started on oral Lasix 40 mg daily.  Monitor renal function.  2/4: Continue current treatment     HTN, HLD  -Continue home medications  -Monitor and adjust as needed     Deconditioning and debility  -In setting of recent hospitalization and upper extremity fractures with cast in place  -PT/OT  -Social work consultation appreciated          Dragon dictation software was used to dictate this note and thus there may be minor errors in translation/transcription including garbled speech or misspellings. Please contact for clarification if needed.     Mariano Levi MD

## 2024-02-04 NOTE — CARE PLAN
Problem: Pain  Goal: My pain/discomfort is manageable  Outcome: Progressing     Problem: Safety  Goal: Patient will be injury free during hospitalization  Outcome: Progressing  Goal: I will remain free of falls  Outcome: Progressing     Problem: Daily Care  Goal: Daily care needs are met  Outcome: Progressing     Problem: Psychosocial Needs  Goal: Demonstrates ability to cope with hospitalization/illness  Outcome: Progressing  Goal: Collaborate with me, my family, and caregiver to identify my specific goals  Outcome: Progressing     Problem: Discharge Barriers  Goal: My discharge needs are met  Outcome: Progressing     Problem: Skin  Goal: Decreased wound size/increased tissue granulation at next dressing change  Outcome: Progressing  Flowsheets (Taken 2/4/2024 0718)  Decreased wound size/increased tissue granulation at next dressing change:   Utilize specialty bed per algorithm   Promote sleep for wound healing   Protective dressings over bony prominences  Goal: Participates in plan/prevention/treatment measures  Outcome: Progressing  Flowsheets (Taken 2/4/2024 0718)  Participates in plan/prevention/treatment measures:   Increase activity/out of bed for meals   Discuss with provider PT/OT consult   Elevate heels  Goal: Prevent/manage excess moisture  Outcome: Progressing  Flowsheets (Taken 2/4/2024 0718)  Prevent/manage excess moisture:   Moisturize dry skin   Cleanse incontinence/protect with barrier cream   Use wicking fabric (obtain order)  Goal: Prevent/minimize sheer/friction injuries  Outcome: Progressing  Flowsheets (Taken 2/4/2024 0718)  Prevent/minimize sheer/friction injuries:   Complete micro-shifts as needed if patient unable. Adjust patient position to relieve pressure points, not a full turn   Increase activity/out of bed for meals   Turn/reposition every 2 hours/use positioning/transfer devices  Goal: Promote/optimize nutrition  Outcome: Progressing  Flowsheets (Taken 2/4/2024  0718)  Promote/optimize nutrition:   Offer water/supplements/favorite foods   Discuss with provider if NPO > 2 days   Monitor/record intake including meals  Goal: Promote skin healing  Outcome: Progressing  Flowsheets (Taken 2/4/2024 0718)  Promote skin healing:   Ensure correct size (line/device) and apply per  instructions   Protective dressings over bony prominences   Turn/reposition every 2 hours/use positioning/transfer devices   The patient's goals for the shift include      The clinical goals for the shift include pt will maintain pulse ox of 92% or greater this shift on baseline o2, 4 liters.

## 2024-02-05 VITALS
BODY MASS INDEX: 18.48 KG/M2 | WEIGHT: 104.28 LBS | HEART RATE: 82 BPM | TEMPERATURE: 97.3 F | RESPIRATION RATE: 17 BRPM | DIASTOLIC BLOOD PRESSURE: 65 MMHG | OXYGEN SATURATION: 91 % | HEIGHT: 63 IN | SYSTOLIC BLOOD PRESSURE: 108 MMHG

## 2024-02-05 LAB
ATRIAL RATE: 70 BPM
P AXIS: 75 DEGREES
P OFFSET: 219 MS
P ONSET: 167 MS
PR INTERVAL: 118 MS
Q ONSET: 226 MS
QRS COUNT: 12 BEATS
QRS DURATION: 76 MS
QT INTERVAL: 422 MS
QTC CALCULATION(BAZETT): 455 MS
QTC FREDERICIA: 444 MS
R AXIS: 74 DEGREES
T AXIS: 68 DEGREES
T OFFSET: 437 MS
VENTRICULAR RATE: 70 BPM

## 2024-02-05 PROCEDURE — 97162 PT EVAL MOD COMPLEX 30 MIN: CPT | Mod: GP

## 2024-02-05 PROCEDURE — 97166 OT EVAL MOD COMPLEX 45 MIN: CPT | Mod: GO | Performed by: OCCUPATIONAL THERAPIST

## 2024-02-05 PROCEDURE — 2500000001 HC RX 250 WO HCPCS SELF ADMINISTERED DRUGS (ALT 637 FOR MEDICARE OP): Performed by: STUDENT IN AN ORGANIZED HEALTH CARE EDUCATION/TRAINING PROGRAM

## 2024-02-05 PROCEDURE — 99239 HOSP IP/OBS DSCHRG MGMT >30: CPT | Performed by: INTERNAL MEDICINE

## 2024-02-05 PROCEDURE — 2500000001 HC RX 250 WO HCPCS SELF ADMINISTERED DRUGS (ALT 637 FOR MEDICARE OP): Performed by: INTERNAL MEDICINE

## 2024-02-05 PROCEDURE — 2500000004 HC RX 250 GENERAL PHARMACY W/ HCPCS (ALT 636 FOR OP/ED): Performed by: INTERNAL MEDICINE

## 2024-02-05 RX ORDER — AMOXICILLIN AND CLAVULANATE POTASSIUM 875; 125 MG/1; MG/1
1 TABLET, FILM COATED ORAL 2 TIMES DAILY
Qty: 4 TABLET | Refills: 0 | Status: SHIPPED | OUTPATIENT
Start: 2024-02-05 | End: 2024-02-07

## 2024-02-05 RX ORDER — OXYCODONE HYDROCHLORIDE 5 MG/1
5 TABLET ORAL EVERY 6 HOURS PRN
Qty: 12 TABLET | Refills: 0 | Status: SHIPPED | OUTPATIENT
Start: 2024-02-05 | End: 2024-02-08

## 2024-02-05 RX ORDER — FUROSEMIDE 40 MG/1
20 TABLET ORAL DAILY
Status: ON HOLD
Start: 2024-02-06 | End: 2024-05-19

## 2024-02-05 RX ORDER — GUAIFENESIN 600 MG/1
1200 TABLET, EXTENDED RELEASE ORAL 2 TIMES DAILY
Qty: 28 TABLET | Refills: 0 | Status: SHIPPED | OUTPATIENT
Start: 2024-02-05 | End: 2024-02-12

## 2024-02-05 RX ORDER — PREDNISONE 20 MG/1
40 TABLET ORAL DAILY
Qty: 4 TABLET | Refills: 0 | Status: SHIPPED | OUTPATIENT
Start: 2024-02-05 | End: 2024-02-07

## 2024-02-05 RX ADMIN — PREDNISONE 40 MG: 20 TABLET ORAL at 11:25

## 2024-02-05 RX ADMIN — FUROSEMIDE 40 MG: 40 TABLET ORAL at 11:24

## 2024-02-05 RX ADMIN — TIOTROPIUM BROMIDE INHALATION SPRAY 2 PUFF: 3.12 SPRAY, METERED RESPIRATORY (INHALATION) at 11:35

## 2024-02-05 RX ADMIN — METOPROLOL TARTRATE 37.5 MG: 25 TABLET, FILM COATED ORAL at 11:23

## 2024-02-05 RX ADMIN — FLUTICASONE FUROATE AND VILANTEROL TRIFENATATE 1 PUFF: 200; 25 POWDER RESPIRATORY (INHALATION) at 11:36

## 2024-02-05 RX ADMIN — AMOXICILLIN AND CLAVULANATE POTASSIUM 875 MG: 875; 125 TABLET, FILM COATED ORAL at 11:24

## 2024-02-05 ASSESSMENT — COGNITIVE AND FUNCTIONAL STATUS - GENERAL
STANDING UP FROM CHAIR USING ARMS: A LOT
MOVING TO AND FROM BED TO CHAIR: A LOT
MOBILITY SCORE: 10
EATING MEALS: A LITTLE
DRESSING REGULAR LOWER BODY CLOTHING: A LOT
STANDING UP FROM CHAIR USING ARMS: A LOT
PERSONAL GROOMING: A LITTLE
MOBILITY SCORE: 10
DRESSING REGULAR UPPER BODY CLOTHING: A LOT
TURNING FROM BACK TO SIDE WHILE IN FLAT BAD: A LOT
MOVING TO AND FROM BED TO CHAIR: A LOT
TOILETING: A LOT
TOILETING: A LOT
MOVING FROM LYING ON BACK TO SITTING ON SIDE OF FLAT BED WITH BEDRAILS: A LOT
DAILY ACTIVITIY SCORE: 14
HELP NEEDED FOR BATHING: A LOT
WALKING IN HOSPITAL ROOM: TOTAL
DAILY ACTIVITIY SCORE: 14
DRESSING REGULAR LOWER BODY CLOTHING: A LOT
DRESSING REGULAR UPPER BODY CLOTHING: A LOT
EATING MEALS: A LITTLE
WALKING IN HOSPITAL ROOM: TOTAL
TURNING FROM BACK TO SIDE WHILE IN FLAT BAD: A LOT
CLIMB 3 TO 5 STEPS WITH RAILING: TOTAL
HELP NEEDED FOR BATHING: A LOT
PERSONAL GROOMING: A LITTLE
CLIMB 3 TO 5 STEPS WITH RAILING: TOTAL
MOVING FROM LYING ON BACK TO SITTING ON SIDE OF FLAT BED WITH BEDRAILS: A LOT

## 2024-02-05 ASSESSMENT — PAIN SCALES - GENERAL
PAINLEVEL_OUTOF10: 0 - NO PAIN
PAINLEVEL_OUTOF10: 3
PAINLEVEL_OUTOF10: 0 - NO PAIN

## 2024-02-05 ASSESSMENT — PAIN - FUNCTIONAL ASSESSMENT
PAIN_FUNCTIONAL_ASSESSMENT: 0-10

## 2024-02-05 NOTE — DISCHARGE INSTRUCTIONS
Check BMP in a week.  Made referral for pulmonary follow up at patient's request.  OK to use O2 at 3.5-4.0 liters. Keep O2 saturation 90-94%.

## 2024-02-05 NOTE — DISCHARGE SUMMARY
Discharge Diagnosis  COPD with acute exacerbation (CMS/[facility])    Issues Requiring Follow-Up  Check BMP later this week.  Follow with PCP.  Requested appointment with  pulmonary at  [place] if possible.    Discharge Meds     Your medication list        START taking these medications        Instructions Last Dose Given Next Dose Due   amoxicillin-pot clavulanate 875-125 mg tablet  Commonly known as: Augmentin      Take 1 tablet (875 mg) by mouth 2 times a day for 4 doses.       furosemide 40 mg tablet  Commonly known as: Lasix  Start taking on: February 6, 2024      Take 0.5 tablets (20 mg) by mouth once daily. Do not start before February 6, 2024.       guaiFENesin 600 mg 12 hr tablet  Commonly known as: Mucinex      Take 2 tablets (1,200 mg) by mouth 2 times a day for 7 days. Do not crush, chew, or split.       hydrOXYzine HCL 25 mg tablet  Commonly known as: Atarax      Take 0.5 tablets (12.5 mg) by mouth every 8 hours if needed for anxiety.       tiotropium 2.5 mcg/actuation inhaler  Commonly known as: Spiriva Respimat  Replaces: Atrovent HFA 17 mcg/actuation inhaler      Inhale 2 puffs once daily.              CHANGE how you take these medications        Instructions Last Dose Given Next Dose Due   predniSONE 20 mg tablet  Commonly known as: Deltasone  What changed:   medication strength  how much to take      Take 2 tablets (40 mg) by mouth once daily for 2 days.              CONTINUE taking these medications        Instructions Last Dose Given Next Dose Due   acetaminophen 325 mg tablet  Commonly known as: Tylenol      Take 2 tablets (650 mg) by mouth every 6 hours if needed for mild pain (1 - 3) or headaches for up to 5 days.       lactobacillus acidophilus tablet tablet      Take by mouth 2 times a day.       metoprolol tartrate 25 mg tablet  Commonly known as: Lopressor      TAKE ONE AND ONE-HALF TABLETS TWICE A DAY       oxyCODONE 5 mg immediate release tablet  Commonly known as: Roxicodone      Take 1  tablet (5 mg) by mouth every 6 hours if needed for severe pain (7 - 10) for up to 3 days.       Symbicort 160-4.5 mcg/actuation inhaler  Generic drug: budesonide-formoteroL                  STOP taking these medications      Atrovent HFA 17 mcg/actuation inhaler  Generic drug: ipratropium  Replaced by: tiotropium 2.5 mcg/actuation inhaler        sulfamethoxazole-trimethoprim 800-160 mg tablet  Commonly known as: Bactrim DS                  Where to Get Your Medications        These medications were sent to [facility] - [address]  [address] [facility] [address]      Phone: [phone]   amoxicillin-pot clavulanate 875-125 mg tablet  guaiFENesin 600 mg 12 hr tablet  predniSONE 20 mg tablet  tiotropium 2.5 mcg/actuation inhaler       You can get these medications from any pharmacy    Bring a paper prescription for each of these medications  oxyCODONE 5 mg immediate release tablet       Information about where to get these medications is not yet available    Ask your nurse or doctor about these medications  furosemide 40 mg tablet         Test Results Pending At Discharge  Pending Labs       No current pending labs.            Hospital Course   Irish Davis is a 78 y.o. female with PMHx s/f HTN, HLD, moderate aortic stenosis, chronic bilateral lower extremity edema (on as needed Lasix), COPD with chronic hypoxic respiratory failure (baseline 4-4.5 L nasal cannula), presenting with worsening shortness of breath, cough, wheezing, congestion.  Patient was just admitted to our hospital 01/27/2024-02/01/2024 in setting of falling on her oxygen tubing and breaking both arms.  She presented back today due to respiratory symptoms.  Patient reports that her symptoms actually did start day of admission, and significantly worsened upon her arrival back home.  She noted it was most significant when getting up to ambulate around her home, stating that going a few  "steps from her bedroom into her bathroom caused her to be significantly winded to the point where she had to sit for 30+ minutes to be able to regain her breath.  She has not had any chest pain associated with this.  She denies any fevers or chills.  In regard to her arms, her fingers are not numb.  She does not feel that she is having any significant lower extremity swelling.  Compared to presentation in the ED, the patient reports that she is feeling better when sitting, but if she were to need to get up, she would probably feel \"worse all over again.\"  Full ROS as detailed below.     ED Course (Summary):   Vitals on presentation: T98.3, /86, , R26, SpO2 86% 4 L nasal cannula  Labs: CBC with WBC 8.5, Hb 10.3, platelets 363.  Chemistries with glucose 103, sodium 136, potassium 4.3, BUN 22, serum creatinine 0.82.   (down from 956).  High-sensitivity troponin 28.  Viral panel negative.  CXR with slightly improved interstitial infiltrate/edema bilaterally, still notable for trace bilateral pleural effusions  Patient was given breathing treatment, 60 mg Solu-Medrol in the ED.  Due to significant increased work of breathing and hypoxia with minimal movement in the bed she was admitted.     2/3: No acute events overnight.  History and chart reviewed.  Patient seen and examined.  Patient still with some shortness of breath.  Denies any cough, chest pain, palpitations,lightheadedness, fevers or chills.  She is currently being treated for COPD exacerbation with presumed acute on chronic cor pulmonale.  Continue treatment for another 24 to 48 hours.  2/4: No acute events overnight.  Denies any shortness of breath, chest pain very, abdominal pain, nausea or vomiting.  Patient has bilateral calf oncology couple extremities.  She is eager to go back to the skilled nursing facility a and is worried about her demented  in the facility.  Will discuss with case management discharge planning tomorrow.  2/5: " Patient seen. OK to discharge to SNF. Doing well. Complains of cough, some of it is chronic. Will Rx some Mucinex and see if it helps. Wants a pulmonary referral, will obtain one after discharge. Will continue steroids for another 2 days (total of 5). Same for antibiotics. Wean O2 to keep saturation 90-94%. OK to return to SNF. Discussed with patient and family at bedside.    Discharge took greater than 35 minutes to arrange.    Acute on chronic hypoxic respiratory failure secondary to AECOPD  -Patient's baseline oxygen is 4-4.5 L; she is currently requiring about 6.5 L nasal cannula to maintain sats in the lower 90s  -She is diffusely wheezy on examination and has had increased cough and sputum production  -Unfortunately, during admission, patient lost IV access.  She also had another IV infiltrate.  She also has a hematoma associated with one of her other IV sites.  Multiple attempts were made by both dayshift nurses and nursing coordinator with ultrasound guidance without significant improvement in ability to obtain access.  Patient previously had required midline versus CVC.   -Will use IM formulations of meds for tonight; discussed with patient and she would be agreeable for additional access (midline versus central line) but unable to obtain at this time  2/3: Continue current treatment for COPD exacerbation.  Wean down oxygen as tolerated.  2/4: Resolved.  Start discharge planning  2/5: Discharge to facility. Finish off steroid burst. Finish off antibiotics.     Elevated BNP, chronic bilateral lower extremity edema  -Patient with a minimally elevated BNP at 426 compared to in the 900s during last admission.  Her last echocardiogram was completed at that time.  -Echo (01/29/2024): LVEF 60-65%, low normal RV systolic function, mild aortic valve stenosis  -Will give 40 mg IM Lasix x 1 and then resume oral as appropriate  2/3: Started on oral Lasix 40 mg daily.  Monitor renal function.  2/4: Continue current  treatment  2/5: Discharge on Lasix 20mg. Recheck BMP.     HTN, HLD  -Continue home medications  -Monitor and adjust as needed     Deconditioning and debility  -In setting of recent hospitalization and upper extremity fractures with cast in place  -PT/OT  -Social work consultation appreciated    Pertinent Physical Exam At Time of Discharge  Physical Exam  Constitutional:       Appearance: Normal appearance.   HENT:      Mouth/Throat:      Mouth: Mucous membranes are moist.   Eyes:      Pupils: Pupils are equal, round, and reactive to light.   Cardiovascular:      Rate and Rhythm: Normal rate and regular rhythm.   Pulmonary:      Effort: Pulmonary effort is normal.      Breath sounds: No stridor. No wheezing, rhonchi or rales.   Abdominal:      General: There is no distension.      Palpations: Abdomen is soft. There is no mass.      Tenderness: There is no abdominal tenderness. There is no guarding.      Hernia: No hernia is present.   Musculoskeletal:         General: No swelling.   Skin:     General: Skin is warm.   Neurological:      General: No focal deficit present.      Mental Status: She is alert.         Outpatient Follow-Up  Future Appointments   Date Time Provider Department Center   2/12/2024  1:00 PM Bryant Arnold DO [id number] [facility]         Sebastián Ryan MD

## 2024-02-05 NOTE — PROGRESS NOTES
Physical Therapy    Physical Therapy Evaluation    Patient Name: Irish Davis  MRN: [id number]  Today's Date: 2/5/2024   Time Calculation  Start Time: 0948  Stop Time: 1018  Time Calculation (min): 30 min    Assessment/Plan   PT Assessment  PT Assessment Results: Impaired balance, Decreased mobility, Orthopedic restrictions, Pain, Decreased strength, Decreased safety awareness, Impaired hearing, Decreased endurance, Decreased range of motion  Rehab Prognosis: Fair  Evaluation/Treatment Tolerance: Patient limited by fatigue, Patient limited by pain  End of Session Communication: Bedside nurse  Assessment Comment:  (MIN X1 A FOR TRANSFER, LEGSUNSTEADY MOD SOB, VERY FATIGUED W/ MOBILITY HIGH FALL RISK, DESATS W/ MOBILITY, WILLNEED SKILLED REHAB ON DISCH, NEEDS MAX A FOR HYGIENE/BATHING/ADL'S)  End of Session Patient Position: Up in chair, Alarm on (CALL LIGHTIN ANA ROSA DAUGHTER PRESENT)  IP OR SWING BED PT PLAN  Inpatient or Swing Bed: Inpatient  PT Plan  Treatment/Interventions: Bed mobility, Transfer training, Gait training, Strengthening, Endurance training  PT Plan: Skilled PT  PT Frequency: 4 times per week  PT Discharge Recommendations: Moderate intensity level of continued care  Equipment Recommended upon Discharge:  (TBD)  PT Recommended Transfer Status: Assist x1, Assist x2  PT - OK to Discharge: Yes (WHEN MEDICALLY CLEARED)      Subjective   General Visit Information:  General  Reason for Referral: impaired mobility  Referred By: ALVA  Past Medical History Relevant to Rehab: SOB, DX: AE COPD, ACUTE ON CHRONIC RESP FAILURE; HX: FALLS, FALL W/  R ULNARFX/L ULNAR&RADIAL FX W/ SURG 1/2024, CATARACT SURG, COPD ON 4L O2, AORTIC STENOSIS, LE EDEMA, TOBACCO USE, NWB SUNITA UE  Family/Caregiver Present: Yes  Caregiver Feedback: DAUGHTER, CONFIRMS HOME MOBILTIY  Co-Treatment: OT  Co-Treatment Reason: FACILITATE MOBILTIY SAFETY  Prior to Session Communication: Bedside nurse  Patient Position Received: Bed, 3 rail up,  Alarm on (ROOM 2015, OK PER RN TO SEE FOR CATI -WATCHO2 SATS, 3.5 L O2 NC, ALERT IV PURE WICK, PT.A ND BED FULLOF URINE PT. NOT AWARE, IV)  General Comment: OLEG BLANTON MPLETED [id number] HRS,AGREEABLE TO BILL, STATES BOTTOM IS TIRED OF BEING IN BED  Home Living:  Home Living  Home Living Comments:  (SPOUSE (DEMENTIA PT. CARES FOR HIM); 1 LEVEL HOUSE 2 ADELAIDA/0RAIL, AMB INDEP HAS FWW, WEARS 4L O2 NC, INDEP ADL, DOES LIGHT CHORES, FAMILY DOES LAUNDRY AND CLEANING, SLEEPS IN RECLINER)  Prior Level of Function:     Precautions:  Precautions  Hearing/Visual Limitations: [place]  UE Weight Bearing Status: Right Non-Weight Bearing, Left Non-Weight Bearing  Medical Precautions: Oxygen therapy device and L/min, Fall precautions  Splinting: SUNITA WRIST SPLINT/ACE  Vital Signs:  Vital Signs  Heart Rate:  (RANGED FROM 98(REST)-138(AMB))  SpO2:  (88-90% DURING SESSION, NEED VC TO DEEP BREATHE)    Objective   Pain:  Pain Assessment  Pain Score:  (C/O PAIN IN SUNITA WRISTS DID NOT RATE)  Cognition:  Cognition  Overall Cognitive Status: Within Functional Limits  Orientation Level: Disoriented to situation (NEEDS VC TO DEEP BREATHE)  Processing Speed: Delayed                  Activity Tolerance  Endurance: Tolerates 10 - 20 min exercise with multiple rests    Sensation  Sensation Comment: NO C/O    Strength  Strength Comments: LE ROM WFL, STRENGTH DEMO'S 3/5, DECREASED MUSCULAR ENDURANCE  Strength  Strength Comments: LE ROM WFL, STRENGTH DEMO'S 3/5, DECREASED MUSCULAR ENDURANCE                     Static Sitting Balance  Static Sitting-Comment/Number of Minutes: FAIR  Dynamic Sitting Balance  Dynamic Sitting-Comments: FAIR/FAIR-    Static Standing Balance  Static Standing-Comment/Number of Minutes: FAIR-  Dynamic Standing Balance  Dynamic Standing-Comments: FAIR-  Functional Assessments:  Bed Mobility  Bed Mobility:  (MIN A SUPINE>SIT, HOB 45 DEGREES MOD SOB, SITS EOB SBA TOTAL 6 MIN)    Transfers  Transfer:  (SIT<>STAND 3 REPS 2X FROM BED  ONCE FROM CHAIR, MIN A LEGS USNTEADY MOD SOB)    Ambulation/Gait Training  Ambulation/Gait Training Performed:  (MIN A X1 +1 FOR SAFETY LEGS UNSTEADY  MODSOB,  RETURNED  IN 2MIN, C/O FATIGUED W/ THIS ACTIVITY)                      Outcome Measures:  [facility] Basic Mobility  Turning from your back to your side while in a flat bed without using bedrails: A lot  Moving from lying on your back to sitting on the side of a flat bed without using bedrails: A lot  Moving to and from bed to chair (including a wheelchair): A lot  Standing up from a chair using your arms (e.g. wheelchair or bedside chair): A lot  To walk in hospital room: Total  Climbing 3-5 steps with railing: Total  Basic Mobility - Total Score: 10    Encounter Problems       Encounter Problems (Active)       Mobility       STG - Patient will ambulate (Not Progressing)       Start:  02/05/24    Expected End:  02/15/24       CGA 50 FT NO LOB,PACING ACTIVITY FOR ENERGY CONSERVATION         STRENGTHENING (Not Progressing)       Start:  02/05/24    Expected End:  02/26/24       20+ REPS RROM INCREASING STRENGTH TO STABILIZE OOB ACATIVITIES            Transfers       STG - Patient to transfer to and from sit to supine (Not Progressing)       Start:  02/05/24    Expected End:  02/16/24       CGA HOB FLAT          STG - Patient will transfer sit to and from stand (Not Progressing)       Start:  02/05/24    Expected End:  02/19/24       CGA USING PROPER TECHNIQUE, TABITHA ORTEGA                Education Documentation  Precautions, taught by Yomaira Pryor, PT at 2/5/2024 12:32 PM.  Learner: Family, Patient  Readiness: Acceptance  Method: Explanation  Response: Needs Reinforcement  Comment: TABITHA ORTEGA; PACING ACTIVITY    Mobility Training, taught by Yomaira Pryor, PT at 2/5/2024 12:32 PM.  Learner: Family, Patient  Readiness: Acceptance  Method: Explanation  Response: Needs Reinforcement  Comment: TABITHA ORTEGA; PACING ACTIVITY    Education Comments  No  comments found.

## 2024-02-05 NOTE — CARE PLAN
Problem: Mobility  Goal: STG - Patient will ambulate  Description: CGA 50 FT NO LOB,PACING ACTIVITY FOR ENERGY CONSERVATION  Outcome: Not Progressing  Goal: STRENGTHENING  Description: 20+ REPS RROM INCREASING STRENGTH TO STABILIZE OOB ACATIVITIES  Outcome: Not Progressing     Problem: Transfers  Goal: STG - Patient to transfer to and from sit to supine  Description: CGA HOB FLAT   Outcome: Not Progressing  Goal: STG - Patient will transfer sit to and from stand  Description: CGA USING PROPER TECHNIQUE, NWB SUNITA UE'S  Outcome: Not Progressing

## 2024-02-05 NOTE — PROGRESS NOTES
"Occupational Therapy    Evaluation    Patient Name: Irish Davis  MRN: [id number]  Today's Date: 2/5/2024  Time Calculation  Start Time: 0945  Stop Time: 1013  Time Calculation (min): 28 min        Assessment:  OT Assessment: pt. needs assist with all ADLs, Min.A trfrs. with 4 liters O2  Prognosis: Good  Medical Staff Made Aware: Yes  End of Session Communication: Bedside nurse  End of Session Patient Position: Up in chair, Alarm on  Prognosis: Good  Medical Staff Made Aware: Yes  Plan:  Treatment Interventions: ADL retraining, Functional transfer training, UE strengthening/ROM  OT Frequency: 3 times per week  OT Discharge Recommendations: Moderate intensity level of continued care  OT Recommended Transfer Status: Minimal assist  OT - OK to Discharge: Yes ((when medically approp.))  Treatment Interventions: ADL retraining, Functional transfer training, UE strengthening/ROM    Subjective   Current Problem:  1. COPD exacerbation (CMS/HCC)  tiotropium (Spiriva Respimat) 2.5 mcg/actuation inhaler    predniSONE (Deltasone) 20 mg tablet    amoxicillin-pot clavulanate (Augmentin) 875-125 mg tablet    guaiFENesin (Mucinex) 600 mg 12 hr tablet    Referral to Pulmonology      2. Closed fracture of right radius and ulna, initial encounter  oxyCODONE (Roxicodone) 5 mg immediate release tablet      3. Acute on chronic hypoxic respiratory failure (CMS/HCC)  furosemide (Lasix) 40 mg tablet        General:  General  Reason for Referral: S.O.B., COPD exac.  Referred By: Amita  Past Medical History Relevant to Rehab: Hx. of aortic stenosis, chronic LE edema, COPD  Family/Caregiver Present: Yes (dtr.)  Co-Treatment: PT  Co-Treatment Reason: maximize pt. mobility  Prior to Session Communication: Bedside nurse  Patient Position Received: Bed, 3 rail up, Alarm on  General Comment: 4 liters O2, [place], pleasant, cooperative , states \"I feel like such a weakling\", bilat. arm casts  Precautions:  Hearing/Visual Limitations: [place]  UE Weight " Bearing Status: Other (Comment) (bilat. NWB hands)  Medical Precautions: Oxygen therapy device and L/min  Vital Signs: pulsox often decreased to 80s with min. activity and 4 liters O2      Pain:  Pain Assessment  Pain Score:  (min. c/o wrists hurting)    Objective   Cognition:  Overall Cognitive Status: Within Functional Limits           Home Living:  Type of Home: House (most recently from [facility] 1 day before admit, with  at same facility for respite)  Lives With: Spouse  Home Layout: One level  Prior Function:  Level of [place]: Independent with ADLs and functional transfers, Independent with homemaking with ambulation (until bilat.wrist fxs.)  IADL History:  Homemaking Responsibilities: Yes  Meal Prep Responsibility: Primary  Laundry Responsibility: Primary  Cleaning Responsibility: Primary  ADL:  Equipment:  (none)  Eating Assistance: Stand by  Eating Deficit:  (with right hand, built-up issued for improved )  UE Dressing Assistance: Moderate  LE Dressing Deficit:  (Max.A to don pull-ups)  Toileting Assistance with Device: Minimal (to[place]from [facility]  Activity Tolerance:  Endurance: Decreased tolerance for upright activites (fatigues easily, pulsox often high 80s, with recovery time for pulsox WNL)  Bed Mobility/Transfers: Bed Mobility  Bed Mobility: Yes (Min.A for supine-to-sit)    Transfers  Transfer: Yes (Min.A bed-to-chair with 4 liters O2, pulsox decreased to 80s)         Sitting Balance:  Dynamic Sitting Balance  Dynamic Sitting-Balance   Standing balance - Min.A to stand and don briefs        Sensation:  Light Touch: No apparent deficits  Strength:  Strength Comments: UEs 4/5 shoulder, fair bilat.   Perception:  Inattention/Neglect: Appears intact  Coordination:  Movements are Fluid and Coordinated:  (hands decreased 2/2 limited with casts)   Hand Function: decreased  strength 2/2 bilat. casts      Outcome Measures:[facility] Daily Activity  Putting on and taking off regular lower  body clothing: A lot  Bathing (including washing, rinsing, drying): A lot  Putting on and taking off regular upper body clothing: A lot  Toileting, which includes using toilet, bedpan or urinal: A lot  Taking care of personal grooming such as brushing teeth: A little  Eating Meals: A little  Daily Activity - Total Score: 14        Education Documentation  Home Exercise Program, taught by Lady Vinson OT at 2/5/2024 11:45 AM.  Learner: Patient  Readiness: Acceptance  Method: Explanation  Response: Demonstrated Understanding  Comment: bilat. UE and  AROM while seated in chair    Education Comments  No comments found.             Goals:  Encounter Problems       Encounter Problems (Active)       ADLs       Demo. Min.A LB ADLs and toileting  (Progressing)       Start:  02/05/24    Expected End:  02/12/24               EXERCISE/STRENGTHENING       Demo. supervised bilat. UE and  AROM ex. all planes possible, 12 reps.  (Progressing)       Start:  02/05/24    Expected End:  02/12/24               MOBILITY       CGA func. trfrs. and mobility for toileting with O2    (Progressing)       Start:  02/05/24    Expected End:  02/12/24

## 2024-02-05 NOTE — CARE PLAN
The patient's goals for the shift include      The clinical goals for the shift include pt will maintain pulse ox of 92% or greater this shift on baseline o2, 4 liters.  Problem: Pain  Goal: My pain/discomfort is manageable  Outcome: Adequate for Discharge     Problem: Safety  Goal: Patient will be injury free during hospitalization  Outcome: Adequate for Discharge  Goal: I will remain free of falls  Outcome: Adequate for Discharge     Problem: Daily Care  Goal: Daily care needs are met  Outcome: Adequate for Discharge     Problem: Psychosocial Needs  Goal: Demonstrates ability to cope with hospitalization/illness  Outcome: Adequate for Discharge  Goal: Collaborate with me, my family, and caregiver to identify my specific goals  Outcome: Adequate for Discharge     Problem: Discharge Barriers  Goal: My discharge needs are met  Outcome: Adequate for Discharge     Problem: Skin  Goal: Decreased wound size/increased tissue granulation at next dressing change  Outcome: Adequate for Discharge  Goal: Participates in plan/prevention/treatment measures  Outcome: Adequate for Discharge  Goal: Prevent/manage excess moisture  Outcome: Adequate for Discharge  Goal: Prevent/minimize sheer/friction injuries  Outcome: Adequate for Discharge  Goal: Promote/optimize nutrition  Outcome: Adequate for Discharge  Goal: Promote skin healing  Outcome: Adequate for Discharge

## 2024-02-09 DIAGNOSIS — S62.102A WRIST FRACTURE, BILATERAL, CLOSED, INITIAL ENCOUNTER: ICD-10-CM

## 2024-02-09 DIAGNOSIS — S62.101A WRIST FRACTURE, BILATERAL, CLOSED, INITIAL ENCOUNTER: ICD-10-CM

## 2024-02-12 ENCOUNTER — EVALUATION (OUTPATIENT)
Dept: OCCUPATIONAL THERAPY | Facility: HOSPITAL | Age: 79
End: 2024-02-12
Payer: COMMERCIAL

## 2024-02-12 ENCOUNTER — HOSPITAL ENCOUNTER (OUTPATIENT)
Dept: RADIOLOGY | Facility: HOSPITAL | Age: 79
Discharge: HOME | End: 2024-02-12
Payer: COMMERCIAL

## 2024-02-12 ENCOUNTER — OFFICE VISIT (OUTPATIENT)
Dept: ORTHOPEDIC SURGERY | Facility: CLINIC | Age: 79
End: 2024-02-12
Payer: COMMERCIAL

## 2024-02-12 DIAGNOSIS — S62.101A WRIST FRACTURE, BILATERAL, CLOSED, INITIAL ENCOUNTER: ICD-10-CM

## 2024-02-12 DIAGNOSIS — S52.201A CLOSED FRACTURE OF RIGHT RADIUS AND ULNA, INITIAL ENCOUNTER: Primary | Chronic | ICD-10-CM

## 2024-02-12 DIAGNOSIS — S62.102S WRIST FRACTURE, BILATERAL, SEQUELA: ICD-10-CM

## 2024-02-12 DIAGNOSIS — S52.91XA CLOSED FRACTURE OF RIGHT RADIUS AND ULNA, INITIAL ENCOUNTER: Primary | Chronic | ICD-10-CM

## 2024-02-12 DIAGNOSIS — R53.1 WEAKNESS: ICD-10-CM

## 2024-02-12 DIAGNOSIS — M25.60 STIFFNESS IN JOINT: Primary | ICD-10-CM

## 2024-02-12 DIAGNOSIS — S62.101S WRIST FRACTURE, BILATERAL, SEQUELA: ICD-10-CM

## 2024-02-12 DIAGNOSIS — S62.102A WRIST FRACTURE, BILATERAL, CLOSED, INITIAL ENCOUNTER: ICD-10-CM

## 2024-02-12 PROCEDURE — 73110 X-RAY EXAM OF WRIST: CPT | Mod: BILATERAL PROCEDURE | Performed by: RADIOLOGY

## 2024-02-12 PROCEDURE — 99024 POSTOP FOLLOW-UP VISIT: CPT | Performed by: ORTHOPAEDIC SURGERY

## 2024-02-12 PROCEDURE — 1111F DSCHRG MED/CURRENT MED MERGE: CPT | Performed by: ORTHOPAEDIC SURGERY

## 2024-02-12 PROCEDURE — 97165 OT EVAL LOW COMPLEX 30 MIN: CPT | Mod: GO | Performed by: OCCUPATIONAL THERAPIST

## 2024-02-12 PROCEDURE — 1159F MED LIST DOCD IN RCRD: CPT | Performed by: ORTHOPAEDIC SURGERY

## 2024-02-12 PROCEDURE — 97760 ORTHOTIC MGMT&TRAING 1ST ENC: CPT | Mod: GO | Performed by: OCCUPATIONAL THERAPIST

## 2024-02-12 PROCEDURE — 73110 X-RAY EXAM OF WRIST: CPT | Mod: 50

## 2024-02-12 PROCEDURE — 1126F AMNT PAIN NOTED NONE PRSNT: CPT | Performed by: ORTHOPAEDIC SURGERY

## 2024-02-12 ASSESSMENT — PATIENT HEALTH QUESTIONNAIRE - PHQ9
SUM OF ALL RESPONSES TO PHQ9 QUESTIONS 1 AND 2: 0
1. LITTLE INTEREST OR PLEASURE IN DOING THINGS: NOT AT ALL
2. FEELING DOWN, DEPRESSED OR HOPELESS: NOT AT ALL

## 2024-02-12 ASSESSMENT — PAIN - FUNCTIONAL ASSESSMENT: PAIN_FUNCTIONAL_ASSESSMENT: 0-10

## 2024-02-12 ASSESSMENT — ENCOUNTER SYMPTOMS
LOSS OF SENSATION IN FEET: 0
OCCASIONAL FEELINGS OF UNSTEADINESS: 0
DEPRESSION: 0

## 2024-02-12 ASSESSMENT — PAIN SCALES - GENERAL: PAINLEVEL_OUTOF10: 4

## 2024-02-12 NOTE — PROGRESS NOTES
78 y.o. female presents today for for follow up after bilateral distal radius ORIF with I and D left wrist for open fracture. The patient reports doing well, at the [facility] rehab. The DOS was 2 weeks ago. Pain is controlled. Patient denies numbness and tingling.  Splint has been worn as directed.       Review of Systems    Constitutional: see HPI, no fever, no chills, not feeling tired, no significant weight gain or weight loss.   Eyes: No vision changes  ENT: no nosebleeds.   Cardiovascular: no chest pain.   Respiratory: no shortness of breath and no cough.   Gastrointestinal: no abdominal pain, no nausea, no vomiting and no diarrhea.   Musculoskeletal: per HPI  Neurological: no headache, no gait disturbances  Psychiatric: no depression and no sleep disturbances.   Endocrine: no muscle weakness and no muscle cramps.   Hematologic/Lymphatic: no swollen glands and no tendency for easy bruising or excessive swelling.     Patient's past medical history, past surgical history, allergies, and medications have been reviewed unless otherwise noted in the chart.     Post-Op Exam  Inspection:  no evidence of infection, no erythema, Palpation:  compartments are soft, Range of Motion:  not tested, Stability:  not tested, Strength:  not tested, Skin:  incision site clean, dry and intact, healing without complication, Vascular:  capillary refill <2 seconds distally, Sensation:  intact to light touch distally.    Constitutional   General appearance: Alert and in no acute distress. Well developed, well nourished.    Eyes   External Eye, Conjunctiva and lids: Normal external exam - pupils were equal in size, round, reactive to light (PERRL) with normal accommodation and extraocular movements intact (EOMI).   Ears, Nose, Mouth, and Throat   Hearing: Normal.   Neck   Neck: No neck mass was observed. Supple.   Pulmonary   Respiratory effort: No respiratory distress.   Cardiovascular   Examination of extremities: No peripheral edema.    Psychiatric   Judgment and insight: Intact.   Orientation to person, place, and time: Alert and oriented x 3.       Mood and affect: Normal.      2 weeks s/p bilateral distal radius ORIF with I and D left wrist for open fracture  Based on the history, physical exam and imaging studies above, the patient's presentation is consistent with the above diagnosis.  I had a long discussion with the patient regarding their presentation and the treatment options.    We again discussed her treatment options going forward along with their associated risks and benefits. After thorough discussion, the patient has elected to proceed with postoperative care. All questions were answered to the patients satisfaction who seems satisfied with the plan.  They will call the office with any questions/concerns.     Sutures removed  Steris  Vitamin E cream prn to scar tissue  OT - fx brace right, muenster left full supination, E/T  FU 4 weeks - XR

## 2024-02-12 NOTE — PROGRESS NOTES
Occupational Therapy    Evaluation/Treatment    Patient Name: Irish Davis  MRN: [id number]  : 1945  Today's Date: 24     Time Calculation  Start Time: 1430  Stop Time: 1600  Time Calculation (min): 90 min    Therapeutic Procedure Codes:  OT Evaluation Time Entry  OT Evaluation (Low) Time Entry: 60  OT Therapeutic Procedures Time Entry  Orthotic Management Training Time Entry: 30    Subjective   Current Problem:  1. Stiffness in joint        2. Wrist fracture, bilateral, sequela  Referral to Occupational Therapy      3. Weakness          General:   OT Received On: 24  General  Reason for Referral: Splint Fabrication -Evaluate and Treat  Referred By: Dr. Arnold  Pt reporting she was walking through house 24 , stating she got her foot caught on O2 tubing.  Pt went to ED, xrays (+) for B distal radius fxs. Surgical management completed on 24 B ORIF with volar plate. Pt is R hand dominant. Referred to skilled OT treatment for L muenster splint in full supination and R fracture brace. Pt is currently at [facility] for therapy.    Precautions:  STEADI Fall Risk Score (The score of 4 or more indicates an increased risk of falling): 7  Splinting: Patient to wear splint per discussion with therapist,  I have reviewed patients medical history form.     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   Prior Function:   IND with amb  IND with ADL and IADL  ADL:   Pt reporting she is DEP with ADLs at the [facility] currently.    Splinting:  The patient was provided with a custom fabricated R radial gutter wrist cock-up splint, L muenster splint. The splint is forearm based. Wear, care and precautions were reviewed with the patient indicating an understanding.  The patient was encouraged to maximize ROM of unrestricted joints without pushing pain. Handouts provided to the patient.        Sensation:  Numbness at tips of fingers on B hands.     Outcome Measures:   Quickdash Scores: 70.45%    Therapeutic exercises/activities:  DOS: 1/26/24 Post-op: 2 weeks    Re-check due on:    Exercises: Reps:                   Activities:    Splint fabrication See section above               Modalities:                    Manual:                    Functional review:     Completed on:       OP EDUCATION:  Education  Individual(s) Educated: Patient  Education Provided: Diagnosis & Precautions, Symptom management, Orthotics wearing schedule and precautions, Orthotics and/or prosthetic trainging, Risk and benefits of OT discussed with patient or other, POC discussed and agreed upon  Home Program: Orthotic wearing schedule, care and precautions, AROM, Handout issued  Equipment: [place], Orthotic(s)  Risk and Benefits Discussed with Patient/Caregiver/Other: yes  Patient/Caregiver Demonstrated Understanding: yes  Plan of Care Discussed and Agreed Upon: yes  Patient Response to Education: Patient/Caregiver Verbalized Understanding of Information    Assessment:     OT Assessment Results: Decreased ADL status, Decreased upper extremity range of motion, Decreased upper extremity strength  Pt is a 77 y/o F who presents to this facility with performance deficits in positioning, ROM, and pain limiting ability to complete ADL and IADL tasks.  The patient reports a good supportive fit from the splint. The patient indicates an understanding of splint wear, care and precautions and indicates an understanding of the need to achieve maximum ROM of all unrestricted joints. Handouts provided to the patient. Education provided to pt regarding need for continued therapy. Pt reporting she will be at [facility] for 10 more days. Pt instructed to call and schedule follow-up OT appointments when discharged home.     Plan:   2x a week for 8 weeks  Rehab Potential: Good  Plan of Care Agreement: Patient    Occupational therapy  intervention plan to include education/instruction, hot pack, ultrasound, manual therapy, orthotic fitting/training, self-care/home management, therapeutic exercises, therapeutic activities, and home program.     Goals:  Active       OT Goals       LTG - Patient will indicate/ demonstrate the ability to resume all preinjury ADLs and IADLs without significant limits secondary to decreased ROM, decreased strength and/or pain as indicated by Quickdash score of less than 25%.        Start:  02/12/24    Expected End:  02/24/24            Develop and issue HEP to help maximize ROM, strength and tolerance to help maximize return to all pre-onset activities.        Start:  02/12/24    Expected End:  02/24/24            Patient will indicate/ demonstrate an understanding of splint wear, care and precautions.        Start:  02/12/24    Expected End:  02/24/24            The patient will indicate/demonstrate protective and supportive position of B wrists via splinting to help maximize support and the patients ability to participate in IADLs.        Start:  02/12/24    Expected End:  02/24/24            Pain to be less than or equal to 1/10 with greater than or equal to 45 minutes activity.        Start:  02/12/24    Expected End:  02/24/24

## 2024-02-14 ENCOUNTER — TELEPHONE (OUTPATIENT)
Dept: PRIMARY CARE | Facility: CLINIC | Age: 79
End: 2024-02-14

## 2024-02-14 ENCOUNTER — TELEPHONE (OUTPATIENT)
Dept: ORTHOPEDIC SURGERY | Facility: CLINIC | Age: 79
End: 2024-02-14

## 2024-02-14 NOTE — LETTER
February 15, 2024     Patient: Irish Davis   YOB: 1945   Date of Visit: 2/14/2024       To Whom It May Concern:    Irish and Josesito Davis are my patients and they have struggled in the past month with a number of medical issues requiring multiple hospitalizations for Irish and the need for 24 hour care for Josesito at home, Their family has been spread thin to make this happen, and their daughter Sofie has been required to take a leave of absence to make this happen.     This was from Irish's first hospitalization on 2/2/2024 until 3/1/2024. Hopefully they can stabilize the situation by then. Thank you for your patience.     If you have any questions or concerns, please don't hesitate to call.       Sincerely,         Khloe Mccray M.D.  [facility]  [address] [place] [address] [address]  [place] [address]  [phone]  f: [phone]

## 2024-02-14 NOTE — TELEPHONE ENCOUNTER
Patient was in ER 2/2-2/5 and her daughter (Sofie Davis) called wanting to know if you would be able to write a leave of absence for her work. She was in ER with her mother during this time. She isn't sure where she would to get this letter. She's not a patient of ours but her mother Irish England is your patient. He work doesn't have FMLA so it would need to be a MICHELLE letter. If you aren't able to do this who would you recommend? Thanks!

## 2024-02-14 NOTE — TELEPHONE ENCOUNTER
Daughter has been providing care for parents and needs a letter for work stating their medical conditions. She does not qualify for FMLA only a leave of absence.

## 2024-02-14 NOTE — TELEPHONE ENCOUNTER
Patient daughter Sofie called and states she needs some kind of documentation what happened with her mom. Sofie took off work to help with her mother, but she doesn't qualify for [place]. She states the note can be brief. She will call back with a fax number  Sofie [phone]

## 2024-02-15 ENCOUNTER — LAB REQUISITION (OUTPATIENT)
Dept: LAB | Facility: HOSPITAL | Age: 79
End: 2024-02-15

## 2024-02-15 DIAGNOSIS — Z79.01 LONG TERM (CURRENT) USE OF ANTICOAGULANTS: ICD-10-CM

## 2024-02-15 LAB
ERYTHROCYTE [DISTWIDTH] IN BLOOD BY AUTOMATED COUNT: 15.2 % (ref 11.5–14.5)
HCT VFR BLD AUTO: 29.2 % (ref 36–46)
HGB BLD-MCNC: 8.3 G/DL (ref 12–16)
MCH RBC QN AUTO: 27.9 PG (ref 26–34)
MCHC RBC AUTO-ENTMCNC: 28.4 G/DL (ref 32–36)
MCV RBC AUTO: 98 FL (ref 80–100)
NRBC BLD-RTO: 0 /100 WBCS (ref 0–0)
PLATELET # BLD AUTO: 269 X10*3/UL (ref 150–450)
RBC # BLD AUTO: 2.98 X10*6/UL (ref 4–5.2)
WBC # BLD AUTO: 8 X10*3/UL (ref 4.4–11.3)

## 2024-02-15 PROCEDURE — 85027 COMPLETE CBC AUTOMATED: CPT

## 2024-02-20 NOTE — TELEPHONE ENCOUNTER
Daughter called back needed fmla certificate  so she is able to help out when her mother comes home on 2/24. Patient's unsure of frequency of occurrence looking till she out of cast

## 2024-02-21 ENCOUNTER — TELEPHONE (OUTPATIENT)
Dept: PRIMARY CARE | Facility: CLINIC | Age: 79
End: 2024-02-21

## 2024-02-21 NOTE — TELEPHONE ENCOUNTER
Being discharged from the [facility] rehab needs a follow up appointment. No availabilty in the 2 week window please advise

## 2024-02-22 ENCOUNTER — DOCUMENTATION (OUTPATIENT)
Dept: HOME HEALTH SERVICES | Facility: HOME HEALTH | Age: 79
End: 2024-02-22

## 2024-02-22 ENCOUNTER — HOME HEALTH ADMISSION (OUTPATIENT)
Dept: HOME HEALTH SERVICES | Facility: HOME HEALTH | Age: 79
End: 2024-02-22
Payer: MEDICARE

## 2024-02-22 DIAGNOSIS — J44.1 COPD EXACERBATION (MULTI): Primary | ICD-10-CM

## 2024-02-22 NOTE — TELEPHONE ENCOUNTER
Left message to call office    Okay, I would need new FMLA forms for this- 2/24 until when? Or would she just need an excuse? (This is the [facility] daughter correct? )

## 2024-02-22 NOTE — HH CARE COORDINATION
Home Care received a Referral for Nursing, Physical Therapy, and Occupational Therapy. We have processed the referral for a Start of Care on 2/25/2024 -2/26/2024.      If you have any questions or concerns, please feel free to contact us at [phone]. Follow the prompts, enter your five digit zip code, and you will be directed to your care team on EAST 3.

## 2024-02-26 DIAGNOSIS — J96.21 ACUTE ON CHRONIC HYPOXIC RESPIRATORY FAILURE (MULTI): Primary | ICD-10-CM

## 2024-02-27 ENCOUNTER — PATIENT OUTREACH (OUTPATIENT)
Dept: HOME HEALTH SERVICES | Age: 79
End: 2024-02-27

## 2024-02-27 ENCOUNTER — TELEPHONE (OUTPATIENT)
Dept: PRIMARY CARE | Facility: CLINIC | Age: 79
End: 2024-02-27

## 2024-02-27 RX ORDER — BUDESONIDE AND FORMOTEROL FUMARATE DIHYDRATE 160; 4.5 UG/1; UG/1
2 AEROSOL RESPIRATORY (INHALATION)
Qty: 10.2 G | Refills: 3 | Status: SHIPPED | OUTPATIENT
Start: 2024-02-27 | End: 2024-03-25

## 2024-03-01 ENCOUNTER — HOSPITAL ENCOUNTER (EMERGENCY)
Facility: HOSPITAL | Age: 79
Discharge: HOME | End: 2024-03-01
Attending: STUDENT IN AN ORGANIZED HEALTH CARE EDUCATION/TRAINING PROGRAM
Payer: COMMERCIAL

## 2024-03-01 ENCOUNTER — APPOINTMENT (OUTPATIENT)
Dept: RADIOLOGY | Facility: HOSPITAL | Age: 79
End: 2024-03-01
Payer: COMMERCIAL

## 2024-03-01 VITALS
HEART RATE: 74 BPM | OXYGEN SATURATION: 97 % | HEIGHT: 60 IN | RESPIRATION RATE: 20 BRPM | WEIGHT: 113.54 LBS | BODY MASS INDEX: 22.29 KG/M2 | SYSTOLIC BLOOD PRESSURE: 160 MMHG | DIASTOLIC BLOOD PRESSURE: 63 MMHG | TEMPERATURE: 99 F

## 2024-03-01 DIAGNOSIS — R04.0 EPISTAXIS: Primary | ICD-10-CM

## 2024-03-01 LAB
ALBUMIN SERPL BCP-MCNC: 3.3 G/DL (ref 3.4–5)
ALP SERPL-CCNC: 108 U/L (ref 33–136)
ALT SERPL W P-5'-P-CCNC: 67 U/L (ref 7–45)
ANION GAP SERPL CALC-SCNC: 8 MMOL/L (ref 10–20)
APTT PPP: 32 SECONDS (ref 27–38)
AST SERPL W P-5'-P-CCNC: 43 U/L (ref 9–39)
BASOPHILS # BLD AUTO: 0.02 X10*3/UL (ref 0–0.1)
BASOPHILS NFR BLD AUTO: 0.2 %
BILIRUB SERPL-MCNC: 0.3 MG/DL (ref 0–1.2)
BUN SERPL-MCNC: 21 MG/DL (ref 6–23)
CALCIUM SERPL-MCNC: 8.8 MG/DL (ref 8.6–10.3)
CHLORIDE SERPL-SCNC: 101 MMOL/L (ref 98–107)
CO2 SERPL-SCNC: 36 MMOL/L (ref 21–32)
CREAT SERPL-MCNC: 0.62 MG/DL (ref 0.5–1.05)
EGFRCR SERPLBLD CKD-EPI 2021: 90 ML/MIN/1.73M*2
EOSINOPHIL # BLD AUTO: 0.01 X10*3/UL (ref 0–0.4)
EOSINOPHIL NFR BLD AUTO: 0.1 %
ERYTHROCYTE [DISTWIDTH] IN BLOOD BY AUTOMATED COUNT: 15.3 % (ref 11.5–14.5)
GLUCOSE SERPL-MCNC: 98 MG/DL (ref 74–99)
HCT VFR BLD AUTO: 35.7 % (ref 36–46)
HGB BLD-MCNC: 10.7 G/DL (ref 12–16)
IMM GRANULOCYTES # BLD AUTO: 0.23 X10*3/UL (ref 0–0.5)
IMM GRANULOCYTES NFR BLD AUTO: 2 % (ref 0–0.9)
INR PPP: 1 (ref 0.9–1.1)
LYMPHOCYTES # BLD AUTO: 0.62 X10*3/UL (ref 0.8–3)
LYMPHOCYTES NFR BLD AUTO: 5.4 %
MCH RBC QN AUTO: 28.5 PG (ref 26–34)
MCHC RBC AUTO-ENTMCNC: 30 G/DL (ref 32–36)
MCV RBC AUTO: 95 FL (ref 80–100)
MONOCYTES # BLD AUTO: 0.76 X10*3/UL (ref 0.05–0.8)
MONOCYTES NFR BLD AUTO: 6.6 %
NEUTROPHILS # BLD AUTO: 9.82 X10*3/UL (ref 1.6–5.5)
NEUTROPHILS NFR BLD AUTO: 85.7 %
NRBC BLD-RTO: 0 /100 WBCS (ref 0–0)
PLATELET # BLD AUTO: 369 X10*3/UL (ref 150–450)
POTASSIUM SERPL-SCNC: 4 MMOL/L (ref 3.5–5.3)
PROT SERPL-MCNC: 6.2 G/DL (ref 6.4–8.2)
PROTHROMBIN TIME: 11 SECONDS (ref 9.8–12.8)
RBC # BLD AUTO: 3.75 X10*6/UL (ref 4–5.2)
SODIUM SERPL-SCNC: 141 MMOL/L (ref 136–145)
WBC # BLD AUTO: 11.5 X10*3/UL (ref 4.4–11.3)

## 2024-03-01 PROCEDURE — 85730 THROMBOPLASTIN TIME PARTIAL: CPT | Performed by: STUDENT IN AN ORGANIZED HEALTH CARE EDUCATION/TRAINING PROGRAM

## 2024-03-01 PROCEDURE — 71045 X-RAY EXAM CHEST 1 VIEW: CPT

## 2024-03-01 PROCEDURE — 36415 COLL VENOUS BLD VENIPUNCTURE: CPT | Performed by: STUDENT IN AN ORGANIZED HEALTH CARE EDUCATION/TRAINING PROGRAM

## 2024-03-01 PROCEDURE — 2500000001 HC RX 250 WO HCPCS SELF ADMINISTERED DRUGS (ALT 637 FOR MEDICARE OP)

## 2024-03-01 PROCEDURE — 99283 EMERGENCY DEPT VISIT LOW MDM: CPT | Mod: 25

## 2024-03-01 PROCEDURE — 85610 PROTHROMBIN TIME: CPT | Performed by: STUDENT IN AN ORGANIZED HEALTH CARE EDUCATION/TRAINING PROGRAM

## 2024-03-01 PROCEDURE — 71045 X-RAY EXAM CHEST 1 VIEW: CPT | Performed by: RADIOLOGY

## 2024-03-01 PROCEDURE — 80053 COMPREHEN METABOLIC PANEL: CPT | Performed by: STUDENT IN AN ORGANIZED HEALTH CARE EDUCATION/TRAINING PROGRAM

## 2024-03-01 PROCEDURE — 85025 COMPLETE CBC W/AUTO DIFF WBC: CPT | Performed by: STUDENT IN AN ORGANIZED HEALTH CARE EDUCATION/TRAINING PROGRAM

## 2024-03-01 RX ORDER — OXYMETAZOLINE HCL 0.05 %
2 SPRAY, NON-AEROSOL (ML) NASAL ONCE
Status: COMPLETED | OUTPATIENT
Start: 2024-03-01 | End: 2024-03-01

## 2024-03-01 RX ORDER — OXYMETAZOLINE HCL 0.05 %
SPRAY, NON-AEROSOL (ML) NASAL
Status: COMPLETED
Start: 2024-03-01 | End: 2024-03-01

## 2024-03-01 RX ADMIN — Medication 2 SPRAY: at 12:57

## 2024-03-01 RX ADMIN — OXYMETAZOLINE HYDROCHLORIDE 2 SPRAY: 0.05 SPRAY NASAL at 12:57

## 2024-03-01 ASSESSMENT — LIFESTYLE VARIABLES
HAVE PEOPLE ANNOYED YOU BY CRITICIZING YOUR DRINKING: NO
HAVE YOU EVER FELT YOU SHOULD CUT DOWN ON YOUR DRINKING: NO
EVER FELT BAD OR GUILTY ABOUT YOUR DRINKING: NO
EVER HAD A DRINK FIRST THING IN THE MORNING TO STEADY YOUR NERVES TO GET RID OF A HANGOVER: NO

## 2024-03-01 ASSESSMENT — PAIN - FUNCTIONAL ASSESSMENT: PAIN_FUNCTIONAL_ASSESSMENT: 0-10

## 2024-03-01 NOTE — ED PROVIDER NOTES
HPI   Chief Complaint   Patient presents with    Epistaxis (Nose Bleed)     STARTED THIS MORNING, NO THINNERS, PREVIOUS NOSE BLEED 5 DAYS AGO        This is an 78-year-old female who is not on anticoagulation presenting to the emergency department with chief complaint of epistaxis and shortness of breath.  She is accompanied by her daughter who assist in providing history.  They state that patient has a history of COPD and at baseline wears 4 to 4.5 L of nasal cannula 24/7.  She occasionally gets nosebleeds with the last bleed 5 days ago that stopped on its own.  Today her nose started bleeding around 10:45 AM.  Patient states that in order to get it stop she tilted her head back and blotted at her nostrils with tissues but did not pinch her nose or use any nasal sprays.  When the nosebleed did not stop after a few minutes, she called 911 to come to the emergency department.  Now, her nose has stopped bleeding on its own without intervention.  She states that her nose feel clogged up and that she is having a hard time breathing through her nose now.  Her daughter also notes that the patient has seemed like she has been more short of breath over the last 4 to 5 days at home.  They deny any recent trauma, travel, or illness.  Patient denies use of nasal saline or humidifier at home.      History provided by:  Patient and relative   used: No                          [place] Coma Scale Score: 15                  Patient History   Past Medical History:   Diagnosis Date    COPD (chronic obstructive pulmonary disease) (CMS/[facility])     Disorder of the skin and subcutaneous tissue, unspecified 07/22/2016    Leg skin lesion, left    Personal history of other diseases of the respiratory system 02/06/2018    History of allergic rhinitis    Personal history of other specified conditions 07/22/2016    History of chest pain     Past Surgical History:   Procedure Laterality Date    OTHER SURGICAL HISTORY   2019    Cataract surgery     No family history on file.  Social History     Tobacco Use    Smoking status: Former     Packs/day: 0.50     Years: 15.00     Additional pack years: 0.00     Total pack years: 7.50     Types: Cigarettes     Quit date: 2018     Years since quittin.1    Smokeless tobacco: Not on file   Vaping Use    Vaping Use: Not on file   Substance Use Topics    Alcohol use: Never    Drug use: Never       Physical Exam     Physical Exam  Constitutional:       General: She is not in acute distress.     Appearance: She is well-developed. She is not toxic-appearing.   HENT:      Head: Normocephalic and atraumatic.      Right Ear: External ear normal.      Left Ear: External ear normal.      Nose:      Comments: Dried epistaxis present in the bilateral nares, around the mouth, and in the oropharynx.  No active bleeding from either nare.  No bright red blood noted with inside the oropharynx.  No drooling or stridor.  Tongue, floor of mouth, and roof of mouth normal appearing with midline uvula.     Mouth/Throat:      Mouth: Mucous membranes are moist.   Eyes:      Conjunctiva/sclera: Conjunctivae normal.   Cardiovascular:      Rate and Rhythm: Normal rate and regular rhythm.      Pulses: Normal pulses.      Heart sounds: Normal heart sounds.   Pulmonary:      Effort: Pulmonary effort is normal.      Comments: Slightly tachypneic, mildly diminished breath sounds throughout, no wheezing or rales  Abdominal:      Palpations: Abdomen is soft.      Tenderness: There is no abdominal tenderness.   Skin:     General: Skin is warm and dry.   Neurological:      Mental Status: She is alert. Mental status is at baseline.      Motor: Motor function is intact.         Labs Reviewed   CBC WITH AUTO DIFFERENTIAL   COMPREHENSIVE METABOLIC PANEL   PROTIME-INR   APTT       ED Course & MDM   Diagnoses as of 24 1642   Epistaxis       Medical Decision Making  This is an 78-year-old female not on  anticoagulation who is on chronic nasal cannula presenting to the emergency department with chief complaint of epistaxis.  On presentation, she is slightly tachypneic but hemodynamically stable.  Initial O2 slightly low at 88%, improved once placed on 10 L nonrebreather by nursing staff prior to my evaluation.  On my evaluation, epistaxis has resolved.  She was turned down to her baseline 4 to 4.5 L nasal cannula and remains satting in the upper 90s on this.  Labs show stable hemoglobin and hematocrit and normal coags chest x-ray without acute findings.  Patient was able to blow her nose, had slight oozing after this at which point Afrin and nasal clamp were applied.  Upon reevaluation, has not had any recurrence of her epistaxis.  Monitored in the emergency department and continued to have no episodes of rebleeds.  Counseled regarding epistaxis home management including leaning forward and pinching her nose as well as using Afrin at home.  Additionally counseled regarding home epistaxis prevention including nasal saline as well as humidifiers given that she is on chronic nasal cannula.  Patient and daughter expressed understanding of this plan, patient was discharged in stable condition with return precautions and PCP follow-up.      Procedure  Procedures    Nikki Yanez DO  Emergency Medicine     Nikki Yanez DO  03/01/24 [id number]

## 2024-03-04 ENCOUNTER — TELEMEDICINE (OUTPATIENT)
Dept: PRIMARY CARE | Facility: CLINIC | Age: 79
End: 2024-03-04
Payer: COMMERCIAL

## 2024-03-04 DIAGNOSIS — J96.21 ACUTE ON CHRONIC HYPOXIC RESPIRATORY FAILURE (MULTI): ICD-10-CM

## 2024-03-04 DIAGNOSIS — S62.101S WRIST FRACTURE, BILATERAL, SEQUELA: ICD-10-CM

## 2024-03-04 DIAGNOSIS — S52.92XS TYPE I OR II OPEN FRACTURE OF LEFT RADIUS AND ULNA, SEQUELA: Chronic | ICD-10-CM

## 2024-03-04 DIAGNOSIS — E78.2 MIXED HYPERLIPIDEMIA: Chronic | ICD-10-CM

## 2024-03-04 DIAGNOSIS — I10 ESSENTIAL HYPERTENSION: Chronic | ICD-10-CM

## 2024-03-04 DIAGNOSIS — J44.1 COPD WITH ACUTE EXACERBATION (MULTI): Primary | ICD-10-CM

## 2024-03-04 DIAGNOSIS — S62.102S WRIST FRACTURE, BILATERAL, SEQUELA: ICD-10-CM

## 2024-03-04 DIAGNOSIS — R04.0 EPISTAXIS: ICD-10-CM

## 2024-03-04 DIAGNOSIS — I35.0 MODERATE AORTIC STENOSIS: Chronic | ICD-10-CM

## 2024-03-04 DIAGNOSIS — S52.202S TYPE I OR II OPEN FRACTURE OF LEFT RADIUS AND ULNA, SEQUELA: Chronic | ICD-10-CM

## 2024-03-04 PROCEDURE — 1123F ACP DISCUSS/DSCN MKR DOCD: CPT | Performed by: FAMILY MEDICINE

## 2024-03-04 PROCEDURE — 1125F AMNT PAIN NOTED PAIN PRSNT: CPT | Performed by: FAMILY MEDICINE

## 2024-03-04 PROCEDURE — 99495 TRANSJ CARE MGMT MOD F2F 14D: CPT | Performed by: FAMILY MEDICINE

## 2024-03-04 PROCEDURE — 1159F MED LIST DOCD IN RCRD: CPT | Performed by: FAMILY MEDICINE

## 2024-03-04 PROCEDURE — 1111F DSCHRG MED/CURRENT MED MERGE: CPT | Performed by: FAMILY MEDICINE

## 2024-03-04 PROCEDURE — 1036F TOBACCO NON-USER: CPT | Performed by: FAMILY MEDICINE

## 2024-03-04 NOTE — PATIENT INSTRUCTIONS
Virtual visit  Assessment/Plan   Problem List Items Addressed This Visit       COPD with acute exacerbation (CMS/HCC) - Primary    Relevant Orders    Referral to Pulmonology    Acute on chronic hypoxic respiratory failure (CMS/HCC)    Relevant Orders    Referral to Pulmonology    Moderate aortic stenosis (Chronic)    Type I or II open fracture of left radius and ulna (Chronic)    Essential hypertension (Chronic)    Mixed hyperlipidemia (Chronic)    Wrist fracture, bilateral, sequela    Epistaxis     Recent hospital admission as well as ER visit. COPD, and bilateral arm fractures, followed by epistaxis. Seems to be well controlled now. Patient looks great, and will work on weight gain at home. As it is difficult for her to get out, follow up as needed.   All questions answered at visit.   A virtual visit was performed today due to the current COVID-19 pandemic. All questions were answered, and medications were reviewed, and renewed where necessary.   A total of  15  minutes was spent on this encounter.     Patient was identified as a fall risk. Risk prevention instructions provided.      Ways to Help Prevent Falls at Home    Quick Tips   ? Ask for help if you need it. Most people want to help!   ? Get up slowly after sitting or laying down   ? Wear a medical alert device or keep cell phone in your pocket   ? Use night lights, especially areas near a bathroom   ? Keep the items you use often within reach on a small stool or end table   ? Use an assistive device such as walker or cane, as directed by provider/physical therapy   ? Use a non-slip mat and grab bars in your bathroom. Look for home health sections for best options     Other Areas to Focus On   ? Exercise and nutrition: Regular exercise or taking a falls prevention class are great ways improve strength and balance. Don’t forget to stay hydrated and bring a snack!   ? Medicine side effects: Some medicines can make you sleepy or dizzy, which could cause a  fall. Ask your healthcare provider about the side effects your medicines could cause. Be sure to let them know if you take any vitamins or supplements as well.   ? Tripping hazards: Remove items you could trip on, such as loose mats, rugs, cords, and clutter. Wear closed toe shoes with rubber soles.   ? Health and wellness: Get regular checkups with your healthcare provider, plus routine vision and hearing screenings. Talk with your healthcare provider about:   o Your medicines and the possible side effects - bring them in a bag if that is easier!   o Problems with balance or feeling dizzy   o Ways to promote bone health, such as Vitamin D and calcium supplements   o Questions or concerns about falling     *Ask your healthcare team if you have questions     ©[facility], 2022

## 2024-03-04 NOTE — PROGRESS NOTES
Subjective   An interactive audio and video telecommunication system which permits real time communications between the patient (at the originating site) and provider (at a distant site) was utilized to provider this telehealth service.   Reviewed all medications by prescribing practitioner or clinical pharmacist (such as prescriptions, OTCs, herbal therapies and supplements) and documented in the medical record.  HPI  Reason for Visit: Irish Davis is an 78 y.o. female here for a   Hospital follow up - admitted 2/2/2024 for bilateral forearm fractures and COPD exacerbation then [facility] for rehab 2/6/2024 to 2/27/2024. Home with splints.   ER follow up. Nosebleeds.   Weight now is 94, from 126. She has been under quite a bit of stress with both broken arms, COPD and  with dementia. Family is helping her tremendously        Patient Care Team:  Khloe Mccray MD as PCP - General (Family Medicine)     Review of Systems   All other systems reviewed and are negative.    Shortness of breath, weight loss. 4.5 L oxygen    Objective   Vitals:  There were no vitals taken for this visit.    /63 in ER, retake at home was less 140/70  Physical Exam  Vitals reviewed.       Virtual visit  Assessment/Plan   Problem List Items Addressed This Visit       COPD with acute exacerbation (CMS/HCC) - Primary    Relevant Orders    Referral to Pulmonology    Acute on chronic hypoxic respiratory failure (CMS/HCC)    Relevant Orders    Referral to Pulmonology    Moderate aortic stenosis (Chronic)    Type I or II open fracture of left radius and ulna (Chronic)    Essential hypertension (Chronic)    Mixed hyperlipidemia (Chronic)    Wrist fracture, bilateral, sequela    Epistaxis     Recent hospital admission as well as ER visit. COPD, and bilateral arm fractures, followed by epistaxis. Seems to be well controlled now. Patient looks great, and will work on weight gain at home. As it is difficult for her to get out,  follow up as needed.   All questions answered at visit.   A virtual visit was performed today due to the current COVID-19 pandemic. All questions were answered, and medications were reviewed, and renewed where necessary.   A total of  15  minutes was spent on this encounter.     Patient was identified as a fall risk. Risk prevention instructions provided.

## 2024-03-05 PROBLEM — R04.0 EPISTAXIS: Status: ACTIVE | Noted: 2024-03-05

## 2024-03-06 ENCOUNTER — TELEPHONE (OUTPATIENT)
Dept: PRIMARY CARE | Facility: CLINIC | Age: 79
End: 2024-03-06

## 2024-03-06 NOTE — TELEPHONE ENCOUNTER
Refill request:    Attorvent  - didn't know dosage    Pharmacy: [facility] for long term and a short term [facility] pharmacy

## 2024-03-07 DIAGNOSIS — J96.21 ACUTE ON CHRONIC HYPOXIC RESPIRATORY FAILURE (MULTI): Primary | ICD-10-CM

## 2024-03-07 RX ORDER — IPRATROPIUM BROMIDE 17 UG/1
2 AEROSOL, METERED RESPIRATORY (INHALATION)
Qty: 12.9 G | Refills: 0 | Status: SHIPPED | OUTPATIENT
Start: 2024-03-07 | End: 2024-03-08 | Stop reason: SDUPTHER

## 2024-03-07 RX ORDER — IPRATROPIUM BROMIDE 17 UG/1
2 AEROSOL, METERED RESPIRATORY (INHALATION)
COMMUNITY
Start: 2020-04-20 | End: 2024-03-07 | Stop reason: SDUPTHER

## 2024-03-08 DIAGNOSIS — S62.101S WRIST FRACTURE, BILATERAL, SEQUELA: ICD-10-CM

## 2024-03-08 DIAGNOSIS — J96.21 ACUTE ON CHRONIC HYPOXIC RESPIRATORY FAILURE (MULTI): ICD-10-CM

## 2024-03-08 DIAGNOSIS — S62.102S WRIST FRACTURE, BILATERAL, SEQUELA: ICD-10-CM

## 2024-03-08 RX ORDER — IPRATROPIUM BROMIDE 17 UG/1
2 AEROSOL, METERED RESPIRATORY (INHALATION)
Qty: 12.9 G | Refills: 0 | Status: SHIPPED | OUTPATIENT
Start: 2024-03-08 | End: 2024-03-11 | Stop reason: SDUPTHER

## 2024-03-11 ENCOUNTER — HOSPITAL ENCOUNTER (OUTPATIENT)
Dept: RADIOLOGY | Facility: HOSPITAL | Age: 79
Discharge: HOME | End: 2024-03-11
Payer: MEDICARE

## 2024-03-11 ENCOUNTER — OFFICE VISIT (OUTPATIENT)
Dept: ORTHOPEDIC SURGERY | Facility: CLINIC | Age: 79
End: 2024-03-11
Payer: COMMERCIAL

## 2024-03-11 DIAGNOSIS — S62.102S WRIST FRACTURE, BILATERAL, SEQUELA: ICD-10-CM

## 2024-03-11 DIAGNOSIS — J96.21 ACUTE ON CHRONIC HYPOXIC RESPIRATORY FAILURE (MULTI): ICD-10-CM

## 2024-03-11 DIAGNOSIS — S52.91XA CLOSED FRACTURE OF RIGHT RADIUS AND ULNA, INITIAL ENCOUNTER: ICD-10-CM

## 2024-03-11 DIAGNOSIS — S62.101S WRIST FRACTURE, BILATERAL, SEQUELA: Primary | ICD-10-CM

## 2024-03-11 DIAGNOSIS — S52.201A CLOSED FRACTURE OF RIGHT RADIUS AND ULNA, INITIAL ENCOUNTER: ICD-10-CM

## 2024-03-11 DIAGNOSIS — S62.101S WRIST FRACTURE, BILATERAL, SEQUELA: ICD-10-CM

## 2024-03-11 DIAGNOSIS — S62.102S WRIST FRACTURE, BILATERAL, SEQUELA: Primary | ICD-10-CM

## 2024-03-11 PROCEDURE — 1123F ACP DISCUSS/DSCN MKR DOCD: CPT | Performed by: ORTHOPAEDIC SURGERY

## 2024-03-11 PROCEDURE — 99024 POSTOP FOLLOW-UP VISIT: CPT | Performed by: ORTHOPAEDIC SURGERY

## 2024-03-11 PROCEDURE — 1125F AMNT PAIN NOTED PAIN PRSNT: CPT | Performed by: ORTHOPAEDIC SURGERY

## 2024-03-11 PROCEDURE — 73110 X-RAY EXAM OF WRIST: CPT | Mod: BILATERAL PROCEDURE | Performed by: RADIOLOGY

## 2024-03-11 PROCEDURE — 1036F TOBACCO NON-USER: CPT | Performed by: ORTHOPAEDIC SURGERY

## 2024-03-11 PROCEDURE — 73110 X-RAY EXAM OF WRIST: CPT | Mod: 50

## 2024-03-11 PROCEDURE — 1159F MED LIST DOCD IN RCRD: CPT | Performed by: ORTHOPAEDIC SURGERY

## 2024-03-11 RX ORDER — IPRATROPIUM BROMIDE 17 UG/1
2 AEROSOL, METERED RESPIRATORY (INHALATION)
Qty: 12.9 G | Refills: 3 | Status: SHIPPED | OUTPATIENT
Start: 2024-03-11 | End: 2024-03-14 | Stop reason: SDUPTHER

## 2024-03-11 NOTE — PROGRESS NOTES
Post op - bilateral distal radius ORIF with I and D left wrist for open fracture - up dated xrays done today

## 2024-03-11 NOTE — PROGRESS NOTES
78 y.o. female presents today for for follow up after bilateral distal radius ORIF with I and D left wrist for open fracture. The patient reports doing well, was at the [facility], home now. The DOS was 6 weeks ago. Pain is controlled. Patient denies numbness and tingling.  Splint has been worn as directed.   Has not been to any occupational therapy as an outpatient at all.    Review of Systems    Constitutional: see HPI, no fever, no chills, not feeling tired, no significant weight gain or weight loss.   Eyes: No vision changes  ENT: no nosebleeds.   Cardiovascular: no chest pain.   Respiratory: no shortness of breath and no cough.   Gastrointestinal: no abdominal pain, no nausea, no vomiting and no diarrhea.   Musculoskeletal: per HPI  Neurological: no headache, no gait disturbances  Psychiatric: no depression and no sleep disturbances.   Endocrine: no muscle weakness and no muscle cramps.   Hematologic/Lymphatic: no swollen glands and no tendency for easy bruising or excessive swelling.     Patient's past medical history, past surgical history, allergies, and medications have been reviewed unless otherwise noted in the chart.     Hand/Wrist Post-Op Exam  Inspection:  no evidence of infection, no erythema, Palpation:  compartments are soft, Range of Motion:  F/E 40/60, S/P 90/90 Finger ROM Full extension, full flexion Stability:  stable Strength:  5/5 F/E, 5/5 Adduction/Abduction 5/5 Opposition Skin:  incision site clean, dry and intact, healing without complication, Vascular:  capillary refill <2 seconds distally, Sensation:  intact to light touch distally.    Constitutional   General appearance: Alert and in no acute distress. Well developed, well nourished.    Eyes   External Eye, Conjunctiva and lids: Normal external exam - pupils were equal in size, round, reactive to light (PERRL) with normal accommodation and extraocular movements intact (EOMI).   Ears, Nose, Mouth, and Throat   Hearing: Normal.   Neck    Neck: No neck mass was observed. Supple.   Pulmonary   Respiratory effort: No respiratory distress.   Cardiovascular   Examination of extremities: No peripheral edema.   Psychiatric   Judgment and insight: Intact.   Orientation to person, place, and time: Alert and oriented x 3.       Mood and affect: Normal.      6 weeks s/p bilateral distal radius ORIF with I and D left wrist for open fracture  Based on the history, physical exam and imaging studies above, the patient's presentation is consistent with the above diagnosis.  I had a long discussion with the patient regarding their presentation and the treatment options.    We again discussed her treatment options going forward along with their associated risks and benefits. After thorough discussion, the patient has elected to proceed with postoperative care. All questions were answered to the patients satisfaction who seems satisfied with the plan.  They will call the office with any questions/concerns.     Vitamin E cream prn to scar tissue  OT - fx brace right, [place] left full supination -wean out over 2 weeks, E/T  FU 5 weeks - XR

## 2024-03-12 ENCOUNTER — TELEPHONE (OUTPATIENT)
Dept: PRIMARY CARE | Facility: CLINIC | Age: 79
End: 2024-03-12

## 2024-03-12 DIAGNOSIS — J96.21 ACUTE ON CHRONIC HYPOXIC RESPIRATORY FAILURE (MULTI): ICD-10-CM

## 2024-03-12 NOTE — TELEPHONE ENCOUNTER
[facility] called concerning prescription sent on 3/11 for Atrovent HFA. She is also taking Spiriva Respimat. They want to know which one you are wanting the patient to be taking.

## 2024-03-14 RX ORDER — IPRATROPIUM BROMIDE 17 UG/1
2 AEROSOL, METERED RESPIRATORY (INHALATION)
Qty: 12.9 G | Refills: 3 | Status: SHIPPED | OUTPATIENT
Start: 2024-03-14 | End: 2024-04-04 | Stop reason: WASHOUT

## 2024-03-14 NOTE — TELEPHONE ENCOUNTER
Khloe Mccray MD  Do [id number]1 Clinical Support Staff17 hours ago (6:17 PM)     She doesn't;t want the spiriva, she would like the atrovent, she feels it works better for her dyspnea.

## 2024-03-14 NOTE — TELEPHONE ENCOUNTER
Libia Giles at [facility] notified, the one they got did not have refills, asked if we could send again for the refills.

## 2024-03-22 ENCOUNTER — TELEPHONE (OUTPATIENT)
Dept: PRIMARY CARE | Facility: CLINIC | Age: 79
End: 2024-03-22

## 2024-03-22 DIAGNOSIS — J96.21 ACUTE ON CHRONIC HYPOXIC RESPIRATORY FAILURE (MULTI): Primary | ICD-10-CM

## 2024-03-22 NOTE — TELEPHONE ENCOUNTER
Requesting refill on Prednisone 10 mg     To [facility]    & small amount of Prednisone 10 mg to [facility]      She also asked that Atrovent inhaler be cancelled at [place] [place]--says she is receiving one in the mail every 4 days  ??

## 2024-03-23 RX ORDER — PREDNISONE 10 MG/1
10 TABLET ORAL DAILY
Qty: 90 TABLET | Refills: 3 | Status: SHIPPED | OUTPATIENT
Start: 2024-03-23 | End: 2024-05-19 | Stop reason: HOSPADM

## 2024-03-23 RX ORDER — PREDNISONE 10 MG/1
10 TABLET ORAL DAILY
Qty: 14 TABLET | Refills: 0 | Status: SHIPPED | OUTPATIENT
Start: 2024-03-23 | End: 2024-05-19 | Stop reason: HOSPADM

## 2024-03-25 DIAGNOSIS — J96.21 ACUTE ON CHRONIC HYPOXIC RESPIRATORY FAILURE (MULTI): ICD-10-CM

## 2024-03-25 RX ORDER — BUDESONIDE AND FORMOTEROL FUMARATE DIHYDRATE 160; 4.5 UG/1; UG/1
2 AEROSOL RESPIRATORY (INHALATION)
Qty: 30.6 G | Refills: 3 | Status: SHIPPED | OUTPATIENT
Start: 2024-03-25 | End: 2024-04-04 | Stop reason: WASHOUT

## 2024-03-27 ENCOUNTER — TELEPHONE (OUTPATIENT)
Dept: PRIMARY CARE | Facility: CLINIC | Age: 79
End: 2024-03-27
Payer: COMMERCIAL

## 2024-03-27 DIAGNOSIS — J44.1 COPD WITH ACUTE EXACERBATION (MULTI): Primary | ICD-10-CM

## 2024-03-27 RX ORDER — DOXYCYCLINE 100 MG/1
100 CAPSULE ORAL 2 TIMES DAILY
Qty: 14 CAPSULE | Refills: 0 | Status: SHIPPED | OUTPATIENT
Start: 2024-03-27 | End: 2024-04-04 | Stop reason: WASHOUT

## 2024-03-27 NOTE — TELEPHONE ENCOUNTER
Patient is requesting an antiboitic    Patient has a presistant cough where she is  coughing up green phlegm and shortness of breath. Please advise     [facility] - [address] [address]  Phone: [phone]  Fax: [phone]- patient said to call this number to contact them

## 2024-03-28 RX ORDER — CEFUROXIME AXETIL 250 MG/1
250 TABLET ORAL 2 TIMES DAILY
Qty: 14 TABLET | Refills: 0 | Status: SHIPPED | OUTPATIENT
Start: 2024-03-28 | End: 2024-04-04

## 2024-03-28 RX ORDER — AMOXICILLIN AND CLAVULANATE POTASSIUM 500; 125 MG/1; MG/1
500 TABLET, FILM COATED ORAL 2 TIMES DAILY
Qty: 20 TABLET | Refills: 0 | Status: SHIPPED | OUTPATIENT
Start: 2024-03-28 | End: 2024-03-28 | Stop reason: SINTOL

## 2024-03-28 NOTE — TELEPHONE ENCOUNTER
Called back , says she cannot take Augmentin (stomach cramps)    Can something else be sent in ?  [facility]

## 2024-03-28 NOTE — TELEPHONE ENCOUNTER
Patient had an allergic reaction to the doxycyline which caused her hand to itch is there an alternative the patent can use.

## 2024-04-04 ENCOUNTER — OFFICE VISIT (OUTPATIENT)
Dept: PULMONOLOGY | Facility: HOSPITAL | Age: 79
End: 2024-04-04
Payer: COMMERCIAL

## 2024-04-04 VITALS
BODY MASS INDEX: 18.88 KG/M2 | SYSTOLIC BLOOD PRESSURE: 132 MMHG | DIASTOLIC BLOOD PRESSURE: 80 MMHG | HEIGHT: 61 IN | OXYGEN SATURATION: 95 % | HEART RATE: 62 BPM | WEIGHT: 100 LBS | RESPIRATION RATE: 18 BRPM

## 2024-04-04 DIAGNOSIS — J18.9 PNEUMONIA DUE TO INFECTIOUS ORGANISM, UNSPECIFIED LATERALITY, UNSPECIFIED PART OF LUNG: ICD-10-CM

## 2024-04-04 DIAGNOSIS — J96.11 CHRONIC RESPIRATORY FAILURE WITH HYPOXIA (MULTI): Primary | ICD-10-CM

## 2024-04-04 DIAGNOSIS — R05.1 ACUTE COUGH: ICD-10-CM

## 2024-04-04 DIAGNOSIS — J44.9 CHRONIC OBSTRUCTIVE PULMONARY DISEASE, UNSPECIFIED COPD TYPE (MULTI): ICD-10-CM

## 2024-04-04 PROCEDURE — 99214 OFFICE O/P EST MOD 30 MIN: CPT | Mod: ZK | Performed by: NURSE PRACTITIONER

## 2024-04-04 PROCEDURE — 1036F TOBACCO NON-USER: CPT | Performed by: NURSE PRACTITIONER

## 2024-04-04 PROCEDURE — 1160F RVW MEDS BY RX/DR IN RCRD: CPT | Performed by: NURSE PRACTITIONER

## 2024-04-04 PROCEDURE — 3075F SYST BP GE 130 - 139MM HG: CPT | Performed by: NURSE PRACTITIONER

## 2024-04-04 PROCEDURE — 99203 OFFICE O/P NEW LOW 30 MIN: CPT | Performed by: NURSE PRACTITIONER

## 2024-04-04 PROCEDURE — 1123F ACP DISCUSS/DSCN MKR DOCD: CPT | Performed by: NURSE PRACTITIONER

## 2024-04-04 PROCEDURE — 3079F DIAST BP 80-89 MM HG: CPT | Performed by: NURSE PRACTITIONER

## 2024-04-04 PROCEDURE — 1159F MED LIST DOCD IN RCRD: CPT | Performed by: NURSE PRACTITIONER

## 2024-04-04 RX ORDER — ALBUTEROL SULFATE 0.83 MG/ML
SOLUTION RESPIRATORY (INHALATION) 4 TIMES DAILY
COMMUNITY
Start: 2021-06-14 | End: 2024-05-14 | Stop reason: ENTERED-IN-ERROR

## 2024-04-04 RX ORDER — BENZONATATE 200 MG/1
200 CAPSULE ORAL 3 TIMES DAILY PRN
Qty: 30 CAPSULE | Refills: 0 | Status: SHIPPED | OUTPATIENT
Start: 2024-04-04 | End: 2024-05-07 | Stop reason: SDUPTHER

## 2024-04-04 RX ORDER — ALBUTEROL SULFATE 0.83 MG/ML
SOLUTION RESPIRATORY (INHALATION) EVERY 6 HOURS
COMMUNITY
Start: 2014-02-25 | End: 2024-05-14 | Stop reason: ENTERED-IN-ERROR

## 2024-04-04 RX ORDER — BUDESONIDE, GLYCOPYRROLATE, AND FORMOTEROL FUMARATE 160; 9; 4.8 UG/1; UG/1; UG/1
2 AEROSOL, METERED RESPIRATORY (INHALATION)
Qty: 10.7 G | Refills: 11 | Status: SHIPPED | OUTPATIENT
Start: 2024-04-04 | End: 2024-05-19 | Stop reason: HOSPADM

## 2024-04-04 RX ORDER — ROFLUMILAST 500 UG/1
500 TABLET ORAL DAILY
Qty: 30 TABLET | Refills: 11 | Status: SHIPPED | OUTPATIENT
Start: 2024-04-04

## 2024-04-04 RX ORDER — ALBUTEROL SULFATE 90 UG/1
2 AEROSOL, METERED RESPIRATORY (INHALATION) EVERY 6 HOURS PRN
Qty: 18 G | Refills: 11 | Status: ON HOLD | OUTPATIENT
Start: 2024-04-04 | End: 2024-05-19

## 2024-04-04 RX ORDER — ROFLUMILAST 500 UG/1
500 TABLET ORAL DAILY
Qty: 30 TABLET | Refills: 11 | Status: SHIPPED | OUTPATIENT
Start: 2024-04-04 | End: 2024-04-04 | Stop reason: SDUPTHER

## 2024-04-04 ASSESSMENT — ENCOUNTER SYMPTOMS
RHINORRHEA: 0
SHORTNESS OF BREATH: 1
FATIGUE: 0
CHILLS: 0
WHEEZING: 1
UNEXPECTED WEIGHT CHANGE: 0
FEVER: 0
COUGH: 1

## 2024-04-04 NOTE — PATIENT INSTRUCTIONS
Stop Symbicort and Atrovent and start on Breztri 2 puffs twice a day, everyday.   Start albuterol inhaler as needed, you can use this every 4 hours.   Continue on prednisone 10 mg daily.   Start on Daliresp one tablet once a day.  Start Tessalon perles for cough, 1 tablet three times per day as needed.  Please get oxygen test done.   Please get follow up Chest x-ray in May.  Call with any questions or concerns.  Follow up with Dr. Byrd in 3 months.

## 2024-04-04 NOTE — PROGRESS NOTES
Subjective   Patient ID: Irish Davis is a 78 y.o. female who presents for NPV for COPD.     HPI: Patient has PMH of COPD, chronic respiratory failure with hypoxia, HTN, aortic stenosis, and HLD. She was hospitalized in February for COPD exacerbation. She is on 4L continuous oxygen. She is on Symbicort and uses atrovent 4 times per day. She has been on Breo and Spiriva and did not like either. She has albuterol but does not use this often. She has been on prednisone 10 mg daily for a while now. She has been hospitalized a few times this year for COPD exacerbations. She has had a worse cough since she had pneumonia. She is a former smoker at 1 ppd from [id number] and was exposed to second hand smoke.     Review of Systems   Constitutional:  Negative for chills, fatigue, fever and unexpected weight change.   HENT:  Negative for congestion, postnasal drip and rhinorrhea.    Respiratory:  Positive for cough (denies hemoptysis.), shortness of breath and wheezing.    Cardiovascular:  Negative for chest pain and leg swelling.   All other systems reviewed and are negative.      Objective   Physical Exam  Vitals reviewed.   Constitutional:       Appearance: Normal appearance.   HENT:      Head: Normocephalic.   Cardiovascular:      Rate and Rhythm: Normal rate and regular rhythm.   Pulmonary:      Effort: Pulmonary effort is normal.      Breath sounds: Decreased breath sounds present.   Skin:     General: Skin is warm and dry.   Neurological:      Mental Status: She is alert.         Assessment/Plan   COPD, severe/very severe  Former smoker  Chronic respiratory failure with hypoxia   Recent pneumonia      Plan:    -PFTs in 2018 with severe COPD, FEV1/FVC 48, FEV1 29-31, . Will defer repeat PFTs.   -Stop Symbicort and Atrovent. She states this worked for her but she is interested in trying something else as Atrovent is very expensive. She has tried Breo and Spiriva and did not like these.  -Recommend start Breztri  2 puffs BID.  -Start albuterol MDI prn. Educated on inhaler use.   -Continue prednisone 10 mg daily.   -Add Daliresp for frequent hospitalizations for COPD exacerbations.   -Add Tessalon perles prn for acute cough.   -Repeat CXR ordered for May to follow up on pneumonia.   -She is on 4L continuous oxygen, will repeat a 6MWT for her.     Overall we discussed severity of her COPD at length and I will optimize management for her. I will bring her back with Dr. Byrd in 3 months for close follow up. I instructed patient to call sooner if needed.

## 2024-04-11 ENCOUNTER — TELEPHONE (OUTPATIENT)
Dept: PRIMARY CARE | Facility: CLINIC | Age: 79
End: 2024-04-11
Payer: COMMERCIAL

## 2024-04-11 NOTE — LETTER
April 11, 2024     Irish Davis  [address] [facility] [address]    Patient: Irish Davis   YOB: 1945   Date of Visit: 4/11/2024       [email or web address]  [place], [place]     To Whom it May Concern:     Ms. Irish Davis is homebound and is requesting someone to come and take her picture for a State Identification Card, or use a previous picture on file to renew? Thank you.       Sincerely,         Khloe Mccray M.D.  [facility]  [address] [place] [address] [address] [place] [id number]  [phone]  f: [phone]

## 2024-04-11 NOTE — TELEPHONE ENCOUNTER
Patients daughter is asking if you can write a letter saying that the patient is homebound so that the [facility] can send someone to the home and get a picture for her non-driving ID card?  Please mail to  [address] [place] [phone]

## 2024-04-15 ENCOUNTER — APPOINTMENT (OUTPATIENT)
Dept: ORTHOPEDIC SURGERY | Facility: CLINIC | Age: 79
End: 2024-04-15
Payer: COMMERCIAL

## 2024-04-18 ENCOUNTER — APPOINTMENT (OUTPATIENT)
Dept: ORTHOPEDIC SURGERY | Facility: CLINIC | Age: 79
End: 2024-04-18
Payer: COMMERCIAL

## 2024-05-07 DIAGNOSIS — R05.1 ACUTE COUGH: ICD-10-CM

## 2024-05-07 RX ORDER — BENZONATATE 200 MG/1
200 CAPSULE ORAL 3 TIMES DAILY PRN
Qty: 30 CAPSULE | Refills: 2 | Status: SHIPPED | OUTPATIENT
Start: 2024-05-07 | End: 2024-05-08 | Stop reason: SDUPTHER

## 2024-05-08 ENCOUNTER — TELEPHONE (OUTPATIENT)
Dept: PULMONOLOGY | Facility: HOSPITAL | Age: 79
End: 2024-05-08
Payer: COMMERCIAL

## 2024-05-08 RX ORDER — BENZONATATE 200 MG/1
200 CAPSULE ORAL 3 TIMES DAILY PRN
Qty: 30 CAPSULE | Refills: 2 | Status: SHIPPED | OUTPATIENT
Start: 2024-05-08 | End: 2024-06-07

## 2024-05-08 NOTE — TELEPHONE ENCOUNTER
Called and left a voicemail and let the patient know we sent the tessalon perles over to [facility] pharmacy. Instructed them in the voicemail to call back with any further questions or concerns.     ----- Message from ADALBERTO Acevedo sent at 5/8/2024 10:36 AM EDT -----  Regarding: RE: Tessalon Pearles  done  ----- Message -----  From: Dary Gore MA  Sent: 5/7/2024   4:01 PM EDT  To: ADALBERTO Acevedo  Subject: RE: Tessalon Cedric                             Can you actually send this over to [facility] pharmacy in [place] instead of the mail order?  ----- Message -----  From: ADALBERTO Acevedo  Sent: 5/7/2024   3:48 PM EDT  To: Dary Gore MA  Subject: RE: Tessalon Peararash                             I sent her a script with 2 refills. It is not something that we recommend taking everyday, but she just still may need it for now, but okay to use 3 times per day as needed for now.   ----- Message -----  From: Dary Gore MA  Sent: 5/7/2024   3:06 PM EDT  To: ADALBERTO Acevedo  Subject: Tessalon Pearles                                 Patients daughter called in asking if they can have an ongoing prescription of the tessalon perles because it worked so well at stopping her cough. Patient says her cough is back to what it was and she is coughing up a lot of phlegm.

## 2024-05-13 ENCOUNTER — TELEPHONE (OUTPATIENT)
Dept: PRIMARY CARE | Facility: CLINIC | Age: 79
End: 2024-05-13
Payer: COMMERCIAL

## 2024-05-13 ENCOUNTER — TELEPHONE (OUTPATIENT)
Dept: ORTHOPEDIC SURGERY | Facility: CLINIC | Age: 79
End: 2024-05-13
Payer: COMMERCIAL

## 2024-05-13 ENCOUNTER — DOCUMENTATION (OUTPATIENT)
Dept: OCCUPATIONAL THERAPY | Facility: HOSPITAL | Age: 79
End: 2024-05-13
Payer: COMMERCIAL

## 2024-05-13 DIAGNOSIS — J40 BRONCHITIS: Primary | ICD-10-CM
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
## 2024-05-13 PROBLEM — R60.0 PERIPHERAL EDEMA: Status: ACTIVE | Noted: 2024-05-13

## 2024-05-13 PROBLEM — M25.532 LEFT WRIST PAIN: Status: ACTIVE | Noted: 2024-05-13

## 2024-05-13 PROBLEM — D50.0 ANEMIA DUE TO BLOOD LOSS: Status: ACTIVE | Noted: 2024-05-13

## 2024-05-13 PROBLEM — R60.9 PERIPHERAL EDEMA: Status: ACTIVE | Noted: 2024-05-13
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
## 2024-05-13 PROBLEM — R06.00 DYSPNEA: Status: ACTIVE | Noted: 2024-05-13

## 2024-05-13 PROBLEM — R09.02 HYPOXIA: Status: ACTIVE | Noted: 2024-05-13

## 2024-05-13 PROBLEM — I27.20 PULMONARY HYPERTENSION (MULTI): Status: ACTIVE | Noted: 2024-05-13

## 2024-05-13 PROBLEM — L03.115 CELLULITIS OF RIGHT LOWER LIMB: Status: ACTIVE | Noted: 2024-02-01

## 2024-05-13 PROBLEM — H35.30 MACULAR DEGENERATION: Status: ACTIVE | Noted: 2024-05-13

## 2024-05-13 PROBLEM — T14.8XXA WOUND INFECTION: Status: ACTIVE | Noted: 2024-05-13

## 2024-05-13 PROBLEM — L97.909: Status: ACTIVE | Noted: 2024-05-13

## 2024-05-13 PROBLEM — M25.632 STIFFNESS OF LEFT WRIST JOINT: Status: ACTIVE | Noted: 2024-05-13

## 2024-05-13 PROBLEM — J30.9 ALLERGIC RHINITIS: Status: ACTIVE | Noted: 2024-05-13

## 2024-05-13 PROBLEM — S52.502A CLOSED FRACTURE OF LEFT DISTAL RADIUS: Status: ACTIVE | Noted: 2024-05-13

## 2024-05-13 PROBLEM — S41.151A DOG BITE OF RIGHT ARM: Status: ACTIVE | Noted: 2024-05-13

## 2024-05-13 PROBLEM — S52.501A DISTAL RADIUS FRACTURE, RIGHT: Status: ACTIVE | Noted: 2024-05-13

## 2024-05-13 PROBLEM — L08.9 WOUND INFECTION: Status: ACTIVE | Noted: 2024-05-13

## 2024-05-13 PROBLEM — I50.9 CONGESTIVE HEART FAILURE (MULTI): Status: ACTIVE | Noted: 2024-05-13

## 2024-05-13 PROBLEM — N20.0 CALCULUS OF KIDNEY: Status: ACTIVE | Noted: 2023-11-20

## 2024-05-13 PROBLEM — S09.90XA CLOSED HEAD INJURY: Status: ACTIVE | Noted: 2024-05-13

## 2024-05-13 PROBLEM — M54.50 LOW BACK PAIN: Status: ACTIVE | Noted: 2024-05-13

## 2024-05-13 PROBLEM — B95.61 METHICILLIN SUSCEPTIBLE STAPHYLOCOCCUS AUREUS INFECTION AS THE CAUSE OF DISEASES CLASSIFIED ELSEWHERE: Status: ACTIVE | Noted: 2024-02-01

## 2024-05-13 PROBLEM — I49.3 VENTRICULAR PREMATURE DEPOLARIZATION: Status: ACTIVE | Noted: 2022-05-27

## 2024-05-13 RX ORDER — LORATADINE 10 MG/1
10 TABLET ORAL
COMMUNITY
Start: 2018-02-06 | End: 2024-05-14 | Stop reason: ENTERED-IN-ERROR

## 2024-05-13 RX ORDER — CALCIUM CARBONATE/VITAMIN D3 600 MG-10
TABLET ORAL
COMMUNITY
Start: 2019-09-25 | End: 2024-05-14 | Stop reason: ENTERED-IN-ERROR

## 2024-05-13 RX ORDER — CEFUROXIME AXETIL 250 MG/1
250 TABLET ORAL 2 TIMES DAILY
Qty: 20 TABLET | Refills: 0 | Status: SHIPPED | OUTPATIENT
Start: 2024-05-13 | End: 2024-05-19 | Stop reason: HOSPADM

## 2024-05-13 RX ORDER — TRAMADOL HYDROCHLORIDE 50 MG/1
TABLET ORAL EVERY 6 HOURS
COMMUNITY
Start: 2022-08-10 | End: 2024-05-14 | Stop reason: ENTERED-IN-ERROR

## 2024-05-13 NOTE — PROGRESS NOTES
Occupational Therapy    Discharge Summary    Name: Irish Davis  MRN: [id number]  : 1945  Date: 24    Discharge Summary: OT    Discharge Information: Date of discharge 24, Date of last visit 24, Date of evaluation 24, Number of attended visits 1, Referred by Dr. Arnold, and Referred for B wrist fx    Therapy Summary: Skilled OT services addressed ROM, strength, and HEP to increase independence with ADL and IADL tasks.    Discharge Status: Pt did not return for follow-up OT sessions.      Rehab Discharge Reason: Failed to schedule and/or keep follow-up appointment(s)

## 2024-05-13 NOTE — TELEPHONE ENCOUNTER
Her daughter called cause Irish is coughing up nasty green stuff from her lungs and she thinks that it might be related to her having pneumonia and sob she also has copd. Please advise and call [phone] her daughter Sofie and her pharmacy is [facility].      Sofie stated that she can  not take the Augmentin. Erthyromin, doxycycline, trycycyline, bacitracin, amoxicillin, penicillin       NOW SHE CAN TAKE STRIAD & CEFTIN

## 2024-05-13 NOTE — TELEPHONE ENCOUNTER
Patient's daughter informed of message above.   States that she will talk to her mother and see what she would like to do.

## 2024-05-13 NOTE — TELEPHONE ENCOUNTER
"Patient's daughter called in stating that her mother had \"  1/27/2024 inpatient surgery Bilateral wrist ORIF\" and states that her hands now shake every once and wants to know if this is related to her surgery.     Please advise.      "

## 2024-05-14 ENCOUNTER — APPOINTMENT (OUTPATIENT)
Dept: CARDIOLOGY | Facility: HOSPITAL | Age: 79
DRG: 280 | End: 2024-05-14
Payer: MEDICARE

## 2024-05-14 ENCOUNTER — TELEPHONE (OUTPATIENT)
Dept: PRIMARY CARE | Facility: CLINIC | Age: 79
End: 2024-05-14
Payer: COMMERCIAL

## 2024-05-14 ENCOUNTER — TELEPHONE (OUTPATIENT)
Dept: ORTHOPEDIC SURGERY | Facility: CLINIC | Age: 79
End: 2024-05-14

## 2024-05-14 ENCOUNTER — APPOINTMENT (OUTPATIENT)
Dept: RADIOLOGY | Facility: HOSPITAL | Age: 79
DRG: 280 | End: 2024-05-14
Payer: MEDICARE

## 2024-05-14 ENCOUNTER — HOSPITAL ENCOUNTER (INPATIENT)
Facility: HOSPITAL | Age: 79
LOS: 5 days | Discharge: HOSPICE | DRG: 280 | End: 2024-05-19
Attending: EMERGENCY MEDICINE | Admitting: INTERNAL MEDICINE
Payer: MEDICARE

## 2024-05-14 DIAGNOSIS — J44.9 CHRONIC OBSTRUCTIVE PULMONARY DISEASE, UNSPECIFIED COPD TYPE (MULTI): ICD-10-CM

## 2024-05-14 DIAGNOSIS — F06.4 ANXIETY DISORDER DUE TO MEDICAL CONDITION: ICD-10-CM

## 2024-05-14 DIAGNOSIS — M25.60 STIFFNESS IN JOINT: ICD-10-CM

## 2024-05-14 DIAGNOSIS — J44.1 COPD WITH ACUTE EXACERBATION (MULTI): ICD-10-CM

## 2024-05-14 DIAGNOSIS — I50.33 ACUTE ON CHRONIC DIASTOLIC HEART FAILURE (MULTI): Primary | ICD-10-CM

## 2024-05-14 DIAGNOSIS — R06.00 DYSPNEA, UNSPECIFIED TYPE: ICD-10-CM

## 2024-05-14 DIAGNOSIS — I50.9 ACUTE ON CHRONIC CONGESTIVE HEART FAILURE, UNSPECIFIED HEART FAILURE TYPE (MULTI): ICD-10-CM

## 2024-05-14 DIAGNOSIS — J96.21 ACUTE ON CHRONIC HYPOXIC RESPIRATORY FAILURE (MULTI): ICD-10-CM

## 2024-05-14 DIAGNOSIS — E87.6 HYPOKALEMIA: ICD-10-CM

## 2024-05-14 DIAGNOSIS — R79.89 ELEVATED TROPONIN: ICD-10-CM

## 2024-05-14 DIAGNOSIS — I10 PRIMARY HYPERTENSION: ICD-10-CM

## 2024-05-14 DIAGNOSIS — Z51.5 HOSPICE CARE: ICD-10-CM

## 2024-05-14 PROBLEM — R53.1 GENERALIZED WEAKNESS: Chronic | Status: RESOLVED | Noted: 2023-11-20 | Resolved: 2023-11-27

## 2024-05-14 PROBLEM — J96.11 CHRONIC HYPOXIC RESPIRATORY FAILURE, ON HOME OXYGEN THERAPY (MULTI): Chronic | Status: ACTIVE | Noted: 2024-05-14

## 2024-05-14 PROBLEM — Z99.81 CHRONIC HYPOXIC RESPIRATORY FAILURE, ON HOME OXYGEN THERAPY (MULTI): Chronic | Status: ACTIVE | Noted: 2024-05-14

## 2024-05-14 LAB
ANION GAP BLDV CALCULATED.4IONS-SCNC: 3 MMOL/L (ref 10–25)
ANION GAP SERPL CALC-SCNC: 14 MMOL/L (ref 10–20)
BASE EXCESS BLDV CALC-SCNC: 15.5 MMOL/L (ref -2–3)
BASOPHILS # BLD AUTO: 0.02 X10*3/UL (ref 0–0.1)
BASOPHILS NFR BLD AUTO: 0.2 %
BNP SERPL-MCNC: 374 PG/ML (ref 0–99)
BODY TEMPERATURE: 37 DEGREES CELSIUS
BUN SERPL-MCNC: 13 MG/DL (ref 6–23)
CA-I BLDV-SCNC: 1.16 MMOL/L (ref 1.1–1.33)
CALCIUM SERPL-MCNC: 9.1 MG/DL (ref 8.6–10.3)
CARDIAC TROPONIN I PNL SERPL HS: 49 NG/L (ref 0–13)
CARDIAC TROPONIN I PNL SERPL HS: 57 NG/L (ref 0–13)
CARDIAC TROPONIN I PNL SERPL HS: 74 NG/L (ref 0–13)
CARDIAC TROPONIN I PNL SERPL HS: 84 NG/L (ref 0–13)
CHLORIDE BLDV-SCNC: 96 MMOL/L (ref 98–107)
CHLORIDE SERPL-SCNC: 93 MMOL/L (ref 98–107)
CO2 SERPL-SCNC: 39 MMOL/L (ref 21–32)
CREAT SERPL-MCNC: 0.71 MG/DL (ref 0.5–1.05)
EGFRCR SERPLBLD CKD-EPI 2021: 86 ML/MIN/1.73M*2
EOSINOPHIL # BLD AUTO: 0 X10*3/UL (ref 0–0.4)
EOSINOPHIL NFR BLD AUTO: 0 %
ERYTHROCYTE [DISTWIDTH] IN BLOOD BY AUTOMATED COUNT: 15.3 % (ref 11.5–14.5)
GLUCOSE BLDV-MCNC: 88 MG/DL (ref 74–99)
GLUCOSE SERPL-MCNC: 86 MG/DL (ref 74–99)
HCO3 BLDV-SCNC: 43.1 MMOL/L (ref 22–26)
HCT VFR BLD AUTO: 39.9 % (ref 36–46)
HCT VFR BLD EST: 36 % (ref 36–46)
HGB BLD-MCNC: 11.8 G/DL (ref 12–16)
HGB BLDV-MCNC: 11.9 G/DL (ref 12–16)
IMM GRANULOCYTES # BLD AUTO: 0.06 X10*3/UL (ref 0–0.5)
IMM GRANULOCYTES NFR BLD AUTO: 0.6 % (ref 0–0.9)
INHALED O2 CONCENTRATION: 36 %
LACTATE BLDV-SCNC: 1.4 MMOL/L (ref 0.4–2)
LACTATE BLDV-SCNC: 2.1 MMOL/L (ref 0.4–2)
LYMPHOCYTES # BLD AUTO: 0.46 X10*3/UL (ref 0.8–3)
LYMPHOCYTES NFR BLD AUTO: 4.4 %
MAGNESIUM SERPL-MCNC: 1.97 MG/DL (ref 1.6–2.4)
MCH RBC QN AUTO: 26.8 PG (ref 26–34)
MCHC RBC AUTO-ENTMCNC: 29.6 G/DL (ref 32–36)
MCV RBC AUTO: 91 FL (ref 80–100)
MONOCYTES # BLD AUTO: 0.78 X10*3/UL (ref 0.05–0.8)
MONOCYTES NFR BLD AUTO: 7.4 %
NEUTROPHILS # BLD AUTO: 9.24 X10*3/UL (ref 1.6–5.5)
NEUTROPHILS NFR BLD AUTO: 87.4 %
NRBC BLD-RTO: 0 /100 WBCS (ref 0–0)
OXYHGB MFR BLDV: 43.6 % (ref 45–75)
PCO2 BLDV: 68 MM HG (ref 41–51)
PH BLDV: 7.41 PH (ref 7.33–7.43)
PLATELET # BLD AUTO: 292 X10*3/UL (ref 150–450)
PO2 BLDV: 31 MM HG (ref 35–45)
POTASSIUM BLDV-SCNC: 2.9 MMOL/L (ref 3.5–5.3)
POTASSIUM SERPL-SCNC: 2.9 MMOL/L (ref 3.5–5.3)
RBC # BLD AUTO: 4.41 X10*6/UL (ref 4–5.2)
SAO2 % BLDV: 44 % (ref 45–75)
SODIUM BLDV-SCNC: 139 MMOL/L (ref 136–145)
SODIUM SERPL-SCNC: 143 MMOL/L (ref 136–145)
WBC # BLD AUTO: 10.6 X10*3/UL (ref 4.4–11.3)

## 2024-05-14 PROCEDURE — 94640 AIRWAY INHALATION TREATMENT: CPT

## 2024-05-14 PROCEDURE — 84484 ASSAY OF TROPONIN QUANT: CPT | Performed by: STUDENT IN AN ORGANIZED HEALTH CARE EDUCATION/TRAINING PROGRAM

## 2024-05-14 PROCEDURE — 36415 COLL VENOUS BLD VENIPUNCTURE: CPT | Performed by: EMERGENCY MEDICINE

## 2024-05-14 PROCEDURE — 2500000001 HC RX 250 WO HCPCS SELF ADMINISTERED DRUGS (ALT 637 FOR MEDICARE OP): Performed by: STUDENT IN AN ORGANIZED HEALTH CARE EDUCATION/TRAINING PROGRAM

## 2024-05-14 PROCEDURE — 93005 ELECTROCARDIOGRAM TRACING: CPT

## 2024-05-14 PROCEDURE — 83880 ASSAY OF NATRIURETIC PEPTIDE: CPT | Performed by: EMERGENCY MEDICINE

## 2024-05-14 PROCEDURE — 96366 THER/PROPH/DIAG IV INF ADDON: CPT

## 2024-05-14 PROCEDURE — 84132 ASSAY OF SERUM POTASSIUM: CPT | Mod: 91 | Performed by: EMERGENCY MEDICINE

## 2024-05-14 PROCEDURE — 84484 ASSAY OF TROPONIN QUANT: CPT | Performed by: EMERGENCY MEDICINE

## 2024-05-14 PROCEDURE — 2500000002 HC RX 250 W HCPCS SELF ADMINISTERED DRUGS (ALT 637 FOR MEDICARE OP, ALT 636 FOR OP/ED): Performed by: INTERNAL MEDICINE

## 2024-05-14 PROCEDURE — 83735 ASSAY OF MAGNESIUM: CPT | Performed by: EMERGENCY MEDICINE

## 2024-05-14 PROCEDURE — 99223 1ST HOSP IP/OBS HIGH 75: CPT | Performed by: STUDENT IN AN ORGANIZED HEALTH CARE EDUCATION/TRAINING PROGRAM

## 2024-05-14 PROCEDURE — 2500000001 HC RX 250 WO HCPCS SELF ADMINISTERED DRUGS (ALT 637 FOR MEDICARE OP): Performed by: EMERGENCY MEDICINE

## 2024-05-14 PROCEDURE — 93010 ELECTROCARDIOGRAM REPORT: CPT | Performed by: INTERNAL MEDICINE

## 2024-05-14 PROCEDURE — 96375 TX/PRO/DX INJ NEW DRUG ADDON: CPT

## 2024-05-14 PROCEDURE — 2060000001 HC INTERMEDIATE ICU ROOM DAILY

## 2024-05-14 PROCEDURE — 85025 COMPLETE CBC W/AUTO DIFF WBC: CPT | Performed by: EMERGENCY MEDICINE

## 2024-05-14 PROCEDURE — 2500000004 HC RX 250 GENERAL PHARMACY W/ HCPCS (ALT 636 FOR OP/ED): Performed by: EMERGENCY MEDICINE

## 2024-05-14 PROCEDURE — 2500000004 HC RX 250 GENERAL PHARMACY W/ HCPCS (ALT 636 FOR OP/ED): Performed by: STUDENT IN AN ORGANIZED HEALTH CARE EDUCATION/TRAINING PROGRAM

## 2024-05-14 PROCEDURE — 71045 X-RAY EXAM CHEST 1 VIEW: CPT | Performed by: RADIOLOGY

## 2024-05-14 PROCEDURE — 2500000002 HC RX 250 W HCPCS SELF ADMINISTERED DRUGS (ALT 637 FOR MEDICARE OP, ALT 636 FOR OP/ED): Performed by: EMERGENCY MEDICINE

## 2024-05-14 PROCEDURE — 83605 ASSAY OF LACTIC ACID: CPT | Performed by: EMERGENCY MEDICINE

## 2024-05-14 PROCEDURE — 96365 THER/PROPH/DIAG IV INF INIT: CPT

## 2024-05-14 PROCEDURE — 71045 X-RAY EXAM CHEST 1 VIEW: CPT

## 2024-05-14 PROCEDURE — 99285 EMERGENCY DEPT VISIT HI MDM: CPT | Mod: 25

## 2024-05-14 PROCEDURE — 2500000005 HC RX 250 GENERAL PHARMACY W/O HCPCS: Performed by: STUDENT IN AN ORGANIZED HEALTH CARE EDUCATION/TRAINING PROGRAM

## 2024-05-14 RX ORDER — AMOXICILLIN AND CLAVULANATE POTASSIUM 875; 125 MG/1; MG/1
1 TABLET, FILM COATED ORAL 2 TIMES DAILY
Status: COMPLETED | OUTPATIENT
Start: 2024-05-14 | End: 2024-05-19

## 2024-05-14 RX ORDER — IPRATROPIUM BROMIDE AND ALBUTEROL SULFATE 2.5; .5 MG/3ML; MG/3ML
3 SOLUTION RESPIRATORY (INHALATION) ONCE
Status: COMPLETED | OUTPATIENT
Start: 2024-05-14 | End: 2024-05-14

## 2024-05-14 RX ORDER — ACETAMINOPHEN 325 MG/1
650 TABLET ORAL EVERY 4 HOURS PRN
Status: DISCONTINUED | OUTPATIENT
Start: 2024-05-14 | End: 2024-05-19 | Stop reason: HOSPADM

## 2024-05-14 RX ORDER — BUDESONIDE AND FORMOTEROL FUMARATE DIHYDRATE 160; 4.5 UG/1; UG/1
2 AEROSOL RESPIRATORY (INHALATION)
COMMUNITY

## 2024-05-14 RX ORDER — CETIRIZINE HYDROCHLORIDE 10 MG/1
10 TABLET ORAL NIGHTLY
COMMUNITY

## 2024-05-14 RX ORDER — IPRATROPIUM BROMIDE AND ALBUTEROL SULFATE 2.5; .5 MG/3ML; MG/3ML
3 SOLUTION RESPIRATORY (INHALATION) 3 TIMES DAILY
Status: DISCONTINUED | OUTPATIENT
Start: 2024-05-14 | End: 2024-05-19 | Stop reason: HOSPADM

## 2024-05-14 RX ORDER — POTASSIUM CHLORIDE 14.9 MG/ML
20 INJECTION INTRAVENOUS ONCE
Status: COMPLETED | OUTPATIENT
Start: 2024-05-14 | End: 2024-05-14

## 2024-05-14 RX ORDER — FUROSEMIDE 10 MG/ML
40 INJECTION INTRAMUSCULAR; INTRAVENOUS ONCE
Status: COMPLETED | OUTPATIENT
Start: 2024-05-14 | End: 2024-05-14

## 2024-05-14 RX ORDER — ATORVASTATIN CALCIUM 40 MG/1
80 TABLET, FILM COATED ORAL NIGHTLY
Status: DISCONTINUED | OUTPATIENT
Start: 2024-05-14 | End: 2024-05-19 | Stop reason: HOSPADM

## 2024-05-14 RX ORDER — ONDANSETRON 4 MG/1
4 TABLET, FILM COATED ORAL EVERY 8 HOURS PRN
Status: DISCONTINUED | OUTPATIENT
Start: 2024-05-14 | End: 2024-05-19 | Stop reason: HOSPADM

## 2024-05-14 RX ORDER — HEPARIN SODIUM 5000 [USP'U]/ML
5000 INJECTION, SOLUTION INTRAVENOUS; SUBCUTANEOUS EVERY 8 HOURS
Status: DISCONTINUED | OUTPATIENT
Start: 2024-05-14 | End: 2024-05-19 | Stop reason: HOSPADM

## 2024-05-14 RX ORDER — ASPIRIN 81 MG/1
81 TABLET ORAL DAILY
Status: DISCONTINUED | OUTPATIENT
Start: 2024-05-14 | End: 2024-05-19 | Stop reason: HOSPADM

## 2024-05-14 RX ORDER — ALBUTEROL SULFATE 90 UG/1
2 AEROSOL, METERED RESPIRATORY (INHALATION) EVERY 6 HOURS PRN
Status: DISCONTINUED | OUTPATIENT
Start: 2024-05-14 | End: 2024-05-19 | Stop reason: HOSPADM

## 2024-05-14 RX ORDER — LORATADINE 10 MG/1
10 TABLET ORAL DAILY
Status: DISCONTINUED | OUTPATIENT
Start: 2024-05-14 | End: 2024-05-19 | Stop reason: HOSPADM

## 2024-05-14 RX ORDER — BISACODYL 5 MG
10 TABLET, DELAYED RELEASE (ENTERIC COATED) ORAL DAILY PRN
Status: DISCONTINUED | OUTPATIENT
Start: 2024-05-14 | End: 2024-05-19 | Stop reason: HOSPADM

## 2024-05-14 RX ORDER — POLYETHYLENE GLYCOL 3350 17 G/17G
17 POWDER, FOR SOLUTION ORAL DAILY
Status: DISCONTINUED | OUTPATIENT
Start: 2024-05-14 | End: 2024-05-19 | Stop reason: HOSPADM

## 2024-05-14 RX ORDER — PANTOPRAZOLE SODIUM 40 MG/10ML
40 INJECTION, POWDER, LYOPHILIZED, FOR SOLUTION INTRAVENOUS
Status: DISCONTINUED | OUTPATIENT
Start: 2024-05-15 | End: 2024-05-19 | Stop reason: HOSPADM

## 2024-05-14 RX ORDER — IPRATROPIUM BROMIDE AND ALBUTEROL SULFATE 2.5; .5 MG/3ML; MG/3ML
3 SOLUTION RESPIRATORY (INHALATION)
Status: DISCONTINUED | OUTPATIENT
Start: 2024-05-14 | End: 2024-05-14

## 2024-05-14 RX ORDER — FUROSEMIDE 10 MG/ML
40 INJECTION INTRAMUSCULAR; INTRAVENOUS 2 TIMES DAILY
Status: DISCONTINUED | OUTPATIENT
Start: 2024-05-14 | End: 2024-05-18

## 2024-05-14 RX ORDER — FLUTICASONE FUROATE AND VILANTEROL 200; 25 UG/1; UG/1
1 POWDER RESPIRATORY (INHALATION)
Status: DISCONTINUED | OUTPATIENT
Start: 2024-05-14 | End: 2024-05-19 | Stop reason: HOSPADM

## 2024-05-14 RX ORDER — BISACODYL 10 MG/1
10 SUPPOSITORY RECTAL DAILY PRN
Status: DISCONTINUED | OUTPATIENT
Start: 2024-05-14 | End: 2024-05-19 | Stop reason: HOSPADM

## 2024-05-14 RX ORDER — POTASSIUM CHLORIDE 1.5 G/1.58G
40 POWDER, FOR SOLUTION ORAL 2 TIMES DAILY
Status: DISCONTINUED | OUTPATIENT
Start: 2024-05-14 | End: 2024-05-17

## 2024-05-14 RX ORDER — HYDROXYZINE HYDROCHLORIDE 25 MG/1
12.5 TABLET, FILM COATED ORAL EVERY 8 HOURS PRN
Status: DISCONTINUED | OUTPATIENT
Start: 2024-05-14 | End: 2024-05-19 | Stop reason: HOSPADM

## 2024-05-14 RX ORDER — ONDANSETRON HYDROCHLORIDE 2 MG/ML
4 INJECTION, SOLUTION INTRAVENOUS EVERY 8 HOURS PRN
Status: DISCONTINUED | OUTPATIENT
Start: 2024-05-14 | End: 2024-05-19 | Stop reason: HOSPADM

## 2024-05-14 RX ORDER — TALC
3 POWDER (GRAM) TOPICAL NIGHTLY PRN
Status: DISCONTINUED | OUTPATIENT
Start: 2024-05-14 | End: 2024-05-19 | Stop reason: HOSPADM

## 2024-05-14 RX ORDER — VIT A/VIT C/VIT E/ZINC/COPPER 2148-113
1 TABLET ORAL 2 TIMES DAILY
COMMUNITY

## 2024-05-14 RX ORDER — GUAIFENESIN 600 MG/1
600 TABLET, EXTENDED RELEASE ORAL EVERY 12 HOURS PRN
Status: DISCONTINUED | OUTPATIENT
Start: 2024-05-14 | End: 2024-05-15

## 2024-05-14 RX ORDER — PANTOPRAZOLE SODIUM 40 MG/1
40 TABLET, DELAYED RELEASE ORAL
Status: DISCONTINUED | OUTPATIENT
Start: 2024-05-15 | End: 2024-05-19 | Stop reason: HOSPADM

## 2024-05-14 RX ORDER — IPRATROPIUM BROMIDE 17 UG/1
AEROSOL, METERED RESPIRATORY (INHALATION)
COMMUNITY

## 2024-05-14 RX ORDER — METOPROLOL TARTRATE 25 MG/1
37.5 TABLET, FILM COATED ORAL 2 TIMES DAILY
Status: DISCONTINUED | OUTPATIENT
Start: 2024-05-14 | End: 2024-05-19 | Stop reason: HOSPADM

## 2024-05-14 RX ORDER — BENZONATATE 100 MG/1
200 CAPSULE ORAL 3 TIMES DAILY PRN
Status: DISCONTINUED | OUTPATIENT
Start: 2024-05-14 | End: 2024-05-19 | Stop reason: HOSPADM

## 2024-05-14 RX ADMIN — ALBUTEROL SULFATE 2 PUFF: 90 AEROSOL, METERED RESPIRATORY (INHALATION) at 21:41

## 2024-05-14 RX ADMIN — FUROSEMIDE 40 MG: 10 INJECTION, SOLUTION INTRAMUSCULAR; INTRAVENOUS at 11:00

## 2024-05-14 RX ADMIN — METHYLPREDNISOLONE SODIUM SUCCINATE 125 MG: 125 INJECTION, POWDER, FOR SOLUTION INTRAMUSCULAR; INTRAVENOUS at 10:59

## 2024-05-14 RX ADMIN — Medication 5 L/MIN: at 15:45

## 2024-05-14 RX ADMIN — METOPROLOL TARTRATE 37.5 MG: 25 TABLET, FILM COATED ORAL at 21:41

## 2024-05-14 RX ADMIN — IPRATROPIUM BROMIDE AND ALBUTEROL SULFATE 3 ML: 2.5; .5 SOLUTION RESPIRATORY (INHALATION) at 20:45

## 2024-05-14 RX ADMIN — AMOXICILLIN AND CLAVULANATE POTASSIUM 1 TABLET: 875; 125 TABLET, FILM COATED ORAL at 21:42

## 2024-05-14 RX ADMIN — Medication 5 L/MIN: at 20:45

## 2024-05-14 RX ADMIN — POTASSIUM CHLORIDE 20 MEQ: 14.9 INJECTION, SOLUTION INTRAVENOUS at 12:52

## 2024-05-14 RX ADMIN — POTASSIUM CHLORIDE 40 MEQ: 1.5 POWDER, FOR SOLUTION ORAL at 21:41

## 2024-05-14 RX ADMIN — Medication 5 L/MIN: at 20:00

## 2024-05-14 RX ADMIN — HEPARIN SODIUM 5000 UNITS: 5000 INJECTION INTRAVENOUS; SUBCUTANEOUS at 21:41

## 2024-05-14 RX ADMIN — FLUTICASONE FUROATE AND VILANTEROL TRIFENATATE 1 PUFF: 200; 25 POWDER RESPIRATORY (INHALATION) at 21:42

## 2024-05-14 RX ADMIN — ATORVASTATIN CALCIUM 80 MG: 40 TABLET, FILM COATED ORAL at 21:42

## 2024-05-14 RX ADMIN — METHYLPREDNISOLONE SODIUM SUCCINATE 40 MG: 40 INJECTION, POWDER, FOR SOLUTION INTRAMUSCULAR; INTRAVENOUS at 21:41

## 2024-05-14 RX ADMIN — POTASSIUM CHLORIDE 40 MEQ: 1.5 POWDER, FOR SOLUTION ORAL at 12:52

## 2024-05-14 RX ADMIN — IPRATROPIUM BROMIDE AND ALBUTEROL SULFATE 3 ML: 2.5; .5 SOLUTION RESPIRATORY (INHALATION) at 10:59

## 2024-05-14 SDOH — SOCIAL STABILITY: SOCIAL INSECURITY: WERE YOU ABLE TO COMPLETE ALL THE BEHAVIORAL HEALTH SCREENINGS?: YES

## 2024-05-14 SDOH — SOCIAL STABILITY: SOCIAL INSECURITY: HAVE YOU HAD THOUGHTS OF HARMING ANYONE ELSE?: NO

## 2024-05-14 ASSESSMENT — COGNITIVE AND FUNCTIONAL STATUS - GENERAL
HELP NEEDED FOR BATHING: A LITTLE
CLIMB 3 TO 5 STEPS WITH RAILING: A LITTLE
TOILETING: A LITTLE
WALKING IN HOSPITAL ROOM: A LITTLE
MOVING TO AND FROM BED TO CHAIR: A LITTLE
STANDING UP FROM CHAIR USING ARMS: A LITTLE
TURNING FROM BACK TO SIDE WHILE IN FLAT BAD: A LITTLE
DAILY ACTIVITIY SCORE: 20
DRESSING REGULAR UPPER BODY CLOTHING: A LITTLE
MOBILITY SCORE: 19
PATIENT BASELINE BEDBOUND: NO
DRESSING REGULAR LOWER BODY CLOTHING: A LITTLE

## 2024-05-14 ASSESSMENT — ACTIVITIES OF DAILY LIVING (ADL)
FEEDING YOURSELF: INDEPENDENT
JUDGMENT_ADEQUATE_SAFELY_COMPLETE_DAILY_ACTIVITIES: YES
HEARING - RIGHT EAR: DIFFICULTY WITH NOISE
BATHING: NEEDS ASSISTANCE
LACK_OF_TRANSPORTATION: NO
FEEDING YOURSELF: INDEPENDENT
GROOMING: NEEDS ASSISTANCE
PATIENT'S MEMORY ADEQUATE TO SAFELY COMPLETE DAILY ACTIVITIES?: YES
TOILETING: NEEDS ASSISTANCE
DRESSING YOURSELF: NEEDS ASSISTANCE
BATHING: NEEDS ASSISTANCE
JUDGMENT_ADEQUATE_SAFELY_COMPLETE_DAILY_ACTIVITIES: YES
TOILETING: NEEDS ASSISTANCE
HEARING - RIGHT EAR: FUNCTIONAL
ADEQUATE_TO_COMPLETE_ADL: YES
GROOMING: NEEDS ASSISTANCE
WALKS IN HOME: NEEDS ASSISTANCE
PATIENT'S MEMORY ADEQUATE TO SAFELY COMPLETE DAILY ACTIVITIES?: YES
HEARING - LEFT EAR: DIFFICULTY WITH NOISE
WALKS IN HOME: NEEDS ASSISTANCE
HEARING - LEFT EAR: FUNCTIONAL
ADEQUATE_TO_COMPLETE_ADL: YES
DRESSING YOURSELF: NEEDS ASSISTANCE

## 2024-05-14 ASSESSMENT — ENCOUNTER SYMPTOMS
PALPITATIONS: 0
CHILLS: 0
CHEST TIGHTNESS: 1
FATIGUE: 1
ABDOMINAL PAIN: 0
APPETITE CHANGE: 1
POLYDIPSIA: 0
ACTIVITY CHANGE: 1
PHOTOPHOBIA: 0
COLOR CHANGE: 0
WEAKNESS: 1
DIZZINESS: 0
BLOOD IN STOOL: 0
LIGHT-HEADEDNESS: 0
SHORTNESS OF BREATH: 1
SLEEP DISTURBANCE: 0
HEMATURIA: 0
TREMORS: 0
WHEEZING: 1
FEVER: 0
DYSURIA: 0
NAUSEA: 0
VOMITING: 0
COUGH: 1
CONFUSION: 0

## 2024-05-14 ASSESSMENT — LIFESTYLE VARIABLES
HOW MANY STANDARD DRINKS CONTAINING ALCOHOL DO YOU HAVE ON A TYPICAL DAY: PATIENT DOES NOT DRINK
PRESCIPTION_ABUSE_PAST_12_MONTHS: NO
HOW OFTEN DO YOU HAVE A DRINK CONTAINING ALCOHOL: NEVER
SKIP TO QUESTIONS 9-10: 1
HOW OFTEN DO YOU HAVE 6 OR MORE DRINKS ON ONE OCCASION: NEVER
AUDIT-C TOTAL SCORE: 0
SUBSTANCE_ABUSE_PAST_12_MONTHS: NO
AUDIT-C TOTAL SCORE: 0

## 2024-05-14 ASSESSMENT — PAIN SCALES - GENERAL
PAINLEVEL_OUTOF10: 0 - NO PAIN

## 2024-05-14 ASSESSMENT — PATIENT HEALTH QUESTIONNAIRE - PHQ9
2. FEELING DOWN, DEPRESSED OR HOPELESS: NOT AT ALL
1. LITTLE INTEREST OR PLEASURE IN DOING THINGS: NOT AT ALL
SUM OF ALL RESPONSES TO PHQ9 QUESTIONS 1 & 2: 0

## 2024-05-14 ASSESSMENT — PAIN - FUNCTIONAL ASSESSMENT
PAIN_FUNCTIONAL_ASSESSMENT: 0-10
PAIN_FUNCTIONAL_ASSESSMENT: 0-10

## 2024-05-14 NOTE — ED NOTES
Pt arrives to ED via ambulance from home    Code Status:  Full Code    HPI     Chief Complaint   Patient presents with    Shortness of Breath     With weakness x past few days. Hx COPD, on 4 lpm at baseline. 90-91% on baseline O2. + cough with green sputum. Denies CP.        /87   Pulse 70   Temp 36.5 °C (97.7 °F) (Temporal)   Resp 16   Wt (!) 44.7 kg (98 lb 8.7 oz)   SpO2 96%     [place]bey Coma Scale Score: 15      LDA:   Peripheral IV 05/14/24 20 G Left Forearm (Active)   Placement Date/Time: 05/14/24 1059   Size (Gauge): 20 G  Orientation: Left  Location: Forearm   Number of days: 0       External Urinary Catheter (Active)   Placement Date/Time: 05/14/24 1123     Number of days: 0        BACKGROUND  Past Medical History:   Diagnosis Date    COPD (chronic obstructive pulmonary disease) (Multi)     Disorder of the skin and subcutaneous tissue, unspecified 07/22/2016    Leg skin lesion, left    Personal history of other diseases of the respiratory system 02/06/2018    History of allergic rhinitis    Personal history of other specified conditions 07/22/2016    History of chest pain     Past Surgical History:   Procedure Laterality Date    OTHER SURGICAL HISTORY  09/25/2019    Cataract surgery     No current facility-administered medications on file prior to encounter.     Current Outpatient Medications on File Prior to Encounter   Medication Sig Dispense Refill    albuterol 90 mcg/actuation inhaler Inhale 2 puffs every 6 hours if needed for wheezing. 18 g 11    benzonatate (Tessalon) 200 mg capsule Take 1 capsule (200 mg) by mouth 3 times a day as needed for cough. Do not crush or chew. (Patient not taking: Reported on 5/14/2024) 30 capsule 2    budesonide-formoteroL (Symbicort) 160-4.5 mcg/actuation inhaler Inhale 2 puffs 2 times a day. Rinse mouth with water after use to reduce aftertaste and incidence of candidiasis. Do not swallow.      budesonide-glycopyr-formoterol (Breztri Aerosphere) 160-9-4.8  mcg/actuation HFA aerosol inhaler Inhale 2 puffs 2 times a day. (Patient not taking: Reported on 5/14/2024) 10.7 g 11    cefuroxime (Ceftin) 250 mg tablet Take 1 tablet (250 mg) by mouth 2 times a day for 10 days. 20 tablet 0    cetirizine (All Day Allergy, cetirizine,) 10 mg tablet Take 1 tablet (10 mg) by mouth once daily at bedtime.      furosemide (Lasix) 40 mg tablet Take 0.5 tablets (20 mg) by mouth once daily. Do not start before February 6, 2024. (Patient not taking: Reported on 5/14/2024)      hydrOXYzine HCL (Atarax) 25 mg tablet Take 0.5 tablets (12.5 mg) by mouth every 8 hours if needed for anxiety. 270 tablet 1    ipratropium (Atrovent HFA) 17 mcg/actuation inhaler Inhale. Inhale 2 puffs at 8am &8 pm and 1 puff at noon and 4pm      lactobacillus acidophilus tablet tablet Take by mouth 2 times a day. (Patient not taking: Reported on 5/14/2024) 20 tablet 0    metoprolol tartrate (Lopressor) 25 mg tablet TAKE ONE AND ONE-HALF TABLETS TWICE A  tablet 0    predniSONE (Deltasone) 10 mg tablet Take 1 tablet (10 mg) by mouth once daily. (Patient not taking: Reported on 5/14/2024) 90 tablet 3    predniSONE (Deltasone) 10 mg tablet Take 1 tablet (10 mg) by mouth once daily. 14 tablet 0    roflumilast (Daliresp) 500 mcg tablet Take 1 tablet (500 mcg) by mouth once daily. 30 tablet 11    vitamins A,C,E-zinc-copper (PreserVision AREDS) 2,148 mcg-113 mg-45 mg-17.4mg tablet Take 1 tablet by mouth 2 times a day.      [DISCONTINUED] albuterol 2.5 mg /3 mL (0.083 %) nebulizer solution Inhale every 6 hours.      [DISCONTINUED] albuterol 2.5 mg /3 mL (0.083 %) nebulizer solution Inhale 4 times a day.      [DISCONTINUED] benzonatate (Tessalon) 200 mg capsule Take 1 capsule (200 mg) by mouth 3 times a day as needed for cough. Do not crush or chew. 30 capsule 2        ASSESSMENT  Diagnoses as of 05/14/24 1723   Hypokalemia   Dyspnea, unspecified type   Elevated troponin   Acute on chronic congestive heart failure,  unspecified heart failure type (Multi)   Acute on chronic diastolic heart failure (Multi)       Medications Currently Running:        Medications Given:  ED Medication Administration from 05/14/2024 1014 to 05/14/2024 1723         Date/Time Order Dose Route Action Action by     05/14/2024 1059 EDT ipratropium-albuteroL (Duo-Neb) 0.5-2.5 mg/3 mL nebulizer solution 3 mL 3 mL nebulization Given Pilolli, S     05/14/2024 1059 EDT methylPREDNISolone sod succinate (SOLU-Medrol) injection 125 mg 125 mg intravenous Given Pilolli, S     05/14/2024 1100 EDT furosemide (Lasix) injection 40 mg 40 mg intravenous Given Pilolli, S     05/14/2024 1252 EDT potassium chloride (Klor-Con) packet 40 mEq 40 mEq oral Given Pilolli, S     05/14/2024 1252 EDT potassium chloride 20 mEq in 100 mL IV premix 20 mEq intravenous New Bag Pilolli, S     05/14/2024 1452 EDT potassium chloride 20 mEq in 100 mL IV premix 0 mEq intravenous Stopped Pilolli, S                 RESULTS    Imaging:  XR chest 1 view   Final Result   COPD. Interstitial lung disease.        MACRO:   none        Signed by: Eunice Lopez 5/14/2024 12:04 PM   Dictation workstation:   [id number]         }  Labs ::99  Abnormal Labs Reviewed   BLOOD GAS VENOUS FULL PANEL - Abnormal; Notable for the following components:       Result Value    POCT pCO2, Venous 68 (*)     POCT pO2, Venous 31 (*)     POCT SO2, Venous 44 (*)     POCT Oxy Hemoglobin, Venous 43.6 (*)     POCT Potassium, Venous 2.9 (*)     POCT Chloride, Venous 96 (*)     POCT Lactate, Venous 2.1 (*)     POCT Base Excess, Venous 15.5 (*)     POCT HCO3 Calculated, Venous 43.1 (*)     POCT Hemoglobin, Venous 11.9 (*)     POCT Anion Gap, Venous 3.0 (*)     All other components within normal limits   CBC WITH AUTO DIFFERENTIAL - Abnormal; Notable for the following components:    Hemoglobin 11.8 (*)     MCHC 29.6 (*)     RDW 15.3 (*)     Neutrophils Absolute 9.24 (*)     Lymphocytes Absolute 0.46 (*)     All other components  within normal limits   BASIC METABOLIC PANEL - Abnormal; Notable for the following components:    Potassium 2.9 (*)     Chloride 93 (*)     Bicarbonate 39 (*)     All other components within normal limits   B-TYPE NATRIURETIC PEPTIDE - Abnormal; Notable for the following components:     (*)     All other components within normal limits    Narrative:        <100 pg/mL - Heart failure unlikely                  100-299 pg/mL - Intermediate probability of acute heart                                  failure exacerbation. Correlate with clinical                                  context and patient history.                    >=300 pg/mL - Heart Failure likely. Correlate with clinical                                  context and patient history.                                    BNP testing is performed using different testing methodology at [facility] than at other [facility]. Direct result comparisons should only be made within the same method.                      SERIAL TROPONIN-INITIAL - Abnormal; Notable for the following components:    Troponin I, High Sensitivity 74 (*)     All other components within normal limits    Narrative:     Less than 99th percentile of normal range cutoff-                  Female and children under 18 years old <14 ng/L; Male <21 ng/L: Negative                  Repeat testing should be performed if clinically indicated.                                     Female and children under 18 years old 14-50 ng/L; Male 21-50 ng/L:                  Consistent with possible cardiac damage and possible increased clinical                   risk. Serial measurements may help to assess extent of myocardial damage.                                     >50 ng/L: Consistent with cardiac damage, increased clinical risk and                  myocardial infarction. Serial measurements may help assess extent of                   myocardial damage.                                       NOTE: Children less than 1 year old may have higher baseline troponin                   levels and results should be interpreted in conjunction with the overall                   clinical context.                                     NOTE: Troponin I testing is performed using a different                   testing methodology at [facility] than at other                   [facility]. Direct result comparisons should only                   be made within the same method.   SERIAL TROPONIN, 1 HOUR - Abnormal; Notable for the following components:    Troponin I, High Sensitivity 84 (*)     All other components within normal limits    Narrative:     Less than 99th percentile of normal range cutoff-                  Female and children under 18 years old <14 ng/L; Male <21 ng/L: Negative                  Repeat testing should be performed if clinically indicated.                                     Female and children under 18 years old 14-50 ng/L; Male 21-50 ng/L:                  Consistent with possible cardiac damage and possible increased clinical                   risk. Serial measurements may help to assess extent of myocardial damage.                                     >50 ng/L: Consistent with cardiac damage, increased clinical risk and                  myocardial infarction. Serial measurements may help assess extent of                   myocardial damage.                                      NOTE: Children less than 1 year old may have higher baseline troponin                   levels and results should be interpreted in conjunction with the overall                   clinical context.                                     NOTE: Troponin I testing is performed using a different                   testing methodology at [facility] than at other                   [facility]. Direct result comparisons should only                   be made within the same method.                 Britni Mason, MIC  05/14/24 9477

## 2024-05-14 NOTE — PROGRESS NOTES
Iirsh Davis is a 79 y.o. female admitted for No Principal Problem: There is no principal problem currently on the Problem List. Please update the Problem List and refresh.. Pharmacy reviewed the patient's oqigy-yf-vonhgafbz medications and allergies for accuracy.    The list below reflects the PTA list prior to pharmacy medication history. A summary a changes to the PTA medication list has been listed below. Please review each medication in order reconciliation for additional clarification and justification.    Source of information:  T2P and dtr    Medications added:  Atrovent 17mcg 2p at 8am + 8pm and 1p at noon and 4pm  Symbicort 160/4.5mcg 2p bid   All day allergy 10mg hs  Preservision Areds bid    Medications modified:    Medications to be removed:  Albuterol 0.83% x2  Benzonatate 200mg  Breztri inhaler  Furosemide 40mg  Lactobacillus   Loratadine 10mg  Omega-3 1200mg  Spiriva Respimat 2.5mcg  Tramadol 50mg   Medications of concern:      Prior to Admission Medications   Prescriptions Last Dose Informant Patient Reported? Taking?   albuterol 2.5 mg /3 mL (0.083 %) nebulizer solution   Yes No   Sig: Inhale every 6 hours.   albuterol 2.5 mg /3 mL (0.083 %) nebulizer solution   Yes No   Sig: Inhale 4 times a day.   albuterol 90 mcg/actuation inhaler   No No   Sig: Inhale 2 puffs every 6 hours if needed for wheezing.   benzonatate (Tessalon) 200 mg capsule   No No   Sig: Take 1 capsule (200 mg) by mouth 3 times a day as needed for cough. Do not crush or chew.   budesonide-glycopyr-formoterol (Breztri Aerosphere) 160-9-4.8 mcg/actuation HFA aerosol inhaler   No No   Sig: Inhale 2 puffs 2 times a day.   cefuroxime (Ceftin) 250 mg tablet   No No   Sig: Take 1 tablet (250 mg) by mouth 2 times a day for 10 days.   furosemide (Lasix) 40 mg tablet   No No   Sig: Take 0.5 tablets (20 mg) by mouth once daily. Do not start before February 6, 2024.   hydrOXYzine HCL (Atarax) 25 mg tablet   No No   Sig: Take 0.5 tablets  (12.5 mg) by mouth every 8 hours if needed for anxiety.   lactobacillus acidophilus tablet tablet   No No   Sig: Take by mouth 2 times a day.   loratadine (Claritin) 10 mg tablet   Yes No   Sig: Take 1 tablet (10 mg) by mouth.   metoprolol tartrate (Lopressor) 25 mg tablet   No No   Sig: TAKE ONE AND ONE-HALF TABLETS TWICE A DAY   omega-3 fatty acids-fish oil (One-Per-Day Omega-3) 684-1,200 mg capsule   Yes No   Sig: Take by mouth.   predniSONE (Deltasone) 10 mg tablet   No No   Sig: Take 1 tablet (10 mg) by mouth once daily.   predniSONE (Deltasone) 10 mg tablet   No No   Sig: Take 1 tablet (10 mg) by mouth once daily.   roflumilast (Daliresp) 500 mcg tablet   No No   Sig: Take 1 tablet (500 mcg) by mouth once daily.   tiotropium (Spiriva Respimat) 2.5 mcg/actuation inhaler   Yes No   Sig: Inhale once daily.   traMADol (Ultram) 50 mg tablet   Yes No   Sig: Take by mouth every 6 hours.      Facility-Administered Medications: None       Madalyn Aguero

## 2024-05-14 NOTE — H&P
[facility] - GENERAL MEDICINE HISTORY AND PHYSICAL    History Obtained From: Patient, family, friend at bedside    History Of Present Illness:  Irish Davis is a 79 y.o. female with PMHx s/f HTN, HLD, moderate aortic stenosis, chronic bilateral lower extremity edema (on Lasix), COPD with chronic hypoxic respiratory failure (baseline 4-4.5 L nasal cannula), presenting with worsening shortness of breath, dyspnea on exertion, cough (productive - green sputum), wheezing, congestion, and generalized weakness. Patient and family report that over the last few days, she has gotten significantly more weak to the point where she is not really able to get up and out of bed; however, they note that this is exacerbated by a significant amount of aforementioned respiratory symptoms. She has not had any significant sick contacts or exposures; however, she has been admitted multiple times under similar circumstances since about November of last year. Has been taking home medications as prescribed, but noticed some mild increase in lower extremity edema from baseline. Has not had much of an appetite since onset of symptoms, but no significant nausea/vomiting/abdominal pain/diarrhea. She admits to some chest tightness when coughing and having increased work of breathing when trying to ambulate, but denies any overt chest pain/pressure, diaphoresis, dizziness or lightheadedness, palpitations, syncope or presyncope.  She is feeling better since presentation to the emergency department, but still generally unwell.  She is in no acute distress sitting in bed with multiple loved ones at the bedside.    ED Course (Summary):   Vitals on presentation: T97.7, /101, , RR 24, SpO2 93% 4 L nasal cannula (patient was increased to 6 L nasal cannula from her baseline of 4-4.5 for comfort, no documented hypoxia on her oxygen)  Labs: CBC with WBC 10.6, Hgb 11.8, platelets 292.  BMP with glucose 86, sodium 143,  potassium 2.9, BUN 13, serum creatinine 0.71, magnesium 1.97.  .  High-sensitivity troponin 82-01-pctzqx pending.  VBG: pH 7.41, pCO2 68, calculated bicarb 43.1.  Venous lactate 2.1-1.4.  ECG is without overt acute ischemic changes  Imaging: CXR with chronic interstitial lung disease/emphysematous changes without overt acute process  Interventions: 125 Solu-Medrol, DuoNebs, 40 mg Lasix, total of 60 mEq KCl    ED Course (From Provider):  Diagnoses as of 05/14/24 1650   Hypokalemia   Dyspnea, unspecified type   Elevated troponin   Acute on chronic congestive heart failure, unspecified heart failure type (Multi)   Acute on chronic diastolic heart failure (Multi)     Relevant Results  Results for orders placed or performed during the hospital encounter of 05/14/24 (from the past 24 hour(s))   ECG 12 lead   Result Value Ref Range    Ventricular Rate 90 BPM    Atrial Rate 91 BPM    WY Interval 119 ms    QRS Duration 82 ms    QT Interval 403 ms    QTC Calculation(Bazett) 493 ms    P Axis 81 degrees    R Axis 84 degrees    T Axis 27 degrees    QRS Count 14 beats    Q Onset 251 ms    T Offset 453 ms    QTC Fredericia 461 ms   Blood Gas, Venous Full Panel   Result Value Ref Range    POCT pH, Venous 7.41 7.33 - 7.43 pH    POCT pCO2, Venous 68 (H) 41 - 51 mm Hg    POCT pO2, Venous 31 (L) 35 - 45 mm Hg    POCT SO2, Venous 44 (L) 45 - 75 %    POCT Oxy Hemoglobin, Venous 43.6 (L) 45.0 - 75.0 %    POCT Hematocrit Calculated, Venous 36.0 36.0 - 46.0 %    POCT Sodium, Venous 139 136 - 145 mmol/L    POCT Potassium, Venous 2.9 (LL) 3.5 - 5.3 mmol/L    POCT Chloride, Venous 96 (L) 98 - 107 mmol/L    POCT Ionized Calicum, Venous 1.16 1.10 - 1.33 mmol/L    POCT Glucose, Venous 88 74 - 99 mg/dL    POCT Lactate, Venous 2.1 (H) 0.4 - 2.0 mmol/L    POCT Base Excess, Venous 15.5 (H) -2.0 - 3.0 mmol/L    POCT HCO3 Calculated, Venous 43.1 (H) 22.0 - 26.0 mmol/L    POCT Hemoglobin, Venous 11.9 (L) 12.0 - 16.0 g/dL    POCT Anion Gap, Venous  3.0 (L) 10.0 - 25.0 mmol/L    Patient Temperature 37.0 degrees Celsius    FiO2 36 %   CBC and Auto Differential   Result Value Ref Range    WBC 10.6 4.4 - 11.3 x10*3/uL    nRBC 0.0 0.0 - 0.0 /100 WBCs    RBC 4.41 4.00 - 5.20 x10*6/uL    Hemoglobin 11.8 (L) 12.0 - 16.0 g/dL    Hematocrit 39.9 36.0 - 46.0 %    MCV 91 80 - 100 fL    MCH 26.8 26.0 - 34.0 pg    MCHC 29.6 (L) 32.0 - 36.0 g/dL    RDW 15.3 (H) 11.5 - 14.5 %    Platelets 292 150 - 450 x10*3/uL    Neutrophils % 87.4 40.0 - 80.0 %    Immature Granulocytes %, Automated 0.6 0.0 - 0.9 %    Lymphocytes % 4.4 13.0 - 44.0 %    Monocytes % 7.4 2.0 - 10.0 %    Eosinophils % 0.0 0.0 - 6.0 %    Basophils % 0.2 0.0 - 2.0 %    Neutrophils Absolute 9.24 (H) 1.60 - 5.50 x10*3/uL    Immature Granulocytes Absolute, Automated 0.06 0.00 - 0.50 x10*3/uL    Lymphocytes Absolute 0.46 (L) 0.80 - 3.00 x10*3/uL    Monocytes Absolute 0.78 0.05 - 0.80 x10*3/uL    Eosinophils Absolute 0.00 0.00 - 0.40 x10*3/uL    Basophils Absolute 0.02 0.00 - 0.10 x10*3/uL   Basic Metabolic Panel   Result Value Ref Range    Glucose 86 74 - 99 mg/dL    Sodium 143 136 - 145 mmol/L    Potassium 2.9 (LL) 3.5 - 5.3 mmol/L    Chloride 93 (L) 98 - 107 mmol/L    Bicarbonate 39 (H) 21 - 32 mmol/L    Anion Gap 14 10 - 20 mmol/L    Urea Nitrogen 13 6 - 23 mg/dL    Creatinine 0.71 0.50 - 1.05 mg/dL    eGFR 86 >60 mL/min/1.73m*2    Calcium 9.1 8.6 - 10.3 mg/dL   Magnesium   Result Value Ref Range    Magnesium 1.97 1.60 - 2.40 mg/dL   B-Type Natriuretic Peptide   Result Value Ref Range     (H) 0 - 99 pg/mL   Troponin I, High Sensitivity, Initial   Result Value Ref Range    Troponin I, High Sensitivity 74 (HH) 0 - 13 ng/L   Troponin, High Sensitivity, 1 Hour   Result Value Ref Range    Troponin I, High Sensitivity 84 (HH) 0 - 13 ng/L   Blood Gas Lactic Acid, Venous   Result Value Ref Range    POCT Lactate, Venous 1.4 0.4 - 2.0 mmol/L   ECG 12 lead   Result Value Ref Range    Ventricular Rate 84 BPM    Atrial  Rate 87 BPM    CO Interval 107 ms    QRS Duration 104 ms    QT Interval 418 ms    QTC Calculation(Bazett) 495 ms    P Axis 74 degrees    R Axis 82 degrees    T Axis 57 degrees    QRS Count 12 beats    Q Onset 249 ms    T Offset 458 ms    QTC Fredericia 467 ms      ECG 12 lead    Result Date: 5/14/2024  Sinus rhythm Sinus pause Short CO interval Borderline right axis deviation Minimal ST depression, inferior leads Borderline prolonged QT interval    ECG 12 lead    Result Date: 5/14/2024  Sinus rhythm Multiform ventricular premature complexes Borderline short CO interval Borderline right axis deviation Minimal ST depression, inferior leads Borderline prolonged QT interval    XR chest 1 view    Result Date: 5/14/2024  Interpreted By:  Eunice Lopez, STUDY: XR CHEST 1 VIEW;  5/14/2024 11:57 am   INDICATION: Signs/Symptoms:Chest Pain.   COMPARISON: 03/01/2024   ACCESSION NUMBER(S): [id number]   ORDERING CLINICIAN: OSMANI TARIQ   FINDINGS: The heart size appears normal.   There are atherosclerotic changes of the aorta   The lungs are hyperinflated. There is basilar interstitial lung disease.   There appears to be right basilar calcified granuloma.   The patient is osteoporotic and scoliotic.   COMPARISON OF FINDING: Chest is similar.       COPD. Interstitial lung disease.   MACRO: none   Signed by: Eunice Lopez 5/14/2024 12:04 PM Dictation workstation:   [id number]    Scheduled medications:  amoxicillin-pot clavulanate, 1 tablet, oral, BID  fluticasone furoate-vilanteroL, 1 puff, inhalation, Daily  furosemide, 40 mg, intravenous, BID  heparin (porcine), 5,000 Units, subcutaneous, q8h  ipratropium-albuteroL, 3 mL, nebulization, q6h  loratadine, 10 mg, oral, Daily  methylPREDNISolone sodium succinate (PF), 40 mg, intravenous, q8h DORIS  metoprolol tartrate, 37.5 mg, oral, BID  oxygen, , inhalation, Continuous - Inhalation  [START ON 5/15/2024] pantoprazole, 40 mg, oral, Daily before breakfast   Or  [START ON 5/15/2024]  pantoprazole, 40 mg, intravenous, Daily before breakfast  polyethylene glycol, 17 g, oral, Daily  potassium chloride, 40 mEq, oral, BID      Continuous medications:     PRN medications:  PRN medications: acetaminophen, albuterol, benzonatate, bisacodyl, bisacodyl, guaiFENesin, melatonin, ondansetron **OR** ondansetron      Past Medical History  She has a past medical history of COPD (chronic obstructive pulmonary disease) (Multi), Disorder of the skin and subcutaneous tissue, unspecified (07/22/2016), Personal history of other diseases of the respiratory system (02/06/2018), and Personal history of other specified conditions (07/22/2016).    Surgical History  She has a past surgical history that includes Other surgical history (09/25/2019).     Social History  She reports that she quit smoking about 6 years ago. Her smoking use included cigarettes. She started smoking about 65 years ago. She has a 59 pack-year smoking history. She has never used smokeless tobacco. She reports that she does not drink alcohol and does not use drugs.    Family History  No family history on file.     Allergies  Erythromycin, Hydrocodone-acetaminophen, and Doxycycline    Code Status  Full Code     Review of Systems   Constitutional:  Positive for activity change, appetite change and fatigue. Negative for chills and fever.   HENT:  Positive for congestion.    Eyes:  Negative for photophobia and visual disturbance.   Respiratory:  Positive for cough, chest tightness, shortness of breath and wheezing.    Cardiovascular:  Positive for leg swelling. Negative for chest pain and palpitations.   Gastrointestinal:  Negative for abdominal pain, blood in stool, nausea and vomiting.   Endocrine: Negative for polydipsia and polyuria.   Genitourinary:  Negative for decreased urine volume, dysuria, hematuria and urgency.   Skin:  Negative for color change and rash.   Neurological:  Positive for weakness. Negative for dizziness, tremors, syncope and  light-headedness.   Psychiatric/Behavioral:  Negative for confusion and sleep disturbance.    All other systems reviewed and are negative.    Last Recorded Vitals  BP (!) 156/95   Pulse 78   Temp 36.5 °C (97.7 °F) (Temporal)   Resp (!) 22   Wt (!) 44.7 kg (98 lb 8.7 oz)   SpO2 98%      Physical Exam:  Vital signs and nursing notes reviewed.   Constitutional: Pleasant and cooperative. Laying in bed in no acute distress. Conversant. A bit hard of hearing (L > R ear for hearing)  Skin: Warm and dry; no obvious lesions, rashes, pallor, or jaundice.    Eyes: EOMI. Anicteric sclera.   ENT: Mucous membranes moist; no obvious injury or deformity appreciated.   Head and Neck: Normocephalic, atraumatic. ROM preserved. Trachea midline.   Respiratory: Nonlabored on 6L NC at 97-98%. Chest rise is equal. Lungs diminished bilaterally with some scattered rhonchi, crackles at the bases, expiratory wheezing  Cardiovascular: RRR. No gross murmur, gallop, or rub. Extremities are warm and well-perfused with good capillary refill. No chest wall tenderness.   Gastro: Abdomen soft, nontender, nondistended. No obvious organomegaly appreciated.  : No CVA tenderness.   MSK: No gross abnormalities appreciated. Global weakness but nonfocal.   Extremities: Pitting BLE edema to knees. No cyanosis or clubbing evident. Neurovascularly intact.   Neuro: A&Ox3. CN 2-12 grossly intact. Able to respond to questions appropriately and clearly (again, hard of hearing, but if spoken to loudly/clearly can respond appropriately). No acute focal neurologic deficits appreciated.  Psych: Appropriate mood and behavior.    Assessment/Plan   Principal Problem:    Acute on chronic diastolic heart failure (Multi)  Active Problems:    COPD with acute exacerbation (Multi)    Moderate aortic stenosis    Generalized weakness    Essential hypertension    Mixed hyperlipidemia    Peripheral edema    Elevated troponin    Hypokalemia    Chronic hypoxic respiratory  failure, on home oxygen therapy (Multi)    79 y.o. female with PMHx s/f HTN, HLD, moderate aortic stenosis, chronic bilateral lower extremity edema (on Lasix), COPD with chronic hypoxic respiratory failure (baseline 4-4.5 L nasal cannula), presenting with worsening shortness of breath, dyspnea on exertion, cough (productive - green sputum), wheezing, congestion, and generalized weakness.     AECOPD; COPD with chronic hypoxic respiratory failure  -Patient's baseline oxygen is 4-4.5 L; she is currently on about 6 L nasal cannula but this was more so for comfort rather than documented hypoxia, will try to wean back to baseline  -She is diffusely wheezy on examination and has had increased cough and sputum production  -Patient with prior itching to doxycycline and erythromycin; has tolerated Augmentin in the past and with increased sputum production and change in color will be continued on empiric Augmentin  -Continued on IV steroids  -Continued on home therapies, additionally scheduled and as needed nebs  -Pulmonary hygiene, RT consultation is appreciated  -Will check VBG in the morning    Worsening bilateral lower extremity edema with minimally elevated BNP, ? diastolic dysfunction  -Patient had an echocardiogram completed on 01/29/2024 which revealed an LVEF of 60-65%, low normal RV systolic function, mild aortic valve stenosis  -During prior admissions, her BNP was more markedly elevated with her present BNP of 374 actually being on the lower end of prior admits  -Initiate lasix 40 mg IV push twice daily for now, suspect that she can likely come down to oral Lasix within the next 24-48 hours  -Continue to follow volume status closely  -Monitor renal function closely with diuresis   -Monitor electrolytes and replenish generously as needed   -Strict I&O’s   -2g salt restriction, 2L fluid restriction   -Monitor fluid status closely   -Cardiology consultation, appreciate further recommendations     Elevated  troponin/demand ischemia/suspected type II MI  -Patient is presenting with an initial high-sensitivity troponin of 74, up trended to 84  -Her ECG is nonrevealing for acute ischemic changes and she is not complaining of any chest pain or anginal equivalents  -I suspect that this is demand mediated in setting of her respiratory status and increased work of breathing, likely degree of diastolic dysfunction, but she will be continued on aspirin, statin, beta-blocker  -I will update her lipid panel and A1c in the morning, monitor on telemetry  -Unless interval development of chest pain or anginal symptoms, significant increase in troponin, will hold on heparinization.  Cardiology on consult as above.    Hypokalemia  -Initial potassium 2.9, was given replacement in the ED.  Will recheck.  -Magnesium was within appropriate limits at this time    HTN, HLD  -Continue home medications  -Monitor and adjust as needed     Deconditioning and debility  -PT/OT  -Social work consultation appreciated         Fabiana Shin PA-C    Dragon dictation software was used to dictate this note and thus there may be minor errors in translation/transcription including garbled speech or misspellings. Please contact for clarification if needed.

## 2024-05-14 NOTE — TELEPHONE ENCOUNTER
Ирина dropped off [facility] papers so she can stay off work 5/28/, 29, 30, 31/24.  Please fax when complete and call Ирина to come .  She is staying with her parents during that time. (Day & night)  Placed in Dr. Lara mailbox

## 2024-05-14 NOTE — ED PROVIDER NOTES
HPI   Chief Complaint   Patient presents with    Shortness of Breath     With weakness x past few days. Hx COPD, on 4 lpm at baseline. 90-91% on baseline O2. + cough with green sputum. Denies CP.        HPI:  79-year-old female with history of COPD on 4-1/2 L of oxygen chronically at home and heart dysrhythmia presents emergency department with worsening shortness of breath over the last several days daughter who is at the bedside provides much of the history states that the patient cannot even get up out of the bed because she is so short of breath states that she is now having a productive cough of nasty looking phlegm denies any obvious fevers or chills denies any abdominal complaints or vomiting    Limitations to history: Patient is hard of hearing best hearing by speaking into her left ear  Independent Historians: Daughter at bedside  External Records Reviewed: EMR records  ------------------------------------------------------------------------------------------------------------------------------------------  ROS: a ten point review of systems was performed and negative except as per HPI.  ------------------------------------------------------------------------------------------------------------------------------------------  PMH / PSH: as per HPI, reviewed in EMR and discussed with the patient []  MEDS:  reviewed in EMR and discussed with the patient []  ALLERGIES: reviewed in EMR[]  SocH:  as per HPI, otherwise reviewed in EMR []  FH:  as per HPI, otherwise reviewed in EMR []  ------------------------------------------------------------------------------------------------------------------------------------------  Physical Exam:  VS: As documented in the triage note and EMR flowsheet from this visit was reviewed  General: Tachypneic with increased work of breathing thin frail appearing  Eyes:  Extraocular movements grossly intact. No scleral icterus.   HEENT: Atraumatic. Normocephalic.    Neck: Supple. No  gross masses  CV: RRR, audible S1/S2, 2+ symmetric peripheral pulses  Resp: Crackles bilaterally midway up the lung along with an expiratory wheezing at the base no respiratory distress.  -labored respirations  GI: Soft, non-tender, non-distended, no rebound or gaurding  MSK: Bilateral pitting edema up to the knees skin: Warm, dry, no obvious rash.  Neuro: Speech fluent. Awake. Alert. Appropriate conversation.  Psych: Appropriate mood and affect for situation  ------------------------------------------------------------------------------------------------------------------------------------------  Hospital Course / Medical Decision Making:  Independent Interpretations:  EKG -   Twelve-lead EKG performed and independently interpreted by me as showing sinus rhythm at a rate of 90 with multiforme PVCs QRS duration of 82 borderline right axis deviation borderline short PA interval no signs of acute ischemia ST segments are flat without any elevation    79-year-old female with COPD on chronic oxygen therapy at home presenting with lower extremity edema worsening shortness of breath and was noted to be hypoxic and tachypneic.  She is in mild respiratory distress she was hooked up to the cardiac monitor peripheral IV access was obtained she was maintained on her nasal cannula found to be fluid overloaded on physical exam lab work notable for BNP level elevated over 300 as well as troponin elevation likely demand delta troponins have been stable.  Electrolytes also notable for hypokalemia which was repleted.  He was given DuoNeb Solu-Medrol Lasix IV to help with diuresis and I's and O's will be monitored her case was discussed with the hospitalist team who agreed with the plan for admission for continued diuresis and medical optimization of for her dyspnea and hypoxic respiratory failure venous blood gas was reassuring with a normal pH        Final diagnosis and disposition: [] Hypoxic respiratory failure, CHF  exacerbation            Lady Dale MD                                      [place] Coma Scale Score: 15                     Patient History   Past Medical History:   Diagnosis Date    COPD (chronic obstructive pulmonary disease) (Multi)     Disorder of the skin and subcutaneous tissue, unspecified 2016    Leg skin lesion, left    Personal history of other diseases of the respiratory system 2018    History of allergic rhinitis    Personal history of other specified conditions 2016    History of chest pain     Past Surgical History:   Procedure Laterality Date    OTHER SURGICAL HISTORY  2019    Cataract surgery     No family history on file.  Social History     Tobacco Use    Smoking status: Former     Current packs/day: 0.00     Average packs/day: 1 pack/day for 59.0 years (59.0 ttl pk-yrs)     Types: Cigarettes     Start date:      Quit date: 2018     Years since quittin.3    Smokeless tobacco: Never   Vaping Use    Vaping status: Not on file   Substance Use Topics    Alcohol use: Never    Drug use: Never       Physical Exam   ED Triage Vitals [24 1032]   Temperature Heart Rate Respirations BP   36.5 °C (97.7 °F) (!) 111 (!) 24 (!) 159/101      Pulse Ox Temp Source Heart Rate Source Patient Position   (!) 93 % Temporal Monitor --      BP Location FiO2 (%)     -- --       Physical Exam    ED Course & MDM   Diagnoses as of 24 1552   Hypokalemia   Dyspnea, unspecified type   Elevated troponin   Acute on chronic congestive heart failure, unspecified heart failure type (Multi)   Acute on chronic diastolic heart failure (Multi)       Medical Decision Making      Procedure  Procedures     Lady Dale MD  24 1340

## 2024-05-14 NOTE — TELEPHONE ENCOUNTER
Patient daughter called the office to see if you could come to the floor to evaluate her for the shakiness that may be related to surgery. I did inform the daughter the floor needs to put in a consult

## 2024-05-15 ENCOUNTER — TELEMEDICINE (OUTPATIENT)
Dept: PHARMACY | Facility: HOSPITAL | Age: 79
End: 2024-05-15
Payer: COMMERCIAL

## 2024-05-15 ENCOUNTER — APPOINTMENT (OUTPATIENT)
Dept: CARDIOLOGY | Facility: HOSPITAL | Age: 79
DRG: 280 | End: 2024-05-15
Payer: MEDICARE

## 2024-05-15 DIAGNOSIS — I50.33 ACUTE ON CHRONIC DIASTOLIC HEART FAILURE (MULTI): Primary | ICD-10-CM

## 2024-05-15 DIAGNOSIS — J44.1 COPD WITH ACUTE EXACERBATION (MULTI): ICD-10-CM

## 2024-05-15 LAB
ALBUMIN SERPL BCP-MCNC: 3.5 G/DL (ref 3.4–5)
ALP SERPL-CCNC: 73 U/L (ref 33–136)
ALT SERPL W P-5'-P-CCNC: 26 U/L (ref 7–45)
ANION GAP BLDV CALCULATED.4IONS-SCNC: -8 MMOL/L (ref 10–25)
ANION GAP SERPL CALC-SCNC: 13 MMOL/L (ref 10–20)
AST SERPL W P-5'-P-CCNC: 27 U/L (ref 9–39)
BASE EXCESS BLDV CALC-SCNC: 21.5 MMOL/L (ref -2–3)
BASOPHILS # BLD AUTO: 0.02 X10*3/UL (ref 0–0.1)
BASOPHILS NFR BLD AUTO: 0.2 %
BILIRUB SERPL-MCNC: 0.5 MG/DL (ref 0–1.2)
BODY TEMPERATURE: 37 DEGREES CELSIUS
BUN SERPL-MCNC: 25 MG/DL (ref 6–23)
CA-I BLDV-SCNC: 1.14 MMOL/L (ref 1.1–1.33)
CALCIUM SERPL-MCNC: 9.1 MG/DL (ref 8.6–10.3)
CHLORIDE BLDV-SCNC: 100 MMOL/L (ref 98–107)
CHLORIDE SERPL-SCNC: 95 MMOL/L (ref 98–107)
CHOLEST SERPL-MCNC: 240 MG/DL (ref 0–199)
CHOLESTEROL/HDL RATIO: 5.1
CO2 SERPL-SCNC: 39 MMOL/L (ref 21–32)
CREAT SERPL-MCNC: 0.7 MG/DL (ref 0.5–1.05)
EGFRCR SERPLBLD CKD-EPI 2021: 87 ML/MIN/1.73M*2
EOSINOPHIL # BLD AUTO: 0 X10*3/UL (ref 0–0.4)
EOSINOPHIL NFR BLD AUTO: 0 %
ERYTHROCYTE [DISTWIDTH] IN BLOOD BY AUTOMATED COUNT: 15.4 % (ref 11.5–14.5)
EST. AVERAGE GLUCOSE BLD GHB EST-MCNC: 114 MG/DL
GLUCOSE BLDV-MCNC: 120 MG/DL (ref 74–99)
GLUCOSE SERPL-MCNC: 100 MG/DL (ref 74–99)
HBA1C MFR BLD: 5.6 %
HCO3 BLDV-SCNC: 48.4 MMOL/L (ref 22–26)
HCT VFR BLD AUTO: 40 % (ref 36–46)
HCT VFR BLD EST: 33 % (ref 36–46)
HDLC SERPL-MCNC: 46.9 MG/DL
HGB BLD-MCNC: 12 G/DL (ref 12–16)
HGB BLDV-MCNC: 11.1 G/DL (ref 12–16)
IMM GRANULOCYTES # BLD AUTO: 0.03 X10*3/UL (ref 0–0.5)
IMM GRANULOCYTES NFR BLD AUTO: 0.3 % (ref 0–0.9)
INHALED O2 CONCENTRATION: 40 %
LACTATE BLDV-SCNC: 1.4 MMOL/L (ref 0.4–2)
LDLC SERPL CALC-MCNC: 167 MG/DL
LYMPHOCYTES # BLD AUTO: 0.34 X10*3/UL (ref 0.8–3)
LYMPHOCYTES NFR BLD AUTO: 3 %
MAGNESIUM SERPL-MCNC: 1.96 MG/DL (ref 1.6–2.4)
MCH RBC QN AUTO: 26.4 PG (ref 26–34)
MCHC RBC AUTO-ENTMCNC: 30 G/DL (ref 32–36)
MCV RBC AUTO: 88 FL (ref 80–100)
MONOCYTES # BLD AUTO: 0.6 X10*3/UL (ref 0.05–0.8)
MONOCYTES NFR BLD AUTO: 5.3 %
NEUTROPHILS # BLD AUTO: 10.34 X10*3/UL (ref 1.6–5.5)
NEUTROPHILS NFR BLD AUTO: 91.2 %
NON HDL CHOLESTEROL: 193 MG/DL (ref 0–149)
NRBC BLD-RTO: 0 /100 WBCS (ref 0–0)
OXYHGB MFR BLDV: 88 % (ref 45–75)
PCO2 BLDV: 65 MM HG (ref 41–51)
PH BLDV: 7.48 PH (ref 7.33–7.43)
PLATELET # BLD AUTO: 285 X10*3/UL (ref 150–450)
PO2 BLDV: 58 MM HG (ref 35–45)
POTASSIUM BLDV-SCNC: 4.5 MMOL/L (ref 3.5–5.3)
POTASSIUM SERPL-SCNC: 3.8 MMOL/L (ref 3.5–5.3)
PROT SERPL-MCNC: 6.4 G/DL (ref 6.4–8.2)
RBC # BLD AUTO: 4.55 X10*6/UL (ref 4–5.2)
SAO2 % BLDV: 90 % (ref 45–75)
SODIUM BLDV-SCNC: 136 MMOL/L (ref 136–145)
SODIUM SERPL-SCNC: 143 MMOL/L (ref 136–145)
TRIGL SERPL-MCNC: 131 MG/DL (ref 0–149)
VLDL: 26 MG/DL (ref 0–40)
WBC # BLD AUTO: 11.3 X10*3/UL (ref 4.4–11.3)

## 2024-05-15 PROCEDURE — 2500000004 HC RX 250 GENERAL PHARMACY W/ HCPCS (ALT 636 FOR OP/ED): Performed by: STUDENT IN AN ORGANIZED HEALTH CARE EDUCATION/TRAINING PROGRAM

## 2024-05-15 PROCEDURE — 36415 COLL VENOUS BLD VENIPUNCTURE: CPT | Performed by: STUDENT IN AN ORGANIZED HEALTH CARE EDUCATION/TRAINING PROGRAM

## 2024-05-15 PROCEDURE — 83036 HEMOGLOBIN GLYCOSYLATED A1C: CPT | Performed by: STUDENT IN AN ORGANIZED HEALTH CARE EDUCATION/TRAINING PROGRAM

## 2024-05-15 PROCEDURE — 93005 ELECTROCARDIOGRAM TRACING: CPT

## 2024-05-15 PROCEDURE — 2500000002 HC RX 250 W HCPCS SELF ADMINISTERED DRUGS (ALT 637 FOR MEDICARE OP, ALT 636 FOR OP/ED): Mod: MUE | Performed by: INTERNAL MEDICINE

## 2024-05-15 PROCEDURE — 2500000001 HC RX 250 WO HCPCS SELF ADMINISTERED DRUGS (ALT 637 FOR MEDICARE OP): Performed by: STUDENT IN AN ORGANIZED HEALTH CARE EDUCATION/TRAINING PROGRAM

## 2024-05-15 PROCEDURE — 94640 AIRWAY INHALATION TREATMENT: CPT

## 2024-05-15 PROCEDURE — 97162 PT EVAL MOD COMPLEX 30 MIN: CPT | Mod: GP | Performed by: PHYSICAL THERAPIST

## 2024-05-15 PROCEDURE — 94667 MNPJ CHEST WALL 1ST: CPT

## 2024-05-15 PROCEDURE — 83735 ASSAY OF MAGNESIUM: CPT | Performed by: STUDENT IN AN ORGANIZED HEALTH CARE EDUCATION/TRAINING PROGRAM

## 2024-05-15 PROCEDURE — 99222 1ST HOSP IP/OBS MODERATE 55: CPT | Performed by: NURSE PRACTITIONER

## 2024-05-15 PROCEDURE — 85025 COMPLETE CBC W/AUTO DIFF WBC: CPT | Performed by: STUDENT IN AN ORGANIZED HEALTH CARE EDUCATION/TRAINING PROGRAM

## 2024-05-15 PROCEDURE — 2500000005 HC RX 250 GENERAL PHARMACY W/O HCPCS: Performed by: STUDENT IN AN ORGANIZED HEALTH CARE EDUCATION/TRAINING PROGRAM

## 2024-05-15 PROCEDURE — 84075 ASSAY ALKALINE PHOSPHATASE: CPT | Performed by: STUDENT IN AN ORGANIZED HEALTH CARE EDUCATION/TRAINING PROGRAM

## 2024-05-15 PROCEDURE — 99222 1ST HOSP IP/OBS MODERATE 55: CPT | Performed by: INTERNAL MEDICINE

## 2024-05-15 PROCEDURE — 36415 COLL VENOUS BLD VENIPUNCTURE: CPT | Performed by: PHYSICIAN ASSISTANT

## 2024-05-15 PROCEDURE — 92610 EVALUATE SWALLOWING FUNCTION: CPT | Mod: GN | Performed by: PHARMACIST

## 2024-05-15 PROCEDURE — 2500000004 HC RX 250 GENERAL PHARMACY W/ HCPCS (ALT 636 FOR OP/ED): Performed by: NURSE PRACTITIONER

## 2024-05-15 PROCEDURE — 2500000001 HC RX 250 WO HCPCS SELF ADMINISTERED DRUGS (ALT 637 FOR MEDICARE OP): Performed by: INTERNAL MEDICINE

## 2024-05-15 PROCEDURE — 2500000005 HC RX 250 GENERAL PHARMACY W/O HCPCS: Performed by: NURSE PRACTITIONER

## 2024-05-15 PROCEDURE — 2500000001 HC RX 250 WO HCPCS SELF ADMINISTERED DRUGS (ALT 637 FOR MEDICARE OP): Performed by: PHYSICIAN ASSISTANT

## 2024-05-15 PROCEDURE — 80061 LIPID PANEL: CPT | Performed by: STUDENT IN AN ORGANIZED HEALTH CARE EDUCATION/TRAINING PROGRAM

## 2024-05-15 PROCEDURE — 84132 ASSAY OF SERUM POTASSIUM: CPT | Mod: 91 | Performed by: PHYSICIAN ASSISTANT

## 2024-05-15 PROCEDURE — 99233 SBSQ HOSP IP/OBS HIGH 50: CPT | Performed by: PHYSICIAN ASSISTANT

## 2024-05-15 PROCEDURE — 97165 OT EVAL LOW COMPLEX 30 MIN: CPT | Mod: GO | Performed by: OCCUPATIONAL THERAPIST

## 2024-05-15 PROCEDURE — 2060000001 HC INTERMEDIATE ICU ROOM DAILY

## 2024-05-15 RX ORDER — GUAIFENESIN 600 MG/1
600 TABLET, EXTENDED RELEASE ORAL 2 TIMES DAILY
Status: DISCONTINUED | OUTPATIENT
Start: 2024-05-15 | End: 2024-05-17

## 2024-05-15 RX ORDER — MORPHINE SULFATE 2 MG/ML
1 INJECTION, SOLUTION INTRAMUSCULAR; INTRAVENOUS ONCE
Status: DISCONTINUED | OUTPATIENT
Start: 2024-05-15 | End: 2024-05-15

## 2024-05-15 RX ADMIN — HYDROXYZINE HYDROCHLORIDE 12.5 MG: 25 TABLET ORAL at 22:29

## 2024-05-15 RX ADMIN — METOPROLOL TARTRATE 37.5 MG: 25 TABLET, FILM COATED ORAL at 22:28

## 2024-05-15 RX ADMIN — AMOXICILLIN AND CLAVULANATE POTASSIUM 1 TABLET: 875; 125 TABLET, FILM COATED ORAL at 08:54

## 2024-05-15 RX ADMIN — FLUTICASONE FUROATE AND VILANTEROL TRIFENATATE 1 PUFF: 200; 25 POWDER RESPIRATORY (INHALATION) at 22:27

## 2024-05-15 RX ADMIN — METOPROLOL TARTRATE 37.5 MG: 25 TABLET, FILM COATED ORAL at 08:54

## 2024-05-15 RX ADMIN — HEPARIN SODIUM 5000 UNITS: 5000 INJECTION INTRAVENOUS; SUBCUTANEOUS at 22:30

## 2024-05-15 RX ADMIN — HEPARIN SODIUM 5000 UNITS: 5000 INJECTION INTRAVENOUS; SUBCUTANEOUS at 05:51

## 2024-05-15 RX ADMIN — POTASSIUM CHLORIDE 40 MEQ: 1.5 POWDER, FOR SOLUTION ORAL at 22:44

## 2024-05-15 RX ADMIN — BENZONATATE 200 MG: 100 CAPSULE ORAL at 05:51

## 2024-05-15 RX ADMIN — IPRATROPIUM BROMIDE AND ALBUTEROL SULFATE 3 ML: 2.5; .5 SOLUTION RESPIRATORY (INHALATION) at 20:26

## 2024-05-15 RX ADMIN — PANTOPRAZOLE SODIUM 40 MG: 40 TABLET, DELAYED RELEASE ORAL at 08:54

## 2024-05-15 RX ADMIN — Medication 6 L/MIN: at 20:26

## 2024-05-15 RX ADMIN — Medication 4 L/MIN: at 07:16

## 2024-05-15 RX ADMIN — HEPARIN SODIUM 5000 UNITS: 5000 INJECTION INTRAVENOUS; SUBCUTANEOUS at 13:17

## 2024-05-15 RX ADMIN — AMOXICILLIN AND CLAVULANATE POTASSIUM 1 TABLET: 875; 125 TABLET, FILM COATED ORAL at 22:28

## 2024-05-15 RX ADMIN — GUAIFENESIN 600 MG: 600 TABLET ORAL at 22:28

## 2024-05-15 RX ADMIN — FUROSEMIDE 40 MG: 10 INJECTION, SOLUTION INTRAMUSCULAR; INTRAVENOUS at 13:17

## 2024-05-15 RX ADMIN — HYDROXYZINE HYDROCHLORIDE 12.5 MG: 25 TABLET ORAL at 12:03

## 2024-05-15 RX ADMIN — FUROSEMIDE 40 MG: 10 INJECTION, SOLUTION INTRAMUSCULAR; INTRAVENOUS at 08:54

## 2024-05-15 RX ADMIN — ALBUTEROL SULFATE 2 PUFF: 90 AEROSOL, METERED RESPIRATORY (INHALATION) at 09:48

## 2024-05-15 RX ADMIN — METHYLPREDNISOLONE SODIUM SUCCINATE 40 MG: 40 INJECTION, POWDER, FOR SOLUTION INTRAMUSCULAR; INTRAVENOUS at 13:17

## 2024-05-15 RX ADMIN — LORATADINE 10 MG: 10 TABLET ORAL at 22:28

## 2024-05-15 RX ADMIN — Medication 6 L/MIN: at 20:00

## 2024-05-15 RX ADMIN — IPRATROPIUM BROMIDE AND ALBUTEROL SULFATE 3 ML: 2.5; .5 SOLUTION RESPIRATORY (INHALATION) at 11:26

## 2024-05-15 RX ADMIN — ASPIRIN 81 MG: 81 TABLET, COATED ORAL at 08:54

## 2024-05-15 RX ADMIN — POTASSIUM CHLORIDE 40 MEQ: 1.5 POWDER, FOR SOLUTION ORAL at 08:54

## 2024-05-15 RX ADMIN — METHYLPREDNISOLONE SODIUM SUCCINATE 40 MG: 40 INJECTION, POWDER, FOR SOLUTION INTRAMUSCULAR; INTRAVENOUS at 22:32

## 2024-05-15 RX ADMIN — TIOTROPIUM BROMIDE INHALATION SPRAY 2 PUFF: 3.12 SPRAY, METERED RESPIRATORY (INHALATION) at 12:03

## 2024-05-15 RX ADMIN — METHYLPREDNISOLONE SODIUM SUCCINATE 40 MG: 40 INJECTION, POWDER, FOR SOLUTION INTRAMUSCULAR; INTRAVENOUS at 05:51

## 2024-05-15 RX ADMIN — Medication 3 MG: at 22:32

## 2024-05-15 ASSESSMENT — ENCOUNTER SYMPTOMS
DIARRHEA: 0
JOINT SWELLING: 0
HEADACHES: 0
TROUBLE SWALLOWING: 0
PALPITATIONS: 0
DYSURIA: 0
FLANK PAIN: 0
HEMATURIA: 0
FREQUENCY: 0
FATIGUE: 0
WOUND: 0
SORE THROAT: 0
COUGH: 1
BACK PAIN: 0
ABDOMINAL PAIN: 0
CHILLS: 0
FEVER: 0
BRUISES/BLEEDS EASILY: 0
EYE PAIN: 0
CHEST TIGHTNESS: 0
VOMITING: 0
LIGHT-HEADEDNESS: 0
HALLUCINATIONS: 0
WHEEZING: 1
APPETITE CHANGE: 0
WEAKNESS: 0
SHORTNESS OF BREATH: 1
BLOOD IN STOOL: 0
FACIAL SWELLING: 0
NAUSEA: 0
CONSTIPATION: 0
NUMBNESS: 0
DIAPHORESIS: 0
DIZZINESS: 0

## 2024-05-15 ASSESSMENT — COGNITIVE AND FUNCTIONAL STATUS - GENERAL
MOVING TO AND FROM BED TO CHAIR: A LITTLE
TOILETING: A LITTLE
HELP NEEDED FOR BATHING: TOTAL
MOBILITY SCORE: 17
CLIMB 3 TO 5 STEPS WITH RAILING: A LOT
MOBILITY SCORE: 17
DRESSING REGULAR UPPER BODY CLOTHING: A LITTLE
DAILY ACTIVITIY SCORE: 19
HELP NEEDED FOR BATHING: A LITTLE
STANDING UP FROM CHAIR USING ARMS: A LITTLE
TURNING FROM BACK TO SIDE WHILE IN FLAT BAD: A LITTLE
DRESSING REGULAR UPPER BODY CLOTHING: A LOT
STANDING UP FROM CHAIR USING ARMS: A LITTLE
PERSONAL GROOMING: A LITTLE
DRESSING REGULAR LOWER BODY CLOTHING: TOTAL
MOVING TO AND FROM BED TO CHAIR: A LITTLE
WALKING IN HOSPITAL ROOM: A LITTLE
TURNING FROM BACK TO SIDE WHILE IN FLAT BAD: A LITTLE
DAILY ACTIVITIY SCORE: 12
MOVING FROM LYING ON BACK TO SITTING ON SIDE OF FLAT BED WITH BEDRAILS: A LITTLE
WALKING IN HOSPITAL ROOM: A LITTLE
EATING MEALS: A LITTLE
DRESSING REGULAR LOWER BODY CLOTHING: A LITTLE
TOILETING: A LOT
MOVING FROM LYING ON BACK TO SITTING ON SIDE OF FLAT BED WITH BEDRAILS: A LITTLE
CLIMB 3 TO 5 STEPS WITH RAILING: A LOT
PERSONAL GROOMING: A LITTLE

## 2024-05-15 ASSESSMENT — PAIN SCALES - GENERAL
PAINLEVEL_OUTOF10: 0 - NO PAIN

## 2024-05-15 ASSESSMENT — PAIN - FUNCTIONAL ASSESSMENT
PAIN_FUNCTIONAL_ASSESSMENT: 0-10

## 2024-05-15 ASSESSMENT — ACTIVITIES OF DAILY LIVING (ADL): BATHING_ASSISTANCE: TOTAL

## 2024-05-15 NOTE — PROGRESS NOTES
Social work consult placed for discharge planning. SW reviewed pt's chart and communicated with TCC. No SW needs foreseen at this time. SW signing off; available upon request.    Darion Solares, MSW, LSW ([id number])   Care Transitions

## 2024-05-15 NOTE — PROGRESS NOTES
Physical Therapy    Physical Therapy Evaluation    Patient Name: Irish Davis  MRN: [id number]  Today's Date: 5/15/2024   Time Calculation  Start Time: 1435  Stop Time: 1457  Time Calculation (min): 22 min    Assessment/Plan   PT Assessment  PT Assessment Results: Decreased endurance, Impaired balance, Decreased mobility  Rehab Prognosis: Good  Barriers to Discharge: none  Evaluation/Treatment Tolerance: Patient limited by fatigue (SOB)  Medical Staff Made Aware: Yes  End of Session Communication: PCT/NA/CTA  Assessment Comment: Patient requires only Min assist x 1. Anticipate some good improvement over course of admit.  End of Session Patient Position: Up in chair, Alarm on  IP OR SWING BED PT PLAN  Inpatient or Swing Bed: Inpatient  PT Plan  Treatment/Interventions: Bed mobility, Transfer training, Gait training, Stair training, Balance training, Strengthening, Endurance training, Therapeutic exercise, Therapeutic activity, Home exercise program  PT Plan: Skilled PT  PT Frequency: 3 times per week  PT Discharge Recommendations: Low intensity level of continued care  PT - OK to Discharge: Yes (when medically cleared)      General Visit Information:  General  Reason for Referral: Dx:SOB, weakness, COPD exacerbation  Referred By: Aleida  Past Medical History Relevant to Rehab: HTN, aortic stenosis, SUNITA LE edema, COPD, chronic respiratory failure on 4L/min at home, SUNITA wrist fx's earlier this year.  Co-Treatment:  (with OT for safety and energy conservation)  Prior to Session Communication: PCT/NA/RONA  Patient Position Received:  (on BSC with PCA)  General Comment: Seen in room 2031 with daughter and NSG; tele, o2 on 5L/min    Home Living:  Home Living  Type of Home:  (Lives with  (who has dementia) and daughter in 1 level home. Has 24/7 assist/support, 2 steps to enter home, amb with FWW, assist with ADLs recently, sleeps in recliner. Pt only able to amb about 30 feet at basel due to decreased  endurance.)    Prior Level of Function:       Precautions:  Precautions  Precautions Comment: falls, O2    Vital Signs:     Objective     Pain:  Pain Assessment  Pain Score: 0 - No pain    Cognition:  Cognition  Overall Cognitive Status:  (Oriented to person, place, partially to situation)    General Assessments:      Activity Tolerance  Endurance: Tolerates less than 10 min exercise, no significant change in vital signs, Decreased tolerance for upright activites     Strength  Strength Comments: SUNITA LE quads grossly 4/5        Postural Control  Postural Control:  (Sitting balance fair/fair+; standing balnace fair with AD and assist; noted to be mildly shaky due to poor endurance but no overt LOB)          Functional Assessments:        Transfers  Transfer:  (Sit to stand with Min assist x 1 and FWW; cues for sequencing, safety, balance, AD use)  Ambulation/Gait Training  Ambulation/Gait Training Performed:  (Amb few steps from BSC to bed then to chair after brief seated rest with FWW and Min assist x 1; cues for sequencing, safety, balance, AD use, energy conservation)          Extremity/Trunk Assessments:                Outcome Measures:  [facility] Basic Mobility  Turning from your back to your side while in a flat bed without using bedrails: A little  Moving from lying on your back to sitting on the side of a flat bed without using bedrails: A little  Moving to and from bed to chair (including a wheelchair): A little  Standing up from a chair using your arms (e.g. wheelchair or bedside chair): A little  To walk in hospital room: A little  Climbing 3-5 steps with railing: A lot  Basic Mobility - Total Score: 17                            Goals:  Encounter Problems       Encounter Problems (Active)       PT Problem       transfers       Start:  05/15/24    Expected End:  05/29/24       Patient will perform all transfers with SBA x1          gait       Start:  05/15/24    Expected End:  05/29/24       Patient will amb  30+ feet with SBA x1 and rest breaks prn           strengthening        Start:  05/15/24    Expected End:  05/29/24       Patient will perform 20+ reps of AROM/RROM for SUNITA LE's to improve safety and functional independence                Education Documentation  Precautions, taught by Nora Sr, PT at 5/15/2024  3:45 PM.  Learner: Patient  Readiness: Acceptance  Method: Explanation  Response: Needs Reinforcement    Mobility Training, taught by Nora Sr PT at 5/15/2024  3:45 PM.  Learner: Patient  Readiness: Acceptance  Method: Explanation  Response: Needs Reinforcement    Education Comments  No comments found.

## 2024-05-15 NOTE — CONSULTS
"Nutrition Initial Assessment:   Nutrition Assessment    Reason for Assessment: Admission nursing screening    Medical history per chart:   HTN, HLD, moderate aortic stenosis, chronic bilateral lower extremity edema (on Lasix), COPD with chronic hypoxic respiratory failure     Patient reports breaking \"both arms\" within the past year.      HPI:  Patient presents with worsening shortness of breath, cough, dyspnea on exertion, worsening weakness     5/15:  Patient awake at time of visit, [place], patients daughter present at bedside.  Patient with decreased oral intake, reports fatigue with chewing, intake consists mainly of liquid/soft type foods such as soups, mashed potatoes.  Offered option of pureed/ground meals to aid with chewing, patient declined at this time, wishes to order tolerated foods off menu first, noted SLP is on consult.  Reviewed supplement options, patient does not tolerate Ensure type supplements well (GI upset), is willing to trial magic cup, will provide with meals.  Significant weight loss indicated since November, ~35 lb loss.  Meal and supplement intake encouraged, will monitor and follow per protocol.          Current Diet: Adult diet Cardiac; 70 gm fat; 2 - 3 grams Sodium; 2000 mL fluid  Supplement(s): No  Average meal Intake during admission:  consumed 100% of tomato soup, 1 bite of grilled cheese per visual         Nutrition Related Findings:   Oral Symptoms: chewing  Teeth: Partial plate lower, Dentures upper   GI symptoms: anorexia, early satiety, and chewing difficulty.   BM:    Wound Type: pressure injury:stage I to left and right elbows, right heel  (nursing/wound notes provide further details)  Edema: Yes: BLE  Food allergies: NKFA. is allergic to erythromycin, hydrocodone-acetaminophen, and doxycycline.  Meds/Labs reviewed.  amoxicillin-pot clavulanate, 1 tablet, oral, BID  aspirin, 81 mg, oral, Daily  atorvastatin, 80 mg, oral, Nightly  fluticasone furoate-vilanteroL, 1 puff, " inhalation, Daily  furosemide, 40 mg, intravenous, BID  heparin (porcine), 5,000 Units, subcutaneous, q8h  ipratropium-albuteroL, 3 mL, nebulization, TID  loratadine, 10 mg, oral, Daily  methylPREDNISolone sodium succinate (PF), 40 mg, intravenous, q8h DORIS  metoprolol tartrate, 37.5 mg, oral, BID  oxygen, , inhalation, Continuous - Inhalation  pantoprazole, 40 mg, oral, Daily before breakfast   Or  pantoprazole, 40 mg, intravenous, Daily before breakfast  polyethylene glycol, 17 g, oral, Daily  potassium chloride, 40 mEq, oral, BID  tiotropium, 2 puff, inhalation, Daily             Nutrition Significant Labs:    Results from last 7 days   Lab Units 05/15/24  0448 05/14/24  1055   GLUCOSE mg/dL 100* 86   SODIUM mmol/L 143 143   POTASSIUM mmol/L 3.8 2.9*   CHLORIDE mmol/L 95* 93*   CO2 mmol/L 39* 39*   BUN mg/dL 25* 13   CREATININE mg/dL 0.70 0.71   EGFR mL/min/1.73m*2 87 86   CALCIUM mg/dL 9.1 9.1   MAGNESIUM mg/dL 1.96 1.97     Lab Results   Component Value Date    HGBA1C 5.6 05/15/2024           Anthropometrics:  Height: 152.4 cm (5')   Weight: (!) 38.4 kg (84 lb 10.5 oz)   BMI (Calculated): 16.53  IBW/kg (Dietitian Calculated): 45.4 kg            Weight History:   Wt Readings from Last 10 Encounters:   05/15/24 (!) 38.4 kg (84 lb 10.5 oz)   04/04/24 45.4 kg (100 lb)   03/01/24 51.5 kg (113 lb 8.6 oz)   02/05/24 47.3 kg (104 lb 4.4 oz)   01/30/24 53.5 kg (117 lb 14.4 oz)   11/20/23 54.4 kg (120 lb)   09/07/22 59 kg (130 lb)   08/10/22 59 kg (130 lb)   05/13/22 58.5 kg (129 lb)   06/14/21 54.4 kg (120 lb)        Weight Change %:  Weight History / % Weight Change: 16% loss x 1-2 months/29% loss x 6 months   (11/20/23 120 lb, 3/1/24 113 lb, 4/4/24 100 lb, 5/15/24 84 lb)  Significant Weight Loss: Yes  Interpretation of Weight Loss: >10% in 6 months       Nutrition Focused Physical Exam Findings:    Subcutaneous Fat Loss:   Orbital Fat Pads: Severe (dark circles, hollowing and loose skin)  Buccal Fat Pads: Severe  (hollow, sunken and narrow face)  Triceps: Severe (negligible fat tissue)  Ribs: Defer  Muscle Wasting:  Temporalis: Severe (hollowed scooping depression)  Pectoralis (Clavicular Region): Severe (protruding prominent clavicle)  Deltoid/Trapezius: Severe (squared shoulders, acromion process prominent)  Interosseous: Severe (depressed area between thumb and forefinger)  Trapezius/Infraspinatus/Supraspinatus (Scapular Region): Defer  Quadriceps: Defer  Gastrocnemius: Defer  Edema:  Edema Location: BLE  Physical Findings:  Skin: Positive (wounds)    Estimated Needs:   Total Energy Estimated Needs (kCal):  (3462-8813)  Method for Estimating Needs: 35-40, CBW  Total Protein Estimated Needs (g):  (55-60)  Method for Estimating Needs: 1.5, CBW     Method for Estimating Needs: 1 mL, kcal or per physician        Nutrition Diagnosis   Nutrition Diagnosis:  Malnutrition Diagnosis  Patient has Malnutrition Diagnosis: Yes  Diagnosis Status: New  Malnutrition Diagnosis: Severe malnutrition related to chronic disease or condition  As Evidenced by: 29% weight loss x 6 months, intake <75% of estimated needs > 1 month, severe muscle and fat loss  Additional Assessment Information: Debility, COPD    Nutrition Diagnosis  Patient has Nutrition Diagnosis: Yes  Diagnosis Status (1): New  Nutrition Diagnosis 1: Increased nutrient needs  Related to (1): wound healing  As Evidenced by (1): multiple stage I wounds       Nutrition Interventions/Recommendations   Nutrition Interventions and Recommendations:        Nutrition Prescription:  Individualized Nutrition Prescription Provided for : Severe malnutrition.  Monitor weights, progressive significant weight loss indicated.   Cardiac diet with fluid restriction per physician.  Magic cups TID.        Nutrition Interventions:   Food and/or Nutrient Delivery Interventions  Interventions: Meals and snacks, Medical food supplement  Meals and Snacks: Fluid-modified diet, Mineral-modified diet,  Fat-modified diet  Goal: >50% intake of meals  Medical Food Supplement: Commercial beverage  Goal: >75% intake of magic cup TID         Nutrition Education:   Education Documentation  No documentation found.      Nutrition Counseling  Counseling Theoretical Approach: Other (Comment)  Goal: Reviewed supplement options, Reviewed options to help aid chewing fatigue (offered pureed/ground meals), discussed tips to provide kcals/protein intake.       Nutrition Monitoring and Evaluation   Monitoring/Evaluation:   Food/Nutrient Related History Monitoring  Monitoring and Evaluation Plan: Energy intake  Energy Intake: Estimated energy intake  Criteria: meet >50% of estimated needs from diet and ONS    Body Composition/Growth/Weight History  Monitoring and Evaluation Plan: Weight  Weight: Weight change  Criteria: Maintain stable weight    Biochemical Data, Medical Tests and Procedures  Monitoring and Evaluation Plan: Electrolyte/renal panel, Glucose/endocrine profile  Electrolyte and Renal Panel: BUN, Sodium, Potassium, Phosphorus, Magnesium  Criteria: WNL  Glucose/Endocrine Profile: Glucose, casual  Criteria: WNL    Nutrition Focused Physical Findings  Monitoring and Evaluation Plan: Skin  Skin: Impaired wound healing  Criteria: Promote healing            Time Spent/Follow-up Reminder:   Follow Up  Time Spent (min): 45 minutes  Last Date of Nutrition Visit: 05/15/24  Nutrition Follow-Up Needed?: Dietitian to reassess per policy  Follow up Comment: 5/17-5/20

## 2024-05-15 NOTE — CARE PLAN
The patient's goals for the shift include      The clinical goals for the shift include pt will have no s/s of SOB this shift    Over the shift, the patient did make progress toward the following goals. Pt was a little anxious when arrived to floor. This nurse had pt take deep breaths to relax. Pt able to make needs known.    Problem: Pain  Goal: My pain/discomfort is manageable  Outcome: Progressing     Problem: Safety  Goal: Patient will be injury free during hospitalization  Outcome: Progressing  Goal: I will remain free of falls  Outcome: Progressing     Problem: Daily Care  Goal: Daily care needs are met  Outcome: Progressing     Problem: Psychosocial Needs  Goal: Demonstrates ability to cope with hospitalization/illness  Outcome: Progressing  Goal: Collaborate with me, my family, and caregiver to identify my specific goals  Outcome: Progressing     Problem: Discharge Barriers  Goal: My discharge needs are met  Outcome: Progressing

## 2024-05-15 NOTE — CARE PLAN
Problem: Pain  Goal: My pain/discomfort is manageable  Outcome: Progressing     Problem: Safety  Goal: Patient will be injury free during hospitalization  Outcome: Progressing  Goal: I will remain free of falls  Outcome: Progressing     Problem: Daily Care  Goal: Daily care needs are met  Outcome: Progressing     Problem: Psychosocial Needs  Goal: Demonstrates ability to cope with hospitalization/illness  Outcome: Progressing  Goal: Collaborate with me, my family, and caregiver to identify my specific goals  Outcome: Progressing     Problem: Discharge Barriers  Goal: My discharge needs are met  Outcome: Progressing     Problem: Fall/Injury  Goal: Not fall by end of shift  Outcome: Progressing  Goal: Be free from injury by end of the shift  Outcome: Progressing  Goal: Verbalize understanding of personal risk factors for fall in the hospital  Outcome: Progressing  Goal: Verbalize understanding of risk factor reduction measures to prevent injury from fall in the home  Outcome: Progressing  Goal: Use assistive devices by end of the shift  Outcome: Progressing  Goal: Pace activities to prevent fatigue by end of the shift  Outcome: Progressing     Problem: Respiratory  Goal: Clear secretions with interventions this shift  Outcome: Progressing  Goal: Minimize anxiety/maximize coping throughout shift  Outcome: Progressing  Goal: Minimal/no exertional discomfort or dyspnea this shift  Outcome: Progressing  Goal: No signs of respiratory distress (eg. Use of accessory muscles. Peds grunting)  Outcome: Progressing  Goal: Patent airway maintained this shift  Outcome: Progressing  Goal: Tolerate mechanical ventilation evidenced by VS/agitation level this shift  Outcome: Progressing  Goal: Tolerate pulmonary toileting this shift  Outcome: Progressing  Goal: Verbalize decreased shortness of breath this shift  Outcome: Progressing  Goal: Wean oxygen to maintain O2 saturation per order/standard this shift  Outcome:  Progressing  Goal: Increase self care and/or family involvement in next 24 hours  Outcome: Progressing     Problem: Skin  Goal: Decreased wound size/increased tissue granulation at next dressing change  Outcome: Progressing  Flowsheets (Taken 5/15/2024 1041)  Decreased wound size/increased tissue granulation at next dressing change:   Promote sleep for wound healing   Protective dressings over bony prominences  Goal: Participates in plan/prevention/treatment measures  Outcome: Progressing  Flowsheets (Taken 5/15/2024 1041)  Participates in plan/prevention/treatment measures:   Discuss with provider PT/OT consult   Elevate heels   Increase activity/out of bed for meals  Goal: Prevent/manage excess moisture  Outcome: Progressing  Flowsheets (Taken 5/15/2024 1041)  Prevent/manage excess moisture:   Cleanse incontinence/protect with barrier cream   Follow provider orders for dressing changes   Moisturize dry skin   Monitor for/manage infection if present  Goal: Prevent/minimize sheer/friction injuries  Outcome: Progressing  Flowsheets (Taken 5/15/2024 1041)  Prevent/minimize sheer/friction injuries:   Turn/reposition every 2 hours/use positioning/transfer devices   Increase activity/out of bed for meals   Use pull sheet  Goal: Promote/optimize nutrition  Outcome: Progressing  Flowsheets (Taken 5/15/2024 1041)  Promote/optimize nutrition:   Assist with feeding   Consume > 50% meals/supplements   Monitor/record intake including meals   Offer water/supplements/favorite foods  Goal: Promote skin healing  Outcome: Progressing  Flowsheets (Taken 5/15/2024 1041)  Promote skin healing:   Assess skin/pad under line(s)/device(s)   Protective dressings over bony prominences   Turn/reposition every 2 hours/use positioning/transfer devices   The patient's goals for the shift include      The clinical goals for the shift include pt will have no s/s of SOB this shift

## 2024-05-15 NOTE — CONSULTS
Inpatient consult to Cardiology  Consult performed by: Amish Baez MD  Consult ordered by: Fabiana Shin PA-C  Reason for consult: elev troponin        History Of Present Illness:    Irish Davis is a 79 y.o. female presenting with shortness of breath.  H/o HTN, DL, moderate aortic stenosis, chronic bilateral lower extremity edema (on Lasix), COPD with chronic hypoxic respiratory failure (baseline 4-4.5 L nasal cannula).    The pt presents with SOB, PATEL, cough productive of green sputum, wheezing, congestion, generalized weakness.  These symptoms have worsened over the last several days, to the point that the patient has trouble getting out of bed.  Has some chest tightness with coughing, but no report of exertional chest pain/pressure.  Of note, the has been admitted to the hospital several times in the last 6 months for similar indications.  In the ED she was afebrile, /101, .  Sat 93% on 4L NC.  Labs significant for WBC 10.6, Hb 11.8, BUN 13, Cr 0.71.  , HSTI 74 > 84.  CXR with chronic interstitial lung dse and emphysema, without overt acute process.  Rec'd 125 Solu-Medrol, DuoNebs, 40 mg Lasix, total of 60 mEq Kcl.    The patient is hard of hearing.  When asked whether she was taking furosemide at home (on her outpatient med list under the summary tab, it states she should be taking furosemide 40mg, half a tablet daily), she tells me that she was not told to take a water pill at home.    ROS:  The remainder of the review of systems was obtained, as was negative as pertains to the chief complaint.     Last Recorded Vitals:  Vitals:    05/15/24 0811 05/15/24 1126 05/15/24 1148 05/15/24 1527   BP: 131/78  136/78 119/72   BP Location: Right arm  Right arm Right arm   Patient Position: Lying  Lying Lying   Pulse: 81  81 100   Resp: 20  20 20   Temp: 36.4 °C (97.6 °F)  36.8 °C (98.2 °F) 36.9 °C (98.4 °F)   TempSrc: Temporal  Temporal Temporal   SpO2: 96% 95% 94% (!) 89%   Weight:        Height:           Last Labs:  CBC - 5/15/2024:  4:48 AM  11.3 12.0 285    40.0      CMP - 5/15/2024:  4:48 AM  9.1 6.4 27 --- 0.5   5.8 3.5 26 73      PTT - 3/1/2024: 12:54 PM  1.0   11.0 32     Troponin I, High Sensitivity   Date/Time Value Ref Range Status   05/14/2024 07:47 PM 49 (H) 0 - 13 ng/L Final   05/14/2024 05:06 PM 57 (HH) 0 - 13 ng/L Final     Comment:     Previous result verified on 5/14/2024 1217 on specimen/case [id number] called with component TRPHS for procedure Troponin I, High Sensitivity, Initial with value 74 ng/L.   05/14/2024 12:04 PM 84 (HH) 0 - 13 ng/L Final     Comment:     Previous result verified on 5/14/2024 1217 on specimen/case [id number] called with component TRPHS for procedure Troponin I, High Sensitivity, Initial with value 74 ng/L.     BNP   Date/Time Value Ref Range Status   05/14/2024 10:55  (H) 0 - 99 pg/mL Final   02/02/2024 10:52  (H) 0 - 99 pg/mL Final     Hemoglobin A1C   Date/Time Value Ref Range Status   05/15/2024 04:48 AM 5.6 see below % Final     LDL Calculated   Date/Time Value Ref Range Status   05/15/2024 04:48  (H) <=99 mg/dL Final     Comment:                                 Near   Borderline      AGE      Desirable  Optimal    High     High     Very High     0-19 Y     0 - 109     ---    110-129   >/= 130     ----    20-24 Y     0 - 119     ---    120-159   >/= 160     ----      >24 Y     0 -  99   100-129  130-159   160-189     >/=190       VLDL   Date/Time Value Ref Range Status   05/15/2024 04:48 AM 26 0 - 40 mg/dL Final   07/20/2020 09:08 AM 15 0 - 40 mg/dL Final      Last I/O:  I/O last 3 completed shifts:  In: - (0 mL/kg)   Out: 250 (6.5 mL/kg) [Urine:250 (0.2 mL/kg/hr)]  Weight: 38.4 kg     Past Cardiology Tests (Last 3 Years):  EKG:  ECG 12 Lead 05/15/2024 (Preliminary)      ECG 12 Lead 05/14/2024 (Preliminary)      ECG 12 Lead 05/14/2024 (Preliminary)      ECG 12 lead 05/14/2024 (Preliminary)      ECG 12 lead 05/14/2024  (Preliminary)      Electrocardiogram, 12-lead PRN ACS symptoms 02/04/2024      ECG 12 lead 02/02/2024      ECG 12 Lead 01/28/2024      Electrocardiogram, 12-lead PRN ACS symptoms 11/27/2023    Echo:  Transthoracic Echo (TTE) Complete 01/29/2024      Transthoracic Echo (TTE) Complete 11/21/2023    Ejection Fractions:  EF   Date/Time Value Ref Range Status   01/29/2024 08:56 AM 72 %    11/21/2023 02:24 PM 61       Cath:  No results found for this or any previous visit from the past 1095 days.    Stress Test:  No results found for this or any previous visit from the past 1095 days.    Cardiac Imaging:  No results found for this or any previous visit from the past 1095 days.      Past Medical History:  She has a past medical history of COPD (chronic obstructive pulmonary disease) (Multi), Disorder of the skin and subcutaneous tissue, unspecified (07/22/2016), Personal history of other diseases of the respiratory system (02/06/2018), and Personal history of other specified conditions (07/22/2016).    Past Surgical History:  She has a past surgical history that includes Other surgical history (09/25/2019).      Social History:  She reports that she quit smoking about 6 years ago. Her smoking use included cigarettes. She started smoking about 65 years ago. She has a 59 pack-year smoking history. She has never used smokeless tobacco. She reports that she does not drink alcohol and does not use drugs.    Family History:  No family history on file.     Allergies:  Erythromycin, Hydrocodone-acetaminophen, and Doxycycline    Inpatient Medications:  Scheduled medications   Medication Dose Route Frequency    amoxicillin-pot clavulanate  1 tablet oral BID    aspirin  81 mg oral Daily    atorvastatin  80 mg oral Nightly    fluticasone furoate-vilanteroL  1 puff inhalation Daily    furosemide  40 mg intravenous BID    heparin (porcine)  5,000 Units subcutaneous q8h    ipratropium-albuteroL  3 mL nebulization TID    loratadine  10 mg  oral Daily    methylPREDNISolone sodium succinate (PF)  40 mg intravenous q8h DORIS    metoprolol tartrate  37.5 mg oral BID    oxygen   inhalation Continuous - Inhalation    pantoprazole  40 mg oral Daily before breakfast    Or    pantoprazole  40 mg intravenous Daily before breakfast    polyethylene glycol  17 g oral Daily    potassium chloride  40 mEq oral BID    tiotropium  2 puff inhalation Daily     PRN medications   Medication    acetaminophen    albuterol    benzonatate    bisacodyl    bisacodyl    guaiFENesin    hydrOXYzine HCL    melatonin    ondansetron    Or    ondansetron     Continuous Medications   Medication Dose Last Rate     Outpatient Medications:  Current Outpatient Medications   Medication Instructions    albuterol 90 mcg/actuation inhaler 2 puffs, inhalation, Every 6 hours PRN    benzonatate (TESSALON) 200 mg, oral, 3 times daily PRN, Do not crush or chew.    budesonide-formoteroL (Symbicort) 160-4.5 mcg/actuation inhaler 2 puffs, inhalation, 2 times daily RT, Rinse mouth with water after use to reduce aftertaste and incidence of candidiasis. Do not swallow.    budesonide-glycopyr-formoterol (Breztri Aerosphere) 160-9-4.8 mcg/actuation HFA aerosol inhaler 2 puffs, inhalation, 2 times daily RT    cefuroxime (CEFTIN) 250 mg, oral, 2 times daily    cetirizine (ALL DAY ALLERGY (CETIRIZINE)) 10 mg, oral, Nightly    furosemide (LASIX) 20 mg, oral, Daily    hydrOXYzine HCL (ATARAX) 12.5 mg, oral, Every 8 hours PRN    ipratropium (Atrovent HFA) 17 mcg/actuation inhaler inhalation, Inhale 2 puffs at 8am &8 pm and 1 puff at noon and 4pm    lactobacillus acidophilus tablet tablet oral, 2 times daily    metoprolol tartrate (Lopressor) 25 mg tablet TAKE ONE AND ONE-HALF TABLETS TWICE A DAY    predniSONE (DELTASONE) 10 mg, oral, Daily    predniSONE (DELTASONE) 10 mg, oral, Daily    roflumilast (DALIRESP) 500 mcg, oral, Daily    vitamins A,C,E-zinc-copper (PreserVision AREDS) 2,148 mcg-113 mg-45 mg-17.4mg  tablet 1 tablet, oral, 2 times daily       Physical Exam:  Physical Exam  Constitutional:       Comments: Hard of hearing   HENT:      Head: Normocephalic.      Mouth/Throat:      Mouth: Mucous membranes are moist.   Cardiovascular:      Rate and Rhythm: Regular rhythm. Tachycardia present.      Heart sounds: Murmur heard.      Comments: 1-2/6 TIFFANIE BUSB, 1/6 systolic murmur LLSB  Pulmonary:      Comments: Decreased BS at b/l bases L > R, no wheezes  Abdominal:      Palpations: Abdomen is soft.   Musculoskeletal:      Cervical back: Neck supple.   Skin:     General: Skin is warm.   Neurological:      Mental Status: Mental status is at baseline.   Psychiatric:         Mood and Affect: Mood normal.            Assessment/Plan   Acute on chronic diastolic HF:  Dyspnea, LE edema.  -diuresis with lasix 40mg IV BID  -strict I/O's and daily weights  -serial Cr  -monitor and replete lytes  -patient states she has not been taking home diuretic - recommended that she takes this at least as needed for leg swelling/weight gain    Moderate aortic stenosis:    -serial monitoring on outpatient TTE    HTN  -metop tartrate 37.5mg bid    DL  -atorvastatin 80mg daily    Peripheral IV 05/14/24 22 G Right;Posterior Wrist (Active)   Site Assessment Clean;Dry;Intact 05/15/24 0740   Dressing Type Transparent 05/15/24 0740   Line Status Flushed 05/15/24 0740   Dressing Status Clean;Dry;Occlusive 05/15/24 0740   Number of days: 1       Peripheral IV 05/14/24 22 G Distal;Left;Posterior Wrist (Active)   Site Assessment Clean;Dry;Intact 05/15/24 0740   Dressing Type Transparent 05/15/24 0740   Line Status Flushed 05/15/24 0740   Dressing Status Clean;Dry;Occlusive 05/15/24 0740   Number of days: 1       Code Status:  DNR and No Intubation      Amish Baez MD

## 2024-05-15 NOTE — CONSULTS
Inpatient consult to Pulmonology  Consult performed by: Yanna Olson, AMARJIT-CNP  Consult ordered by: Sadie Zhu PA-C          Reason For Consult    Recurrent admissions due to COPD exacerbation, advanced COPD/chronic respiratory failure       History Of Present Illness  Irish Davis is a 79 y.o. female (former smoker, quit 2018, ~59 pack year history) with a PMHx of severe COPD (GOLD 4, FEV1 29%), chronic hypoxic respiratory failure on 3-4 L NC, frequent falls, HTN, HLD and aortic stenosis. Admitted 5/14/24 after presented with increased SOB, productive cough with green sputum and increasing fatigue/weakness.  Patient admitted for AECOPD with chronic hypoxic respiratory failure, possible acute on chronic diastolic heart failure, elevated troponin and hypokalemia. Pulmonary consulted for recurrent admissions d/t COPD exacerbation, advanced COPD/chronic respiratory failure.     From H&P:  History Of Present Illness:  Irish Davis is a 79 y.o. female with PMHx s/f HTN, HLD, moderate aortic stenosis, chronic bilateral lower extremity edema (on Lasix), COPD with chronic hypoxic respiratory failure (baseline 4-4.5 L nasal cannula), presenting with worsening shortness of breath, dyspnea on exertion, cough (productive - green sputum), wheezing, congestion, and generalized weakness. Patient and family report that over the last few days, she has gotten significantly more weak to the point where she is not really able to get up and out of bed; however, they note that this is exacerbated by a significant amount of aforementioned respiratory symptoms. She has not had any significant sick contacts or exposures; however, she has been admitted multiple times under similar circumstances since about November of last year. Has been taking home medications as prescribed, but noticed some mild increase in lower extremity edema from baseline. Has not had much of an appetite since onset of symptoms, but no  significant nausea/vomiting/abdominal pain/diarrhea. She admits to some chest tightness when coughing and having increased work of breathing when trying to ambulate, but denies any overt chest pain/pressure, diaphoresis, dizziness or lightheadedness, palpitations, syncope or presyncope.  She is feeling better since presentation to the emergency department, but still generally unwell.  She is in no acute distress sitting in bed with multiple loved ones at the bedside.     ED Course (Summary):   Vitals on presentation: T97.7, /101, , RR 24, SpO2 93% 4 L nasal cannula (patient was increased to 6 L nasal cannula from her baseline of 4-4.5 for comfort, no documented hypoxia on her oxygen)  Labs: CBC with WBC 10.6, Hgb 11.8, platelets 292.  BMP with glucose 86, sodium 143, potassium 2.9, BUN 13, serum creatinine 0.71, magnesium 1.97.  .  High-sensitivity troponin 28-49-urfcla pending.  VBG: pH 7.41, pCO2 68, calculated bicarb 43.1.  Venous lactate 2.1-1.4.  ECG is without overt acute ischemic changes  Imaging: CXR with chronic interstitial lung disease/emphysematous changes without overt acute process  Interventions: 125 Solu-Medrol, DuoNebs, 40 mg Lasix, total of 60 mEq KCl    Patient reports her breathing has been unwell since she fell and broke her wrists a few months back, is unable to answer when her symptoms started to worse recently. She is very hard of hearing and unable to tell me for sure what her inhaler/medication regimen is at home. States her breathing and cough have been worse for some time.     Spoke with patient's daughter, Sofie, she reports that for the last several days the patient has had worsening SOB and productive cough. Denies any recent known sick contacts. Denies fever or chills. Has been progressively worse to the point that she can no longer walk to the bathroom that is about 30 feet away and is now using BSC. Per Sofie, patient is using Symbicort 2 puffs BID, atrovent 2  puff in the morning and 2 puffs in the evening with occasional 1-2 puffs in the middle of the day and rarely uses albuterol MDI. She has a nebulizer machine at home but the patient did not feel it was as helpful as the inhalers so she has not used them. Patient has been taking prednisone 10 mg once daily and daliresp which Sofie feels has been helpful up until a few days ago. She also reports that patient recently stated that her breathing gets worse with hot/humid weather and they do not have an air conditioner at home and her breathing has been worse since the weather has been getting warmer and humid.     Per chart review, patient follows with Marcela Garza CNP for outpatient pulmonary. Last seen 24 and was started on prednisone 10 mg once daily, daliresp, PRN benzonatate and advised to stop previous inhaler regimen and start Breztri 2 puffs BID and PRN albuterol.     (Majority of HPI obtained from chart review and patient's daughter, Sofie, as patient is confused and insisted that I took her inhalers)    Past Medical History  Past Medical History:   Diagnosis Date    COPD (chronic obstructive pulmonary disease) (Multi)     Disorder of the skin and subcutaneous tissue, unspecified 2016    Leg skin lesion, left    Personal history of other diseases of the respiratory system 2018    History of allergic rhinitis    Personal history of other specified conditions 2016    History of chest pain       Surgical History  Past Surgical History:   Procedure Laterality Date    OTHER SURGICAL HISTORY  2019    Cataract surgery        Social History  Social History     Tobacco Use    Smoking status: Former     Current packs/day: 0.00     Average packs/day: 1 pack/day for 59.0 years (59.0 ttl pk-yrs)     Types: Cigarettes     Start date:      Quit date: 2018     Years since quittin.3    Smokeless tobacco: Never   Substance Use Topics    Alcohol use: Never    Drug use: Never       Family  History  No family history on file.     Allergies  Erythromycin, Hydrocodone-acetaminophen, and Doxycycline    Review of Systems  Negative except as noted in HPI      Physical Exam  Vitals reviewed.   Constitutional:       Comments: Very thin, chronically ill appearing; confused and agitated stating that I took her inhaler yesterday   HENT:      Mouth/Throat:      Mouth: Mucous membranes are moist.   Cardiovascular:      Rate and Rhythm: Regular rhythm. Tachycardia present.      Pulses: Normal pulses.      Heart sounds: Normal heart sounds.   Pulmonary:      Effort: No respiratory distress.      Comments: Mild conversational dyspnea noted; diminished bilaterally but no wheezing, crackles or rhonchi   Musculoskeletal:      Right lower leg: Edema present.      Left lower leg: Edema present.   Skin:     General: Skin is warm and dry.   Neurological:      Mental Status: She is alert.          Vital Signs  Blood pressure 119/72, pulse 100, temperature 36.9 °C (98.4 °F), temperature source Temporal, resp. rate 20, height 1.524 m (5'), weight (!) 38.4 kg (84 lb 10.5 oz), SpO2 (!) 89%.  Oxygen Therapy  SpO2: (!) 89 % (RN notified)  Medical Gas Therapy: Supplemental oxygen  O2 Delivery Method: Nasal cannula  FiO2 (%): 36 %    Relevant Results  Medications:  Scheduled medications  amoxicillin-pot clavulanate, 1 tablet, oral, BID  aspirin, 81 mg, oral, Daily  atorvastatin, 80 mg, oral, Nightly  fluticasone furoate-vilanteroL, 1 puff, inhalation, Daily  furosemide, 40 mg, intravenous, BID  heparin (porcine), 5,000 Units, subcutaneous, q8h  ipratropium-albuteroL, 3 mL, nebulization, TID  loratadine, 10 mg, oral, Daily  methylPREDNISolone sodium succinate (PF), 40 mg, intravenous, q8h DORIS  metoprolol tartrate, 37.5 mg, oral, BID  oxygen, , inhalation, Continuous - Inhalation  pantoprazole, 40 mg, oral, Daily before breakfast   Or  pantoprazole, 40 mg, intravenous, Daily before breakfast  polyethylene glycol, 17 g, oral,  Daily  potassium chloride, 40 mEq, oral, BID  tiotropium, 2 puff, inhalation, Daily       PRN medications  PRN medications: acetaminophen, albuterol, benzonatate, bisacodyl, bisacodyl, guaiFENesin, hydrOXYzine HCL, melatonin, ondansetron **OR** ondansetron    Labs:  Results for orders placed or performed during the hospital encounter of 05/14/24 (from the past 24 hour(s))   Troponin I, High Sensitivity   Result Value Ref Range    Troponin I, High Sensitivity 57 (HH) 0 - 13 ng/L   ECG 12 Lead   Result Value Ref Range    Ventricular Rate 105 BPM    Atrial Rate 105 BPM    TN Interval 126 ms    QRS Duration 70 ms    QT Interval 344 ms    QTC Calculation(Bazett) 454 ms    P Axis 82 degrees    R Axis 94 degrees    T Axis 92 degrees    QRS Count 17 beats    Q Onset 227 ms    P Onset 174 ms    P Offset 221 ms    T Offset 399 ms    QTC Fredericia 414 ms   Troponin I, High Sensitivity   Result Value Ref Range    Troponin I, High Sensitivity 49 (H) 0 - 13 ng/L   ECG 12 Lead   Result Value Ref Range    Ventricular Rate 94 BPM    Atrial Rate 140 BPM    TN Interval 126 ms    QRS Duration 87 ms    QT Interval 359 ms    QTC Calculation(Bazett) 449 ms    P Axis 84 degrees    R Axis 88 degrees    T Axis 67 degrees    QRS Count 13 beats    Q Onset 253 ms    T Offset 432 ms    QTC Fredericia 417 ms   CBC and Auto Differential   Result Value Ref Range    WBC 11.3 4.4 - 11.3 x10*3/uL    nRBC 0.0 0.0 - 0.0 /100 WBCs    RBC 4.55 4.00 - 5.20 x10*6/uL    Hemoglobin 12.0 12.0 - 16.0 g/dL    Hematocrit 40.0 36.0 - 46.0 %    MCV 88 80 - 100 fL    MCH 26.4 26.0 - 34.0 pg    MCHC 30.0 (L) 32.0 - 36.0 g/dL    RDW 15.4 (H) 11.5 - 14.5 %    Platelets 285 150 - 450 x10*3/uL    Neutrophils % 91.2 40.0 - 80.0 %    Immature Granulocytes %, Automated 0.3 0.0 - 0.9 %    Lymphocytes % 3.0 13.0 - 44.0 %    Monocytes % 5.3 2.0 - 10.0 %    Eosinophils % 0.0 0.0 - 6.0 %    Basophils % 0.2 0.0 - 2.0 %    Neutrophils Absolute 10.34 (H) 1.60 - 5.50 x10*3/uL     Immature Granulocytes Absolute, Automated 0.03 0.00 - 0.50 x10*3/uL    Lymphocytes Absolute 0.34 (L) 0.80 - 3.00 x10*3/uL    Monocytes Absolute 0.60 0.05 - 0.80 x10*3/uL    Eosinophils Absolute 0.00 0.00 - 0.40 x10*3/uL    Basophils Absolute 0.02 0.00 - 0.10 x10*3/uL   Comprehensive Metabolic Panel   Result Value Ref Range    Glucose 100 (H) 74 - 99 mg/dL    Sodium 143 136 - 145 mmol/L    Potassium 3.8 3.5 - 5.3 mmol/L    Chloride 95 (L) 98 - 107 mmol/L    Bicarbonate 39 (H) 21 - 32 mmol/L    Anion Gap 13 10 - 20 mmol/L    Urea Nitrogen 25 (H) 6 - 23 mg/dL    Creatinine 0.70 0.50 - 1.05 mg/dL    eGFR 87 >60 mL/min/1.73m*2    Calcium 9.1 8.6 - 10.3 mg/dL    Albumin 3.5 3.4 - 5.0 g/dL    Alkaline Phosphatase 73 33 - 136 U/L    Total Protein 6.4 6.4 - 8.2 g/dL    AST 27 9 - 39 U/L    Bilirubin, Total 0.5 0.0 - 1.2 mg/dL    ALT 26 7 - 45 U/L   Magnesium   Result Value Ref Range    Magnesium 1.96 1.60 - 2.40 mg/dL   Lipid Panel   Result Value Ref Range    Cholesterol 240 (H) 0 - 199 mg/dL    HDL-Cholesterol 46.9 mg/dL    Cholesterol/HDL Ratio 5.1     LDL Calculated 167 (H) <=99 mg/dL    VLDL 26 0 - 40 mg/dL    Triglycerides 131 0 - 149 mg/dL    Non HDL Cholesterol 193 (H) 0 - 149 mg/dL   Hemoglobin A1C   Result Value Ref Range    Hemoglobin A1C 5.6 see below %    Estimated Average Glucose 114 Not Established mg/dL   ECG 12 Lead   Result Value Ref Range    Ventricular Rate 161 BPM    Atrial Rate 175 BPM    OK Interval 128 ms    QRS Duration 178 ms    QT Interval 355 ms    QTC Calculation(Bazett) 581 ms    P Axis 99 degrees    R Axis 0 degrees    T Axis -82 degrees    QRS Count 22 beats    Q Onset 249 ms    T Offset 427 ms    QTC Fredericia 493 ms   Blood Gas Venous Full Panel   Result Value Ref Range    POCT pH, Venous 7.48 (H) 7.33 - 7.43 pH    POCT pCO2, Venous 65 (H) 41 - 51 mm Hg    POCT pO2, Venous 58 (H) 35 - 45 mm Hg    POCT SO2, Venous 90 (H) 45 - 75 %    POCT Oxy Hemoglobin, Venous 88.0 (H) 45.0 - 75.0 %    POCT  Hematocrit Calculated, Venous 33.0 (L) 36.0 - 46.0 %    POCT Sodium, Venous 136 136 - 145 mmol/L    POCT Potassium, Venous 4.5 3.5 - 5.3 mmol/L    POCT Chloride, Venous 100 98 - 107 mmol/L    POCT Ionized Calicum, Venous 1.14 1.10 - 1.33 mmol/L    POCT Glucose, Venous 120 (H) 74 - 99 mg/dL    POCT Lactate, Venous 1.4 0.4 - 2.0 mmol/L    POCT Base Excess, Venous 21.5 (H) -2.0 - 3.0 mmol/L    POCT HCO3 Calculated, Venous 48.4 (H) 22.0 - 26.0 mmol/L    POCT Hemoglobin, Venous 11.1 (L) 12.0 - 16.0 g/dL    POCT Anion Gap, Venous -8.0 (L) 10.0 - 25.0 mmol/L    Patient Temperature 37.0 degrees Celsius    FiO2 40 %      Imaging:  XR chest 1 view  Result Date: 5/14/2024  Interpreted By:  Eunice Lopez, STUDY: XR CHEST 1 VIEW;  5/14/2024 11:57 am   INDICATION: Signs/Symptoms:Chest Pain.   COMPARISON: 03/01/2024   ACCESSION NUMBER(S): [id number]   ORDERING CLINICIAN: OSMANI TARIQ   FINDINGS: The heart size appears normal.   There are atherosclerotic changes of the aorta   The lungs are hyperinflated. There is basilar interstitial lung disease.   There appears to be right basilar calcified granuloma.   The patient is osteoporotic and scoliotic.   COMPARISON OF FINDING: Chest is similar.       COPD. Interstitial lung disease.   MACRO: none   Signed by: Eunice Lopez 5/14/2024 12:04 PM Dictation workstation:   [id number]     CXR 2/2/24: IMPRESSION: 1. Slightly improved bilateral interstitial opacities when compared with chest x-ray dated 01/20/2024. 2. Trace bilateral pleural effusion as in prior.    CXR 1/28/24: IMPRESSION: 1.  Features suspicious for progressive pulmonary edema . Follow-up is advised hyperinflation of the lungs    CT chest abdomen pelvis w IV contrast 11/20/23:   IMPRESSION:  Probable pneumonia versus less likely contusion left base.      Large obstructing calculus left ureter with severe left-sided  hydronephrosis. Smaller calculus upstream to the large obstructing  calculus left  kidney.    Assessment/Plan   Irish Davis is a 79 y.o. female (former smoker, quit 2018, ~59 pack year history) with a PMHx of severe COPD (GOLD 4, FEV1 29%), chronic hypoxic respiratory failure on 3-4 L NC, frequent falls, HTN, HLD and aortic stenosis. Admitted 5/14/24 after presented with increased SOB, productive cough with green sputum and increasing fatigue/weakness.  Patient admitted for AECOPD with chronic hypoxic respiratory failure, possible acute on chronic diastolic heart failure, elevated troponin and hypokalemia. Pulmonary consulted for recurrent admissions d/t COPD exacerbation, advanced COPD/chronic respiratory failure.     Impression:  # severe COPD (GOLD 4, FEV1 29%) with acute exacerbation - increased SOB, wheezing and productive cough on presentation; does not seem to be r/t infection, WBC & lactate WNL  and patient without fever or chills; daughter feels breathing issues got worse as it got warmer & more humid outside and they do not have air conditioning (daughter mentioned possibly getting portable air conditioner for in the home)   -- home regimen: symbicort 2 puffs BID, atrovent 2 puffs BID and occasionally 1-2 in the middle of the day, PRN albuterol MDI; has nebilulizer machine but does not use; tried Breztri for a few days but did not feel was as helpful; prednisone 10 mg once daily; daliresp 500 mcg once daily   -recent hospital admissions:     -11/20/23-11/27/23 AECOPD 2/2 LLL PNA    -1/26/24-2/1/24 bilateral wrist fracture s/p fall c/b fluid overload/CHF    - 2/2/24-2/5/24 AECOPD    # chronic hypoxic respiratory failure - on 4.5 L NC at home; was scheduled for 6MW to re-evaluate O2 needs on 5/16, will need rescheduled    # possible acute on chronic diastolic HF - leg swelling and slightly elevated BNP      Recommendations:  - continue augmentin x 5 days  - continue Breo & Spiriva  - continue duonebs TID  - continue solumedrol; will need prednisone taper back to home dose of 10 mg  once daily   - start acapella TID  - change guaifenesin from PRN to BID  - continue supplemental O2, wean to maintain pulse ox 88-92%  - home O2 study prior to discharge for updated O2 needs   - diuresis as tolerated per primary or cardiology    - Had lengthy discussion with patient's daughter about severity of COPD and that patient likely has end-stage COPD and is already on daily prednisone and daliresp to help try to reduce COPD exacerbations. Advised may need to consider palliative services to further help with symptom management. Per daughter, patient has said multiple times recently she no longer wants to go to the hospital or doctors appointments but the daughter convinced her to come to the hospital this time. Gave brief information about palliative and hospice to focus more on quality of life versus continuing with frequent hospital admissions and aggressive interventions. Palliative care consult already placed by primary, appreciate their recs.       Yanna Olson, APRN-CNP

## 2024-05-15 NOTE — CONSULTS
Wound Care Consult     Visit Date: 5/15/2024      Patient Name: Irish Davis         MRN: [id number]           YOB: 1945     Pertinent Labs:   Albumin   Date Value Ref Range Status   05/15/2024 3.5 3.4 - 5.0 g/dL Final     Wound Assessment:  Wound 05/14/24 Pressure Injury Elbow Dorsal;Right (Active)   Wound Image   05/15/24 1016   Site Assessment Dry;Red 05/15/24 1016   Leticia-Wound Assessment Dry;Fragile;Intact 05/15/24 1016   Pressure Injury Stage 1 05/15/24 1016   Shape irregular 05/15/24 1016   Wound Length (cm) 2 cm 05/15/24 1016   Wound Width (cm) 2 cm 05/15/24 1016   Wound Surface Area (cm^2) 4 cm^2 05/15/24 1016   Dressing Foam 05/15/24 1016   Dressing Changed Changed 05/15/24 1016   Dressing Status Clean;Dry;Occlusive 05/15/24 1016       Wound 05/14/24 Traumatic Pretibial Right (Active)   Wound Image   05/14/24 2058   Site Assessment Brown;Dry;Other (Comment) 05/15/24 1019   Leticia-Wound Assessment Dry;Pink 05/15/24 1019   Non-staged Wound Description Full thickness 05/15/24 1019   Shape cluster of scabbs from previous blister 05/15/24 1019   Wound Length (cm) 6.4 cm 05/15/24 1019   Wound Width (cm) 1 cm 05/15/24 1019   Wound Surface Area (cm^2) 6.4 cm^2 05/15/24 1019   Drainage Description None 05/15/24 1019   Drainage Amount None 05/15/24 1019   Dressing Open to air 05/15/24 1019       Wound 05/14/24 Pressure Injury Heel Right (Active)   Wound Image    05/15/24 1027   Site Assessment Non-blanchable erythema 05/15/24 1027   Leticia-Wound Assessment Boggy 05/15/24 1027   Pressure Injury Stage 1 05/15/24 1027   Wound Length (cm) 4 cm 05/15/24 1027   Wound Width (cm) 4 cm 05/15/24 1027   Wound Surface Area (cm^2) 16 cm^2 05/15/24 1027   Drainage Description None 05/15/24 1027   Dressing Foam 05/15/24 1027   Dressing Changed Changed 05/15/24 1027   Dressing Status Clean;Dry;Occlusive 05/15/24 1027       Wound 05/15/24 Pressure Injury Elbow Dorsal;Left (Active)   Wound Image   05/15/24 1019   Site  Assessment Non-blanchable erythema;Dry 05/15/24 1019   Leticia-Wound Assessment Dry;Fragile 05/15/24 1019   Pressure Injury Stage 1 05/15/24 1019   Wound Length (cm) 3 cm 05/15/24 1019   Wound Width (cm) 1 cm 05/15/24 1019   Wound Surface Area (cm^2) 3 cm^2 05/15/24 1019   Drainage Description None 05/15/24 1019   Dressing Foam 05/15/24 1019   Dressing Changed New 05/15/24 1019   Dressing Status Clean;Dry;Occlusive 05/15/24 1019     Patient seen for report of multiple wounds (present on admission) complicated by PMH: HTN, HLD, moderate aortic stenosis, chronic bilateral lower extremity edema (on Lasix), COPD with chronic hypoxic respiratory failure (baseline 4-4.5 L nasal cannula) per chart review. Exam conducted with RAMONA Minaya at bedside, assisted with getting patient off bedside commode and back to bed using walker to assist. Patient alert, denies pain. Patient is known to this RN from previous admissions and wound consults on 11/23/23 and 1/29/24. On 11/23 large blister was noted to right lower leg that then presented as open full thickness wound on 1/29, this area is now dry and scabbed, left open to air. Bilateral elbow stage 1 pressure injuries noted likely from how patient sits leaning on elbows. Stage 1 pressure injury noted to right heel. No wounds or non blanchable redness noted to sacrum, or left heel, preventative foams placed. Bilateral lower legs/ankle swollen and iris in some areas with dry skin noted, no pressure injuries noted to ankles (wounds removed from avatar). Patients overall skin very fragile. Skin hygiene and dressing care provided. See detailed assessment above from flowsheet. Recommendations below, reviewed with Sadie Maher.     Wound location: Right heel and bilateral elbows    Treatment protocol recommended:  Cover with mepilex foam, change every 3 days/prn. Remove using adhesive removal spray.  Continue to off load, turning at least every 2 hours. Offload heels.      Therapeutic  surface: Patient on [place] [employer] pressure relieving mattress. Staff to continue to encourage and assist patient with turning/repositioning atleast every 2 hours. Offload heels when in bed. Elevate legs when resting.     Nursing updated, continue pressure injury preventions, nursing to follow provider orders and re-consult wound RN if needed.    See above recommendations for treatment. Patient will likely continue to require skilled assistance with skin hygiene and wound care upon discharge.    Please contact me with questions or changes in patient condition.  Diamond Basurto RN  [facility]   [phone]

## 2024-05-15 NOTE — PROGRESS NOTES
Speech-Language Pathology Clinical Swallow Evaluation    Patient Name: Irish Davis  MRN: [id number]  : 1945  Today's Date: 05/15/24  Start time: Start Time:   Stop time: Stop Time: 153  Time calculation (min) : Time Calculation (min): 17 min      ASSESSMENT  Impressions:  Mild oral phase dysphagia and mild suspected pharyngeal phase dysphagia based on clinical swallow evaluation and pt report. Pt appears to be at her baseline swallow function and demonstrates independence with previously trained compensatory swallow strategies.  Prognosis: Good    PLAN  Recommendations:  MBSS recommended: No; pt appears to be at or near functional baseline.  Solid consistency: Regular (IDDSI level 7) with pt to continue to choose softer/moister items as appropriate for energy conservation and to promote pharyngeal clearance  Liquid consistency: Thin (IDDSI 0)  Medication administration: Whole, in thin liquid, may trial whole in puree as needed  Compensatory swallow strategies: Upright positioning for all PO intake, Slow rate of intake, Small bites, Small sips, Add extra moisture to solids as needed, Alternate bites and sips, and Chew thoroughly    Recommended frequency/duration:  Skilled SLP services recommended: No  Discharge recommendation: Home with no further SLP. Pt reported no interest in pursuing dysphagia therapy at this time, but demonstrated understanding that ST services are available if needed in future.     Strengths: Cognition  Barriers to participation in tx: Comorbidities, other stressors (pt reports has other things to deal with at home; her  has dementia)      SUBJECTIVE    PMHx relevant to rehab: HTN, HLD, moderate aortic stenosis, chronic bilateral lower extremity edema (on Lasix), COPD with chronic hypoxic respiratory failure (baseline 4-4.5 L nasal cannula)    Chief complaint: Pt was admitted on 24 due to worsening SOB, dyspnea on exertion, productive cough, wheezing, congestion,  "and generalized weakness. She was diagnosed with COPD exacerbation and suspected type II MI.    Relevant imaging results:  CXR 5/14/24: COPD. Interstitial lung disease.    General Visit Information:  SLP Received On: 05/15/24  Patient Class: Inpatient  Living Environment: Home  Ordering Physician: Sadie Zhu PA-C  Reason for Referral: Increased effort required to swallow, SOB with chewing/swallowing  Prior to Session Communication: Bedside nurse    RN cleared pt to participate in session and reported that pt does get SOB while eating.    Pt reported that she underwent dysphagia therapy during a previous hospitalization. She reported that she was taught strategies to help her swallow more safely, which have been effective. Pt stated, \"I think I have it under control.\" Pt reported that she does have sensation of retention of solids intermittently, which she is able wash down with liquid. Pt denied s/sx aspiration.     BaseLine Diet: Regular/thin  Current Diet : Cardiac, 2000mL fluid restriction, regular texture/thin liquid    Pain Assessment  Pain Assessment: 0-10  Pain Score: 0 - No pain    Pt was alert, pleasant, and cooperative for session.  Orientation: Oriented to self, Oriented to location, Oriented to situation, and Did not assess orientation to time  Ability to follow functional commands: WFL with loud voice due to hearing impairment  Nutritional status: Decreased appetite (chronic); pt reported that she has seen a nutritionist, who recommended supplements, which she plans to try this evening    Respiratory status: Supplemental oxygen via NC  Baseline Vocal Quality: Normal  Volitional Cough: Strong  Volitional Swallow: Within Functional Limits  Patient positioning: Upright in chair      OBJECTIVE  Clinical swallow evaluation completed and consisted of interview, oral motor assessment, and PO trials (x1 bite of grilled cheese sandwich; 4-5 sequential sips of thin water). Pt declined further trials due " to feeling full.  ORAL PHASE: Some natural dentition on bottom, bottom partial, full upper denture. Oral mucosa were pink, moist, and free of obvious lesions. Lingual strength and ROM were grossly WFL bilaterally. Labial seal was adequate. Mastication of soft solids was WFL with mildly increased time. A/P transit and oral clearance were WFL.  PHARYNGEAL PHASE: Laryngeal elevation was visualized or palpated with all trials, however adequacy of hyolaryngeal elevation/excursion cannot be determined at bedside. No immediate or delayed s/sx aspiration/penetration were observed with any consistencies.    Was 3oz challenge administered: No, deferred due to high RR (about 25) and pt reporting that she tends to take small sips at baseline.      Treatment/Education:  Results and recommendations were relayed to: Patient and Bedside nurse  Education provided: Yes   Learner: Patient   Barriers to learning: Hearing impairment barrier   Method of teaching: Verbal   Topic: results of assessment, recommended diet modifications, and recommended safe swallow strategies   Outcome of teaching: Pt/family demonstrated good understanding via discussion and teachback  Treatment provided: No

## 2024-05-15 NOTE — PROGRESS NOTES
05/15/24 1432   Discharge Planning   Living Arrangements Spouse/significant other;Children   Support Systems Spouse/significant other;Children   Assistance Needed ADL's, IADL's   Type of Residence Private residence   Number of Stairs to Enter Residence 2   Number of Stairs Within Residence 0   Home or Post Acute Services None   Patient expects to be discharged to: Home   Does the patient need discharge transport arranged? No     Discharge planning assessment completed with patient's daughter at bedside. Patient was in the room but using the commode. Patient's daughter lives at home with patient and spouse. Dtr reports that patient is very limited in mobility secondary to respiratory status and COPD. Patient sleeps in a recliner at home. She used to be able to ambulate about 30 feet to the bathroom, but has been unable to do this for some time, so uses a BSC at home. Patient is chronically on supplemental O2, 4.5L NC. Dtr is unsure of the O2 supplier. Patient has a walker at home but broke bilateral wrists earlier this year and has been unable to bear weight to use the walker. Patient is not currently active with any skilled home care agencies, and daughter says patient would not be interested in these services. Pall med and pulmonology consults are pending. Pt's daughter mentioned that patient has been complaining about difficulty swallowing. Orders obtained for SLP jason. TCC following.

## 2024-05-15 NOTE — PROGRESS NOTES
Irish Davis is a 79 y.o. female on day 1 of admission presenting with Acute on chronic diastolic heart failure (Multi).      Subjective   Irish Davis is a 79 y.o. female with PMHx s/f HTN, HLD, moderate aortic stenosis, chronic bilateral lower extremity edema (on Lasix), COPD with chronic hypoxic respiratory failure (baseline 4-4.5 L nasal cannula), presenting with worsening shortness of breath, dyspnea on exertion, cough (productive - green sputum), wheezing, congestion, and generalized weakness. Patient and family report that over the last few days, she has gotten significantly more weak to the point where she is not really able to get up and out of bed; however, they note that this is exacerbated by a significant amount of aforementioned respiratory symptoms. She has not had any significant sick contacts or exposures; however, she has been admitted multiple times under similar circumstances since about November of last year. Has been taking home medications as prescribed, but noticed some mild increase in lower extremity edema from baseline. Has not had much of an appetite since onset of symptoms, but no significant nausea/vomiting/abdominal pain/diarrhea. She admits to some chest tightness when coughing and having increased work of breathing when trying to ambulate. She is feeling better since presentation to the emergency department, but still generally unwell.  She is in no acute distress sitting in bed with multiple loved ones at the bedside. 93% 4 L nasal cannula (patient was increased to 6 L nasal cannula from her baseline of 4-4.5 for comfort, no documented hypoxia on her oxygen). High-sensitivity troponin [id number] pending.  VBG: pH 7.41, pCO2 68, calculated bicarb 43.1.  Venous lactate 2.1-1.4. ECG is without overt acute ischemic changes. CXR with chronic interstitial lung dz/emphysema without overt acute process. Lipid panel with elevated , cholesterol 240. HSTI  [id number]    5/15/2024: No acute events overnight. Vitals stable, 99% on 2L, RR 24. Labs largely unremarkable.  Has chronic hypercarbia. Will ask for pulmonology recommendations regarding her recurrent COPD exacerbations.   Patient appears to have slight increase work of breathing today, just got her lasix and improved upon recheck evaluation, will continue IV diuresis. Ordered 1x dose oral morphine but not given as she improved. She is agreeable to DNR/DNI but would be ok with BIPAP. She wants assistance making her daughter her POA as her  has dementia per her report. Edgar ask for palliative care meeting.        Review of Systems   Constitutional:  Negative for appetite change, chills, diaphoresis, fatigue and fever.   HENT:  Negative for congestion, ear pain, facial swelling, hearing loss, nosebleeds, sore throat, tinnitus and trouble swallowing.    Eyes:  Negative for pain.   Respiratory:  Positive for cough, shortness of breath and wheezing. Negative for chest tightness.    Cardiovascular:  Positive for leg swelling. Negative for chest pain and palpitations.   Gastrointestinal:  Negative for abdominal pain, blood in stool, constipation, diarrhea, nausea and vomiting.   Genitourinary:  Negative for dysuria, flank pain, frequency, hematuria and urgency.   Musculoskeletal:  Negative for back pain and joint swelling.   Skin:  Negative for rash and wound.   Neurological:  Negative for dizziness, syncope, weakness, light-headedness, numbness and headaches.   Hematological:  Does not bruise/bleed easily.   Psychiatric/Behavioral:  Negative for behavioral problems, hallucinations and suicidal ideas.           Objective     Last Recorded Vitals  /78 (BP Location: Right arm, Patient Position: Lying)   Pulse 81   Temp 36.4 °C (97.6 °F) (Temporal)   Resp 20   Wt (!) 38.4 kg (84 lb 10.5 oz)   SpO2 95%     Image Results  ECG 12 Lead    Result Date: 5/15/2024  Extreme tachycardia with wide complex, no  further rhythm analysis attempted Artifact in lead(s) II,III,aVR,aVL,aVF,V1,V2,V3,V4,V5,V6    ECG 12 Lead    Result Date: 5/15/2024  Sinus rhythm Multiple premature complexes, vent & supraven Borderline right axis deviation Borderline low voltage, extremity leads Minimal ST depression, lateral leads    ECG 12 Lead    Result Date: 5/15/2024  Sinus tachycardia with Premature supraventricular complexes Possible Left atrial enlargement Rightward axis Nonspecific ST abnormality Abnormal ECG When compared with ECG of 14-MAY-2024 12:01, PREVIOUS ECG IS PRESENT    ECG 12 lead    Result Date: 5/14/2024  Sinus rhythm Sinus pause Short MO interval Borderline right axis deviation Minimal ST depression, inferior leads Borderline prolonged QT interval    ECG 12 lead    Result Date: 5/14/2024  Sinus rhythm Multiform ventricular premature complexes Borderline short MO interval Borderline right axis deviation Minimal ST depression, inferior leads Borderline prolonged QT interval    XR chest 1 view    Result Date: 5/14/2024  Interpreted By:  Eunice Lopez, STUDY: XR CHEST 1 VIEW;  5/14/2024 11:57 am   INDICATION: Signs/Symptoms:Chest Pain.   COMPARISON: 03/01/2024   ACCESSION NUMBER(S): [id number]   ORDERING CLINICIAN: OSMANI TARIQ   FINDINGS: The heart size appears normal.   There are atherosclerotic changes of the aorta   The lungs are hyperinflated. There is basilar interstitial lung disease.   There appears to be right basilar calcified granuloma.   The patient is osteoporotic and scoliotic.   COMPARISON OF FINDING: Chest is similar.       COPD. Interstitial lung disease.   MACRO: none   Signed by: Eunice Lopez 5/14/2024 12:04 PM Dictation workstation:   [id number]       Lab Results  Results for orders placed or performed during the hospital encounter of 05/14/24 (from the past 24 hour(s))   Troponin, High Sensitivity, 1 Hour   Result Value Ref Range    Troponin I, High Sensitivity 84 (HH) 0 - 13 ng/L   Blood Gas Lactic  Acid, Venous   Result Value Ref Range    POCT Lactate, Venous 1.4 0.4 - 2.0 mmol/L   ECG 12 lead   Result Value Ref Range    Ventricular Rate 84 BPM    Atrial Rate 87 BPM    OH Interval 107 ms    QRS Duration 104 ms    QT Interval 418 ms    QTC Calculation(Bazett) 495 ms    P Axis 74 degrees    R Axis 82 degrees    T Axis 57 degrees    QRS Count 12 beats    Q Onset 249 ms    T Offset 458 ms    QTC Fredericia 467 ms   Troponin I, High Sensitivity   Result Value Ref Range    Troponin I, High Sensitivity 57 (HH) 0 - 13 ng/L   ECG 12 Lead   Result Value Ref Range    Ventricular Rate 105 BPM    Atrial Rate 105 BPM    OH Interval 126 ms    QRS Duration 70 ms    QT Interval 344 ms    QTC Calculation(Bazett) 454 ms    P Axis 82 degrees    R Axis 94 degrees    T Axis 92 degrees    QRS Count 17 beats    Q Onset 227 ms    P Onset 174 ms    P Offset 221 ms    T Offset 399 ms    QTC Fredericia 414 ms   Troponin I, High Sensitivity   Result Value Ref Range    Troponin I, High Sensitivity 49 (H) 0 - 13 ng/L   ECG 12 Lead   Result Value Ref Range    Ventricular Rate 94 BPM    Atrial Rate 140 BPM    OH Interval 126 ms    QRS Duration 87 ms    QT Interval 359 ms    QTC Calculation(Bazett) 449 ms    P Axis 84 degrees    R Axis 88 degrees    T Axis 67 degrees    QRS Count 13 beats    Q Onset 253 ms    T Offset 432 ms    QTC Fredericia 417 ms   CBC and Auto Differential   Result Value Ref Range    WBC 11.3 4.4 - 11.3 x10*3/uL    nRBC 0.0 0.0 - 0.0 /100 WBCs    RBC 4.55 4.00 - 5.20 x10*6/uL    Hemoglobin 12.0 12.0 - 16.0 g/dL    Hematocrit 40.0 36.0 - 46.0 %    MCV 88 80 - 100 fL    MCH 26.4 26.0 - 34.0 pg    MCHC 30.0 (L) 32.0 - 36.0 g/dL    RDW 15.4 (H) 11.5 - 14.5 %    Platelets 285 150 - 450 x10*3/uL    Neutrophils % 91.2 40.0 - 80.0 %    Immature Granulocytes %, Automated 0.3 0.0 - 0.9 %    Lymphocytes % 3.0 13.0 - 44.0 %    Monocytes % 5.3 2.0 - 10.0 %    Eosinophils % 0.0 0.0 - 6.0 %    Basophils % 0.2 0.0 - 2.0 %    Neutrophils  Absolute 10.34 (H) 1.60 - 5.50 x10*3/uL    Immature Granulocytes Absolute, Automated 0.03 0.00 - 0.50 x10*3/uL    Lymphocytes Absolute 0.34 (L) 0.80 - 3.00 x10*3/uL    Monocytes Absolute 0.60 0.05 - 0.80 x10*3/uL    Eosinophils Absolute 0.00 0.00 - 0.40 x10*3/uL    Basophils Absolute 0.02 0.00 - 0.10 x10*3/uL   Comprehensive Metabolic Panel   Result Value Ref Range    Glucose 100 (H) 74 - 99 mg/dL    Sodium 143 136 - 145 mmol/L    Potassium 3.8 3.5 - 5.3 mmol/L    Chloride 95 (L) 98 - 107 mmol/L    Bicarbonate 39 (H) 21 - 32 mmol/L    Anion Gap 13 10 - 20 mmol/L    Urea Nitrogen 25 (H) 6 - 23 mg/dL    Creatinine 0.70 0.50 - 1.05 mg/dL    eGFR 87 >60 mL/min/1.73m*2    Calcium 9.1 8.6 - 10.3 mg/dL    Albumin 3.5 3.4 - 5.0 g/dL    Alkaline Phosphatase 73 33 - 136 U/L    Total Protein 6.4 6.4 - 8.2 g/dL    AST 27 9 - 39 U/L    Bilirubin, Total 0.5 0.0 - 1.2 mg/dL    ALT 26 7 - 45 U/L   Magnesium   Result Value Ref Range    Magnesium 1.96 1.60 - 2.40 mg/dL   Lipid Panel   Result Value Ref Range    Cholesterol 240 (H) 0 - 199 mg/dL    HDL-Cholesterol 46.9 mg/dL    Cholesterol/HDL Ratio 5.1     LDL Calculated 167 (H) <=99 mg/dL    VLDL 26 0 - 40 mg/dL    Triglycerides 131 0 - 149 mg/dL    Non HDL Cholesterol 193 (H) 0 - 149 mg/dL   Hemoglobin A1C   Result Value Ref Range    Hemoglobin A1C 5.6 see below %    Estimated Average Glucose 114 Not Established mg/dL   ECG 12 Lead   Result Value Ref Range    Ventricular Rate 161 BPM    Atrial Rate 175 BPM    LA Interval 128 ms    QRS Duration 178 ms    QT Interval 355 ms    QTC Calculation(Bazett) 581 ms    P Axis 99 degrees    R Axis 0 degrees    T Axis -82 degrees    QRS Count 22 beats    Q Onset 249 ms    T Offset 427 ms    QTC Fredericia 493 ms        Medications  Scheduled medications:  amoxicillin-pot clavulanate, 1 tablet, oral, BID  aspirin, 81 mg, oral, Daily  atorvastatin, 80 mg, oral, Nightly  fluticasone furoate-vilanteroL, 1 puff, inhalation, Daily  furosemide, 40 mg,  intravenous, BID  heparin (porcine), 5,000 Units, subcutaneous, q8h  ipratropium-albuteroL, 3 mL, nebulization, TID  loratadine, 10 mg, oral, Daily  methylPREDNISolone sodium succinate (PF), 40 mg, intravenous, q8h DORIS  metoprolol tartrate, 37.5 mg, oral, BID  oxygen, , inhalation, Continuous - Inhalation  pantoprazole, 40 mg, oral, Daily before breakfast   Or  pantoprazole, 40 mg, intravenous, Daily before breakfast  polyethylene glycol, 17 g, oral, Daily  potassium chloride, 40 mEq, oral, BID  tiotropium, 2 puff, inhalation, Daily      Continuous medications:     PRN medications:  PRN medications: acetaminophen, albuterol, benzonatate, bisacodyl, bisacodyl, guaiFENesin, hydrOXYzine HCL, melatonin, ondansetron **OR** ondansetron     Physical Exam  Constitutional:       General: She is not in acute distress.     Appearance: Normal appearance.      Comments: Thin elderly female   HENT:      Head: Normocephalic and atraumatic.      Right Ear: External ear normal.      Left Ear: External ear normal.      Nose: Nose normal.      Mouth/Throat:      Mouth: Mucous membranes are moist.      Pharynx: Oropharynx is clear.   Eyes:      Extraocular Movements: Extraocular movements intact.      Conjunctiva/sclera: Conjunctivae normal.      Pupils: Pupils are equal, round, and reactive to light.   Cardiovascular:      Rate and Rhythm: Normal rate and regular rhythm.      Pulses: Normal pulses.      Heart sounds: Normal heart sounds.   Pulmonary:      Effort: No respiratory distress (Very mild tachypnea without accessory muscle use).      Breath sounds: Wheezing and rales present. No rhonchi.      Comments: NC in place  Abdominal:      General: Bowel sounds are normal.      Palpations: Abdomen is soft.      Tenderness: There is no abdominal tenderness. There is no right CVA tenderness, left CVA tenderness, guarding or rebound.   Musculoskeletal:         General: No swelling. Normal range of motion.      Cervical back: Normal range  of motion and neck supple.   Skin:     General: Skin is warm and dry.      Capillary Refill: Capillary refill takes less than 2 seconds.      Findings: No lesion or rash.   Neurological:      General: No focal deficit present.      Mental Status: She is alert and oriented to person, place, and time. Mental status is at baseline.   Psychiatric:         Mood and Affect: Mood normal.         Behavior: Behavior normal.                  Code Status  DNR and No Intubation     Assessment/Plan   This patient currently has cardiac telemetry ordered; if you would like to modify or discontinue the telemetry order, click here to go to the orders activity to modify/discontinue the order.    AECOPD; COPD with chronic hypoxic respiratory failure  -Patient's baseline oxygen is 4-4.5 L; she is currently on about 6 L nasal cannula but this was more so for comfort rather than documented hypoxia, will try to wean back to baseline  -She is diffusely wheezy on examination and has had increased cough and sputum production  -Patient with prior itching to doxycycline and erythromycin; has tolerated Augmentin in the past and with increased sputum production and change in color will be continued on empiric Augmentin  -Continued on IV steroids  -Continued on home therapies, additionally scheduled and as needed nebs  -Pulmonary hygiene, RT consultation is appreciated  -Will check VBG in the morning  -Palliative care consultation  -Pulmonology consultation  -Patient endorses DNR/DNI     Worsening bilateral lower extremity edema with minimally elevated BNP, ? diastolic dysfunction  -Patient had an echocardiogram completed on 01/29/2024 which revealed an LVEF of 60-65%, low normal RV systolic function, mild aortic valve stenosis  -During prior admissions, her BNP was more markedly elevated with her present BNP of 374 actually being on the lower end of prior admits  -Initiate lasix 40 mg IV push twice daily for now, suspect that she can likely come  down to oral Lasix within the next 24-48 hours  -Continue to follow volume status closely  -Monitor renal function closely with diuresis   -Monitor electrolytes and replenish generously as needed   -Strict I&O’s   -2g salt restriction, 2L fluid restriction   -Monitor fluid status closely   -Cardiology consultation, appreciate further recommendations      Elevated troponin/demand ischemia/suspected type II MI  -Patient is presenting with an initial high-sensitivity troponin of 74, up trended to 84  -Her ECG is nonrevealing for acute ischemic changes and she is not complaining of any chest pain or anginal equivalents  -I suspect that this is demand mediated in setting of her respiratory status and increased work of breathing, likely degree of diastolic dysfunction, but she will be continued on aspirin, statin, beta-blocker  -I will update her lipid panel and A1c in the morning, monitor on telemetry  -Unless interval development of chest pain or anginal symptoms, significant increase in troponin, will hold on heparinization.  Cardiology on consult as above.     Hypokalemia  -Initial potassium 2.9, was given replacement in the ED.  Will recheck.  -Magnesium was within appropriate limits at this time     HTN, HLD  -Continue home medications  -Monitor and adjust as needed     Deconditioning and debility  -PT/OT  -Social work consultation appreciated    Goals of care  -Patient endorses DNR/DNI, orders placed    DVT ppx:  subcutaneous heparin       Please see orders for more complete plan    Sadie Maher PA-C

## 2024-05-15 NOTE — PROGRESS NOTES
Occupational Therapy    Evaluation    Patient Name: Irish Davis  MRN: [id number]  Today's Date: 5/15/2024  Time Calculation  Start Time: 1436  Stop Time: 1454  Time Calculation (min): 18 min    Assessment  IP OT Assessment  Prognosis: Fair  Evaluation/Treatment Tolerance: Patient limited by fatigue  Medical Staff Made Aware: Yes  End of Session Communication: Bedside nurse, PCT/GABRIELLE/RONA  End of Session Patient Position: Up in chair, Alarm on  Plan:  Treatment Interventions: UE strengthening/ROM, Endurance training, Patient/family training, Equipment evaluation/education, Compensatory technique education  OT Frequency: 2 times per week  OT Discharge Recommendations: Low intensity level of continued care  OT Recommended Transfer Status: Assist of 1, Minimal assist  OT - OK to Discharge: Yes    Subjective   Current Problem:  1. Acute on chronic diastolic heart failure (Multi)        2. Hypokalemia        3. Dyspnea, unspecified type        4. Elevated troponin        5. Acute on chronic congestive heart failure, unspecified heart failure type (Multi)          General:  General  Reason for Referral: Pt presents from home with c/o SOB, weakness, COPD exacerbation; OT for ADL and safety assessment  Referred By: Aleida  Past Medical History Relevant to Rehab: HTN, aortic stenosis, SUNITA LE edema, COPD, chronic respiratory failure on 4L/min at home, SUNITA wrist fx's earlier this year.  Family/Caregiver Present: Yes  Caregiver Feedback: able to confirm pt home setup and PLOF  Prior to Session Communication: Bedside nurse, PCT/GABRIELLE/RONA  Patient Position Received:  (On BSC)  Preferred Learning Style: verbal, visual (pt [place])  General Comment: Pt and daughter educated on reason for OT evaluation and acute services. Both agreeable to participate  Precautions:  Hearing/Visual Limitations: [place]  Medical Precautions: Oxygen therapy device and L/min, Fall precautions       Pain:  Pain Assessment  Pain Assessment: 0-10  Pain Score: 0  - No pain    Objective   Cognition:  Overall Cognitive Status: Within Functional Limits     Confusion Assessment Method (CAM)  Acute Onset and Fluctuating Course (1A): No  [place] Agitation Sedation Scale  [place] Agitation Sedation Scale (RASS): Alert and calm  Home Living:  Type of Home:  (Lives with  (advanced dementia) and dtr in 1 level home. 2 steps to enter home, amb with FWW, assist with ADLs recently, sleeps in recliner. Pt able to amb about 30 feet at baseline before needing to rest)   Prior Function:  Level of [place]: Needs assistance with ADLs, Needs assistance with homemaking  Receives Help From:  (daughter)  Prior Function Comments: Pt required assistance due to decreased activity tolerance PTA however, leading upto admission, pt requiring more assistance per dtr and ambulating less  IADL History:  IADL Comments: Pt has not homemaking responsibilities  ADL:  Eating Assistance: Stand by  Eating Deficit:  (poor endurance for chewing; pt currently eating soups and foods that don't require much chewing.)  Grooming Assistance: Maximal  Bathing Assistance: Total  UE Dressing Assistance: Moderate  LE Dressing Assistance: Total  Toileting Assistance with Device: Maximal  Functional Assistance: Moderate  ADL Comments: pt has minimal endurance to complete tasks  Activity Tolerance:  Endurance: Tolerates less than 10 min exercise, no significant change in vital signs, Decreased tolerance for upright activites  Activity Tolerance Comments: requires rest breaks frequently for at least 2-3 minutes  Bed Mobility/Transfers: Bed Mobility  Bed Mobility: No    Transfers  Transfer:  (Sit to stand with Min assist x 1 and FWW; cues for sequencing, safety, balance, AD use)      Functional Mobility:  Functional Mobility  Functional Mobility Performed: No  Sitting Balance:  Static Sitting Balance  Static Sitting-Balance Support: No upper extremity supported, Feet supported  Static Sitting-Level of Assistance:  Independent  Dynamic Sitting Balance  Dynamic Sitting-Balance Support: Feet supported, Bilateral upper extremity supported  Dynamic Sitting-Balance: Forward lean, Reaching for objects, Trunk control activities  Dynamic Sitting-Comments: close supervision  Standing Balance:  Static Standing Balance  Static Standing-Balance Support: Bilateral upper extremity supported  Static Standing-Level of Assistance: Minimum assistance  Dynamic Standing Balance  Dynamic Standing-Balance Support: Bilateral upper extremity supported  Dynamic Standing-Balance: Turning, Forward lean  Dynamic Standing-Comments: min A  Modalities:  Modalities Used: No  IADL's:   IADL Comments: Pt has not homemaking responsibilities  Vision: Vision - Basic Assessment  Current Vision: No visual deficits  Sensation:  Light Touch: No apparent deficits  Strength:  Strength Comments: BUE WFL       Coordination:  Coordination Comment: muscle fatigue noted with minimal activity   Hand Function:  Hand Function  Gross Grasp: Functional  Coordination: Functional  Extremities: RUE   RUE : Within Functional Limits and LUE   LUE: Within Functional Limits      Outcome Measures: [facility] Daily Activity  Putting on and taking off regular lower body clothing: Total  Bathing (including washing, rinsing, drying): Total  Putting on and taking off regular upper body clothing: A lot  Toileting, which includes using toilet, bedpan or urinal: A lot  Taking care of personal grooming such as brushing teeth: A little  Eating Meals: A little  Daily Activity - Total Score: 12      Education Documentation  Home Exercise Program, taught by Nano Barrientos OT at 5/15/2024  5:29 PM.  Learner: Family, Patient  Readiness: Acceptance  Method: Explanation  Response: Verbalizes Understanding    ADL Training, taught by Nano Barrientos OT at 5/15/2024  5:29 PM.  Learner: Family, Patient  Readiness: Acceptance  Method: Explanation  Response: Verbalizes Understanding    Education Comments  No  comments found.      Goals:   Encounter Problems       Encounter Problems (Active)       ADLs       Patient will complete daily grooming tasks brushing teeth, washing face/hair, and unsupported sitting with modified independent level of assistance and PRN adaptive equipment while supported sitting.       Start:  05/15/24    Expected End:  05/29/24            Patient will complete toileting including hygiene clothing management/hygiene with modified independent level of assistance.       Start:  05/15/24    Expected End:  05/29/24               EXERCISE/STRENGTHENING       Patient will be educated on BUE HEP for increased ADL performance.       Start:  05/15/24    Expected End:  05/29/24               TRANSFERS       Patient will complete sit to stand transfer with modified independent level of assistance and least restrictive device in order to improve safety and prepare for out of bed mobility.       Start:  05/15/24    Expected End:  05/29/24

## 2024-05-15 NOTE — PROGRESS NOTES
Irish Davis was referred to the Clinical Pharmacy Team to complete a virtual Transitions of Care encounter for discharge medication optimization. The patient was referred for their [disease state(s)]. The primary goal of this encounter is to ensure the patient's medications are both clinically appropriate and financially feasible for the patient. Pharmacy clinical interventions were provided as noted below.     Attending: Parker Hall  _______________________________________________________________________  PHARMACY ASSESSMENT    Meds to beds? [yes]   Home Pharmacy: [facility] ([phone])  Allergies Reviewed? [yes]    Affordability/Accessibility:Medicare patient on Breztri and Roflumilast  Adherence/Organization: none  Adverse Effects: none  _______________________________________________________________________  CHRONIC HEART FAILURE ASSESSMENT  -HTN present/diagnosed: [yes]    LVEF: 60-65%  eGFR: 86  Beta blocker: (metoprolol)  ACEi/ARB: none  MRA: none  Diuretic: furosemide and potassium supplementation  SGLT2i: none  _______________________________________________________________________  ASTHMA/COPD ASSESSMENT    CURRENT PHARMACOTHERAPY  - [baseline 4L O2, Breztri, albuterol nebules]    RESCUE INHALER USE  - [daily]    INHALER TECHNIQUE  - Demonstrated correct inhaler technique    SMOKING CESSATION  Patient is not currently using tobacco products  - Quit Date: [6 years ago]  ___________________________________________________________________  PATIENT EDUCATION/GOALS  - Answered all patient questions and concerns  - [2G Salt restriction daily, 2L fluid restriction daily, Daily weights]  -Limit salt intake and avoid foods high in sodium such as processed meats, salty snacks, and fast food. The recommended daily maximum sodium intake is less than 2,300mg (2 teaspoons) per day.    -Educated patient on proper inhaler technique and proper inhaler  cleaning.    _______________________________________________________________________  RECOMMENDATIONS/PLAN  Please send prescriptions to  [facility] for assistance on insurance prior authorization and copay. Prescriptions will be delivered to the patient's bedside prior to discharge with the Meds to Beds program.   Continuation of care will be provided by the outpatient clinical pharmacy team in collaboration with the patient's  Primary Care Provider     Verbal consent to manage patient's drug therapy was obtained from the patient. They were informed they may decline to participate or withdraw from participation in pharmacy services at any time.

## 2024-05-16 ENCOUNTER — APPOINTMENT (OUTPATIENT)
Dept: RESPIRATORY THERAPY | Facility: HOSPITAL | Age: 79
End: 2024-05-16
Payer: COMMERCIAL

## 2024-05-16 ENCOUNTER — APPOINTMENT (OUTPATIENT)
Dept: CARDIOLOGY | Facility: HOSPITAL | Age: 79
DRG: 280 | End: 2024-05-16
Payer: MEDICARE

## 2024-05-16 LAB
ANION GAP SERPL CALC-SCNC: 10 MMOL/L (ref 10–20)
ATRIAL RATE: 105 BPM
ATRIAL RATE: 71 BPM
BUN SERPL-MCNC: 42 MG/DL (ref 6–23)
CALCIUM SERPL-MCNC: 9.3 MG/DL (ref 8.6–10.3)
CHLORIDE SERPL-SCNC: 97 MMOL/L (ref 98–107)
CO2 SERPL-SCNC: 40 MMOL/L (ref 21–32)
CREAT SERPL-MCNC: 1.01 MG/DL (ref 0.5–1.05)
EGFRCR SERPLBLD CKD-EPI 2021: 56 ML/MIN/1.73M*2
ERYTHROCYTE [DISTWIDTH] IN BLOOD BY AUTOMATED COUNT: 15.3 % (ref 11.5–14.5)
GLUCOSE SERPL-MCNC: 146 MG/DL (ref 74–99)
HCT VFR BLD AUTO: 40.3 % (ref 36–46)
HGB BLD-MCNC: 11.6 G/DL (ref 12–16)
MAGNESIUM SERPL-MCNC: 2.1 MG/DL (ref 1.6–2.4)
MCH RBC QN AUTO: 25.8 PG (ref 26–34)
MCHC RBC AUTO-ENTMCNC: 28.8 G/DL (ref 32–36)
MCV RBC AUTO: 90 FL (ref 80–100)
NRBC BLD-RTO: 0 /100 WBCS (ref 0–0)
P AXIS: 77 DEGREES
P AXIS: 82 DEGREES
P OFFSET: 219 MS
P OFFSET: 221 MS
P ONSET: 172 MS
P ONSET: 174 MS
PLATELET # BLD AUTO: 324 X10*3/UL (ref 150–450)
POTASSIUM SERPL-SCNC: 4.1 MMOL/L (ref 3.5–5.3)
PR INTERVAL: 110 MS
PR INTERVAL: 126 MS
Q ONSET: 227 MS
Q ONSET: 227 MS
QRS COUNT: 12 BEATS
QRS COUNT: 17 BEATS
QRS DURATION: 70 MS
QRS DURATION: 70 MS
QT INTERVAL: 344 MS
QT INTERVAL: 404 MS
QTC CALCULATION(BAZETT): 439 MS
QTC CALCULATION(BAZETT): 454 MS
QTC FREDERICIA: 414 MS
QTC FREDERICIA: 427 MS
R AXIS: 81 DEGREES
R AXIS: 94 DEGREES
RBC # BLD AUTO: 4.49 X10*6/UL (ref 4–5.2)
SODIUM SERPL-SCNC: 143 MMOL/L (ref 136–145)
T AXIS: 54 DEGREES
T AXIS: 92 DEGREES
T OFFSET: 399 MS
T OFFSET: 429 MS
VENTRICULAR RATE: 105 BPM
VENTRICULAR RATE: 71 BPM
WBC # BLD AUTO: 13.1 X10*3/UL (ref 4.4–11.3)

## 2024-05-16 PROCEDURE — 99418 PROLNG IP/OBS E/M EA 15 MIN: CPT

## 2024-05-16 PROCEDURE — 93005 ELECTROCARDIOGRAM TRACING: CPT

## 2024-05-16 PROCEDURE — 2500000005 HC RX 250 GENERAL PHARMACY W/O HCPCS: Performed by: NURSE PRACTITIONER

## 2024-05-16 PROCEDURE — 99233 SBSQ HOSP IP/OBS HIGH 50: CPT | Performed by: PHYSICIAN ASSISTANT

## 2024-05-16 PROCEDURE — 99223 1ST HOSP IP/OBS HIGH 75: CPT

## 2024-05-16 PROCEDURE — 94640 AIRWAY INHALATION TREATMENT: CPT

## 2024-05-16 PROCEDURE — 93010 ELECTROCARDIOGRAM REPORT: CPT | Performed by: INTERNAL MEDICINE

## 2024-05-16 PROCEDURE — 2500000004 HC RX 250 GENERAL PHARMACY W/ HCPCS (ALT 636 FOR OP/ED): Performed by: NURSE PRACTITIONER

## 2024-05-16 PROCEDURE — 2500000002 HC RX 250 W HCPCS SELF ADMINISTERED DRUGS (ALT 637 FOR MEDICARE OP, ALT 636 FOR OP/ED): Performed by: INTERNAL MEDICINE

## 2024-05-16 PROCEDURE — 2500000001 HC RX 250 WO HCPCS SELF ADMINISTERED DRUGS (ALT 637 FOR MEDICARE OP): Performed by: STUDENT IN AN ORGANIZED HEALTH CARE EDUCATION/TRAINING PROGRAM

## 2024-05-16 PROCEDURE — 2500000001 HC RX 250 WO HCPCS SELF ADMINISTERED DRUGS (ALT 637 FOR MEDICARE OP): Performed by: INTERNAL MEDICINE

## 2024-05-16 PROCEDURE — 99232 SBSQ HOSP IP/OBS MODERATE 35: CPT | Performed by: NURSE PRACTITIONER

## 2024-05-16 PROCEDURE — 97110 THERAPEUTIC EXERCISES: CPT | Mod: GO,CO

## 2024-05-16 PROCEDURE — 82565 ASSAY OF CREATININE: CPT | Performed by: PHYSICIAN ASSISTANT

## 2024-05-16 PROCEDURE — 94668 MNPJ CHEST WALL SBSQ: CPT

## 2024-05-16 PROCEDURE — 2500000004 HC RX 250 GENERAL PHARMACY W/ HCPCS (ALT 636 FOR OP/ED): Performed by: STUDENT IN AN ORGANIZED HEALTH CARE EDUCATION/TRAINING PROGRAM

## 2024-05-16 PROCEDURE — 85027 COMPLETE CBC AUTOMATED: CPT | Performed by: PHYSICIAN ASSISTANT

## 2024-05-16 PROCEDURE — 83735 ASSAY OF MAGNESIUM: CPT | Performed by: PHYSICIAN ASSISTANT

## 2024-05-16 PROCEDURE — 2060000001 HC INTERMEDIATE ICU ROOM DAILY

## 2024-05-16 PROCEDURE — 36415 COLL VENOUS BLD VENIPUNCTURE: CPT | Performed by: PHYSICIAN ASSISTANT

## 2024-05-16 RX ADMIN — PANTOPRAZOLE SODIUM 40 MG: 40 TABLET, DELAYED RELEASE ORAL at 08:48

## 2024-05-16 RX ADMIN — Medication 5 L/MIN: at 20:00

## 2024-05-16 RX ADMIN — POTASSIUM CHLORIDE 40 MEQ: 1.5 POWDER, FOR SOLUTION ORAL at 21:29

## 2024-05-16 RX ADMIN — ASPIRIN 81 MG: 81 TABLET, COATED ORAL at 08:48

## 2024-05-16 RX ADMIN — FUROSEMIDE 40 MG: 10 INJECTION, SOLUTION INTRAMUSCULAR; INTRAVENOUS at 08:48

## 2024-05-16 RX ADMIN — METOPROLOL TARTRATE 37.5 MG: 25 TABLET, FILM COATED ORAL at 21:29

## 2024-05-16 RX ADMIN — AMOXICILLIN AND CLAVULANATE POTASSIUM 1 TABLET: 875; 125 TABLET, FILM COATED ORAL at 08:48

## 2024-05-16 RX ADMIN — IPRATROPIUM BROMIDE AND ALBUTEROL SULFATE 3 ML: 2.5; .5 SOLUTION RESPIRATORY (INHALATION) at 11:37

## 2024-05-16 RX ADMIN — HEPARIN SODIUM 5000 UNITS: 5000 INJECTION INTRAVENOUS; SUBCUTANEOUS at 06:39

## 2024-05-16 RX ADMIN — GUAIFENESIN 600 MG: 600 TABLET ORAL at 08:48

## 2024-05-16 RX ADMIN — METHYLPREDNISOLONE SODIUM SUCCINATE 40 MG: 40 INJECTION, POWDER, FOR SOLUTION INTRAMUSCULAR; INTRAVENOUS at 16:05

## 2024-05-16 RX ADMIN — HYDROXYZINE HYDROCHLORIDE 12.5 MG: 25 TABLET ORAL at 21:30

## 2024-05-16 RX ADMIN — TIOTROPIUM BROMIDE INHALATION SPRAY 2 PUFF: 3.12 SPRAY, METERED RESPIRATORY (INHALATION) at 06:40

## 2024-05-16 RX ADMIN — GUAIFENESIN 600 MG: 600 TABLET ORAL at 21:30

## 2024-05-16 RX ADMIN — HEPARIN SODIUM 5000 UNITS: 5000 INJECTION INTRAVENOUS; SUBCUTANEOUS at 21:39

## 2024-05-16 RX ADMIN — FUROSEMIDE 40 MG: 10 INJECTION, SOLUTION INTRAMUSCULAR; INTRAVENOUS at 16:05

## 2024-05-16 RX ADMIN — HEPARIN SODIUM 5000 UNITS: 5000 INJECTION INTRAVENOUS; SUBCUTANEOUS at 16:05

## 2024-05-16 RX ADMIN — METHYLPREDNISOLONE SODIUM SUCCINATE 40 MG: 40 INJECTION, POWDER, FOR SOLUTION INTRAMUSCULAR; INTRAVENOUS at 21:29

## 2024-05-16 RX ADMIN — METHYLPREDNISOLONE SODIUM SUCCINATE 40 MG: 40 INJECTION, POWDER, FOR SOLUTION INTRAMUSCULAR; INTRAVENOUS at 06:39

## 2024-05-16 RX ADMIN — IPRATROPIUM BROMIDE AND ALBUTEROL SULFATE 3 ML: 2.5; .5 SOLUTION RESPIRATORY (INHALATION) at 07:35

## 2024-05-16 RX ADMIN — AMOXICILLIN AND CLAVULANATE POTASSIUM 1 TABLET: 875; 125 TABLET, FILM COATED ORAL at 21:29

## 2024-05-16 RX ADMIN — POTASSIUM CHLORIDE 40 MEQ: 1.5 POWDER, FOR SOLUTION ORAL at 08:49

## 2024-05-16 RX ADMIN — Medication 6 L/MIN: at 08:45

## 2024-05-16 RX ADMIN — ALBUTEROL SULFATE 2 PUFF: 90 AEROSOL, METERED RESPIRATORY (INHALATION) at 20:05

## 2024-05-16 RX ADMIN — Medication 6 L/MIN: at 07:37

## 2024-05-16 RX ADMIN — Medication 3 MG: at 21:30

## 2024-05-16 RX ADMIN — IPRATROPIUM BROMIDE AND ALBUTEROL SULFATE 3 ML: 2.5; .5 SOLUTION RESPIRATORY (INHALATION) at 20:09

## 2024-05-16 RX ADMIN — METOPROLOL TARTRATE 37.5 MG: 25 TABLET, FILM COATED ORAL at 08:48

## 2024-05-16 RX ADMIN — FLUTICASONE FUROATE AND VILANTEROL TRIFENATATE 1 PUFF: 200; 25 POWDER RESPIRATORY (INHALATION) at 20:05

## 2024-05-16 RX ADMIN — LORATADINE 10 MG: 10 TABLET ORAL at 21:30

## 2024-05-16 RX ADMIN — ACETAMINOPHEN 650 MG: 325 TABLET ORAL at 21:31

## 2024-05-16 ASSESSMENT — PAIN - FUNCTIONAL ASSESSMENT
PAIN_FUNCTIONAL_ASSESSMENT: 0-10

## 2024-05-16 ASSESSMENT — ENCOUNTER SYMPTOMS
FALLS: 1
BRUISES/BLEEDS EASILY: 0
LIGHT-HEADEDNESS: 0
SORE THROAT: 0
APPETITE CHANGE: 0
CONSTIPATION: 0
WOUND: 0
WEAKNESS: 0
MEMORY LOSS: 0
HEMATURIA: 0
TROUBLE SWALLOWING: 0
ABDOMINAL PAIN: 0
DIAPHORESIS: 0
IRREGULAR HEARTBEAT: 0
DYSPNEA ON EXERTION: 1
FLANK PAIN: 0
DIARRHEA: 0
DEPRESSION: 0
EYE PAIN: 0
PALPITATIONS: 0
CHILLS: 0
FEVER: 0
DECREASED APPETITE: 1
FATIGUE: 0
BLOOD IN STOOL: 0
CHEST TIGHTNESS: 0
COUGH: 1
SYNCOPE: 0
SHORTNESS OF BREATH: 1
NUMBNESS: 0
HEADACHES: 0
UNEXPECTED WEIGHT CHANGE: 1
NAUSEA: 0
BLURRED VISION: 0
DIZZINESS: 0
JOINT SWELLING: 0
TROUBLE SWALLOWING: 1
HALLUCINATIONS: 0
BACK PAIN: 0
GASTROINTESTINAL NEGATIVE: 1
FOCAL WEAKNESS: 0
DYSURIA: 0
FREQUENCY: 0
WHEEZING: 1
VOMITING: 0
FACIAL SWELLING: 0

## 2024-05-16 ASSESSMENT — COGNITIVE AND FUNCTIONAL STATUS - GENERAL
MOVING TO AND FROM BED TO CHAIR: A LITTLE
PERSONAL GROOMING: A LITTLE
TOILETING: A LOT
DRESSING REGULAR LOWER BODY CLOTHING: A LOT
HELP NEEDED FOR BATHING: A LOT
PERSONAL GROOMING: A LITTLE
DRESSING REGULAR LOWER BODY CLOTHING: TOTAL
WALKING IN HOSPITAL ROOM: A LITTLE
DRESSING REGULAR UPPER BODY CLOTHING: A LOT
EATING MEALS: A LITTLE
TOILETING: A LITTLE
TURNING FROM BACK TO SIDE WHILE IN FLAT BAD: A LITTLE
HELP NEEDED FOR BATHING: TOTAL
STANDING UP FROM CHAIR USING ARMS: A LITTLE
MOVING FROM LYING ON BACK TO SITTING ON SIDE OF FLAT BED WITH BEDRAILS: A LITTLE
CLIMB 3 TO 5 STEPS WITH RAILING: A LOT
EATING MEALS: A LITTLE
DRESSING REGULAR UPPER BODY CLOTHING: A LOT
DAILY ACTIVITIY SCORE: 12
MOBILITY SCORE: 17
DAILY ACTIVITIY SCORE: 15

## 2024-05-16 ASSESSMENT — PAIN SCALES - GENERAL
PAINLEVEL_OUTOF10: 2
PAINLEVEL_OUTOF10: 0 - NO PAIN

## 2024-05-16 NOTE — PROGRESS NOTES
Physical Therapy                 Therapy Communication Note    Patient Name: Irish Davis  MRN: [id number]  Today's Date: 5/16/2024     Discipline: Physical Therapy    Missed Visit Reason: Missed Visit Reason: Other (Comment) (Family present and requesting PT hold today. pt and family waiting to meet with Rakesh Polanco from palliative care.)    Missed Time: Attempt    Comment:

## 2024-05-16 NOTE — NURSING NOTE
This nurse received a message saying, 29's t-wave is looking a little elevated. Pt is currently sleeping. MD notified.

## 2024-05-16 NOTE — CARE PLAN
The patient's goals for the shift include      The clinical goals for the shift include Pt will have no s/s of SOB this shift    Over the shift, the patient did make progress toward the following goals. Pt able to make needs known. This nurse maintained pt safety this shift.    Problem: Safety  Goal: Patient will be injury free during hospitalization  Outcome: Progressing  Goal: I will remain free of falls  Outcome: Progressing     Problem: Daily Care  Goal: Daily care needs are met  Outcome: Progressing     Problem: Psychosocial Needs  Goal: Demonstrates ability to cope with hospitalization/illness  Outcome: Progressing  Goal: Collaborate with me, my family, and caregiver to identify my specific goals  Outcome: Progressing     Problem: Fall/Injury  Goal: Not fall by end of shift  Outcome: Progressing  Goal: Be free from injury by end of the shift  Outcome: Progressing  Goal: Verbalize understanding of personal risk factors for fall in the hospital  Outcome: Progressing  Goal: Verbalize understanding of risk factor reduction measures to prevent injury from fall in the home  Outcome: Progressing  Goal: Use assistive devices by end of the shift  Outcome: Progressing  Goal: Pace activities to prevent fatigue by end of the shift  Outcome: Progressing     Problem: Respiratory  Goal: Clear secretions with interventions this shift  Outcome: Progressing  Goal: Minimize anxiety/maximize coping throughout shift  Outcome: Progressing  Goal: Minimal/no exertional discomfort or dyspnea this shift  Outcome: Progressing  Goal: No signs of respiratory distress (eg. Use of accessory muscles. Peds grunting)  Outcome: Progressing  Goal: Patent airway maintained this shift  Outcome: Progressing  Goal: Tolerate mechanical ventilation evidenced by VS/agitation level this shift  Outcome: Progressing  Goal: Tolerate pulmonary toileting this shift  Outcome: Progressing  Goal: Verbalize decreased shortness of breath this shift  Outcome:  Progressing  Goal: Wean oxygen to maintain O2 saturation per order/standard this shift  Outcome: Progressing  Goal: Increase self care and/or family involvement in next 24 hours  Outcome: Progressing

## 2024-05-16 NOTE — PROGRESS NOTES
HPI:  Irish Davis is a 79 y.o. female presenting with shortness of breath.  H/o HTN, DL, moderate aortic stenosis, chronic bilateral lower extremity edema (on Lasix), COPD with chronic hypoxic respiratory failure (baseline 4-4.5 L nasal cannula).     The pt presents with SOB, PATEL, cough productive of green sputum, wheezing, congestion, generalized weakness.  These symptoms have worsened over the last several days, to the point that the patient has trouble getting out of bed.  Has some chest tightness with coughing, but no report of exertional chest pain/pressure.  Of note, the has been admitted to the hospital several times in the last 6 months for similar indications.  In the ED she was afebrile, /101, .  Sat 93% on 4L NC.  Labs significant for WBC 10.6, Hb 11.8, BUN 13, Cr 0.71.  , HSTI 74 > 84.  CXR with chronic interstitial lung dse and emphysema, without overt acute process.  Rec'd 125 Solu-Medrol, DuoNebs, 40 mg Lasix, total of 60 mEq Kcl.     The patient is hard of hearing.  When asked whether she was taking furosemide at home (on her outpatient med list under the summary tab, it states she should be taking furosemide 40mg, half a tablet daily), she tells me that she was not told to take a water pill at home.    Subjective Data:  Has ongoing dyspnea even with talking.  She is on 5 lpm NC.  K 4.1, creatinine 1.01, mag 2.1.  Echo in January showed EF 60-65%, mild AORTIC STENOSIS.  No CP/pressure/palpitations.  Monitor: SR    Overnight Events:    None     Objective Data:  Last Recorded Vitals:  Vitals:    05/15/24 2036 05/15/24 2355 05/16/24 0403 05/16/24 0737   BP: 137/77 107/65 129/74 126/69   BP Location: Right arm Left arm Right arm Right arm   Patient Position: Lying Lying Lying Lying   Pulse: (!) 116 81 71 74   Resp: 20 18 20 20   Temp: 37.3 °C (99.2 °F) 36.9 °C (98.5 °F) 36.7 °C (98 °F) 36.4 °C (97.6 °F)   TempSrc: Temporal Temporal Temporal Temporal   SpO2: 99% 97% 98% 93%    Weight:   (!) 38.9 kg (85 lb 12.1 oz)    Height:           Last Labs:    Results from last 7 days   Lab Units 05/16/24  0438 05/15/24  0448 05/14/24  1055   SODIUM mmol/L 143 143 143   POTASSIUM mmol/L 4.1 3.8 2.9*   CHLORIDE mmol/L 97* 95* 93*   CO2 mmol/L 40* 39* 39*   BUN mg/dL 42* 25* 13   CREATININE mg/dL 1.01 0.70 0.71   GLUCOSE mg/dL 146* 100* 86   CALCIUM mg/dL 9.3 9.1 9.1        Results from last 7 days   Lab Units 05/16/24  0438 05/15/24  0448 05/14/24  1055   WBC AUTO x10*3/uL 13.1* 11.3 10.6   HEMOGLOBIN g/dL 11.6* 12.0 11.8*   HEMATOCRIT % 40.3 40.0 39.9   PLATELETS AUTO x10*3/uL 324 285 292        Results from last 7 days   Lab Units 05/15/24  0448   CHOLESTEROL mg/dL 240*   TRIGLYCERIDES mg/dL 131   HDL mg/dL 46.9        Last I/O:  I/O last 3 completed shifts:  In: - (0 mL/kg)   Out: 650 (16.7 mL/kg) [Urine:650 (0.5 mL/kg/hr)]  Weight: 38.9 kg         Inpatient Medications:  Scheduled medications   Medication Dose Route Frequency    amoxicillin-pot clavulanate  1 tablet oral BID    aspirin  81 mg oral Daily    atorvastatin  80 mg oral Nightly    fluticasone furoate-vilanteroL  1 puff inhalation Daily    furosemide  40 mg intravenous BID    guaiFENesin  600 mg oral BID    heparin (porcine)  5,000 Units subcutaneous q8h    ipratropium-albuteroL  3 mL nebulization TID    loratadine  10 mg oral Daily    methylPREDNISolone sodium succinate (PF)  40 mg intravenous q8h DORIS    metoprolol tartrate  37.5 mg oral BID    oxygen   inhalation Continuous - Inhalation    pantoprazole  40 mg oral Daily before breakfast    Or    pantoprazole  40 mg intravenous Daily before breakfast    polyethylene glycol  17 g oral Daily    potassium chloride  40 mEq oral BID    tiotropium  2 puff inhalation Daily     PRN medications   Medication    acetaminophen    albuterol    benzonatate    bisacodyl    bisacodyl    hydrOXYzine HCL    melatonin    ondansetron    Or    ondansetron     Continuous Medications   Medication Dose Last  Rate       ROS:  Review of Systems   Constitutional: Positive for decreased appetite and malaise/fatigue.   HENT: Negative.          [place]   Eyes:  Negative for blurred vision and visual disturbance.   Cardiovascular:  Positive for dyspnea on exertion. Negative for chest pain, irregular heartbeat, leg swelling, palpitations and syncope.   Respiratory:  Positive for cough and shortness of breath.    Musculoskeletal:  Positive for arthritis and falls.   Gastrointestinal: Negative.    Neurological:  Negative for focal weakness.   Psychiatric/Behavioral:  Negative for depression and memory loss.         Physical Exam:  Physical Exam  Constitutional:       Appearance: She is ill-appearing.      Comments: Frail   HENT:      Head: Normocephalic.   Eyes:      Conjunctiva/sclera: Conjunctivae normal.   Cardiovascular:      Rate and Rhythm: Normal rate and regular rhythm.      Pulses: Normal pulses.      Heart sounds: S1 normal and S2 normal. Murmur heard.      No friction rub. No gallop.   Pulmonary:      Comments: On 5lpm O2 via NC. Dyspneic with talking.  Lungs with poor air movement and + wheezes, no crackles noted.  Abdominal:      General: Bowel sounds are normal.      Palpations: Abdomen is soft.      Tenderness: There is no abdominal tenderness.   Musculoskeletal:      Cervical back: Neck supple.      Right lower leg: No edema.      Left lower leg: No edema.   Skin:     General: Skin is warm and dry.   Neurological:      General: No focal deficit present.      Mental Status: She is alert and oriented to person, place, and time.   Psychiatric:         Attention and Perception: Attention normal.         Mood and Affect: Mood normal.          Assessment/Plan     Acute on chronic diastolic HF:  Dyspnea, LE edema.  -diuresis with lasix 40mg IV BID  -strict I/O's and daily weights  -serial Cr  -monitor and replete lytes  -patient states she has not been taking home diuretic - recommended that she takes this at least as needed  for leg swelling/weight gain  5-16-24: Lytes are WNL, BP stable, creatinine 1.01.  Continue to diurese and reassess tomorrow.  I think a lot of her breathing issues are from COPD/Emphysema/interstitial lung disease.  on admission.      Moderate aortic stenosis:    -serial monitoring on outpatient TTE  5-16-24: Mild AORTIC STENOSIS, echo from January shows:  Aortic Valve: The aortic valve was not well visualized. There is mild aortic valve cusp calcification. There is evidence of mild aortic valve stenosis.  The aortic valve dimensionless index is 0.55. There is no evidence of aortic valve regurgitation. The peak instantaneous gradient of the aortic valve is 23.0 mmHg. The mean gradient of the aortic valve is 12.8 mmHg.  Can monitor in the outpt setting.     HTN  -metop tartrate 37.5mg 2 times per day  5-16-24: BP stable.      DL  -atorvastatin 80mg daily      Code Status:  DNR and No Intubation          Vilma Winn, APRN-CNP

## 2024-05-16 NOTE — DOCUMENTATION CLARIFICATION NOTE
"    PATIENT:               ROMULO DOHERTY  ACCT #:                  [id number]  MRN:                       [id number]  :                       1945  ADMIT DATE:       2024 10:17 AM  DISCH DATE:  RESPONDING PROVIDER #:        [id number]          PROVIDER RESPONSE TEXT:    Unspecified    CDI QUERY TEXT:    Clarification      Instruction:    Based on your assessment of the patient and the clinical information, please provide the requested documentation by clicking on the appropriate radio button and enter any additional information if prompted.    Question: Please further clarify the most likely etiology of the altered mental status as    When answering this query, please exercise your independent professional judgment. The fact that a question is being asked, does not imply that any particular answer is desired or expected.    The patient's clinical indicators include:  Clinical Information:  78yo female admitted with COPD exacerbation, CHF exacerbation, electrolyte imbalances, weakness and elevated troponin.   PMH includes HTN, HLD, moderate aortic stenosis, chronic BLE edema on Lasix, COPD/ CRF hypoxic on 4-4.5 L O2, chronic hypercarbia, Hard of hearing and is a former smoker.    Clinical Indicators:  - NP White   5/15 : \" Majority of HPI obtained from chart review and patient's daughter, Sofie, as patient is confused and insisted that I took her inhalers\"    - RN Neuro assessments:   Alert, disoriented to time  5/15 Forgetful, tremors    Alert, oriented x 4, tremors, forgetful    Treatment: Atarax, Melatonin, lab monitoring, safety measures, supplemental O2 at 2 - 6 liters, electrolyte replacements, diuresis.    Risk Factors: 78yo female with CHFe, COPDe, [place], CRF hypoxia, chronic hypercarbia, elevated troponins, and electrolyte imbalances.  Options provided:  -- Metabolic encephalopathy 2/2 CHF and COPD exacerbations  -- Metabolic encephalopathy 2/2 CHF and COPD exacerbations superimposed on possible " dementia  -- Possible dementia  -- Other - I will add my own diagnosis  -- Refer to Clinical Documentation Reviewer    Query created by: Elisa Tariq on 5/16/2024 1:32 PM      Electronically signed by:  MODESTO PATE PA-C 5/16/2024 1:51 PM

## 2024-05-16 NOTE — PROGRESS NOTES
Irish Davis is a 79 y.o. female on day 2 of admission presenting with Acute on chronic diastolic heart failure (Multi).      Subjective   Irish Davis is a 79 y.o. female with PMHx s/f HTN, HLD, moderate aortic stenosis, chronic bilateral lower extremity edema (on Lasix), COPD with chronic hypoxic respiratory failure (baseline 4-4.5 L nasal cannula), presenting with worsening shortness of breath, dyspnea on exertion, cough (productive - green sputum), wheezing, congestion, and generalized weakness. Patient and family report that over the last few days, she has gotten significantly more weak to the point where she is not really able to get up and out of bed; however, they note that this is exacerbated by a significant amount of aforementioned respiratory symptoms. She has not had any significant sick contacts or exposures; however, she has been admitted multiple times under similar circumstances since about November of last year. Has been taking home medications as prescribed, but noticed some mild increase in lower extremity edema from baseline. Has not had much of an appetite since onset of symptoms, but no significant nausea/vomiting/abdominal pain/diarrhea. She admits to some chest tightness when coughing and having increased work of breathing when trying to ambulate. She is feeling better since presentation to the emergency department, but still generally unwell.  She is in no acute distress sitting in bed with multiple loved ones at the bedside. 93% 4 L nasal cannula (patient was increased to 6 L nasal cannula from her baseline of 4-4.5 for comfort, no documented hypoxia on her oxygen). High-sensitivity troponin 36-20-dwbfcm pending.  VBG: pH 7.41, pCO2 68, calculated bicarb 43.1.  Venous lactate 2.1-1.4. ECG is without overt acute ischemic changes. CXR with chronic interstitial lung dz/emphysema without overt acute process. Lipid panel with elevated , cholesterol 240. HSTI 74-84-57-49. A1c  5.6. She is agreeable to DNR/DNI but would be ok with BIPAP.    5/16/2024: No acute events overnight. Vitals stable, 93% on 6L, RR 20. Labs largely unremarkable. Has chronic hypercarbia. Slight leukocytosis from steroids. Continues on IV lasix 40 BID. Pulm added acapella and scheduled mucinex  Palliative care to arrange family meeting. Had a long discussion with patient today regarding palliative care vs hospice, she states her goals align more with hospice but would like her daughter involved. Her main worry isnt about herself but regarding her  who has advanced dementia per her report and is worried this is difficult on her daughter as she is helping care for them both.        Review of Systems   Constitutional:  Negative for appetite change, chills, diaphoresis, fatigue and fever.   HENT:  Negative for congestion, ear pain, facial swelling, hearing loss, nosebleeds, sore throat, tinnitus and trouble swallowing.    Eyes:  Negative for pain.   Respiratory:  Positive for cough, shortness of breath and wheezing. Negative for chest tightness.    Cardiovascular:  Positive for leg swelling. Negative for chest pain and palpitations.   Gastrointestinal:  Negative for abdominal pain, blood in stool, constipation, diarrhea, nausea and vomiting.   Genitourinary:  Negative for dysuria, flank pain, frequency, hematuria and urgency.   Musculoskeletal:  Negative for back pain and joint swelling.   Skin:  Negative for rash and wound.   Neurological:  Negative for dizziness, syncope, weakness, light-headedness, numbness and headaches.   Hematological:  Does not bruise/bleed easily.   Psychiatric/Behavioral:  Negative for behavioral problems, hallucinations and suicidal ideas.           Objective     Last Recorded Vitals  /69 (BP Location: Right arm, Patient Position: Lying)   Pulse 74   Temp 36.4 °C (97.6 °F) (Temporal)   Resp 20   Wt (!) 38.9 kg (85 lb 12.1 oz)   SpO2 93%     Image Results  ECG 12 Lead    Result  Date: 5/15/2024  Extreme tachycardia with wide complex, no further rhythm analysis attempted Artifact in lead(s) II,III,aVR,aVL,aVF,V1,V2,V3,V4,V5,V6    ECG 12 Lead    Result Date: 5/15/2024  Sinus rhythm Multiple premature complexes, vent & supraven Borderline right axis deviation Borderline low voltage, extremity leads Minimal ST depression, lateral leads    ECG 12 Lead    Result Date: 5/15/2024  Sinus tachycardia with Premature supraventricular complexes Possible Left atrial enlargement Rightward axis Nonspecific ST abnormality Abnormal ECG When compared with ECG of 14-MAY-2024 12:01, PREVIOUS ECG IS PRESENT    ECG 12 lead    Result Date: 5/14/2024  Sinus rhythm Sinus pause Short MT interval Borderline right axis deviation Minimal ST depression, inferior leads Borderline prolonged QT interval    ECG 12 lead    Result Date: 5/14/2024  Sinus rhythm Multiform ventricular premature complexes Borderline short MT interval Borderline right axis deviation Minimal ST depression, inferior leads Borderline prolonged QT interval    XR chest 1 view    Result Date: 5/14/2024  Interpreted By:  Eunice Lopez, STUDY: XR CHEST 1 VIEW;  5/14/2024 11:57 am   INDICATION: Signs/Symptoms:Chest Pain.   COMPARISON: 03/01/2024   ACCESSION NUMBER(S): [id number]   ORDERING CLINICIAN: OSMANI TARIQ   FINDINGS: The heart size appears normal.   There are atherosclerotic changes of the aorta   The lungs are hyperinflated. There is basilar interstitial lung disease.   There appears to be right basilar calcified granuloma.   The patient is osteoporotic and scoliotic.   COMPARISON OF FINDING: Chest is similar.       COPD. Interstitial lung disease.   MACRO: none   Signed by: Eunice Lopez 5/14/2024 12:04 PM Dictation workstation:   [id number]       Lab Results  Results for orders placed or performed during the hospital encounter of 05/14/24 (from the past 24 hour(s))   Blood Gas Venous Full Panel   Result Value Ref Range    POCT pH, Venous  7.48 (H) 7.33 - 7.43 pH    POCT pCO2, Venous 65 (H) 41 - 51 mm Hg    POCT pO2, Venous 58 (H) 35 - 45 mm Hg    POCT SO2, Venous 90 (H) 45 - 75 %    POCT Oxy Hemoglobin, Venous 88.0 (H) 45.0 - 75.0 %    POCT Hematocrit Calculated, Venous 33.0 (L) 36.0 - 46.0 %    POCT Sodium, Venous 136 136 - 145 mmol/L    POCT Potassium, Venous 4.5 3.5 - 5.3 mmol/L    POCT Chloride, Venous 100 98 - 107 mmol/L    POCT Ionized Calicum, Venous 1.14 1.10 - 1.33 mmol/L    POCT Glucose, Venous 120 (H) 74 - 99 mg/dL    POCT Lactate, Venous 1.4 0.4 - 2.0 mmol/L    POCT Base Excess, Venous 21.5 (H) -2.0 - 3.0 mmol/L    POCT HCO3 Calculated, Venous 48.4 (H) 22.0 - 26.0 mmol/L    POCT Hemoglobin, Venous 11.1 (L) 12.0 - 16.0 g/dL    POCT Anion Gap, Venous -8.0 (L) 10.0 - 25.0 mmol/L    Patient Temperature 37.0 degrees Celsius    FiO2 40 %   ECG 12 lead   Result Value Ref Range    Ventricular Rate 71 BPM    Atrial Rate 71 BPM    LA Interval 110 ms    QRS Duration 70 ms    QT Interval 404 ms    QTC Calculation(Bazett) 439 ms    P Axis 77 degrees    R Axis 81 degrees    T Axis 54 degrees    QRS Count 12 beats    Q Onset 227 ms    P Onset 172 ms    P Offset 219 ms    T Offset 429 ms    QTC Fredericia 427 ms   CBC   Result Value Ref Range    WBC 13.1 (H) 4.4 - 11.3 x10*3/uL    nRBC 0.0 0.0 - 0.0 /100 WBCs    RBC 4.49 4.00 - 5.20 x10*6/uL    Hemoglobin 11.6 (L) 12.0 - 16.0 g/dL    Hematocrit 40.3 36.0 - 46.0 %    MCV 90 80 - 100 fL    MCH 25.8 (L) 26.0 - 34.0 pg    MCHC 28.8 (L) 32.0 - 36.0 g/dL    RDW 15.3 (H) 11.5 - 14.5 %    Platelets 324 150 - 450 x10*3/uL   Basic Metabolic Panel   Result Value Ref Range    Glucose 146 (H) 74 - 99 mg/dL    Sodium 143 136 - 145 mmol/L    Potassium 4.1 3.5 - 5.3 mmol/L    Chloride 97 (L) 98 - 107 mmol/L    Bicarbonate 40 (HH) 21 - 32 mmol/L    Anion Gap 10 10 - 20 mmol/L    Urea Nitrogen 42 (H) 6 - 23 mg/dL    Creatinine 1.01 0.50 - 1.05 mg/dL    eGFR 56 (L) >60 mL/min/1.73m*2    Calcium 9.3 8.6 - 10.3 mg/dL    Magnesium   Result Value Ref Range    Magnesium 2.10 1.60 - 2.40 mg/dL        Medications  Scheduled medications:  amoxicillin-pot clavulanate, 1 tablet, oral, BID  aspirin, 81 mg, oral, Daily  atorvastatin, 80 mg, oral, Nightly  fluticasone furoate-vilanteroL, 1 puff, inhalation, Daily  furosemide, 40 mg, intravenous, BID  guaiFENesin, 600 mg, oral, BID  heparin (porcine), 5,000 Units, subcutaneous, q8h  ipratropium-albuteroL, 3 mL, nebulization, TID  loratadine, 10 mg, oral, Daily  methylPREDNISolone sodium succinate (PF), 40 mg, intravenous, q8h DORIS  metoprolol tartrate, 37.5 mg, oral, BID  oxygen, , inhalation, Continuous - Inhalation  pantoprazole, 40 mg, oral, Daily before breakfast   Or  pantoprazole, 40 mg, intravenous, Daily before breakfast  polyethylene glycol, 17 g, oral, Daily  potassium chloride, 40 mEq, oral, BID  tiotropium, 2 puff, inhalation, Daily      Continuous medications:     PRN medications:  PRN medications: acetaminophen, albuterol, benzonatate, bisacodyl, bisacodyl, hydrOXYzine HCL, melatonin, ondansetron **OR** ondansetron     Physical Exam  Constitutional:       General: She is not in acute distress.     Appearance: Normal appearance.      Comments: Thin elderly female   HENT:      Head: Normocephalic and atraumatic.      Right Ear: External ear normal.      Left Ear: External ear normal.      Nose: Nose normal.      Mouth/Throat:      Mouth: Mucous membranes are moist.      Pharynx: Oropharynx is clear.   Eyes:      Extraocular Movements: Extraocular movements intact.      Conjunctiva/sclera: Conjunctivae normal.      Pupils: Pupils are equal, round, and reactive to light.   Cardiovascular:      Rate and Rhythm: Normal rate and regular rhythm.      Pulses: Normal pulses.      Heart sounds: Normal heart sounds.   Pulmonary:      Effort: No respiratory distress.      Breath sounds: Wheezing and rales present. No rhonchi.      Comments: NC in place  Abdominal:      General: Bowel sounds  are normal.      Palpations: Abdomen is soft.      Tenderness: There is no abdominal tenderness. There is no right CVA tenderness, left CVA tenderness, guarding or rebound.   Musculoskeletal:         General: No swelling. Normal range of motion.      Cervical back: Normal range of motion and neck supple.   Skin:     General: Skin is warm and dry.      Capillary Refill: Capillary refill takes less than 2 seconds.      Findings: No lesion or rash.   Neurological:      General: No focal deficit present.      Mental Status: She is alert and oriented to person, place, and time. Mental status is at baseline.   Psychiatric:         Mood and Affect: Mood normal.         Behavior: Behavior normal.                  Code Status  DNR and No Intubation     Assessment/Plan   This patient currently has cardiac telemetry ordered; if you would like to modify or discontinue the telemetry order, click here to go to the orders activity to modify/discontinue the order.    AECOPD; COPD with chronic hypoxic respiratory failure  -Patient's baseline oxygen is 4-4.5 L; she is currently on about 6 L nasal cannula but this was more so for comfort rather than documented hypoxia, will try to wean back to baseline  -She is diffusely wheezy on examination and has had increased cough and sputum production  -Patient with prior itching to doxycycline and erythromycin; has tolerated Augmentin in the past and with increased sputum production and change in color will be continued on empiric Augmentin  -Continued on IV steroids  -Continued on home therapies, additionally scheduled and as needed nebs  -Pulmonary hygiene, RT consultation is appreciated  -Will check VBG in the morning  -Palliative care consultation  -Pulmonology consultation  -Patient endorses DNR/DNI  -Mucinex  -Acapella      Acute on chronic diastolic HF  -Patient had an echocardiogram completed on 01/29/2024 which revealed an LVEF of 60-65%, low normal RV systolic function, mild aortic  valve stenosis  -During prior admissions, her BNP was more markedly elevated with her present BNP of 374 actually being on the lower end of prior admits  -Lasix 40 mg IV push twice daily for now, suspect that she can likely come down to oral Lasix within the next 24-48 hours  -Continue to follow volume status closely  -Monitor renal function closely with diuresis   -Monitor electrolytes and replenish generously as needed   -Strict I&O’s   -2g salt restriction, 2L fluid restriction   -Monitor fluid status closely   -Cardiology consultation, appreciate further recommendations      Elevated troponin/demand ischemia/suspected type II MI  -Patient is presenting with an initial high-sensitivity troponin of 74, up trended to 84  -Her ECG is nonrevealing for acute ischemic changes and she is not complaining of any chest pain or anginal equivalents  -I suspect that this is demand mediated in setting of her respiratory status and increased work of breathing, likely degree of diastolic dysfunction, but she will be continued on aspirin, statin, beta-blocker  -Lipids/A1c as above  -Unless interval development of chest pain or anginal symptoms, significant increase in troponin, will hold on heparinization.  Cardiology on consult as above.     Hypokalemia  -Initial potassium 2.9, was given replacement in the ED.  Will recheck.  -Magnesium was within appropriate limits at this time     HTN, HLD  -Continue home medications  -Monitor and adjust as needed  -Lipid panel as above     Deconditioning and debility  -PT/OT  -Social work consultation appreciated    Moderate Aortic Stenosis  -Serial monitoring with cardiology    Goals of care  -Patient endorses DNR/DNI, orders placed  -Palliative care to arrange family meeting, patient herself states her goals align with hospice but she would like her daughter involved I conversation    DVT ppx:  subcutaneous heparin       Please see orders for more complete plan    Sadie Maher PA-C

## 2024-05-16 NOTE — PROGRESS NOTES
Occupational Therapy    OT Treatment    Patient Name: Irish Davis  MRN: [id number]  Today's Date: 5/16/2024  Time Calculation  Start Time: 1015  Stop Time: 1033  Time Calculation (min): 18 min         Assessment:  End of Session Communication: Bedside nurse, PCT/NA/CTA  End of Session Patient Position: Alarm on, Bed, 3 rail up  OT Assessment Results: Decreased ADL status, Decreased endurance, Decreased functional mobility  Plan:  Treatment Interventions: ADL retraining, UE strengthening/ROM, Endurance training, Functional transfer training, Neuromuscular reeducation  OT Frequency: 2 times per week  OT Discharge Recommendations: Low intensity level of continued care  OT Recommended Transfer Status: Assist of 1, Minimal assist  OT - OK to Discharge: Yes  Treatment Interventions: ADL retraining, UE strengthening/ROM, Endurance training, Functional transfer training, Neuromuscular reeducation    Subjective   Previous Visit Info:  OT Last Visit  OT Received On: 05/16/24  General:  General  Reason for Referral: Pt presents from home with c/o SOB, weakness, COPD exacerbation; OT for ADL and safety assessment  Referred By: Aleida  Past Medical History Relevant to Rehab: HTN, aortic stenosis, SUNITA LE edema, COPD, chronic respiratory failure on 4L/min at home, SUNITA wrist fx's earlier this year.  Prior to Session Communication: Bedside nurse  Patient Position Received: Bed, 3 rail up, Alarm on  Preferred Learning Style: verbal, visual  General Comment: pt agreeable to OT tx session at this time with mild encouragement  Precautions:  Hearing/Visual Limitations: Ruby  UE Weight Bearing Status: Right Non-Weight Bearing, Left Non-Weight Bearing  Medical Precautions: Oxygen therapy device and L/min, Fall precautions  Precautions Comment: falls, O2  Vital Signs:     Pain:  Pain Assessment  Pain Assessment: 0-10  Pain Score: 0 - No pain    Objective    Cognition:  Cognition  Overall Cognitive Status: Within Functional  Limits  Orientation Level: Oriented X4    Functional Standing Tolerance:  Time: <1 min standing bout  Activity: achieved x1 standing bout with Amy and FWW, no LOB noted. Pt tolerated standing bout fairly  Bed Mobility/Transfers: Bed Mobility  Bed Mobility: Yes  Bed Mobility 1  Bed Mobility 1: Supine to sitting, Sitting to supine  Level of Assistance 1: Minimum assistance, Minimal verbal cues  Bed Mobility Comments 1: cues for proper technique and hand placement    Transfers  Transfer: Yes  Transfer 1  Transfer From 1: Bed to  Transfer to 1: Stand  Technique 1: Sit to stand, Stand to sit  Transfer Device 1: Walker  Transfer Level of Assistance 1: Minimum assistance, Minimal verbal cues      Functional Mobility:  Functional Mobility  Functional Mobility Performed: Yes  Functional Mobility 1  Comments 1: pt completed a few side steps to HOB to improve position for transitioning to supine. Amy and FWW for side stepping to the L  Sitting Balance:  Static Sitting Balance  Static Sitting-Balance Support: No upper extremity supported, Feet supported  Static Sitting-Level of Assistance: Independent  Dynamic Sitting Balance  Dynamic Sitting-Balance Support: Feet supported, Bilateral upper extremity supported  Dynamic Sitting-Comments: BUE HEP, close supv  Standing Balance:  Static Standing Balance  Static Standing-Balance Support: Bilateral upper extremity supported  Static Standing-Level of Assistance: Minimum assistance    Therapy/Activity: Therapeutic Exercise  Therapeutic Exercise Performed: Yes  Therapeutic Exercise Activity 1: 1x15 bicep curls  Therapeutic Exercise Activity 2: 1x15 front punches  Therapeutic Exercise Activity 3: 1x15 front shoulder raises    Balance/Neuromuscular Re-Education  Balance/Neuromuscular Re-Education Activity Performed: Yes  Balance/Neuromuscular Re-Education Activity 1: pt achieved sitting EOB for ~10 mins to completed BUE exercises, pt tolerated sitting activities well with no LOB noted  and close supv      Outcome Measures:[facility] Daily Activity  Putting on and taking off regular lower body clothing: Total  Bathing (including washing, rinsing, drying): Total  Putting on and taking off regular upper body clothing: A lot  Toileting, which includes using toilet, bedpan or urinal: A lot  Taking care of personal grooming such as brushing teeth: A little  Eating Meals: A little  Daily Activity - Total Score: 12        Education Documentation  Home Exercise Program, taught by GARLAND Borges at 5/16/2024 11:06 AM.  Learner: Patient  Readiness: Acceptance  Method: Explanation  Response: Verbalizes Understanding    ADL Training, taught by GARLAND Borges at 5/16/2024 11:06 AM.  Learner: Patient  Readiness: Acceptance  Method: Explanation  Response: Verbalizes Understanding    Education Comments  No comments found.        OP EDUCATION:       Goals:  Encounter Problems       Encounter Problems (Active)       ADLs       Patient will complete daily grooming tasks brushing teeth, washing face/hair, and unsupported sitting with modified independent level of assistance and PRN adaptive equipment while supported sitting. (Progressing)       Start:  05/15/24    Expected End:  05/29/24            Patient will complete toileting including hygiene clothing management/hygiene with modified independent level of assistance. (Progressing)       Start:  05/15/24    Expected End:  05/29/24               EXERCISE/STRENGTHENING       Patient will be educated on BUE HEP for increased ADL performance. (Progressing)       Start:  05/15/24    Expected End:  05/29/24               TRANSFERS       Patient will complete sit to stand transfer with modified independent level of assistance and least restrictive device in order to improve safety and prepare for out of bed mobility. (Progressing)       Start:  05/15/24    Expected End:  05/29/24

## 2024-05-16 NOTE — CARE PLAN
Attempted to see patient 3 times today. Twice during family meeting with palliative and when I returned to her room at 1655 patient was sleeping comfortably so did not wake her, no family present. Per RN, patient has been stable from a pulmonary standpoint and the plan is for hospice consult. Patient is on 5-6 L NC for comfort.    Recommendations for AECOPD unchanged from recommendations in yesterday's note.       Pulmonary will sign off at this time.   There is no pulmonary coverage 5/17-5/19. If patient condition were to worsen and she does not transition for hospice, would recommend consulting critical care.

## 2024-05-16 NOTE — CONSULTS
" Palliative Care Consultation    History obtained from: patient, patient's daughter Sofie and medical records.     HPI: Irish Davis is a 79 y.o. female with past medical history significant for HTN, HLD, moderate aortic stenosis, chronic diastolic heart failure, chronic bilateral lower extremity edema (on Lasix), severe COPD (GOLD 4, FEV1 29%) with chronic hypoxic respiratory failure (baseline 4-4.5 L nasal cannula) for approx 10 years. Patient presented to the ED 5/14 d/t shortness of breath, productive cough, and generalized weakness. She was found to have COPD exacerbation, acute on chronic diastolic heart failure, elevated troponin/demand ischemia/suspected type II MI.     PSH: cataract surgery, bilateral wrist ORIF 1/27/24    FH: patient was adopted, unknown     Palliative care consulted for goals of care, code status, advanced directive, high risk for readmissions, symptom management and outpatient support.    Joint interprofessional visit completed with Audra PosadaD    Met with patient and her daughter, Sofie. Patient provided permission to speak to her daughter. Discussed PMH, HPI, and current hospitalization. Answered questions, provided support and education.     Social History   Marital Status:  Josesito of 59 years who has dementia and will likely be moving into a long term care facility soon  Children: daughters Sofie and Ирина  Employment: Worked for [employer] doing various jobs    Status: not a    Tobacco/alcohol/elicit substance use history: former smoker from [id number]. Denies alcohol or illicit drug use   Mosque/Cultural/Spiritual: Amish. States she is more spiritual \"looking at all God's creation and being awestruck\".   Patient finds sebastien and happiness in: \"to see other people happy, to work together and enjoy all that God made\"    Living arrangement and functional status prior to admission: Irish lives with her  who has dementia and her " "daughter Sofie. Her PATEL limits her mobility and utilizes a BSC. She has not been able to leave the house since January  Recent falls: last fall was in January   Cognitive status: \"overall very good\" but can be forgetful at times  ADL's dressing/bathing: \"it's getting more dificult, my hygiene has gone downhill\". Wears slip on shoes and only able to sponge bathe.    Assistive devices: previously used a walker but after her fall in January, she is not able to bear weight on her wrists since beck wrist surgery and no longer able to use the walker  Cooking/cleaning/laundry: daughters  Finances/medication management/grocery shopping/transportation: daughterSofie    Prior Hospitalizations: patient has had 4 hospitalizations and 2 ED visit in the last 8 months  3/1/24: ED for epistaxis   2/2-2/5/24: AECOPD  1/26-2/1/24: bilateral wrist fracture s/p fall c/b fluid overload/CHF  11/20-11/27/23: AECOPD secondary to LLL PNA  9/12/23: ED COPD    Patient/family description of QOL over the last 6 months: \"I get along ok but not what I envisioned half-way would be.\" She is unable to spend time outside of the home as she would like but thankful for the people surrounding her.    Goals of Care  Decisional Capacity: yes  Understanding of illness: good   Most important at this time: patient states \"that we have a good ending to our crazy problems we have right now and to be able to enjoy the rest of my life. The remaining days are going to be good, happy, no more arguments. To bring happiness to my family\". Patient shared that her  with dementia has been combative and resistant and he will be going to a facility in the near future  Expectations of treatment: home with hospice care, no longer returning to the hospital, comfort  Fears/concerns: patient shared her \"biggest fear in life is being buried alive\". She also shared \"making somebody unhappy upsets me, life is short. Not being able to bring happiness to my family " "would be a concern\".     Advanced Care Planning/Advanced Directives   Health care power of : copy obtained. POA listed as  who has dementia therefore will be deferred to dtr Sofie who is listed as 1st alternate   Living Will: copy obtained and placed on hospital chart   [place] form[place] and copy placed on hospital chart    Current code status: DNR and No Intubation   Code status discussed today? Yes, confirmed DNR/DNI    Review of Systems   Constitutional:  Positive for activity change, fatigue and unexpected weight change (loss).   HENT:  Positive for hearing loss and trouble swallowing.    Respiratory:  Positive for cough and shortness of breath.    Cardiovascular:  Positive for leg swelling.   Neurological:  Positive for weakness.   Psychiatric/Behavioral:  Negative for confusion.        Physical Exam  Constitutional:       General: She is not in acute distress.  HENT:      Head: Normocephalic.      Nose: Nose normal.      Mouth/Throat:      Mouth: Mucous membranes are moist.   Eyes:      General: No scleral icterus.  Cardiovascular:      Rate and Rhythm: Normal rate.   Pulmonary:      Comments: Conversational dyspnea   Musculoskeletal:      Cervical back: Neck supple.   Skin:     General: Skin is warm and dry.   Neurological:      Mental Status: She is alert and oriented to person, place, and time.   Psychiatric:         Mood and Affect: Mood normal.         Thought Content: Thought content normal.       Plan    Goals of care: discharge home with hospice care, not return to the hospital  Health care power of : Copies of living will and HCPOA obtained and placed on hospital chart. Spouse listed as POA however has dementia and 1st alternate named as daughter, Sofie   Code status: DNR/DNI [place] DNR: completed [place] CCA, will likely transition to DNR Com[facility] upon enrollment in hospice care  Consult music therapy and pastoral care   Outpatient referral: Hospice " consult placed, family provided agency list and pamphlets and will review.     Collaborated with: Sadie Maher RN, SW    I spent 80 minutes in the professional and overall care of this patient.      AMARJIT Corley-CNP

## 2024-05-17 LAB
ANION GAP SERPL CALC-SCNC: 8 MMOL/L (ref 10–20)
BUN SERPL-MCNC: 51 MG/DL (ref 6–23)
CALCIUM SERPL-MCNC: 9 MG/DL (ref 8.6–10.3)
CHLORIDE SERPL-SCNC: 96 MMOL/L (ref 98–107)
CO2 SERPL-SCNC: 40 MMOL/L (ref 21–32)
CREAT SERPL-MCNC: 0.86 MG/DL (ref 0.5–1.05)
EGFRCR SERPLBLD CKD-EPI 2021: 68 ML/MIN/1.73M*2
ERYTHROCYTE [DISTWIDTH] IN BLOOD BY AUTOMATED COUNT: 15.2 % (ref 11.5–14.5)
GLUCOSE SERPL-MCNC: 166 MG/DL (ref 74–99)
HCT VFR BLD AUTO: 40.3 % (ref 36–46)
HGB BLD-MCNC: 11.8 G/DL (ref 12–16)
MAGNESIUM SERPL-MCNC: 2.11 MG/DL (ref 1.6–2.4)
MCH RBC QN AUTO: 26.3 PG (ref 26–34)
MCHC RBC AUTO-ENTMCNC: 29.3 G/DL (ref 32–36)
MCV RBC AUTO: 90 FL (ref 80–100)
NRBC BLD-RTO: 0 /100 WBCS (ref 0–0)
PLATELET # BLD AUTO: 305 X10*3/UL (ref 150–450)
POTASSIUM SERPL-SCNC: 4.4 MMOL/L (ref 3.5–5.3)
RBC # BLD AUTO: 4.48 X10*6/UL (ref 4–5.2)
SODIUM SERPL-SCNC: 140 MMOL/L (ref 136–145)
WBC # BLD AUTO: 12.8 X10*3/UL (ref 4.4–11.3)

## 2024-05-17 PROCEDURE — 2500000004 HC RX 250 GENERAL PHARMACY W/ HCPCS (ALT 636 FOR OP/ED): Performed by: STUDENT IN AN ORGANIZED HEALTH CARE EDUCATION/TRAINING PROGRAM

## 2024-05-17 PROCEDURE — 2060000001 HC INTERMEDIATE ICU ROOM DAILY

## 2024-05-17 PROCEDURE — 2500000004 HC RX 250 GENERAL PHARMACY W/ HCPCS (ALT 636 FOR OP/ED): Performed by: PHYSICIAN ASSISTANT

## 2024-05-17 PROCEDURE — 2500000001 HC RX 250 WO HCPCS SELF ADMINISTERED DRUGS (ALT 637 FOR MEDICARE OP): Performed by: STUDENT IN AN ORGANIZED HEALTH CARE EDUCATION/TRAINING PROGRAM

## 2024-05-17 PROCEDURE — 85027 COMPLETE CBC AUTOMATED: CPT | Performed by: PHYSICIAN ASSISTANT

## 2024-05-17 PROCEDURE — 80048 BASIC METABOLIC PNL TOTAL CA: CPT | Performed by: PHYSICIAN ASSISTANT

## 2024-05-17 PROCEDURE — 2500000004 HC RX 250 GENERAL PHARMACY W/ HCPCS (ALT 636 FOR OP/ED): Performed by: NURSE PRACTITIONER

## 2024-05-17 PROCEDURE — 2500000001 HC RX 250 WO HCPCS SELF ADMINISTERED DRUGS (ALT 637 FOR MEDICARE OP): Performed by: PHYSICIAN ASSISTANT

## 2024-05-17 PROCEDURE — 99232 SBSQ HOSP IP/OBS MODERATE 35: CPT | Performed by: NURSE PRACTITIONER

## 2024-05-17 PROCEDURE — 82374 ASSAY BLOOD CARBON DIOXIDE: CPT | Performed by: PHYSICIAN ASSISTANT

## 2024-05-17 PROCEDURE — 36415 COLL VENOUS BLD VENIPUNCTURE: CPT | Performed by: PHYSICIAN ASSISTANT

## 2024-05-17 PROCEDURE — 97116 GAIT TRAINING THERAPY: CPT | Mod: GP,CQ | Performed by: PHYSICAL THERAPY ASSISTANT

## 2024-05-17 PROCEDURE — 2500000005 HC RX 250 GENERAL PHARMACY W/O HCPCS: Performed by: NURSE PRACTITIONER

## 2024-05-17 PROCEDURE — 2500000002 HC RX 250 W HCPCS SELF ADMINISTERED DRUGS (ALT 637 FOR MEDICARE OP, ALT 636 FOR OP/ED): Performed by: INTERNAL MEDICINE

## 2024-05-17 PROCEDURE — 99231 SBSQ HOSP IP/OBS SF/LOW 25: CPT

## 2024-05-17 PROCEDURE — 83735 ASSAY OF MAGNESIUM: CPT | Performed by: PHYSICIAN ASSISTANT

## 2024-05-17 PROCEDURE — 99233 SBSQ HOSP IP/OBS HIGH 50: CPT | Performed by: PHYSICIAN ASSISTANT

## 2024-05-17 PROCEDURE — 94668 MNPJ CHEST WALL SBSQ: CPT

## 2024-05-17 PROCEDURE — 2500000001 HC RX 250 WO HCPCS SELF ADMINISTERED DRUGS (ALT 637 FOR MEDICARE OP): Performed by: INTERNAL MEDICINE

## 2024-05-17 PROCEDURE — 94640 AIRWAY INHALATION TREATMENT: CPT

## 2024-05-17 RX ORDER — GUAIFENESIN 600 MG/1
1200 TABLET, EXTENDED RELEASE ORAL 2 TIMES DAILY
Status: DISCONTINUED | OUTPATIENT
Start: 2024-05-17 | End: 2024-05-19 | Stop reason: HOSPADM

## 2024-05-17 RX ORDER — LOPERAMIDE HYDROCHLORIDE 2 MG/1
2 CAPSULE ORAL 4 TIMES DAILY PRN
Status: DISCONTINUED | OUTPATIENT
Start: 2024-05-17 | End: 2024-05-19 | Stop reason: HOSPADM

## 2024-05-17 RX ADMIN — Medication 3 MG: at 21:03

## 2024-05-17 RX ADMIN — METHYLPREDNISOLONE SODIUM SUCCINATE 40 MG: 40 INJECTION, POWDER, FOR SOLUTION INTRAMUSCULAR; INTRAVENOUS at 21:04

## 2024-05-17 RX ADMIN — ACETAMINOPHEN 650 MG: 325 TABLET ORAL at 21:02

## 2024-05-17 RX ADMIN — LORATADINE 10 MG: 10 TABLET ORAL at 20:39

## 2024-05-17 RX ADMIN — Medication 5 L/MIN: at 08:15

## 2024-05-17 RX ADMIN — POTASSIUM CHLORIDE 40 MEQ: 1.5 POWDER, FOR SOLUTION ORAL at 09:02

## 2024-05-17 RX ADMIN — GUAIFENESIN 1200 MG: 600 TABLET ORAL at 20:41

## 2024-05-17 RX ADMIN — FUROSEMIDE 40 MG: 10 INJECTION, SOLUTION INTRAMUSCULAR; INTRAVENOUS at 09:01

## 2024-05-17 RX ADMIN — METOPROLOL TARTRATE 37.5 MG: 25 TABLET, FILM COATED ORAL at 20:40

## 2024-05-17 RX ADMIN — FLUTICASONE FUROATE AND VILANTEROL TRIFENATATE 1 PUFF: 200; 25 POWDER RESPIRATORY (INHALATION) at 20:38

## 2024-05-17 RX ADMIN — HYDROXYZINE HYDROCHLORIDE 12.5 MG: 25 TABLET ORAL at 21:00

## 2024-05-17 RX ADMIN — METOPROLOL TARTRATE 37.5 MG: 25 TABLET, FILM COATED ORAL at 09:01

## 2024-05-17 RX ADMIN — FUROSEMIDE 40 MG: 10 INJECTION, SOLUTION INTRAMUSCULAR; INTRAVENOUS at 15:18

## 2024-05-17 RX ADMIN — HEPARIN SODIUM 5000 UNITS: 5000 INJECTION INTRAVENOUS; SUBCUTANEOUS at 15:18

## 2024-05-17 RX ADMIN — ALBUTEROL SULFATE 2 PUFF: 90 AEROSOL, METERED RESPIRATORY (INHALATION) at 20:56

## 2024-05-17 RX ADMIN — TIOTROPIUM BROMIDE INHALATION SPRAY 2 PUFF: 3.12 SPRAY, METERED RESPIRATORY (INHALATION) at 05:40

## 2024-05-17 RX ADMIN — ASPIRIN 81 MG: 81 TABLET, COATED ORAL at 09:02

## 2024-05-17 RX ADMIN — Medication 4 L/MIN: at 20:00

## 2024-05-17 RX ADMIN — GUAIFENESIN 600 MG: 600 TABLET ORAL at 09:02

## 2024-05-17 RX ADMIN — Medication 5 L/MIN: at 07:58

## 2024-05-17 RX ADMIN — HYDROXYZINE HYDROCHLORIDE 12.5 MG: 25 TABLET ORAL at 13:44

## 2024-05-17 RX ADMIN — METHYLPREDNISOLONE SODIUM SUCCINATE 40 MG: 40 INJECTION, POWDER, FOR SOLUTION INTRAMUSCULAR; INTRAVENOUS at 05:40

## 2024-05-17 RX ADMIN — IPRATROPIUM BROMIDE AND ALBUTEROL SULFATE 3 ML: 2.5; .5 SOLUTION RESPIRATORY (INHALATION) at 07:58

## 2024-05-17 RX ADMIN — LOPERAMIDE HYDROCHLORIDE 2 MG: 2 CAPSULE ORAL at 12:13

## 2024-05-17 RX ADMIN — AMOXICILLIN AND CLAVULANATE POTASSIUM 1 TABLET: 875; 125 TABLET, FILM COATED ORAL at 20:39

## 2024-05-17 RX ADMIN — IPRATROPIUM BROMIDE AND ALBUTEROL SULFATE 3 ML: 2.5; .5 SOLUTION RESPIRATORY (INHALATION) at 19:56

## 2024-05-17 RX ADMIN — AMOXICILLIN AND CLAVULANATE POTASSIUM 1 TABLET: 875; 125 TABLET, FILM COATED ORAL at 09:02

## 2024-05-17 RX ADMIN — HEPARIN SODIUM 5000 UNITS: 5000 INJECTION INTRAVENOUS; SUBCUTANEOUS at 21:04

## 2024-05-17 RX ADMIN — PANTOPRAZOLE SODIUM 40 MG: 40 TABLET, DELAYED RELEASE ORAL at 09:02

## 2024-05-17 RX ADMIN — HEPARIN SODIUM 5000 UNITS: 5000 INJECTION INTRAVENOUS; SUBCUTANEOUS at 05:40

## 2024-05-17 RX ADMIN — METHYLPREDNISOLONE SODIUM SUCCINATE 40 MG: 40 INJECTION, POWDER, FOR SOLUTION INTRAMUSCULAR; INTRAVENOUS at 15:18

## 2024-05-17 ASSESSMENT — ENCOUNTER SYMPTOMS
FEVER: 0
WEAKNESS: 0
WOUND: 0
BACK PAIN: 0
ABDOMINAL PAIN: 0
CONSTIPATION: 0
SHORTNESS OF BREATH: 1
DIAPHORESIS: 0
SYNCOPE: 0
DECREASED APPETITE: 1
WEAKNESS: 1
FATIGUE: 0
NUMBNESS: 0
SORE THROAT: 0
GASTROINTESTINAL NEGATIVE: 1
PALPITATIONS: 0
BLURRED VISION: 0
MEMORY LOSS: 0
BRUISES/BLEEDS EASILY: 0
CHEST TIGHTNESS: 0
DIZZINESS: 0
TROUBLE SWALLOWING: 0
IRREGULAR HEARTBEAT: 0
FLANK PAIN: 0
DEPRESSION: 0
FALLS: 1
HEMATURIA: 0
VOMITING: 0
EYE PAIN: 0
HEADACHES: 0
WHEEZING: 0
FREQUENCY: 0
APPETITE CHANGE: 0
CONFUSION: 0
JOINT SWELLING: 0
DYSPNEA ON EXERTION: 1
COUGH: 1
FOCAL WEAKNESS: 0
DIARRHEA: 0
FACIAL SWELLING: 0
FATIGUE: 1
BLOOD IN STOOL: 0
ACTIVITY CHANGE: 1
HALLUCINATIONS: 0
DYSURIA: 0
NAUSEA: 0
LIGHT-HEADEDNESS: 0
CHILLS: 0

## 2024-05-17 ASSESSMENT — COGNITIVE AND FUNCTIONAL STATUS - GENERAL
DAILY ACTIVITIY SCORE: 15
CLIMB 3 TO 5 STEPS WITH RAILING: A LOT
DRESSING REGULAR UPPER BODY CLOTHING: A LOT
TURNING FROM BACK TO SIDE WHILE IN FLAT BAD: A LITTLE
DRESSING REGULAR UPPER BODY CLOTHING: A LOT
MOBILITY SCORE: 18
CLIMB 3 TO 5 STEPS WITH RAILING: TOTAL
MOVING TO AND FROM BED TO CHAIR: A LITTLE
WALKING IN HOSPITAL ROOM: A LITTLE
TURNING FROM BACK TO SIDE WHILE IN FLAT BAD: A LITTLE
DRESSING REGULAR LOWER BODY CLOTHING: A LOT
TURNING FROM BACK TO SIDE WHILE IN FLAT BAD: A LITTLE
MOVING FROM LYING ON BACK TO SITTING ON SIDE OF FLAT BED WITH BEDRAILS: A LITTLE
PERSONAL GROOMING: A LITTLE
MOBILITY SCORE: 16
DAILY ACTIVITIY SCORE: 15
STANDING UP FROM CHAIR USING ARMS: A LITTLE
WALKING IN HOSPITAL ROOM: A LITTLE
WALKING IN HOSPITAL ROOM: A LITTLE
STANDING UP FROM CHAIR USING ARMS: A LITTLE
MOBILITY SCORE: 18
DRESSING REGULAR LOWER BODY CLOTHING: A LOT
TOILETING: A LITTLE
MOVING TO AND FROM BED TO CHAIR: A LITTLE
CLIMB 3 TO 5 STEPS WITH RAILING: A LOT
HELP NEEDED FOR BATHING: A LOT
STANDING UP FROM CHAIR USING ARMS: A LITTLE
EATING MEALS: A LITTLE
MOVING TO AND FROM BED TO CHAIR: A LITTLE
TOILETING: A LITTLE
PERSONAL GROOMING: A LITTLE
EATING MEALS: A LITTLE
HELP NEEDED FOR BATHING: A LOT

## 2024-05-17 ASSESSMENT — PAIN - FUNCTIONAL ASSESSMENT
PAIN_FUNCTIONAL_ASSESSMENT: 0-10

## 2024-05-17 ASSESSMENT — PAIN SCALES - GENERAL
PAINLEVEL_OUTOF10: 0 - NO PAIN
PAINLEVEL_OUTOF10: 1

## 2024-05-17 NOTE — PROGRESS NOTES
Physical Therapy    Physical Therapy Treatment    Patient Name: Irish Davis  MRN: [id number]  Today's Date: 5/17/2024  Time Calculation  Start Time: 0741  Stop Time: 0753  Time Calculation (min): 12 min    Assessment/Plan   PT Assessment  End of Session Communication: Bedside nurse, PCT/NA/CTA  Assessment Comment: pt demonstrating good motivation and requesting to continue with pT. pt fatigued at end of session. pt would benefit from further treatment to improve safety and mobility.  End of Session Patient Position: Up in chair, Alarm on     PT Plan  Treatment/Interventions: Bed mobility, Transfer training, Gait training, Stair training, Balance training, Strengthening, Endurance training, Therapeutic exercise, Therapeutic activity, Home exercise program  PT Plan: Skilled PT  PT Frequency: 3 times per week  PT Discharge Recommendations: Low intensity level of continued care  PT - OK to Discharge: Yes (when medically cleared)      General Visit Information:   PT  Visit  PT Received On: 05/17/24  Response to Previous Treatment: Patient with no complaints from previous session.  General  Reason for Referral: Pt presents from home with c/o SOB, weakness, COPD exacerbation; OT for ADL and safety assessment  Referred By: Aleida  Past Medical History Relevant to Rehab: HTN, aortic stenosis, SUNITA LE edema, COPD, chronic respiratory failure on 4L/min at home, SUNITA wrist fx's earlier this year.  Missed Visit: Yes  Missed Visit Reason: Other (Comment) (Family present and requesting PT hold today. pt and family waiting to meet with Rakesh Polanco from palliative care.)  Prior to Session Communication: Bedside nurse  Patient Position Received: Up in chair, Alarm on  Preferred Learning Style: verbal, visual  General Comment: pt agreeable to pT. pt reporting she is probably going HOSPICE but would like to maintain her mobility.      Precautions:  Precautions  Medical Precautions: Oxygen therapy device and L/min, Fall  precautions  Precautions Comment: falls, O2    Pain:  Pain Assessment  Pain Assessment: 0-10  Pain Score: 0 - No pain  Cognition:  Cognition  Overall Cognitive Status: Within Functional Limits          Treatments:  Ambulation/Gait Training  Ambulation/Gait Training Performed: Yes  Ambulation/Gait Training 1  Surface 1: Level tile  Device 1: Rolling walker  Assistance 1: Minimum assistance  Quality of Gait 1: Narrow base of support, Diminished heel strike  Comments/Distance (ft) 1: 25'x2 with wheeled walker and cues for walker technique and gait pattern. pt required assist with O2 line.  Transfers  Transfer: Yes  Transfer 1  Technique 1: Sit to stand, Stand to sit  Transfer Device 1: Walker  Transfer Level of Assistance 1: Minimum assistance, Minimal verbal cues  Trials/Comments 1: cues for hand placement and safety    Outcome Measures:  [facility] Basic Mobility  Turning from your back to your side while in a flat bed without using bedrails: A little  Moving from lying on your back to sitting on the side of a flat bed without using bedrails: A little  Moving to and from bed to chair (including a wheelchair): A little  Standing up from a chair using your arms (e.g. wheelchair or bedside chair): A little  To walk in hospital room: A little  Climbing 3-5 steps with railing: Total  Basic Mobility - Total Score: 16    Education Documentation  Precautions, taught by Karen James PTA at 5/17/2024  8:44 AM.  Learner: Patient  Readiness: Acceptance  Method: Explanation  Response: Verbalizes Understanding, Needs Reinforcement    Mobility Training, taught by Karen James PTA at 5/17/2024  8:44 AM.  Learner: Patient  Readiness: Acceptance  Method: Explanation  Response: Verbalizes Understanding, Needs Reinforcement    Education Comments  No comments found.               Encounter Problems       Encounter Problems (Active)       PT Problem       transfers (Progressing)       Start:  05/15/24    Expected End:  05/29/24       Patient  will perform all transfers with SBA x1          gait (Progressing)       Start:  05/15/24    Expected End:  05/29/24       Patient will amb 30+ feet with SBA x1 and rest breaks prn           strengthening  (Progressing)       Start:  05/15/24    Expected End:  05/29/24       Patient will perform 20+ reps of AROM/RROM for SUNITA LE's to improve safety and functional independence

## 2024-05-17 NOTE — PROGRESS NOTES
HPI:  Irish Davis is a 79 y.o. female presenting with shortness of breath.  H/o HTN, DL, moderate aortic stenosis, chronic bilateral lower extremity edema (on Lasix), COPD with chronic hypoxic respiratory failure (baseline 4-4.5 L nasal cannula).     The pt presents with SOB, PATEL, cough productive of green sputum, wheezing, congestion, generalized weakness.  These symptoms have worsened over the last several days, to the point that the patient has trouble getting out of bed.  Has some chest tightness with coughing, but no report of exertional chest pain/pressure.  Of note, the has been admitted to the hospital several times in the last 6 months for similar indications.  In the ED she was afebrile, /101, .  Sat 93% on 4L NC.  Labs significant for WBC 10.6, Hb 11.8, BUN 13, Cr 0.71.  , HSTI 74 > 84.  CXR with chronic interstitial lung dse and emphysema, without overt acute process.  Rec'd 125 Solu-Medrol, DuoNebs, 40 mg Lasix, total of 60 mEq Kcl.     The patient is hard of hearing.  When asked whether she was taking furosemide at home (on her outpatient med list under the summary tab, it states she should be taking furosemide 40mg, half a tablet daily), she tells me that she was not told to take a water pill at home.    Subjective Data:  Has ongoing dyspnea even with talking.  She is on 5 lpm NC.  K 4.1, creatinine 1.01, mag 2.1.  Echo in January showed EF 60-65%, mild AORTIC STENOSIS.  No CP/pressure/palpitations.  Monitor: SR  5-17-24: No CP/pressure.  + for ongoing dyspnea/air hunger.  HR/BP well controlled.  K 4.4, creatinine 0.86.  Monitor; SR.     Overnight Events:    None     Objective Data:  Last Recorded Vitals:  Vitals:    05/16/24 2333 05/17/24 0337 05/17/24 0758 05/17/24 0826   BP: 124/80 123/71  122/70   BP Location: Right arm Right arm  Right arm   Patient Position: Lying Lying  Sitting   Pulse: 61 60  82   Resp: 18 18  18   Temp: 36.7 °C (98 °F) 36.8 °C (98.2 °F)  36.7 °C (98  °F)   TempSrc: Temporal Temporal  Temporal   SpO2: 100% 100% 100% 94%   Weight:  (!) 39.9 kg (87 lb 15.4 oz)     Height:           Last Labs:    Results from last 7 days   Lab Units 05/17/24  0554 05/16/24  0438 05/15/24  0448   SODIUM mmol/L 140 143 143   POTASSIUM mmol/L 4.4 4.1 3.8   CHLORIDE mmol/L 96* 97* 95*   CO2 mmol/L 40* 40* 39*   BUN mg/dL 51* 42* 25*   CREATININE mg/dL 0.86 1.01 0.70   GLUCOSE mg/dL 166* 146* 100*   CALCIUM mg/dL 9.0 9.3 9.1        Results from last 7 days   Lab Units 05/17/24  0554 05/16/24  0438 05/15/24  0448   WBC AUTO x10*3/uL 12.8* 13.1* 11.3   HEMOGLOBIN g/dL 11.8* 11.6* 12.0   HEMATOCRIT % 40.3 40.3 40.0   PLATELETS AUTO x10*3/uL 305 324 285        Results from last 7 days   Lab Units 05/15/24  0448   CHOLESTEROL mg/dL 240*   TRIGLYCERIDES mg/dL 131   HDL mg/dL 46.9        Last I/O:  I/O last 3 completed shifts:  In: - (0 mL/kg)   Out: 600 (15 mL/kg) [Urine:600 (0.4 mL/kg/hr)]  Weight: 39.9 kg         Inpatient Medications:  Scheduled medications   Medication Dose Route Frequency    amoxicillin-pot clavulanate  1 tablet oral BID    aspirin  81 mg oral Daily    atorvastatin  80 mg oral Nightly    fluticasone furoate-vilanteroL  1 puff inhalation Daily    furosemide  40 mg intravenous BID    guaiFENesin  600 mg oral BID    heparin (porcine)  5,000 Units subcutaneous q8h    ipratropium-albuteroL  3 mL nebulization TID    loratadine  10 mg oral Daily    methylPREDNISolone sodium succinate (PF)  40 mg intravenous q8h DORIS    metoprolol tartrate  37.5 mg oral BID    oxygen   inhalation Continuous - Inhalation    pantoprazole  40 mg oral Daily before breakfast    Or    pantoprazole  40 mg intravenous Daily before breakfast    polyethylene glycol  17 g oral Daily    potassium chloride  40 mEq oral BID    tiotropium  2 puff inhalation Daily     PRN medications   Medication    acetaminophen    albuterol    benzonatate    bisacodyl    bisacodyl    hydrOXYzine HCL    melatonin    ondansetron     Or    ondansetron     Continuous Medications   Medication Dose Last Rate       ROS:  Review of Systems   Constitutional: Positive for decreased appetite and malaise/fatigue.   HENT: Negative.          [place]   Eyes:  Negative for blurred vision and visual disturbance.   Cardiovascular:  Positive for dyspnea on exertion and leg swelling. Negative for chest pain, irregular heartbeat, palpitations and syncope.   Respiratory:  Positive for cough and shortness of breath.    Musculoskeletal:  Positive for arthritis and falls.   Gastrointestinal: Negative.    Neurological:  Negative for focal weakness.   Psychiatric/Behavioral:  Negative for depression and memory loss.         Physical Exam:  Physical Exam  Constitutional:       Appearance: She is ill-appearing.      Comments: Frail   HENT:      Head: Normocephalic.   Eyes:      Conjunctiva/sclera: Conjunctivae normal.   Cardiovascular:      Rate and Rhythm: Normal rate and regular rhythm.      Pulses: Normal pulses.      Heart sounds: S1 normal and S2 normal. Murmur heard.      No friction rub. No gallop.   Pulmonary:      Comments: On 5lpm O2 via NC. Dyspneic with talking.  Lungs with poor air movement and + wheezes, no crackles noted.  Abdominal:      General: Bowel sounds are normal.      Palpations: Abdomen is soft.      Tenderness: There is no abdominal tenderness.   Musculoskeletal:      Cervical back: Neck supple.      Right lower leg: Edema present.      Left lower leg: Edema present.   Skin:     General: Skin is warm and dry.   Neurological:      General: No focal deficit present.      Mental Status: She is alert and oriented to person, place, and time.   Psychiatric:         Attention and Perception: Attention normal.         Mood and Affect: Mood normal.          Assessment/Plan     Acute on chronic diastolic HF:  Dyspnea, LE edema.  -diuresis with lasix 40mg IV BID  -strict I/O's and daily weights  -serial Cr  -monitor and replete lytes  -patient states she  has not been taking home diuretic - recommended that she takes this at least as needed for leg swelling/weight gain  5-16-24: Lytes are WNL, BP stable, creatinine 1.01.  Continue to diurese and reassess tomorrow.  I think a lot of her breathing issues are from COPD/Emphysema/interstitial lung disease.  on admission.   5-17-24: Still with ongoing dyspnea, + ankle edema today (up in chair).  Kidney function is stable.  Would continue on IV diuretics today and transition to oral tomorrow.     Moderate aortic stenosis:    -serial monitoring on outpatient TTE  5-16-24: Mild AORTIC STENOSIS, echo from January shows:  Aortic Valve: The aortic valve was not well visualized. There is mild aortic valve cusp calcification. There is evidence of mild aortic valve stenosis.  The aortic valve dimensionless index is 0.55. There is no evidence of aortic valve regurgitation. The peak instantaneous gradient of the aortic valve is 23.0 mmHg. The mean gradient of the aortic valve is 12.8 mmHg.  Can monitor in the outpt setting.  5-17-24: Serial monitoring in outpt setting.      HTN  -metop tartrate 37.5mg 2 times per day  5-16-24: BP stable.   5-17-24: BP is well controlled.      DL  -atorvastatin 80mg daily    DISPOSITION:  Continue to diurese  Consider changing to oral diuretics tomorrow      Code Status:  DNR and No Intubation          Vilma Winn, APRN-CNP

## 2024-05-17 NOTE — DOCUMENTATION CLARIFICATION NOTE
"    PATIENT:               ROMULO DOHERTY  ACCT #:                  [id number]  MRN:                       [id number]  :                       1945  ADMIT DATE:       2024 10:17 AM  DISCH DATE:  RESPONDING PROVIDER #:        [id number]          PROVIDER RESPONSE TEXT:    Severe Protein Calorie Malnutrition    CDI QUERY TEXT:    Clarification        Instruction:    Based on your assessment of the patient and the clinical information, please provide the requested documentation by clicking on the appropriate radio button and enter any additional information if prompted.    Question: Please further clarify this patient nutritional status as    When answering this query, please exercise your independent professional judgment. The fact that a question is being asked, does not imply that any particular answer is desired or expected.    The patient's clinical indicators include:  Clinical Information:  - 80yo female admitted with COPD exacerbation, CHF exacerbation, electrolyte imbalances, weakness and elevated troponin.   PMH includes HTN, HLD, moderate aortic stenosis, chronic BLE edema on Lasix, COPD/ CRF hypoxic on 4- 4.5 L O2, chronic hypercarbia, hard of hearing and is a former smoker.    Clinical Indicators:  - LANA Hinson 5/15 : \" Patient with decreased oral intake, reports fatigue with chewing, intake consists mainly of liquid/soft type foods such as soups, mashed potatoes.\"  and  \" Reviewed supplement options, patient does not tolerate Ensure type supplements well -GI upset, is willing to trial magic cup, will provide with meals.  Significant weight loss indicated since November,  approximately 35 lb loss.\"  and  \" Severe malnutrition related to chronic disease or condition as evidenced by 29 percent weight loss x 6 months, intake less than 75 percent of estimated needs for over 1 month, severe muscle and fat loss\"    - Ht 5 foot, 85 pounds, BMI 16.75    Treatment: RD consult, Magic cups TID, weight " monitoring.    Risk Factors: COPD, CHF, weakness, weight loss, poor po intake.  Options provided:  -- Severe Protein Calorie Malnutrition  -- Protein Calorie Malnutrition, Please specify severity  -- Other - I will add my own diagnosis  -- Refer to Clinical Documentation Reviewer    Query created by: Elisa Tariq on 5/16/2024 2:50 PM      Electronically signed by:  MODESTO PATE PA-C 5/16/2024 8:21 PM

## 2024-05-17 NOTE — PROGRESS NOTES
Irish Davis is a 79 y.o. female on day 3 of admission presenting with Acute on chronic diastolic heart failure (Multi).      Subjective   Irish Davis is a 79 y.o. female with PMHx s/f HTN, HLD, moderate aortic stenosis, chronic bilateral lower extremity edema (on Lasix), COPD with chronic hypoxic respiratory failure (baseline 4-4.5 L nasal cannula), presenting with worsening shortness of breath, dyspnea on exertion, cough (productive - green sputum), wheezing, congestion, and generalized weakness. Patient and family report that over the last few days, she has gotten significantly more weak to the point where she is not really able to get up and out of bed; however, they note that this is exacerbated by a significant amount of aforementioned respiratory symptoms. She has not had any significant sick contacts or exposures; however, she has been admitted multiple times under similar circumstances since about November of last year. Has been taking home medications as prescribed, but noticed some mild increase in lower extremity edema from baseline. Has not had much of an appetite since onset of symptoms, but no significant nausea/vomiting/abdominal pain/diarrhea. She admits to some chest tightness when coughing and having increased work of breathing when trying to ambulate. She is feeling better since presentation to the emergency department, but still generally unwell.  She is in no acute distress sitting in bed with multiple loved ones at the bedside. 93% 4 L nasal cannula (patient was increased to 6 L nasal cannula from her baseline of 4-4.5 for comfort, no documented hypoxia on her oxygen). High-sensitivity troponin 80-25-bnsvok pending.  VBG: pH 7.41, pCO2 68, calculated bicarb 43.1.  Venous lactate 2.1-1.4. ECG is without overt acute ischemic changes. CXR with chronic interstitial lung dz/emphysema without overt acute process. Lipid panel with elevated , cholesterol 240. HSTI 74-84-57-49. A1c  5.6. She is agreeable to DNR/DNI but would be ok with BIPAP. Patient met with palliative care on 5/16/24, she and her daughter are in agreement with hospice care at home. Daughter needs another day or two as they are working on arrangements for the patients  given he reportedly has advanced dementia. Patient will continue DNR-CCA for now and start DNR-CCO upon dc to home, hopefully over the weekend.     5/17/2024: No acute events overnight. Vitals stable, 100% on 5L, RR 18. Labs largely unchanged. Has chronic hypercarbia. Slight leukocytosis from steroids. Continues on IV lasix 40 BID, still has some ankle edema and cr tolerating so will continue for 1 more day, likely can transition to orals tomorrow.   Patient met with palliative care yesterday, goals now to consult hospice and plan dc to home with [facility], hopefully over the weekend       Review of Systems   Constitutional:  Negative for appetite change, chills, diaphoresis, fatigue and fever.   HENT:  Negative for congestion, ear pain, facial swelling, hearing loss, nosebleeds, sore throat, tinnitus and trouble swallowing.    Eyes:  Negative for pain.   Respiratory:  Positive for cough and shortness of breath. Negative for chest tightness and wheezing.    Cardiovascular:  Positive for leg swelling. Negative for chest pain and palpitations.   Gastrointestinal:  Negative for abdominal pain, blood in stool, constipation, diarrhea, nausea and vomiting.   Genitourinary:  Negative for dysuria, flank pain, frequency, hematuria and urgency.   Musculoskeletal:  Negative for back pain and joint swelling.   Skin:  Negative for rash and wound.   Neurological:  Negative for dizziness, syncope, weakness, light-headedness, numbness and headaches.   Hematological:  Does not bruise/bleed easily.   Psychiatric/Behavioral:  Negative for behavioral problems, hallucinations and suicidal ideas.           Objective     Last Recorded Vitals  /79 (BP Location: Right arm,  Patient Position: Sitting)   Pulse 60   Temp 37.1 °C (98.8 °F) (Temporal)   Resp 18   Wt (!) 39.9 kg (87 lb 15.4 oz)   SpO2 95%     Image Results  ECG 12 Lead    Result Date: 5/15/2024  Extreme tachycardia with wide complex, no further rhythm analysis attempted Artifact in lead(s) II,III,aVR,aVL,aVF,V1,V2,V3,V4,V5,V6    ECG 12 Lead    Result Date: 5/15/2024  Sinus rhythm Multiple premature complexes, vent & supraven Borderline right axis deviation Borderline low voltage, extremity leads Minimal ST depression, lateral leads    ECG 12 Lead    Result Date: 5/15/2024  Sinus tachycardia with Premature supraventricular complexes Possible Left atrial enlargement Rightward axis Nonspecific ST abnormality Abnormal ECG When compared with ECG of 14-MAY-2024 12:01, PREVIOUS ECG IS PRESENT    ECG 12 lead    Result Date: 5/14/2024  Sinus rhythm Sinus pause Short [place] interval Borderline right axis deviation Minimal ST depression, inferior leads Borderline prolonged QT interval    ECG 12 lead    Result Date: 5/14/2024  Sinus rhythm Multiform ventricular premature complexes Borderline short [place] interval Borderline right axis deviation Minimal ST depression, inferior leads Borderline prolonged QT interval    XR chest 1 view    Result Date: 5/14/2024  Interpreted By:  Eunice Lopez, STUDY: XR CHEST 1 VIEW;  5/14/2024 11:57 am   INDICATION: Signs/Symptoms:Chest Pain.   COMPARISON: 03/01/2024   ACCESSION NUMBER(S): [id number]   ORDERING CLINICIAN: OSMANI TARIQ   FINDINGS: The heart size appears normal.   There are atherosclerotic changes of the aorta   The lungs are hyperinflated. There is basilar interstitial lung disease.   There appears to be right basilar calcified granuloma.   The patient is osteoporotic and scoliotic.   COMPARISON OF FINDING: Chest is similar.       COPD. Interstitial lung disease.   MACRO: none   Signed by: Eunice Lopez 5/14/2024 12:04 PM Dictation workstation:   [id number]       Lab Results  Results for  orders placed or performed during the hospital encounter of 05/14/24 (from the past 24 hour(s))   CBC   Result Value Ref Range    WBC 12.8 (H) 4.4 - 11.3 x10*3/uL    nRBC 0.0 0.0 - 0.0 /100 WBCs    RBC 4.48 4.00 - 5.20 x10*6/uL    Hemoglobin 11.8 (L) 12.0 - 16.0 g/dL    Hematocrit 40.3 36.0 - 46.0 %    MCV 90 80 - 100 fL    MCH 26.3 26.0 - 34.0 pg    MCHC 29.3 (L) 32.0 - 36.0 g/dL    RDW 15.2 (H) 11.5 - 14.5 %    Platelets 305 150 - 450 x10*3/uL   Basic Metabolic Panel   Result Value Ref Range    Glucose 166 (H) 74 - 99 mg/dL    Sodium 140 136 - 145 mmol/L    Potassium 4.4 3.5 - 5.3 mmol/L    Chloride 96 (L) 98 - 107 mmol/L    Bicarbonate 40 (HH) 21 - 32 mmol/L    Anion Gap 8 (L) 10 - 20 mmol/L    Urea Nitrogen 51 (H) 6 - 23 mg/dL    Creatinine 0.86 0.50 - 1.05 mg/dL    eGFR 68 >60 mL/min/1.73m*2    Calcium 9.0 8.6 - 10.3 mg/dL   Magnesium   Result Value Ref Range    Magnesium 2.11 1.60 - 2.40 mg/dL        Medications  Scheduled medications:  amoxicillin-pot clavulanate, 1 tablet, oral, BID  aspirin, 81 mg, oral, Daily  atorvastatin, 80 mg, oral, Nightly  fluticasone furoate-vilanteroL, 1 puff, inhalation, Daily  furosemide, 40 mg, intravenous, BID  guaiFENesin, 600 mg, oral, BID  heparin (porcine), 5,000 Units, subcutaneous, q8h  ipratropium-albuteroL, 3 mL, nebulization, TID  loratadine, 10 mg, oral, Daily  methylPREDNISolone sodium succinate (PF), 40 mg, intravenous, q8h DORIS  metoprolol tartrate, 37.5 mg, oral, BID  oxygen, , inhalation, Continuous - Inhalation  pantoprazole, 40 mg, oral, Daily before breakfast   Or  pantoprazole, 40 mg, intravenous, Daily before breakfast  polyethylene glycol, 17 g, oral, Daily  tiotropium, 2 puff, inhalation, Daily      Continuous medications:     PRN medications:  PRN medications: acetaminophen, albuterol, benzonatate, bisacodyl, bisacodyl, hydrOXYzine HCL, loperamide, melatonin, ondansetron **OR** ondansetron     Physical Exam  Constitutional:       General: She is not in  acute distress.     Appearance: Normal appearance.      Comments: Thin elderly female  Sitting upright in bedside chair   HENT:      Head: Normocephalic and atraumatic.      Right Ear: External ear normal.      Left Ear: External ear normal.      Nose: Nose normal.      Mouth/Throat:      Mouth: Mucous membranes are moist.      Pharynx: Oropharynx is clear.   Eyes:      Extraocular Movements: Extraocular movements intact.      Conjunctiva/sclera: Conjunctivae normal.      Pupils: Pupils are equal, round, and reactive to light.   Cardiovascular:      Rate and Rhythm: Normal rate and regular rhythm.      Pulses: Normal pulses.      Heart sounds: Normal heart sounds.   Pulmonary:      Effort: No respiratory distress.      Breath sounds: Rales present. No wheezing or rhonchi.      Comments: NC in place  Abdominal:      General: Bowel sounds are normal.      Palpations: Abdomen is soft.      Tenderness: There is no abdominal tenderness. There is no right CVA tenderness, left CVA tenderness, guarding or rebound.   Musculoskeletal:         General: No swelling. Normal range of motion.      Cervical back: Normal range of motion and neck supple.   Skin:     General: Skin is warm and dry.      Capillary Refill: Capillary refill takes less than 2 seconds.      Findings: No lesion or rash.   Neurological:      General: No focal deficit present.      Mental Status: She is alert and oriented to person, place, and time. Mental status is at baseline.   Psychiatric:         Mood and Affect: Mood normal.         Behavior: Behavior normal.                  Code Status  DNR and No Intubation     Assessment/Plan   This patient currently has cardiac telemetry ordered; if you would like to modify or discontinue the telemetry order, click here to go to the orders activity to modify/discontinue the order.    AECOPD; COPD with chronic hypoxic respiratory failure  -Patient's baseline oxygen is 4-4.5 L; she is currently on about 6 L nasal  cannula but this was more so for comfort rather than documented hypoxia, will try to wean back to baseline  -She is diffusely wheezy on examination and has had increased cough and sputum production  -Patient with prior itching to doxycycline and erythromycin; has tolerated Augmentin in the past and with increased sputum production and change in color will be continued on empiric Augmentin  -Continued on IV steroids  -Continued on home therapies, additionally scheduled and as needed nebs  -Pulmonary hygiene, RT consultation is appreciated  -Will check VBG in the morning  -Palliative care consultation  -Pulmonology consultation  -Patient endorses DNR/DNI  -Mucinex  -Acapella      Acute on chronic diastolic HF  -Patient had an echocardiogram completed on 01/29/2024 which revealed an LVEF of 60-65%, low normal RV systolic function, mild aortic valve stenosis  -During prior admissions, her BNP was more markedly elevated with her present BNP of 374 actually being on the lower end of prior admits  -Lasix 40 mg IV push twice daily for now, suspect that she can likely come down to oral Lasix within the next 24-48 hours  -Continue to follow volume status closely  -Monitor renal function closely with diuresis   -Monitor electrolytes and replenish generously as needed   -Strict I&O’s   -2g salt restriction, 2L fluid restriction   -Monitor fluid status closely   -Cardiology consultation, appreciate further recommendations      Elevated troponin/demand ischemia/suspected type II MI  -Patient is presenting with an initial high-sensitivity troponin of 74, up trended to 84  -Her ECG is nonrevealing for acute ischemic changes and she is not complaining of any chest pain or anginal equivalents  -I suspect that this is demand mediated in setting of her respiratory status and increased work of breathing, likely degree of diastolic dysfunction, but she will be continued on aspirin, statin, beta-blocker  -Lipids/A1c as above  -Unless  interval development of chest pain or anginal symptoms, significant increase in troponin, will hold on heparinization.  Cardiology on consult as above.     Hypokalemia  -Initial potassium 2.9, was given replacement in the ED.  Will recheck.  -Magnesium was within appropriate limits at this time     HTN, HLD  -Continue home medications  -Monitor and adjust as needed  -Lipid panel as above     Deconditioning and debility  -PT/OT  -Social work consultation appreciated    Moderate Aortic Stenosis  -Serial monitoring with cardiology    Goals of care  -Patient endorses DNR/DNI, orders placed  -Hospice consult- goal is home with hospice over the weekend/ or when family ready    DVT ppx:  subcutaneous heparin       Please see orders for more complete plan    Sadie Maher PA-C

## 2024-05-17 NOTE — CARE PLAN
The patient's goals for the shift include      The clinical goals for the shift include Pt will have no s/s of SOB this shift    Over the shift, the patient did not make progress toward the following goals. Pt able to make needs known. This nurse maintained pt safety this shift.    Problem: Pain  Goal: My pain/discomfort is manageable  Outcome: Progressing     Problem: Safety  Goal: Patient will be injury free during hospitalization  Outcome: Progressing  Goal: I will remain free of falls  Outcome: Progressing     Problem: Daily Care  Goal: Daily care needs are met  Outcome: Progressing     Problem: Psychosocial Needs  Goal: Demonstrates ability to cope with hospitalization/illness  Outcome: Progressing  Goal: Collaborate with me, my family, and caregiver to identify my specific goals  Outcome: Progressing     Problem: Fall/Injury  Goal: Not fall by end of shift  Outcome: Progressing  Goal: Be free from injury by end of the shift  Outcome: Progressing  Goal: Verbalize understanding of personal risk factors for fall in the hospital  Outcome: Progressing  Goal: Verbalize understanding of risk factor reduction measures to prevent injury from fall in the home  Outcome: Progressing  Goal: Use assistive devices by end of the shift  Outcome: Progressing  Goal: Pace activities to prevent fatigue by end of the shift  Outcome: Progressing     Problem: Respiratory  Goal: Clear secretions with interventions this shift  Outcome: Progressing  Goal: Minimize anxiety/maximize coping throughout shift  Outcome: Progressing  Goal: Minimal/no exertional discomfort or dyspnea this shift  Outcome: Progressing  Goal: No signs of respiratory distress (eg. Use of accessory muscles. Peds grunting)  Outcome: Progressing  Goal: Patent airway maintained this shift  Outcome: Progressing  Goal: Tolerate mechanical ventilation evidenced by VS/agitation level this shift  Outcome: Progressing  Goal: Tolerate pulmonary toileting this shift  Outcome:  Progressing  Goal: Verbalize decreased shortness of breath this shift  Outcome: Progressing  Goal: Wean oxygen to maintain O2 saturation per order/standard this shift  Outcome: Progressing  Goal: Increase self care and/or family involvement in next 24 hours  Outcome: Progressing

## 2024-05-17 NOTE — PROGRESS NOTES
Palliative Care Follow-Up Progress Note    Irish Davis is a 79 y.o. female on day 3 of admission presenting with shortness of breath, productive cough, and generalized weakness. She was found to have COPD exacerbation, acute on chronic diastolic heart failure, elevated troponin/demand ischemia/suspected type II MI. Life limiting illnesses include chronic diastolic heart failure and severe COPD (GOLD 4, FEV1 29%) with chronic hypoxic respiratory failure (baseline 4-4.5 L nasal cannula) for approx 10 years. Patient lives with her  who has dementia and her daughter, Sofie. Her worsening shortness of breath is limiting her mobility requiring more assistance from her family and she has been unable to leave the house since January. She has had 4 hospitalizations and 2 ED visit in the last 8 months. Family meeting 5/16 complete and patient/daughter would like home hospice on discharge, consult placed.     5/17/2024: Patient seen and examined while sitting in the chair, no visitors at the bedside. She denies complaints and is thankful for the care she is receiving.     Phone call to daughter/Sofie DELCID. She states she will look at hospice agency list further this morning and make final decision on hospice agency of choice in order to send referral. She is touring nursing homes for her father today. Answered questions and provided support/education.     Review of Systems   Constitutional:  Positive for fatigue.   Respiratory:  Positive for shortness of breath.    Neurological:  Positive for weakness.   Psychiatric/Behavioral:  Negative for confusion.        Physical Exam  Constitutional:       General: She is not in acute distress.     Comments: thin   HENT:      Head: Normocephalic.      Nose: Nose normal.      Mouth/Throat:      Mouth: Mucous membranes are moist.   Eyes:      General: No scleral icterus.  Cardiovascular:      Rate and Rhythm: Normal rate.   Pulmonary:      Effort: No respiratory distress.       Comments: Oxygen via NC intact  Musculoskeletal:      Cervical back: Neck supple.   Skin:     General: Skin is warm and dry.   Neurological:      Mental Status: She is alert and oriented to person, place, and time.   Psychiatric:         Mood and Affect: Mood normal.         Thought Content: Thought content normal.       Plan     Goals of Care: discharge home with hospice care, not return to the hospital, comfort  Advanced Directives: Copies of living will, HCPOA, and [place] DNR on hospital chart to be scanned into the EMR upon discharge from the hospital.   Health care power of : DaughterSofie. Spouse listed as POA however has dementia and 1st alternate named as daughterSofie   Outpatient services: hospice consult pending, awaiting patient/family agency of choice to send referral      Collaborated with: Sadie Maher, ZACKARY, and RN    I spent 15 minutes in the professional and overall care of this patient.    Rakesh Polanco, APRN-CNP

## 2024-05-18 LAB
ANION GAP SERPL CALC-SCNC: 9 MMOL/L (ref 10–20)
BUN SERPL-MCNC: 53 MG/DL (ref 6–23)
CALCIUM SERPL-MCNC: 8.8 MG/DL (ref 8.6–10.3)
CHLORIDE SERPL-SCNC: 94 MMOL/L (ref 98–107)
CO2 SERPL-SCNC: 42 MMOL/L (ref 21–32)
CREAT SERPL-MCNC: 0.88 MG/DL (ref 0.5–1.05)
EGFRCR SERPLBLD CKD-EPI 2021: 66 ML/MIN/1.73M*2
ERYTHROCYTE [DISTWIDTH] IN BLOOD BY AUTOMATED COUNT: 14.6 % (ref 11.5–14.5)
GLUCOSE SERPL-MCNC: 127 MG/DL (ref 74–99)
HCT VFR BLD AUTO: 40.9 % (ref 36–46)
HGB BLD-MCNC: 12.1 G/DL (ref 12–16)
MAGNESIUM SERPL-MCNC: 2.17 MG/DL (ref 1.6–2.4)
MCH RBC QN AUTO: 26.2 PG (ref 26–34)
MCHC RBC AUTO-ENTMCNC: 29.6 G/DL (ref 32–36)
MCV RBC AUTO: 89 FL (ref 80–100)
NRBC BLD-RTO: 0 /100 WBCS (ref 0–0)
PLATELET # BLD AUTO: 306 X10*3/UL (ref 150–450)
POTASSIUM SERPL-SCNC: 4.4 MMOL/L (ref 3.5–5.3)
RBC # BLD AUTO: 4.62 X10*6/UL (ref 4–5.2)
SODIUM SERPL-SCNC: 141 MMOL/L (ref 136–145)
WBC # BLD AUTO: 9.8 X10*3/UL (ref 4.4–11.3)

## 2024-05-18 PROCEDURE — 83735 ASSAY OF MAGNESIUM: CPT | Performed by: PHYSICIAN ASSISTANT

## 2024-05-18 PROCEDURE — 2500000001 HC RX 250 WO HCPCS SELF ADMINISTERED DRUGS (ALT 637 FOR MEDICARE OP): Performed by: INTERNAL MEDICINE

## 2024-05-18 PROCEDURE — 2500000004 HC RX 250 GENERAL PHARMACY W/ HCPCS (ALT 636 FOR OP/ED): Performed by: PHYSICIAN ASSISTANT

## 2024-05-18 PROCEDURE — 2060000001 HC INTERMEDIATE ICU ROOM DAILY

## 2024-05-18 PROCEDURE — 94640 AIRWAY INHALATION TREATMENT: CPT

## 2024-05-18 PROCEDURE — 2500000002 HC RX 250 W HCPCS SELF ADMINISTERED DRUGS (ALT 637 FOR MEDICARE OP, ALT 636 FOR OP/ED): Performed by: INTERNAL MEDICINE

## 2024-05-18 PROCEDURE — 99233 SBSQ HOSP IP/OBS HIGH 50: CPT | Performed by: INTERNAL MEDICINE

## 2024-05-18 PROCEDURE — 94668 MNPJ CHEST WALL SBSQ: CPT

## 2024-05-18 PROCEDURE — 2500000004 HC RX 250 GENERAL PHARMACY W/ HCPCS (ALT 636 FOR OP/ED): Performed by: STUDENT IN AN ORGANIZED HEALTH CARE EDUCATION/TRAINING PROGRAM

## 2024-05-18 PROCEDURE — 2500000001 HC RX 250 WO HCPCS SELF ADMINISTERED DRUGS (ALT 637 FOR MEDICARE OP): Performed by: STUDENT IN AN ORGANIZED HEALTH CARE EDUCATION/TRAINING PROGRAM

## 2024-05-18 PROCEDURE — 80048 BASIC METABOLIC PNL TOTAL CA: CPT | Performed by: PHYSICIAN ASSISTANT

## 2024-05-18 PROCEDURE — 2500000005 HC RX 250 GENERAL PHARMACY W/O HCPCS: Performed by: NURSE PRACTITIONER

## 2024-05-18 PROCEDURE — 99232 SBSQ HOSP IP/OBS MODERATE 35: CPT | Performed by: NURSE PRACTITIONER

## 2024-05-18 PROCEDURE — 85027 COMPLETE CBC AUTOMATED: CPT | Performed by: PHYSICIAN ASSISTANT

## 2024-05-18 PROCEDURE — 36415 COLL VENOUS BLD VENIPUNCTURE: CPT | Performed by: PHYSICIAN ASSISTANT

## 2024-05-18 RX ORDER — FUROSEMIDE 40 MG/1
40 TABLET ORAL DAILY
Status: DISCONTINUED | OUTPATIENT
Start: 2024-05-18 | End: 2024-05-19 | Stop reason: HOSPADM

## 2024-05-18 RX ADMIN — HEPARIN SODIUM 5000 UNITS: 5000 INJECTION INTRAVENOUS; SUBCUTANEOUS at 14:41

## 2024-05-18 RX ADMIN — Medication 6 L/MIN: at 20:00

## 2024-05-18 RX ADMIN — Medication 4.5 L/MIN: at 08:00

## 2024-05-18 RX ADMIN — TIOTROPIUM BROMIDE INHALATION SPRAY 2 PUFF: 3.12 SPRAY, METERED RESPIRATORY (INHALATION) at 08:46

## 2024-05-18 RX ADMIN — IPRATROPIUM BROMIDE AND ALBUTEROL SULFATE 3 ML: 2.5; .5 SOLUTION RESPIRATORY (INHALATION) at 12:00

## 2024-05-18 RX ADMIN — METHYLPREDNISOLONE SODIUM SUCCINATE 40 MG: 40 INJECTION, POWDER, FOR SOLUTION INTRAMUSCULAR; INTRAVENOUS at 14:41

## 2024-05-18 RX ADMIN — GUAIFENESIN 1200 MG: 600 TABLET ORAL at 08:46

## 2024-05-18 RX ADMIN — AMOXICILLIN AND CLAVULANATE POTASSIUM 1 TABLET: 875; 125 TABLET, FILM COATED ORAL at 08:45

## 2024-05-18 RX ADMIN — FUROSEMIDE 40 MG: 10 INJECTION, SOLUTION INTRAMUSCULAR; INTRAVENOUS at 08:46

## 2024-05-18 RX ADMIN — METHYLPREDNISOLONE SODIUM SUCCINATE 40 MG: 40 INJECTION, POWDER, FOR SOLUTION INTRAMUSCULAR; INTRAVENOUS at 05:36

## 2024-05-18 RX ADMIN — ASPIRIN 81 MG: 81 TABLET, COATED ORAL at 08:46

## 2024-05-18 RX ADMIN — GUAIFENESIN 1200 MG: 600 TABLET ORAL at 21:06

## 2024-05-18 RX ADMIN — HYDROXYZINE HYDROCHLORIDE 12.5 MG: 25 TABLET ORAL at 08:53

## 2024-05-18 RX ADMIN — AMOXICILLIN AND CLAVULANATE POTASSIUM 1 TABLET: 875; 125 TABLET, FILM COATED ORAL at 21:06

## 2024-05-18 RX ADMIN — IPRATROPIUM BROMIDE AND ALBUTEROL SULFATE 3 ML: 2.5; .5 SOLUTION RESPIRATORY (INHALATION) at 08:02

## 2024-05-18 RX ADMIN — ATORVASTATIN CALCIUM 80 MG: 40 TABLET, FILM COATED ORAL at 21:06

## 2024-05-18 RX ADMIN — PANTOPRAZOLE SODIUM 40 MG: 40 TABLET, DELAYED RELEASE ORAL at 08:45

## 2024-05-18 RX ADMIN — HEPARIN SODIUM 5000 UNITS: 5000 INJECTION INTRAVENOUS; SUBCUTANEOUS at 05:36

## 2024-05-18 RX ADMIN — METOPROLOL TARTRATE 37.5 MG: 25 TABLET, FILM COATED ORAL at 08:45

## 2024-05-18 RX ADMIN — METOPROLOL TARTRATE 37.5 MG: 25 TABLET, FILM COATED ORAL at 21:06

## 2024-05-18 RX ADMIN — LORATADINE 10 MG: 10 TABLET ORAL at 21:07

## 2024-05-18 RX ADMIN — Medication 3 MG: at 21:21

## 2024-05-18 RX ADMIN — Medication 6 L/MIN: at 21:40

## 2024-05-18 RX ADMIN — HEPARIN SODIUM 5000 UNITS: 5000 INJECTION INTRAVENOUS; SUBCUTANEOUS at 21:07

## 2024-05-18 RX ADMIN — FLUTICASONE FUROATE AND VILANTEROL TRIFENATATE 1 PUFF: 200; 25 POWDER RESPIRATORY (INHALATION) at 21:09

## 2024-05-18 RX ADMIN — METHYLPREDNISOLONE SODIUM SUCCINATE 40 MG: 40 INJECTION, POWDER, FOR SOLUTION INTRAMUSCULAR; INTRAVENOUS at 21:07

## 2024-05-18 RX ADMIN — HYDROXYZINE HYDROCHLORIDE 12.5 MG: 25 TABLET ORAL at 17:00

## 2024-05-18 ASSESSMENT — ENCOUNTER SYMPTOMS
LIGHT-HEADEDNESS: 0
FATIGUE: 0
SHORTNESS OF BREATH: 1
PALPITATIONS: 0
CONSTIPATION: 0
VOMITING: 0
FLANK PAIN: 0
BACK PAIN: 0
SYNCOPE: 0
FOCAL WEAKNESS: 0
MEMORY LOSS: 0
WHEEZING: 0
EYE PAIN: 0
DYSURIA: 0
FACIAL SWELLING: 0
WEAKNESS: 0
NAUSEA: 0
FREQUENCY: 0
SORE THROAT: 0
DIARRHEA: 0
NUMBNESS: 0
FEVER: 0
HALLUCINATIONS: 0
WOUND: 0
HEADACHES: 0
DECREASED APPETITE: 1
IRREGULAR HEARTBEAT: 0
BLURRED VISION: 0
TROUBLE SWALLOWING: 0
BRUISES/BLEEDS EASILY: 0
DIZZINESS: 0
CHEST TIGHTNESS: 0
APPETITE CHANGE: 0
HEMATURIA: 0
DIAPHORESIS: 0
COUGH: 1
DEPRESSION: 0
BLOOD IN STOOL: 0
JOINT SWELLING: 0
CHILLS: 0
ABDOMINAL PAIN: 0
FALLS: 1
GASTROINTESTINAL NEGATIVE: 1
DYSPNEA ON EXERTION: 1

## 2024-05-18 ASSESSMENT — PAIN - FUNCTIONAL ASSESSMENT: PAIN_FUNCTIONAL_ASSESSMENT: 0-10

## 2024-05-18 ASSESSMENT — COGNITIVE AND FUNCTIONAL STATUS - GENERAL
DRESSING REGULAR UPPER BODY CLOTHING: A LOT
WALKING IN HOSPITAL ROOM: A LITTLE
DRESSING REGULAR LOWER BODY CLOTHING: A LOT
TOILETING: A LITTLE
HELP NEEDED FOR BATHING: A LOT
DRESSING REGULAR LOWER BODY CLOTHING: A LOT
MOVING TO AND FROM BED TO CHAIR: A LITTLE
MOBILITY SCORE: 18
STANDING UP FROM CHAIR USING ARMS: A LITTLE
PERSONAL GROOMING: A LITTLE
PERSONAL GROOMING: A LITTLE
MOBILITY SCORE: 18
DAILY ACTIVITIY SCORE: 15
DRESSING REGULAR UPPER BODY CLOTHING: A LOT
EATING MEALS: A LITTLE
MOVING TO AND FROM BED TO CHAIR: A LITTLE
TURNING FROM BACK TO SIDE WHILE IN FLAT BAD: A LITTLE
EATING MEALS: A LITTLE
CLIMB 3 TO 5 STEPS WITH RAILING: A LOT
WALKING IN HOSPITAL ROOM: A LITTLE
STANDING UP FROM CHAIR USING ARMS: A LITTLE
DAILY ACTIVITIY SCORE: 15
TOILETING: A LITTLE
TURNING FROM BACK TO SIDE WHILE IN FLAT BAD: A LITTLE
HELP NEEDED FOR BATHING: A LOT
CLIMB 3 TO 5 STEPS WITH RAILING: A LOT

## 2024-05-18 ASSESSMENT — PAIN SCALES - GENERAL
PAINLEVEL_OUTOF10: 0 - NO PAIN
PAINLEVEL_OUTOF10: 0 - NO PAIN

## 2024-05-18 NOTE — PROGRESS NOTES
Met with patient and daughter, Sofie. Reviewed Hospice philosophy and services. Pt very appropriate for Hospice. All hospice documents signed. Daughter concerned regarding pt use of inhalers vs nebulizers as pt feels more relief with inhaler. Has a supply at home and [facility] approves a one time refill.   DME to be delivered this evening - bed, with 1/2 rails and and APM, OBT, w/c, and rolator walker. Has oxygen in home.  Will need dc order and d/c meds    Thank you,  Porsha Cedeno RN  [facility]

## 2024-05-18 NOTE — NURSING NOTE
Patient was sleeping with morning. I asked her if she wanted her 7am spiriva. Patient stated she wanted to take it later. Spiriva moved to 9am

## 2024-05-18 NOTE — PROGRESS NOTES
HPI:  Irish Davis is a 79 y.o. female presenting with shortness of breath.  H/o HTN, DL, moderate aortic stenosis, chronic bilateral lower extremity edema (on Lasix), COPD with chronic hypoxic respiratory failure (baseline 4-4.5 L nasal cannula).     The pt presents with SOB, PATEL, cough productive of green sputum, wheezing, congestion, generalized weakness.  These symptoms have worsened over the last several days, to the point that the patient has trouble getting out of bed.  Has some chest tightness with coughing, but no report of exertional chest pain/pressure.  Of note, the has been admitted to the hospital several times in the last 6 months for similar indications.  In the ED she was afebrile, /101, .  Sat 93% on 4L NC.  Labs significant for WBC 10.6, Hb 11.8, BUN 13, Cr 0.71.  , HSTI 74 > 84.  CXR with chronic interstitial lung dse and emphysema, without overt acute process.  Rec'd 125 Solu-Medrol, DuoNebs, 40 mg Lasix, total of 60 mEq Kcl.     The patient is hard of hearing.  When asked whether she was taking furosemide at home (on her outpatient med list under the summary tab, it states she should be taking furosemide 40mg, half a tablet daily), she tells me that she was not told to take a water pill at home.    Subjective Data:  Has ongoing dyspnea even with talking.  She is on 5 lpm NC.  K 4.1, creatinine 1.01, mag 2.1.  Echo in January showed EF 60-65%, mild AORTIC STENOSIS.  No CP/pressure/palpitations.  Monitor: SR  5-17-24: No CP/pressure.  + for ongoing dyspnea/air hunger.  HR/BP well controlled.  K 4.4, creatinine 0.86.  Monitor; SR.     5/18/24:  Ongoing SOB.  On 4.5L NC which is what her daughter states she wore prior to admission.  Denies any chest pain/pressure.  No BLE edema. SR with PAC's, HR 79 bpm on telemetry.    Overnight Events:    None     Objective Data:  Last Recorded Vitals:  Vitals:    05/18/24 0025 05/18/24 0428 05/18/24 0802 05/18/24 0821   BP: 97/59 128/60   142/82   BP Location: Left arm Left arm  Left arm   Patient Position: Lying Lying  Lying   Pulse: 61 55  70   Resp: 14 14  18   Temp: 36.4 °C (97.6 °F) 36.1 °C (96.9 °F)  36.5 °C (97.7 °F)   TempSrc: Temporal Temporal  Temporal   SpO2: 100% 98% 98% 99%   Weight:  (!) 38.5 kg (84 lb 14 oz)     Height:           Last Labs:    Results from last 7 days   Lab Units 05/18/24  0530 05/17/24  0554 05/16/24  0438   SODIUM mmol/L 141 140 143   POTASSIUM mmol/L 4.4 4.4 4.1   CHLORIDE mmol/L 94* 96* 97*   CO2 mmol/L 42* 40* 40*   BUN mg/dL 53* 51* 42*   CREATININE mg/dL 0.88 0.86 1.01   GLUCOSE mg/dL 127* 166* 146*   CALCIUM mg/dL 8.8 9.0 9.3        Results from last 7 days   Lab Units 05/18/24 0530 05/17/24  0554 05/16/24  0438   WBC AUTO x10*3/uL 9.8 12.8* 13.1*   HEMOGLOBIN g/dL 12.1 11.8* 11.6*   HEMATOCRIT % 40.9 40.3 40.3   PLATELETS AUTO x10*3/uL 306 305 324        Results from last 7 days   Lab Units 05/15/24  0448   CHOLESTEROL mg/dL 240*   TRIGLYCERIDES mg/dL 131   HDL mg/dL 46.9        Last I/O:  I/O last 3 completed shifts:  In: 500 (13 mL/kg) [P.O.:500]  Out: 1400 (36.4 mL/kg) [Urine:1400 (1 mL/kg/hr)]  Weight: 38.5 kg         Inpatient Medications:  Scheduled medications   Medication Dose Route Frequency    amoxicillin-pot clavulanate  1 tablet oral BID    aspirin  81 mg oral Daily    atorvastatin  80 mg oral Nightly    fluticasone furoate-vilanteroL  1 puff inhalation Daily    furosemide  40 mg intravenous BID    guaiFENesin  1,200 mg oral BID    heparin (porcine)  5,000 Units subcutaneous q8h    ipratropium-albuteroL  3 mL nebulization TID    loratadine  10 mg oral Daily    methylPREDNISolone sodium succinate (PF)  40 mg intravenous q8h DORIS    metoprolol tartrate  37.5 mg oral BID    oxygen   inhalation Continuous - Inhalation    pantoprazole  40 mg oral Daily before breakfast    Or    pantoprazole  40 mg intravenous Daily before breakfast    polyethylene glycol  17 g oral Daily    tiotropium  2 puff inhalation  Daily     PRN medications   Medication    acetaminophen    albuterol    benzonatate    bisacodyl    bisacodyl    hydrOXYzine HCL    loperamide    melatonin    ondansetron    Or    ondansetron     Continuous Medications   Medication Dose Last Rate       ROS:  Review of Systems   Constitutional: Positive for decreased appetite and malaise/fatigue.   HENT: Negative.          [place]   Eyes:  Negative for blurred vision and visual disturbance.   Cardiovascular:  Positive for dyspnea on exertion. Negative for chest pain, irregular heartbeat, leg swelling, palpitations and syncope.   Respiratory:  Positive for cough and shortness of breath.    Musculoskeletal:  Positive for arthritis and falls.   Gastrointestinal: Negative.    Neurological:  Negative for focal weakness.   Psychiatric/Behavioral:  Negative for depression and memory loss.         Physical Exam:  Physical Exam  Constitutional:       Appearance: She is ill-appearing.      Comments: Frail   HENT:      Head: Normocephalic.   Eyes:      Conjunctiva/sclera: Conjunctivae normal.   Cardiovascular:      Rate and Rhythm: Normal rate and regular rhythm.      Pulses: Normal pulses.      Heart sounds: S1 normal and S2 normal. Murmur heard.      No friction rub. No gallop.   Pulmonary:      Comments: On 4.5 lpm O2 via NC. Dyspneic with talking.  Lungs with poor air movement and scattered wheezes, no crackles noted.  Abdominal:      General: Bowel sounds are normal.      Palpations: Abdomen is soft.      Tenderness: There is no abdominal tenderness.   Musculoskeletal:      Cervical back: Neck supple.      Right lower leg: No edema.      Left lower leg: No edema.   Skin:     General: Skin is warm and dry.   Neurological:      General: No focal deficit present.      Mental Status: She is alert and oriented to person, place, and time.   Psychiatric:         Attention and Perception: Attention normal.         Mood and Affect: Mood normal.          Assessment/Plan     Acute on  chronic diastolic HF:  Dyspnea, LE edema.  -diuresis with lasix 40mg IV BID  -strict I/O's and daily weights  -serial Cr  -monitor and replete lytes  -patient states she has not been taking home diuretic - recommended that she takes this at least as needed for leg swelling/weight gain  5-16-24: Lytes are WNL, BP stable, creatinine 1.01.  Continue to diurese and reassess tomorrow.  I think a lot of her breathing issues are from COPD/Emphysema/interstitial lung disease.  on admission.   5-17-24: Still with ongoing dyspnea, + ankle edema today (up in chair).  Kidney function is stable.  Would continue on IV diuretics today and transition to oral tomorrow.   5/18/24:  BLE edema is resolved.  Renal function stable.  Stop IV furosemide and change to furosemide 40 mg PO daily.  Will need to monitor renal function and electrolytes as outpatient.  Echo Jan 2024 with LVEF 60-65% and mild aortic valve stenosis.     Moderate aortic stenosis:    -serial monitoring on outpatient TTE  5-16-24: Mild AORTIC STENOSIS, echo from January shows:  Aortic Valve: The aortic valve was not well visualized. There is mild aortic valve cusp calcification. There is evidence of mild aortic valve stenosis.  The aortic valve dimensionless index is 0.55. There is no evidence of aortic valve regurgitation. The peak instantaneous gradient of the aortic valve is 23.0 mmHg. The mean gradient of the aortic valve is 12.8 mmHg.  Can monitor in the outpt setting.  5-18-24: Serial monitoring in outpt setting.      HTN  -metop tartrate 37.5mg 2 times per day  5-16-24: BP stable.   5-18-24: BP is well controlled.      DL  -atorvastatin 80mg daily    DISPOSITION:  Change IV furosemide to furosemide 40 mg PO daily  Reinforced 2 gm sodium and 2L fluid restriction (daughter at bedside)  Monitor electrolytes and renal function as outpatient  Continue metoprolol tartrate  Cardiology team is signing off.  Please call for questions or if further follow-up is  needed.       Code Status:  DNR and No Intubation          Michelle Lyon, APRN-CNP

## 2024-05-18 NOTE — PROGRESS NOTES
Irish Davis is a 79 y.o. female on day 4 of admission presenting with Acute on chronic diastolic heart failure (Multi).      Subjective   Irish Davis is a 79 y.o. female with PMHx s/f HTN, HLD, moderate aortic stenosis, chronic bilateral lower extremity edema (on Lasix), COPD with chronic hypoxic respiratory failure (baseline 4-4.5 L nasal cannula), presenting with worsening shortness of breath, dyspnea on exertion, cough (productive - green sputum), wheezing, congestion, and generalized weakness. Patient and family report that over the last few days, she has gotten significantly more weak to the point where she is not really able to get up and out of bed; however, they note that this is exacerbated by a significant amount of aforementioned respiratory symptoms. She has not had any significant sick contacts or exposures; however, she has been admitted multiple times under similar circumstances since about November of last year. Has been taking home medications as prescribed, but noticed some mild increase in lower extremity edema from baseline. Has not had much of an appetite since onset of symptoms, but no significant nausea/vomiting/abdominal pain/diarrhea. She admits to some chest tightness when coughing and having increased work of breathing when trying to ambulate. She is feeling better since presentation to the emergency department, but still generally unwell.  She is in no acute distress sitting in bed with multiple loved ones at the bedside. 93% 4 L nasal cannula (patient was increased to 6 L nasal cannula from her baseline of 4-4.5 for comfort, no documented hypoxia on her oxygen). High-sensitivity troponin 25-94-[id number] pending.  VBG: pH 7.41, pCO2 68, calculated bicarb 43.1.  Venous lactate 2.1-1.4. ECG is without overt acute ischemic changes. CXR with chronic interstitial lung dz/emphysema without overt acute process. Lipid panel with elevated , cholesterol 240. HSTI 74-84-57-49. A1c  5.6. She is agreeable to DNR/DNI but would be ok with BIPAP. Patient met with palliative care on 5/16/24, she and her daughter are in agreement with hospice care at home. Daughter needs another day or two as they are working on arrangements for the patients  given he reportedly has advanced dementia. Patient will continue DNR-CCA for now and start DNR-CCO upon dc to home, hopefully over the weekend.     5/15/2024: No acute events overnight. Vitals stable, 99% on 2L, RR 24. Labs largely unremarkable.  Has chronic hypercarbia. Will ask for pulmonology recommendations regarding her recurrent COPD exacerbations.   Patient appears to have slight increase work of breathing today, just got her lasix and improved upon recheck evaluation, will continue IV diuresis. Ordered 1x dose oral morphine but not given as she improved. She is agreeable to DNR/DNI but would be ok with BIPAP. She wants assistance making her daughter her POA as her  has dementia per her report. Edgar ask for palliative care meeting.    5/16/2024: No acute events overnight. Vitals stable, 93% on 6L, RR 20. Labs largely unremarkable. Has chronic hypercarbia. Slight leukocytosis from steroids. Continues on IV lasix 40 BID. Pulm added acapella and scheduled mucinex  Palliative care to arrange family meeting. Had a long discussion with patient today regarding palliative care vs hospice, she states her goals align more with hospice but would like her daughter involved. Her main worry isnt about herself but regarding her  who has advanced dementia per her report and is worried this is difficult on her daughter as she is helping care for them both.     5/17/2024: No acute events overnight. Vitals stable, 100% on 5L, RR 18. Labs largely unchanged. Has chronic hypercarbia. Slight leukocytosis from steroids. Continues on IV lasix 40 BID, still has some ankle edema and cr tolerating so will continue for 1 more day, likely can transition to orals  tomorrow.   Patient met with palliative care yesterday, goals now to consult hospice and plan dc to home with [facility], hopefully over the weekend.    5/18: Patient was seen and examined.  Saturating well on 4L nasal cannula oxygen which is her home baseline.  IV Lasix changed to p.o. per cardiology.  Plan is to discharge home tomorrow with home hospice once home equipment is delivered by this evening.  Discussed extensively with patient's daughter at bedside.         Review of Systems   Constitutional:  Negative for appetite change, chills, diaphoresis, fatigue and fever.   HENT:  Negative for congestion, ear pain, facial swelling, hearing loss, nosebleeds, sore throat, tinnitus and trouble swallowing.    Eyes:  Negative for pain.   Respiratory:  Positive for cough and shortness of breath. Negative for chest tightness and wheezing.    Cardiovascular:  Positive for leg swelling. Negative for chest pain and palpitations.   Gastrointestinal:  Negative for abdominal pain, blood in stool, constipation, diarrhea, nausea and vomiting.   Genitourinary:  Negative for dysuria, flank pain, frequency, hematuria and urgency.   Musculoskeletal:  Negative for back pain and joint swelling.   Skin:  Negative for rash and wound.   Neurological:  Negative for dizziness, syncope, weakness, light-headedness, numbness and headaches.   Hematological:  Does not bruise/bleed easily.   Psychiatric/Behavioral:  Negative for behavioral problems, hallucinations and suicidal ideas.           Objective     Last Recorded Vitals  /72 (BP Location: Right arm, Patient Position: Lying)   Pulse 50   Temp 36.4 °C (97.5 °F) (Temporal)   Resp 16   Wt (!) 38.5 kg (84 lb 14 oz)   SpO2 95%     Image Results  ECG 12 Lead    Result Date: 5/15/2024  Extreme tachycardia with wide complex, no further rhythm analysis attempted Artifact in lead(s) II,III,aVR,aVL,aVF,V1,V2,V3,V4,V5,V6    ECG 12 Lead    Result Date: 5/15/2024  Sinus rhythm Multiple premature  complexes, vent & supraven Borderline right axis deviation Borderline low voltage, extremity leads Minimal ST depression, lateral leads    ECG 12 Lead    Result Date: 5/15/2024  Sinus tachycardia with Premature supraventricular complexes Possible Left atrial enlargement Rightward axis Nonspecific ST abnormality Abnormal ECG When compared with ECG of 14-MAY-2024 12:01, PREVIOUS ECG IS PRESENT    ECG 12 lead    Result Date: 5/14/2024  Sinus rhythm Sinus pause Short MS interval Borderline right axis deviation Minimal ST depression, inferior leads Borderline prolonged QT interval    ECG 12 lead    Result Date: 5/14/2024  Sinus rhythm Multiform ventricular premature complexes Borderline short MS interval Borderline right axis deviation Minimal ST depression, inferior leads Borderline prolonged QT interval    XR chest 1 view    Result Date: 5/14/2024  Interpreted By:  Eunice Lopez, STUDY: XR CHEST 1 VIEW;  5/14/2024 11:57 am   INDICATION: Signs/Symptoms:Chest Pain.   COMPARISON: 03/01/2024   ACCESSION NUMBER(S): [id number]   ORDERING CLINICIAN: OSMANI TARIQ   FINDINGS: The heart size appears normal.   There are atherosclerotic changes of the aorta   The lungs are hyperinflated. There is basilar interstitial lung disease.   There appears to be right basilar calcified granuloma.   The patient is osteoporotic and scoliotic.   COMPARISON OF FINDING: Chest is similar.       COPD. Interstitial lung disease.   MACRO: none   Signed by: Eunice Lopez 5/14/2024 12:04 PM Dictation workstation:   [id number]       Lab Results  Results for orders placed or performed during the hospital encounter of 05/14/24 (from the past 24 hour(s))   CBC   Result Value Ref Range    WBC 9.8 4.4 - 11.3 x10*3/uL    nRBC 0.0 0.0 - 0.0 /100 WBCs    RBC 4.62 4.00 - 5.20 x10*6/uL    Hemoglobin 12.1 12.0 - 16.0 g/dL    Hematocrit 40.9 36.0 - 46.0 %    MCV 89 80 - 100 fL    MCH 26.2 26.0 - 34.0 pg    MCHC 29.6 (L) 32.0 - 36.0 g/dL    RDW 14.6 (H) 11.5 -  14.5 %    Platelets 306 150 - 450 x10*3/uL   Basic Metabolic Panel   Result Value Ref Range    Glucose 127 (H) 74 - 99 mg/dL    Sodium 141 136 - 145 mmol/L    Potassium 4.4 3.5 - 5.3 mmol/L    Chloride 94 (L) 98 - 107 mmol/L    Bicarbonate 42 (HH) 21 - 32 mmol/L    Anion Gap 9 (L) 10 - 20 mmol/L    Urea Nitrogen 53 (H) 6 - 23 mg/dL    Creatinine 0.88 0.50 - 1.05 mg/dL    eGFR 66 >60 mL/min/1.73m*2    Calcium 8.8 8.6 - 10.3 mg/dL   Magnesium   Result Value Ref Range    Magnesium 2.17 1.60 - 2.40 mg/dL        Medications  Scheduled medications:  amoxicillin-pot clavulanate, 1 tablet, oral, BID  aspirin, 81 mg, oral, Daily  atorvastatin, 80 mg, oral, Nightly  fluticasone furoate-vilanteroL, 1 puff, inhalation, Daily  furosemide, 40 mg, oral, Daily  guaiFENesin, 1,200 mg, oral, BID  heparin (porcine), 5,000 Units, subcutaneous, q8h  ipratropium-albuteroL, 3 mL, nebulization, TID  loratadine, 10 mg, oral, Daily  methylPREDNISolone sodium succinate (PF), 40 mg, intravenous, q8h DORIS  metoprolol tartrate, 37.5 mg, oral, BID  oxygen, , inhalation, Continuous - Inhalation  pantoprazole, 40 mg, oral, Daily before breakfast   Or  pantoprazole, 40 mg, intravenous, Daily before breakfast  polyethylene glycol, 17 g, oral, Daily  tiotropium, 2 puff, inhalation, Daily      Continuous medications:     PRN medications:  PRN medications: acetaminophen, albuterol, benzonatate, bisacodyl, bisacodyl, hydrOXYzine HCL, loperamide, melatonin, ondansetron **OR** ondansetron     Physical Exam  Constitutional:       General: She is not in acute distress.     Appearance: Normal appearance.      Comments: Thin elderly female  Sitting upright in bedside chair   HENT:      Head: Normocephalic and atraumatic.      Right Ear: External ear normal.      Left Ear: External ear normal.      Nose: Nose normal.      Mouth/Throat:      Mouth: Mucous membranes are moist.      Pharynx: Oropharynx is clear.   Eyes:      Extraocular Movements: Extraocular  movements intact.      Conjunctiva/sclera: Conjunctivae normal.      Pupils: Pupils are equal, round, and reactive to light.   Cardiovascular:      Rate and Rhythm: Normal rate and regular rhythm.      Pulses: Normal pulses.      Heart sounds: Normal heart sounds.   Pulmonary:      Effort: No respiratory distress.      Breath sounds: Rales present. No wheezing or rhonchi.      Comments: NC in place  Abdominal:      General: Bowel sounds are normal.      Palpations: Abdomen is soft.      Tenderness: There is no abdominal tenderness. There is no right CVA tenderness, left CVA tenderness, guarding or rebound.   Musculoskeletal:         General: No swelling. Normal range of motion.      Cervical back: Normal range of motion and neck supple.   Skin:     General: Skin is warm and dry.      Capillary Refill: Capillary refill takes less than 2 seconds.      Findings: No lesion or rash.   Neurological:      General: No focal deficit present.      Mental Status: She is alert and oriented to person, place, and time. Mental status is at baseline.   Psychiatric:         Mood and Affect: Mood normal.         Behavior: Behavior normal.                  Code Status  DNR and No Intubation     Assessment/Plan   This patient currently has cardiac telemetry ordered; if you would like to modify or discontinue the telemetry order, click here to go to the orders activity to modify/discontinue the order.    AECOPD; COPD with chronic hypoxic respiratory failure  -Patient's baseline oxygen is 4-4.5 L; she is currently on about 6 L nasal cannula but this was more so for comfort rather than documented hypoxia, will try to wean back to baseline  -She is diffusely wheezy on examination and has had increased cough and sputum production  -Patient with prior itching to doxycycline and erythromycin; has tolerated Augmentin in the past and with increased sputum production and change in color will be continued on empiric Augmentin  -Continued on IV  steroids  -Continued on home therapies, additionally scheduled and as needed nebs  -Pulmonary hygiene, RT consultation is appreciated  -Will check VBG in the morning  -Palliative care consultation  -Pulmonology consultation  -Patient endorses DNR/DNI  -Mucinex  -Acapella      Acute on chronic diastolic HF  -Patient had an echocardiogram completed on 01/29/2024 which revealed an LVEF of 60-65%, low normal RV systolic function, mild aortic valve stenosis  -During prior admissions, her BNP was more markedly elevated with her present BNP of 374 actually being on the lower end of prior admits  -IV Lasix changed to p.o.  -Continue to follow volume status closely  -Monitor renal function closely with diuresis   -Monitor electrolytes and replenish generously as needed   -Strict I&O’s   -2g salt restriction, 2L fluid restriction   -Monitor fluid status closely   -Cardiology consultation, appreciate further recommendations      Elevated troponin/demand ischemia/suspected type II MI  -Patient is presenting with an initial high-sensitivity troponin of 74, up trended to 84  -Her ECG is nonrevealing for acute ischemic changes and she is not complaining of any chest pain or anginal equivalents  -I suspect that this is demand mediated in setting of her respiratory status and increased work of breathing, likely degree of diastolic dysfunction, but she will be continued on aspirin, statin, beta-blocker  -Lipids/A1c as above  -Unless interval development of chest pain or anginal symptoms, significant increase in troponin, will hold on heparinization.  Cardiology on consult as above.     Hypokalemia  -Resolved     HTN, HLD  -Continue home medications  -Monitor and adjust as needed  -Lipid panel as above     Deconditioning and debility  -PT/OT  -Social work consultation appreciated    Moderate Aortic Stenosis  -Serial monitoring with cardiology    Goals of care  -Patient endorses DNR/DNI, orders placed  -Hospice consult- goal is home  with hospice over the weekend/ or when family ready    DVT ppx:  subcutaneous heparin     Spent 35 minutes in the follow-up management of this patient today    Please see orders for more complete plan    Ortiz Marshall MD

## 2024-05-19 ENCOUNTER — PHARMACY VISIT (OUTPATIENT)
Dept: PHARMACY | Facility: CLINIC | Age: 79
End: 2024-05-19
Payer: COMMERCIAL

## 2024-05-19 VITALS
OXYGEN SATURATION: 93 % | BODY MASS INDEX: 16.84 KG/M2 | TEMPERATURE: 97.1 F | DIASTOLIC BLOOD PRESSURE: 74 MMHG | SYSTOLIC BLOOD PRESSURE: 128 MMHG | HEIGHT: 60 IN | RESPIRATION RATE: 16 BRPM | HEART RATE: 61 BPM | WEIGHT: 85.76 LBS

## 2024-05-19 PROBLEM — R60.0 PERIPHERAL EDEMA: Status: RESOLVED | Noted: 2024-05-13 | Resolved: 2024-05-19

## 2024-05-19 PROBLEM — J44.1 COPD WITH ACUTE EXACERBATION (MULTI): Status: RESOLVED | Noted: 2023-11-20 | Resolved: 2024-05-19

## 2024-05-19 PROBLEM — R60.9 PERIPHERAL EDEMA: Status: RESOLVED | Noted: 2024-05-13 | Resolved: 2024-05-19

## 2024-05-19 PROBLEM — I50.33 ACUTE ON CHRONIC DIASTOLIC HEART FAILURE (MULTI): Status: RESOLVED | Noted: 2024-05-14 | Resolved: 2024-05-19

## 2024-05-19 PROBLEM — E87.6 HYPOKALEMIA: Status: RESOLVED | Noted: 2024-05-14 | Resolved: 2024-05-19

## 2024-05-19 PROBLEM — R79.89 ELEVATED TROPONIN: Status: RESOLVED | Noted: 2024-05-14 | Resolved: 2024-05-19

## 2024-05-19 LAB
ANION GAP SERPL CALC-SCNC: 9 MMOL/L (ref 10–20)
BUN SERPL-MCNC: 53 MG/DL (ref 6–23)
CALCIUM SERPL-MCNC: 8.3 MG/DL (ref 8.6–10.3)
CHLORIDE SERPL-SCNC: 97 MMOL/L (ref 98–107)
CO2 SERPL-SCNC: 40 MMOL/L (ref 21–32)
CREAT SERPL-MCNC: 0.79 MG/DL (ref 0.5–1.05)
EGFRCR SERPLBLD CKD-EPI 2021: 75 ML/MIN/1.73M*2
ERYTHROCYTE [DISTWIDTH] IN BLOOD BY AUTOMATED COUNT: 14.6 % (ref 11.5–14.5)
GLUCOSE SERPL-MCNC: 143 MG/DL (ref 74–99)
HCT VFR BLD AUTO: 39.3 % (ref 36–46)
HGB BLD-MCNC: 11.7 G/DL (ref 12–16)
MAGNESIUM SERPL-MCNC: 2.15 MG/DL (ref 1.6–2.4)
MCH RBC QN AUTO: 26.1 PG (ref 26–34)
MCHC RBC AUTO-ENTMCNC: 29.8 G/DL (ref 32–36)
MCV RBC AUTO: 88 FL (ref 80–100)
NRBC BLD-RTO: 0 /100 WBCS (ref 0–0)
PLATELET # BLD AUTO: 287 X10*3/UL (ref 150–450)
POTASSIUM SERPL-SCNC: 3.6 MMOL/L (ref 3.5–5.3)
RBC # BLD AUTO: 4.48 X10*6/UL (ref 4–5.2)
SODIUM SERPL-SCNC: 142 MMOL/L (ref 136–145)
WBC # BLD AUTO: 10.4 X10*3/UL (ref 4.4–11.3)

## 2024-05-19 PROCEDURE — 80048 BASIC METABOLIC PNL TOTAL CA: CPT | Performed by: PHYSICIAN ASSISTANT

## 2024-05-19 PROCEDURE — RXMED WILLOW AMBULATORY MEDICATION CHARGE

## 2024-05-19 PROCEDURE — 94640 AIRWAY INHALATION TREATMENT: CPT

## 2024-05-19 PROCEDURE — 2500000001 HC RX 250 WO HCPCS SELF ADMINISTERED DRUGS (ALT 637 FOR MEDICARE OP): Performed by: NURSE PRACTITIONER

## 2024-05-19 PROCEDURE — 99239 HOSP IP/OBS DSCHRG MGMT >30: CPT | Performed by: INTERNAL MEDICINE

## 2024-05-19 PROCEDURE — 83735 ASSAY OF MAGNESIUM: CPT | Performed by: PHYSICIAN ASSISTANT

## 2024-05-19 PROCEDURE — 2500000004 HC RX 250 GENERAL PHARMACY W/ HCPCS (ALT 636 FOR OP/ED): Performed by: STUDENT IN AN ORGANIZED HEALTH CARE EDUCATION/TRAINING PROGRAM

## 2024-05-19 PROCEDURE — 85027 COMPLETE CBC AUTOMATED: CPT | Performed by: PHYSICIAN ASSISTANT

## 2024-05-19 PROCEDURE — 36415 COLL VENOUS BLD VENIPUNCTURE: CPT | Performed by: PHYSICIAN ASSISTANT

## 2024-05-19 PROCEDURE — 2500000005 HC RX 250 GENERAL PHARMACY W/O HCPCS: Performed by: NURSE PRACTITIONER

## 2024-05-19 PROCEDURE — 94668 MNPJ CHEST WALL SBSQ: CPT

## 2024-05-19 PROCEDURE — 2500000002 HC RX 250 W HCPCS SELF ADMINISTERED DRUGS (ALT 637 FOR MEDICARE OP, ALT 636 FOR OP/ED): Performed by: INTERNAL MEDICINE

## 2024-05-19 PROCEDURE — 2500000001 HC RX 250 WO HCPCS SELF ADMINISTERED DRUGS (ALT 637 FOR MEDICARE OP): Performed by: STUDENT IN AN ORGANIZED HEALTH CARE EDUCATION/TRAINING PROGRAM

## 2024-05-19 PROCEDURE — 2500000004 HC RX 250 GENERAL PHARMACY W/ HCPCS (ALT 636 FOR OP/ED): Performed by: INTERNAL MEDICINE

## 2024-05-19 PROCEDURE — 2500000004 HC RX 250 GENERAL PHARMACY W/ HCPCS (ALT 636 FOR OP/ED): Performed by: PHYSICIAN ASSISTANT

## 2024-05-19 PROCEDURE — 2500000001 HC RX 250 WO HCPCS SELF ADMINISTERED DRUGS (ALT 637 FOR MEDICARE OP): Performed by: INTERNAL MEDICINE

## 2024-05-19 RX ORDER — ALBUTEROL SULFATE 90 UG/1
2 AEROSOL, METERED RESPIRATORY (INHALATION) EVERY 4 HOURS PRN
Qty: 8.5 G | Refills: 5 | Status: SHIPPED | OUTPATIENT
Start: 2024-05-19

## 2024-05-19 RX ORDER — MORPHINE SULFATE 20 MG/ML
10 SOLUTION ORAL EVERY 4 HOURS PRN
Qty: 30 ML | Refills: 0 | Status: SHIPPED | OUTPATIENT
Start: 2024-05-19

## 2024-05-19 RX ORDER — ALBUTEROL SULFATE 90 UG/1
2 AEROSOL, METERED RESPIRATORY (INHALATION) EVERY 6 HOURS PRN
Qty: 8.5 G | Refills: 11 | Status: SHIPPED | OUTPATIENT
Start: 2024-05-19

## 2024-05-19 RX ORDER — BUDESONIDE AND FORMOTEROL FUMARATE DIHYDRATE 80; 4.5 UG/1; UG/1
2 AEROSOL RESPIRATORY (INHALATION)
Qty: 10.2 G | Refills: 5 | Status: SHIPPED | OUTPATIENT
Start: 2024-05-19

## 2024-05-19 RX ORDER — FUROSEMIDE 40 MG/1
40 TABLET ORAL DAILY
Qty: 30 TABLET | Refills: 0 | Status: SHIPPED | OUTPATIENT
Start: 2024-05-19 | End: 2024-06-18

## 2024-05-19 RX ORDER — GUAIFENESIN 600 MG/1
1200 TABLET, EXTENDED RELEASE ORAL 2 TIMES DAILY
Qty: 20 TABLET | Refills: 0 | Status: SHIPPED | OUTPATIENT
Start: 2024-05-19

## 2024-05-19 RX ORDER — LORAZEPAM 2 MG/ML
0.5 INJECTION INTRAMUSCULAR ONCE
Status: COMPLETED | OUTPATIENT
Start: 2024-05-19 | End: 2024-05-19

## 2024-05-19 RX ORDER — PREDNISONE 10 MG/1
TABLET ORAL
Qty: 20 TABLET | Refills: 0 | Status: SHIPPED | OUTPATIENT
Start: 2024-05-19 | End: 2024-05-27

## 2024-05-19 RX ADMIN — FUROSEMIDE 40 MG: 40 TABLET ORAL at 08:05

## 2024-05-19 RX ADMIN — LORAZEPAM 0.5 MG: 2 INJECTION INTRAMUSCULAR; INTRAVENOUS at 12:29

## 2024-05-19 RX ADMIN — HEPARIN SODIUM 5000 UNITS: 5000 INJECTION INTRAVENOUS; SUBCUTANEOUS at 05:53

## 2024-05-19 RX ADMIN — ACETAMINOPHEN 650 MG: 325 TABLET ORAL at 08:03

## 2024-05-19 RX ADMIN — METHYLPREDNISOLONE SODIUM SUCCINATE 40 MG: 40 INJECTION, POWDER, FOR SOLUTION INTRAMUSCULAR; INTRAVENOUS at 05:53

## 2024-05-19 RX ADMIN — HYDROXYZINE HYDROCHLORIDE 12.5 MG: 25 TABLET ORAL at 08:03

## 2024-05-19 RX ADMIN — IPRATROPIUM BROMIDE AND ALBUTEROL SULFATE 3 ML: 2.5; .5 SOLUTION RESPIRATORY (INHALATION) at 11:30

## 2024-05-19 RX ADMIN — Medication 6 L/MIN: at 08:00

## 2024-05-19 RX ADMIN — ASPIRIN 81 MG: 81 TABLET, COATED ORAL at 08:04

## 2024-05-19 RX ADMIN — ALBUTEROL SULFATE 2 PUFF: 90 AEROSOL, METERED RESPIRATORY (INHALATION) at 12:04

## 2024-05-19 RX ADMIN — GUAIFENESIN 1200 MG: 600 TABLET ORAL at 08:04

## 2024-05-19 RX ADMIN — PANTOPRAZOLE SODIUM 40 MG: 40 TABLET, DELAYED RELEASE ORAL at 08:05

## 2024-05-19 RX ADMIN — IPRATROPIUM BROMIDE AND ALBUTEROL SULFATE 3 ML: 2.5; .5 SOLUTION RESPIRATORY (INHALATION) at 08:06

## 2024-05-19 RX ADMIN — METOPROLOL TARTRATE 37.5 MG: 25 TABLET, FILM COATED ORAL at 08:04

## 2024-05-19 RX ADMIN — AMOXICILLIN AND CLAVULANATE POTASSIUM 1 TABLET: 875; 125 TABLET, FILM COATED ORAL at 08:04

## 2024-05-19 ASSESSMENT — PAIN DESCRIPTION - LOCATION: LOCATION: BACK

## 2024-05-19 ASSESSMENT — PAIN SCALES - GENERAL: PAINLEVEL_OUTOF10: 4

## 2024-05-19 NOTE — DISCHARGE SUMMARY
Discharge Diagnosis  Acute on chronic diastolic heart failure (Multi)  Hospice care discharge  COPD exacerbation  Acute on chronic respiratory failure  Hypertension  Hyperlipidemia  Hypokalemia  Deconditioning and debility  Moderate aortic stenosis        Issues Requiring Follow-Up      Discharge Meds     Your medication list        START taking these medications        Instructions Last Dose Given Next Dose Due   guaiFENesin 1,200 mg tablet extended release 12hr  Commonly known as: Mucinex      Take 1 tablet (1,200 mg) by mouth 2 times a day for 5 days. Do not crush, chew, or split.       morphine 20 mg/mL concentrated oral solution      Take 0.5 mL (10 mg) by mouth every 4 hours if needed for severe pain (7 - 10) (Pain and respiratory distress).       tiotropium 2.5 mcg/actuation inhaler  Commonly known as: Spiriva Respimat      Inhale 2 puffs once daily.              CHANGE how you take these medications        Instructions Last Dose Given Next Dose Due   albuterol 90 mcg/actuation inhaler  Commonly known as: Ventolin HFA  What changed: You were already taking a medication with the same name, and this prescription was added. Make sure you understand how and when to take each.      Inhale 2 puffs every 4 hours if needed for wheezing or shortness of breath.       albuterol 90 mcg/actuation inhaler  What changed: Another medication with the same name was added. Make sure you understand how and when to take each.      Inhale 2 puffs every 6 hours if needed for wheezing.       furosemide 40 mg tablet  Commonly known as: Lasix  What changed: how much to take      Take 1 tablet (40 mg) by mouth once daily.       predniSONE 10 mg tablet  Commonly known as: Deltasone  Start taking on: May 19, 2024  What changed:   See the new instructions.  Another medication with the same name was removed. Continue taking this medication, and follow the directions you see here.      Take 4 tablets (40 mg) by mouth once daily for 2 days,  THEN 3 tablets (30 mg) once daily for 2 days, THEN 2 tablets (20 mg) once daily for 2 days, THEN 1 tablet (10 mg) once daily for 2 days.       Symbicort 160-4.5 mcg/actuation inhaler  Generic drug: budesonide-formoteroL  What changed: Another medication with the same name was added. Make sure you understand how and when to take each.           budesonide-formoteroL 80-4.5 mcg/actuation inhaler  Commonly known as: Symbicort  What changed: You were already taking a medication with the same name, and this prescription was added. Make sure you understand how and when to take each.      Inhale 2 puffs 2 times a day. Rinse mouth with water after use to reduce aftertaste and incidence of candidiasis. Do not swallow.              CONTINUE taking these medications        Instructions Last Dose Given Next Dose Due   All Day Allergy (cetirizine) 10 mg tablet  Generic drug: cetirizine           Atrovent HFA 17 mcg/actuation inhaler  Generic drug: ipratropium           benzonatate 200 mg capsule  Commonly known as: Tessalon      Take 1 capsule (200 mg) by mouth 3 times a day as needed for cough. Do not crush or chew.       hydrOXYzine HCL 25 mg tablet  Commonly known as: Atarax      Take 0.5 tablets (12.5 mg) by mouth every 8 hours if needed for anxiety.       metoprolol tartrate 25 mg tablet  Commonly known as: Lopressor      TAKE ONE AND ONE-HALF TABLETS TWICE A DAY       PreserVision AREDS 2,148 mcg-113 mg-45 mg-17.4mg tablet  Generic drug: vitamins A,C,E-zinc-copper           roflumilast 500 mcg tablet  Commonly known as: Daliresp      Take 1 tablet (500 mcg) by mouth once daily.              STOP taking these medications      Breztri Aerosphere 160-9-4.8 mcg/actuation HFA aerosol inhaler  Generic drug: budesonide-glycopyr-formoterol        cefuroxime 250 mg tablet  Commonly known as: Ceftin        lactobacillus acidophilus tablet tablet                  Where to Get Your Medications        These medications were sent to   [facility]  [address] [address]      Hours: 8AM to 6PM Mon-Fri, 8AM to 4PM Sat-Sun Phone: [phone]   albuterol 90 mcg/actuation inhaler  albuterol 90 mcg/actuation inhaler  budesonide-formoteroL 80-4.5 mcg/actuation inhaler  furosemide 40 mg tablet  guaiFENesin 1,200 mg tablet extended release 12hr  morphine 20 mg/mL concentrated oral solution  predniSONE 10 mg tablet  tiotropium 2.5 mcg/actuation inhaler         Test Results Pending At Discharge  Pending Labs       No current pending labs.            Hospital Course  Irish Davis is a 79 y.o. female with PMHx s/f HTN, HLD, moderate aortic stenosis, chronic bilateral lower extremity edema (on Lasix), COPD with chronic hypoxic respiratory failure (baseline 4-4.5 L nasal cannula), presenting with worsening shortness of breath, dyspnea on exertion, cough (productive - green sputum), wheezing, congestion, and generalized weakness. Patient and family report that over the last few days, she has gotten significantly more weak to the point where she is not really able to get up and out of bed; however, they note that this is exacerbated by a significant amount of aforementioned respiratory symptoms. She has not had any significant sick contacts or exposures; however, she has been admitted multiple times under similar circumstances since about November of last year. Has been taking home medications as prescribed, but noticed some mild increase in lower extremity edema from baseline. Has not had much of an appetite since onset of symptoms, but no significant nausea/vomiting/abdominal pain/diarrhea. She admits to some chest tightness when coughing and having increased work of breathing when trying to ambulate. She is feeling better since presentation to the emergency department, but still generally unwell.  She is in no acute distress sitting in bed with multiple loved ones at the bedside. 93% 4 L nasal cannula (patient was increased to  6 L nasal cannula from her baseline of 4-4.5 for comfort, no documented hypoxia on her oxygen). High-sensitivity troponin 79-41-eqxmvu pending.  VBG: pH 7.41, pCO2 68, calculated bicarb 43.1.  Venous lactate 2.1-1.4. ECG is without overt acute ischemic changes. CXR with chronic interstitial lung dz/emphysema without overt acute process. Lipid panel with elevated , cholesterol 240. HSTI 74-84-57-49. A1c 5.6. She is agreeable to DNR/DNI but would be ok with BIPAP. Patient met with palliative care on 5/16/24, she and her daughter are in agreement with hospice care at home. Daughter needs another day or two as they are working on arrangements for the patients  given he reportedly has advanced dementia. Patient will continue DNR-CCA for now and start DNR-CCO upon dc to home, hopefully over the weekend.      5/15/2024: No acute events overnight. Vitals stable, 99% on 2L, RR 24. Labs largely unremarkable.  Has chronic hypercarbia. Will ask for pulmonology recommendations regarding her recurrent COPD exacerbations.   Patient appears to have slight increase work of breathing today, just got her lasix and improved upon recheck evaluation, will continue IV diuresis. Ordered 1x dose oral morphine but not given as she improved. She is agreeable to DNR/DNI but would be ok with BIPAP. She wants assistance making her daughter her POA as her  has dementia per her report. Edgar ask for palliative care meeting.     5/16/2024: No acute events overnight. Vitals stable, 93% on 6L, RR 20. Labs largely unremarkable. Has chronic hypercarbia. Slight leukocytosis from steroids. Continues on IV lasix 40 BID. Pulm added acapella and scheduled mucinex  Palliative care to arrange family meeting. Had a long discussion with patient today regarding palliative care vs hospice, she states her goals align more with hospice but would like her daughter involved. Her main worry isnt about herself but regarding her  who has  advanced dementia per her report and is worried this is difficult on her daughter as she is helping care for them both.      5/17/2024: No acute events overnight. Vitals stable, 100% on 5L, RR 18. Labs largely unchanged. Has chronic hypercarbia. Slight leukocytosis from steroids. Continues on IV lasix 40 BID, still has some ankle edema and cr tolerating so will continue for 1 more day, likely can transition to orals tomorrow.   Patient met with palliative care yesterday, goals now to consult hospice and plan dc to home with [facility], hopefully over the weekend.     5/18: Patient was seen and examined.  Saturating well on 4L nasal cannula oxygen which is her home baseline.  IV Lasix changed to p.o. per cardiology.  Plan is to discharge home tomorrow with home hospice once home equipment is delivered by this evening.  Discussed extensively with patient's daughter at bedside.    5/19: Patient was seen and examined.  She was discharged in stable condition home with home hospice.      The discharge process took about 40 minutes    Pertinent Physical Exam At Time of Discharge  Physical Exam  Constitutional:       General: She is not in acute distress.     Appearance: Normal appearance.      Comments: Thin elderly female  Sitting upright in bedside chair   HENT:      Head: Normocephalic and atraumatic.      Right Ear: External ear normal.      Left Ear: External ear normal.      Nose: Nose normal.      Mouth/Throat:      Mouth: Mucous membranes are moist.      Pharynx: Oropharynx is clear.   Eyes:      Extraocular Movements: Extraocular movements intact.      Conjunctiva/sclera: Conjunctivae normal.      Pupils: Pupils are equal, round, and reactive to light.   Cardiovascular:      Rate and Rhythm: Normal rate and regular rhythm.      Pulses: Normal pulses.      Heart sounds: Normal heart sounds.   Pulmonary:      Effort: No respiratory distress.      Breath sounds: Rales present. No wheezing or rhonchi.      Comments: NC in  place  Abdominal:      General: Bowel sounds are normal.      Palpations: Abdomen is soft.      Tenderness: There is no abdominal tenderness. There is no right CVA tenderness, left CVA tenderness, guarding or rebound.   Musculoskeletal:         General: No swelling. Normal range of motion.      Cervical back: Normal range of motion and neck supple.   Skin:     General: Skin is warm and dry.      Capillary Refill: Capillary refill takes less than 2 seconds.      Findings: No lesion or rash.   Neurological:      General: No focal deficit present.      Mental Status: She is alert and oriented to person, place, and time. Mental status is at baseline.   Psychiatric:         Mood and Affect: Mood normal.         Behavior: Behavior normal.      Outpatient Follow-Up  Future Appointments   Date Time Provider Department [place]   6/14/2024  2:15 PM Christian Kendall MD [id number] [facility]   7/15/2024  2:40 PM Liu Byrd MD [id number] [facility]         Ortiz Marshall MD

## 2024-05-19 NOTE — NURSING NOTE
RN Hospice Note    Irish Davis is a Hospice Patient.   Hospice terminal diagnosis: COPD, heart failure   Physician: Ortiz Marshall MD   Visit type: discharge     Comments/recommendations:     Discharge Planning:  Patient to be discharged to home    The following is to be completed:  Discharge order: done  State DNR signed by MD: done in discharge [facility]  Nursing facility referral/transfer form: na  Medication reconciliation: done  PAS/RR or convalescent stay form: na  Prescriptions for al narcotics/new medications: meds to beds delivered   Transportation: [facility] ambulance scheduled for  at 1400  Other: daughter took belongings home with her    Plan of care reviewed with patient/family members Wilton Davis  Plan of care reviewed with hospital staff members: Nikki HAILE     Please notify [facility] of any changes in condition. Thank you.  Office: [phone] (8 am-6:30 pm M-F and 8 am-4:30 pm weekends and holidays)   [phone] (6:30 pm-8 am M-F and 4:30 pm-8 am weekends and holidays)    Debbie Patterson RN

## 2024-05-19 NOTE — CARE PLAN
Problem: Pain  Goal: My pain/discomfort is manageable  Outcome: Progressing     Problem: Safety  Goal: Patient will be injury free during hospitalization  Outcome: Progressing  Goal: I will remain free of falls  Outcome: Progressing     Problem: Daily Care  Goal: Daily care needs are met  Outcome: Progressing     Problem: Psychosocial Needs  Goal: Demonstrates ability to cope with hospitalization/illness  Outcome: Progressing  Goal: Collaborate with me, my family, and caregiver to identify my specific goals  Outcome: Progressing     Problem: Discharge Barriers  Goal: My discharge needs are met  Outcome: Progressing     Problem: Fall/Injury  Goal: Not fall by end of shift  Outcome: Progressing  Goal: Be free from injury by end of the shift  Outcome: Progressing  Goal: Verbalize understanding of personal risk factors for fall in the hospital  Outcome: Progressing  Goal: Verbalize understanding of risk factor reduction measures to prevent injury from fall in the home  Outcome: Progressing  Goal: Use assistive devices by end of the shift  Outcome: Progressing  Goal: Pace activities to prevent fatigue by end of the shift  Outcome: Progressing     Problem: Respiratory  Goal: Clear secretions with interventions this shift  Outcome: Progressing  Goal: Minimize anxiety/maximize coping throughout shift  Outcome: Progressing  Goal: Minimal/no exertional discomfort or dyspnea this shift  Outcome: Progressing  Goal: No signs of respiratory distress (eg. Use of accessory muscles. Peds grunting)  Outcome: Progressing  Goal: Patent airway maintained this shift  Outcome: Progressing  Goal: Tolerate mechanical ventilation evidenced by VS/agitation level this shift  Outcome: Progressing  Goal: Tolerate pulmonary toileting this shift  Outcome: Progressing  Goal: Verbalize decreased shortness of breath this shift  Outcome: Progressing  Goal: Wean oxygen to maintain O2 saturation per order/standard this shift  Outcome:  Progressing  Goal: Increase self care and/or family involvement in next 24 hours  Outcome: Progressing     Problem: Skin  Goal: Decreased wound size/increased tissue granulation at next dressing change  Outcome: Progressing  Flowsheets (Taken 5/18/2024 2202)  Decreased wound size/increased tissue granulation at next dressing change: Promote sleep for wound healing  Goal: Participates in plan/prevention/treatment measures  Outcome: Progressing  Goal: Prevent/manage excess moisture  Outcome: Progressing  Goal: Prevent/minimize sheer/friction injuries  Outcome: Progressing  Goal: Promote/optimize nutrition  Outcome: Progressing  Goal: Promote skin healing  Outcome: Progressing     Problem: Nutrition  Goal: Oral intake greater than 50%  Outcome: Progressing  Goal: Consume prescribed supplement  Outcome: Progressing  Goal: Lab values WNL  Outcome: Progressing  Goal: Promote healing  Outcome: Progressing  Goal: Maintain stable weight  Outcome: Progressing

## 2024-05-21 NOTE — TELEPHONE ENCOUNTER
Faxed and mailed to pt     Instructed to call immediately if any new distortion, blurring, decreased vision or eye pain.

## 2024-05-22 LAB
ATRIAL RATE: 140 BPM
ATRIAL RATE: 175 BPM
ATRIAL RATE: 87 BPM
ATRIAL RATE: 91 BPM
P AXIS: 74 DEGREES
P AXIS: 81 DEGREES
P AXIS: 84 DEGREES
P AXIS: 99 DEGREES
PR INTERVAL: 107 MS
PR INTERVAL: 119 MS
PR INTERVAL: 126 MS
PR INTERVAL: 128 MS
Q ONSET: 249 MS
Q ONSET: 249 MS
Q ONSET: 251 MS
Q ONSET: 253 MS
QRS COUNT: 12 BEATS
QRS COUNT: 13 BEATS
QRS COUNT: 14 BEATS
QRS COUNT: 22 BEATS
QRS DURATION: 104 MS
QRS DURATION: 178 MS
QRS DURATION: 82 MS
QRS DURATION: 87 MS
QT INTERVAL: 355 MS
QT INTERVAL: 359 MS
QT INTERVAL: 403 MS
QT INTERVAL: 418 MS
QTC CALCULATION(BAZETT): 449 MS
QTC CALCULATION(BAZETT): 493 MS
QTC CALCULATION(BAZETT): 495 MS
QTC CALCULATION(BAZETT): 581 MS
QTC FREDERICIA: 417 MS
QTC FREDERICIA: 461 MS
QTC FREDERICIA: 467 MS
QTC FREDERICIA: 493 MS
R AXIS: 0 DEGREES
R AXIS: 82 DEGREES
R AXIS: 84 DEGREES
R AXIS: 88 DEGREES
T AXIS: -82 DEGREES
T AXIS: 27 DEGREES
T AXIS: 57 DEGREES
T AXIS: 67 DEGREES
T OFFSET: 427 MS
T OFFSET: 432 MS
T OFFSET: 453 MS
T OFFSET: 458 MS
VENTRICULAR RATE: 161 BPM
VENTRICULAR RATE: 84 BPM
VENTRICULAR RATE: 90 BPM
VENTRICULAR RATE: 94 BPM

## 2024-05-22 NOTE — DOCUMENTATION CLARIFICATION NOTE
"    PATIENT:               ROMULO DOHERTY  ACCT #:                  [id number]  MRN:                       [id number]  :                       1945  ADMIT DATE:       2024 10:17 AM  DISCH DATE:        2024 1:17 PM  RESPONDING PROVIDER #:        [id number]          PROVIDER RESPONSE TEXT:    Type II MI    CDI QUERY TEXT:    Clarification        Instruction:    Based on your assessment of the patient and the clinical information, please provide the requested documentation by clicking on the appropriate radio button and enter any additional information if prompted.    Question: Is there a diagnosis indicative of the patient elevated Troponins and symptoms    When answering this query, please exercise your independent professional judgment. The fact that a question is being asked, does not imply that any particular answer is desired or expected.    The patient's clinical indicators include:  Clinical Information:  -  78yo female admitted with AOC dCHF, hypokalemia, severe malnutrition and elevated troponin. PMH includes HTN, HLD, moderate aortic stenosis, chronic BLE edema on Lasix, COPD/ CRF hypoxic on 4-4.5 L O2, exsmoker and BMI less than 19.    Clinical Indicators:  - ED Dr Dale  : \" Twelve-lead EKG performed and independently interpreted by me as showing sinus rhythm at a rate of 90 with multiforme PVCs QRS duration of 82 borderline right axis deviation borderline short AR interval no signs of acute ischemia ST segments are flat without any elevation. \"    - Dr Marshall  : \" Elevated troponin/demand ischemia/suspected type II MI. Patient is presenting with an initial high-sensitivity troponin of 74, up trended to 84. Her ECG is nonrevealing for acute ischemic changes and she is not complaining of any chest pain or anginal equivalents. I suspect that this is demand mediated in setting of her respiratory status and increased work of breathing, likely degree of diastolic dysfunction, but she will be " "continued on aspirin, statin, beta-blocker. Lipids/A1c as above. Unless interval development of chest pain or anginal symptoms, significant increase in troponin, will hold on heparinization. \"    Treatment: Cardiology consult.    Risk Factors: HTN, HLD, aortic stenosis, CHF, COPD, CRF hypoxic.  Options provided:  -- Type II MI  -- Non-ischemic myocardial injury  -- Troponin elevation due to other, Please specify additional information below  -- Other - I will add my own diagnosis  -- Refer to Clinical Documentation Reviewer    Query created by: Elisa Tariq on 5/22/2024 10:20 AM      Electronically signed by:  MY WEN MD 5/22/2024 3:37 PM          "

## 2024-06-14 ENCOUNTER — APPOINTMENT (OUTPATIENT)
Dept: CARDIOLOGY | Facility: CLINIC | Age: 79
End: 2024-06-14
Payer: COMMERCIAL

## 2024-07-15 ENCOUNTER — APPOINTMENT (OUTPATIENT)
Dept: PULMONOLOGY | Facility: HOSPITAL | Age: 79
End: 2024-07-15
Payer: COMMERCIAL

## 2024-10-10 ENCOUNTER — TELEPHONE (OUTPATIENT)
Dept: PRIMARY CARE | Facility: CLINIC | Age: 79
End: 2024-10-10
Payer: COMMERCIAL

## (undated) DEVICE — DRILL BIT, 1.8MM W/DEPTH MARKER/QC/110MM

## (undated) DEVICE — IRRIGATION SET, CYSTOSCOPY, REGULATING CLAMP, STRAIGHT, 81 IN

## (undated) DEVICE — TUBING, NON COND, 6MM X 20

## (undated) DEVICE — CUFF, TOURNIQUET, 12 X 3, DUAL PORT/SNGL BLADDER, DISP, LF

## (undated) DEVICE — BIT, DRILL, QUICK-COUPLING, 2 X 100 MM, STAINLESS STEEL

## (undated) DEVICE — Device

## (undated) DEVICE — IMPLANTABLE DEVICE: Type: IMPLANTABLE DEVICE | Site: WRIST | Status: NON-FUNCTIONAL

## (undated) DEVICE — DRAPE, C-ARM, FLUROSCAN, MINI